# Patient Record
Sex: FEMALE | Race: WHITE | Employment: UNEMPLOYED | URBAN - NONMETROPOLITAN AREA
[De-identification: names, ages, dates, MRNs, and addresses within clinical notes are randomized per-mention and may not be internally consistent; named-entity substitution may affect disease eponyms.]

---

## 2018-12-05 ENCOUNTER — OFFICE VISIT (OUTPATIENT)
Dept: FAMILY MEDICINE CLINIC | Age: 38
End: 2018-12-05
Payer: MEDICAID

## 2018-12-05 VITALS
HEIGHT: 65 IN | RESPIRATION RATE: 14 BRPM | BODY MASS INDEX: 32.59 KG/M2 | HEART RATE: 76 BPM | DIASTOLIC BLOOD PRESSURE: 74 MMHG | SYSTOLIC BLOOD PRESSURE: 106 MMHG | WEIGHT: 195.6 LBS

## 2018-12-05 DIAGNOSIS — Z00.00 WELL ADULT EXAM: Primary | ICD-10-CM

## 2018-12-05 DIAGNOSIS — F32.A DEPRESSION, UNSPECIFIED DEPRESSION TYPE: ICD-10-CM

## 2018-12-05 DIAGNOSIS — M32.9 SYSTEMIC LUPUS ERYTHEMATOSUS, UNSPECIFIED SLE TYPE, UNSPECIFIED ORGAN INVOLVEMENT STATUS (HCC): ICD-10-CM

## 2018-12-05 DIAGNOSIS — M25.512 CHRONIC LEFT SHOULDER PAIN: ICD-10-CM

## 2018-12-05 DIAGNOSIS — J45.30 MILD PERSISTENT ASTHMA WITHOUT COMPLICATION: ICD-10-CM

## 2018-12-05 DIAGNOSIS — G89.29 CHRONIC LEFT SHOULDER PAIN: ICD-10-CM

## 2018-12-05 PROCEDURE — G8484 FLU IMMUNIZE NO ADMIN: HCPCS | Performed by: FAMILY MEDICINE

## 2018-12-05 PROCEDURE — G8427 DOCREV CUR MEDS BY ELIG CLIN: HCPCS | Performed by: FAMILY MEDICINE

## 2018-12-05 PROCEDURE — 99385 PREV VISIT NEW AGE 18-39: CPT | Performed by: FAMILY MEDICINE

## 2018-12-05 PROCEDURE — 4004F PT TOBACCO SCREEN RCVD TLK: CPT | Performed by: FAMILY MEDICINE

## 2018-12-05 PROCEDURE — 99203 OFFICE O/P NEW LOW 30 MIN: CPT | Performed by: FAMILY MEDICINE

## 2018-12-05 PROCEDURE — G8417 CALC BMI ABV UP PARAM F/U: HCPCS | Performed by: FAMILY MEDICINE

## 2018-12-05 RX ORDER — PREDNISONE 20 MG/1
TABLET ORAL
Qty: 15 TABLET | Refills: 0 | Status: SHIPPED | OUTPATIENT
Start: 2018-12-05 | End: 2018-12-14

## 2018-12-05 RX ORDER — IBUPROFEN 600 MG/1
600 TABLET ORAL EVERY 6 HOURS PRN
Qty: 360 TABLET | Refills: 3 | Status: SHIPPED | OUTPATIENT
Start: 2018-12-05 | End: 2019-05-21

## 2018-12-05 RX ORDER — ALPRAZOLAM 0.25 MG/1
0.25 TABLET ORAL NIGHTLY PRN
COMMUNITY
End: 2019-03-01 | Stop reason: ALTCHOICE

## 2018-12-05 RX ORDER — HYDROXYCHLOROQUINE SULFATE 200 MG/1
200 TABLET, FILM COATED ORAL DAILY
Qty: 90 TABLET | Refills: 1 | Status: SHIPPED | OUTPATIENT
Start: 2018-12-05 | End: 2019-06-11 | Stop reason: SDUPTHER

## 2018-12-05 RX ORDER — IBUPROFEN 600 MG/1
600 TABLET ORAL EVERY 6 HOURS PRN
COMMUNITY
End: 2018-12-05 | Stop reason: SDUPTHER

## 2018-12-05 RX ORDER — ALBUTEROL SULFATE 90 UG/1
2 AEROSOL, METERED RESPIRATORY (INHALATION) EVERY 6 HOURS PRN
Qty: 3 INHALER | Refills: 3 | Status: SHIPPED | OUTPATIENT
Start: 2018-12-05 | End: 2019-03-04 | Stop reason: SDUPTHER

## 2018-12-05 RX ORDER — MELATONIN 10 MG
30 CAPSULE ORAL NIGHTLY PRN
COMMUNITY
End: 2018-12-14 | Stop reason: ALTCHOICE

## 2018-12-05 RX ORDER — ALBUTEROL SULFATE 90 UG/1
2 AEROSOL, METERED RESPIRATORY (INHALATION) EVERY 6 HOURS PRN
COMMUNITY
End: 2018-12-05 | Stop reason: SDUPTHER

## 2018-12-05 ASSESSMENT — ENCOUNTER SYMPTOMS
ABDOMINAL PAIN: 0
CONSTIPATION: 0
NAUSEA: 0
VOMITING: 0
BACK PAIN: 0
BLOOD IN STOOL: 0
RHINORRHEA: 0
EYE PAIN: 0
SORE THROAT: 0
WHEEZING: 0
CHEST TIGHTNESS: 0
SHORTNESS OF BREATH: 0
DIARRHEA: 0
COUGH: 0

## 2018-12-05 ASSESSMENT — PATIENT HEALTH QUESTIONNAIRE - PHQ9
2. FEELING DOWN, DEPRESSED OR HOPELESS: 0
SUM OF ALL RESPONSES TO PHQ9 QUESTIONS 1 & 2: 0
SUM OF ALL RESPONSES TO PHQ QUESTIONS 1-9: 0
SUM OF ALL RESPONSES TO PHQ QUESTIONS 1-9: 0
1. LITTLE INTEREST OR PLEASURE IN DOING THINGS: 0

## 2018-12-05 NOTE — PATIENT INSTRUCTIONS
you take. How can you care for yourself at home? · Follow your asthma action plan so you can manage your symptoms at home. An asthma action plan will help you prevent and control airway reactions and will tell you what to do during an asthma attack. If you do not have an asthma action plan, work with your doctor to build one. · Take your asthma medicine exactly as prescribed. Medicine plays an important role in controlling asthma. Talk to your doctor right away if you have any questions about what to take and how to take it. ? Use your quick-relief medicine when you have symptoms of an attack. Quick-relief medicine often is an albuterol inhaler. Some people need to use quick-relief medicine before they exercise. ? Take your controller medicine every day, not just when you have symptoms. Controller medicine is usually an inhaled corticosteroid. The goal is to prevent problems before they occur. Do not use your controller medicine to try to treat an attack that has already started. It does not work fast enough to help. ? If your doctor prescribed corticosteroid pills to use during an attack, take them as directed. They may take hours to work, but they may shorten the attack and help you breathe better. ? Keep your quick-relief medicine with you at all times. · Talk to your doctor before using other medicines. Some medicines, such as aspirin, can cause asthma attacks in some people. · Check yourself for asthma symptoms to know which step to follow in your action plan. Watch for things like being short of breath, having chest tightness, coughing, and wheezing. Also notice if symptoms wake you up at night or if you get tired quickly when you exercise. · If you have a peak flow meter, use it to check how well you are breathing. This can help you predict when an asthma attack is going to occur. Then you can take medicine to prevent the asthma attack or make it less severe. · See your doctor regularly.  These visits will help you learn more about asthma and what you can do to control it. Your doctor will monitor your treatment to make sure the medicine is helping you. · Keep track of your asthma attacks and your treatment. After you have had an attack, write down what triggered it, what helped end it, and any concerns you have about your asthma action plan. Take your diary when you see your doctor. You can then review your asthma action plan and decide if it is working. · Do not smoke or allow others to smoke around you. Avoid smoky places. Smoking makes asthma worse. If you need help quitting, talk to your doctor about stop-smoking programs and medicines. These can increase your chances of quitting for good. · Learn what triggers an asthma attack for you, and avoid the triggers when you can. Common triggers include colds, smoke, air pollution, dust, pollen, mold, pets, cockroaches, stress, and cold air. · Avoid colds and the flu. Get a pneumococcal vaccine shot. If you have had one before, ask your doctor whether you need a second dose. Get a flu vaccine every fall. If you must be around people with colds or the flu, wash your hands often. When should you call for help? Call 911 anytime you think you may need emergency care. For example, call if:    · You have severe trouble breathing.    Call your doctor now or seek immediate medical care if:    · Your symptoms do not get better after you have followed your asthma action plan.     · You cough up yellow, dark brown, or bloody mucus (sputum).    Watch closely for changes in your health, and be sure to contact your doctor if:    · Your coughing and wheezing get worse.     · You need to use quick-relief medicine on more than 2 days a week (unless it is just for exercise).     · You need help figuring out what is triggering your asthma attacks. Where can you learn more? Go to https://chvivienne.Malwarebytes. org and sign in to your SmartRecruiters account.  Enter P597 in the Search Health Information box to learn more about \"Asthma in Adults: Care Instructions. \"     If you do not have an account, please click on the \"Sign Up Now\" link. Current as of: December 6, 2017  Content Version: 11.8  © 5978-7644 [employer]. Care instructions adapted under license by [facility] ([facility]). If you have questions about a medical condition or this instruction, always ask your healthcare professional. [place] [address] any warranty or liability for your use of this information. Patient Education        Depression Treatment: Care Instructions  Your Care Instructions    Depression is a condition that affects the way you feel, think, and act. It causes symptoms such as low energy, loss of interest in daily activities, and sadness or grouchiness that goes on for a long time. Depression is very common and affects men and women of all ages. Depression is a medical illness caused by changes in the natural chemicals in your brain. It is not a character flaw, and it does not mean that you are a bad or weak person. It does not mean that you are going crazy. It is important to know that depression can be treated. Medicines, counseling, and self-care can all help. Many people do not get help because they are embarrassed or think that they will get over the depression on their own. But some people do not get better without treatment. Follow-up care is a key part of your treatment and safety. Be sure to make and go to all appointments, and call your doctor if you are having problems. It's also a good idea to know your test results and keep a list of the medicines you take. How can you care for yourself at home? Learn about antidepressant medicines  Antidepressant medicines can improve or end the symptoms of depression. You may need to take the medicine for at least 6 months, and often longer. Keep taking your medicine even if you feel better.  If you stop taking it too soon, have these tests. Your doctor can help you decide what is right for you. · Pap test and pelvic exam. Begin Pap tests at age 24. A Pap test is the best way to find cervical cancer. The test often is part of a pelvic exam. Ask how often to have this test.  · Tests for sexually transmitted infections (STIs). Ask whether you should have tests for STIs. You may be at risk if you have sex with more than one person, especially if your partners do not wear condoms. For men  · Tests for sexually transmitted infections (STIs). Ask whether you should have tests for STIs. You may be at risk if you have sex with more than one person, especially if you do not wear a condom. · Testicular cancer exam. Ask your doctor whether you should check your testicles regularly. · Prostate exam. Talk to your doctor about whether you should have a blood test (called a PSA test) for prostate cancer. Experts differ on whether and when men should have this test. Some experts suggest it if you are older than 39 and are -American or have a father or brother who got prostate cancer when he was younger than 72. When should you call for help? Watch closely for changes in your health, and be sure to contact your doctor if you have any problems or symptoms that concern you. Where can you learn more? Go to https://Cabara.healthIntio. org and sign in to your GoWorkaBit account. Enter P072 in the [employer] box to learn more about \"Well Visit, Ages 25 to 48: Care Instructions. \"     If you do not have an account, please click on the \"Sign Up Now\" link. Current as of: March 29, 2018  Content Version: 11.8  © 6033-0379 Healthwise, Incorporated. Care instructions adapted under license by [facility] ([facility]). If you have questions about a medical condition or this instruction, always ask your healthcare professional. [place] [address] any warranty or liability for your use of this information.

## 2018-12-05 NOTE — PROGRESS NOTES
MCG/ACT inhaler, Inhale 2 puffs into the lungs every 6 hours as needed for Wheezing, Disp: , Rfl:     Melatonin 10 MG CAPS, Take 30 mg by mouth nightly as needed, Disp: , Rfl:     Hydroxychloroquine Sulfate (PLAQUENIL PO), Take by mouth, Disp: , Rfl:     ibuprofen (ADVIL;MOTRIN) 600 MG tablet, Take 600 mg by mouth every 6 hours as needed for Pain, Disp: , Rfl:     fluticasone-salmeterol (ADVAIR) 250-50 MCG/DOSE AEPB, Inhale 1 puff into the lungs every 12 hours, Disp: , Rfl:     ALPRAZolam (XANAX) 0.25 MG tablet, Take 0.25 mg by mouth nightly as needed for Sleep. ., Disp: , Rfl:     Lactase (DAIRY-RELIEF PO), Take by mouth, Disp: , Rfl:     RaNITidine HCl (ACID REDUCER PO), Take by mouth, Disp: , Rfl:     Fexofenadine HCl (ALLEGRA ALLERGY PO), Take by mouth, Disp: , Rfl:   Social History     Social History    Marital status: Single     Spouse name: N/A    Number of children: N/A    Years of education: N/A     Occupational History    Not on file. Social History Main Topics    Smoking status: Current Every Day Smoker     Packs/day: 0.50     Years: 15.00    Smokeless tobacco: Never Used    Alcohol use No    Drug use: No    Sexual activity: Yes     Other Topics Concern    Not on file     Social History Narrative    No narrative on file     Family History   Problem Relation Age of Onset    Depression Mother     COPD Father     Asthma Sister        Objective:   Physical Exam   Constitutional: She is oriented to person, place, and time. She appears well-developed and well-nourished. HENT:   Head: Normocephalic and atraumatic. Right Ear: External ear normal.   Left Ear: External ear normal.   Nose: Nose normal.   Mouth/Throat: Oropharynx is clear and moist.   Eyes: Pupils are equal, round, and reactive to light. EOM are normal.   Neck: Neck supple. No thyromegaly present. Cardiovascular: Normal rate, regular rhythm and normal heart sounds.     Pulmonary/Chest: Breath sounds normal. She has no

## 2018-12-14 ENCOUNTER — OFFICE VISIT (OUTPATIENT)
Dept: FAMILY MEDICINE CLINIC | Age: 38
End: 2018-12-14
Payer: MEDICAID

## 2018-12-14 ENCOUNTER — HOSPITAL ENCOUNTER (OUTPATIENT)
Age: 38
Discharge: HOME | End: 2018-12-14
Payer: MEDICAID

## 2018-12-14 ENCOUNTER — HOSPITAL ENCOUNTER (OUTPATIENT)
Dept: GENERAL RADIOLOGY | Age: 38
Discharge: HOME | End: 2018-12-14
Payer: MEDICAID

## 2018-12-14 VITALS
SYSTOLIC BLOOD PRESSURE: 102 MMHG | WEIGHT: 193.2 LBS | BODY MASS INDEX: 32.15 KG/M2 | DIASTOLIC BLOOD PRESSURE: 68 MMHG | HEART RATE: 76 BPM | RESPIRATION RATE: 16 BRPM

## 2018-12-14 DIAGNOSIS — M25.512 CHRONIC LEFT SHOULDER PAIN: ICD-10-CM

## 2018-12-14 DIAGNOSIS — M51.36 DDD (DEGENERATIVE DISC DISEASE), LUMBAR: ICD-10-CM

## 2018-12-14 DIAGNOSIS — R07.9 CHEST PAIN, UNSPECIFIED TYPE: ICD-10-CM

## 2018-12-14 DIAGNOSIS — M51.36 DDD (DEGENERATIVE DISC DISEASE), LUMBAR: Primary | ICD-10-CM

## 2018-12-14 DIAGNOSIS — G47.9 SLEEP DISTURBANCE: ICD-10-CM

## 2018-12-14 DIAGNOSIS — G89.29 CHRONIC LEFT SHOULDER PAIN: ICD-10-CM

## 2018-12-14 PROCEDURE — G8484 FLU IMMUNIZE NO ADMIN: HCPCS | Performed by: FAMILY MEDICINE

## 2018-12-14 PROCEDURE — 99214 OFFICE O/P EST MOD 30 MIN: CPT | Performed by: FAMILY MEDICINE

## 2018-12-14 PROCEDURE — 93000 ELECTROCARDIOGRAM COMPLETE: CPT | Performed by: FAMILY MEDICINE

## 2018-12-14 PROCEDURE — G8427 DOCREV CUR MEDS BY ELIG CLIN: HCPCS | Performed by: FAMILY MEDICINE

## 2018-12-14 PROCEDURE — G8417 CALC BMI ABV UP PARAM F/U: HCPCS | Performed by: FAMILY MEDICINE

## 2018-12-14 PROCEDURE — 73030 X-RAY EXAM OF SHOULDER: CPT

## 2018-12-14 PROCEDURE — 4004F PT TOBACCO SCREEN RCVD TLK: CPT | Performed by: FAMILY MEDICINE

## 2018-12-14 PROCEDURE — 72100 X-RAY EXAM L-S SPINE 2/3 VWS: CPT

## 2018-12-14 RX ORDER — DICLOFENAC SODIUM 75 MG/1
75 TABLET, DELAYED RELEASE ORAL 2 TIMES DAILY
Qty: 60 TABLET | Refills: 0 | Status: SHIPPED | OUTPATIENT
Start: 2018-12-14 | End: 2019-03-30 | Stop reason: SDUPTHER

## 2018-12-14 RX ORDER — TRAZODONE HYDROCHLORIDE 50 MG/1
50-100 TABLET ORAL NIGHTLY
Qty: 60 TABLET | Refills: 0 | Status: SHIPPED | OUTPATIENT
Start: 2018-12-14 | End: 2019-03-04 | Stop reason: SDUPTHER

## 2018-12-14 RX ORDER — PREDNISONE 20 MG/1
TABLET ORAL
Qty: 15 TABLET | Refills: 0 | Status: SHIPPED | OUTPATIENT
Start: 2018-12-14 | End: 2019-02-01 | Stop reason: ALTCHOICE

## 2018-12-14 ASSESSMENT — ENCOUNTER SYMPTOMS
ABDOMINAL PAIN: 0
COUGH: 0
BLOOD IN STOOL: 0
SHORTNESS OF BREATH: 0
SORE THROAT: 0
CHEST TIGHTNESS: 1
RHINORRHEA: 0
VOMITING: 0
EYE PAIN: 0
DIARRHEA: 0
BACK PAIN: 1
NAUSEA: 0
CONSTIPATION: 0
WHEEZING: 0

## 2018-12-14 NOTE — PATIENT INSTRUCTIONS
Patient Education        Learning About Degenerative Disc Disease  What is degenerative disc disease? Degenerative disc disease is not really a disease. It's a term used to describe the normal changes in your spinal discs as you age. Spinal discs are small, spongy discs that separate the bones (vertebrae) that make up the spine. The discs act as shock absorbers for the spine, so it can flex, bend, and twist.  Degenerative disc disease can take place in one or more places along the spine. It most often occurs in the discs in the lower back and the neck. What causes it? As we age, our spinal discs break down, or degenerate. This breakdown causes the symptoms of degenerative disc disease in some people. When the discs break down, they can lose fluid and dry out, and their outer layers can have tiny cracks or tears. This leads to less padding and less space between the bones in the spine. The body reacts to this by making bony growths on the spine called bone spurs. These spurs can press on the spinal nerve roots or spinal cord. This can cause pain and can affect how well the nerves work. What are the symptoms? Many people with degenerative disc disease have no pain. But others have severe pain or other symptoms that limit their activities. Some of the most common symptoms are:  · Pain in the back or neck. Where the pain occurs depends on which discs are affected. · Pain that gets worse when you move, such as bending over, reaching up, or twisting. · Pain that may occur in the rear end (buttocks), arm, or leg if a nerve is pinched. · Numbness or tingling in your arm or leg. How is it diagnosed? A doctor can often diagnose degenerative disc disease while doing a physical exam. If your exam shows no signs of a serious condition, imaging tests (such as an X-ray) aren't likely to help your doctor find the cause of your symptoms.   Sometimes degenerative disc disease is found when an X-ray is taken for another reason, such as an injury or other health problem. But even if the doctor finds degenerative disc disease, that doesn't always mean that you will have symptoms. How is it treated? There are several things you can do at home to manage pain from this problem. · To relieve pain, use ice or heat (whichever feels better) on the affected area. ? Put ice or a cold pack on the area for 10 to 20 minutes at a time. Put a thin cloth between the ice and your skin. ? Put a warm water bottle, a heating pad set on low, or a warm cloth on your back. Put a thin cloth between the heating pad and your skin. Do not go to sleep with a heating pad on your skin. · Ask your doctor if you can take acetaminophen (such as Tylenol) or nonsteroidal anti-inflammatory drugs, such as ibuprofen or naproxen. Your doctor can prescribe stronger medicines if needed. Be safe with medicines. Read and follow all instructions on the label. · Get some exercise every day. Exercise is one of the best ways to help your back feel better and stay better. It's best to start each exercise slowly. You may notice a little soreness, and that's okay. But if an exercise makes your pain worse, stop doing it. Here are things you can try:  ? Walking. It's the simplest and maybe the best activity for your back. It gets your blood moving and helps your muscles stay strong. ? Exercises that gently stretch and strengthen your stomach, back, and leg muscles. The stronger those muscles are, the better they're able to protect your back. If you have constant or severe pain in your back or spine, you may need other treatments, such as physical therapy. In some cases, your doctor may suggest surgery. Follow-up care is a key part of your treatment and safety. Be sure to make and go to all appointments, and call your doctor if you are having problems. It's also a good idea to know your test results and keep a list of the medicines you take. Where can you learn more?   Go to But if it doesn't, do your reading elsewhere in the house. Don't watch TV in bed. · Be sure your bed is big enough to stretch out comfortably, especially if you have a sleep partner. · Keep your bedroom quiet, dark, and cool. Use curtains, blinds, or a sleep mask to block out light. To block out noise, use earplugs, soothing music, or a \"white noise\" machine. Your evening and bedtime routine  · Create a relaxing bedtime routine. You might want to take a warm shower or bath, listen to soothing music, or drink a cup of noncaffeinated tea. · Go to bed at the same time every night. And get up at the same time every morning, even if you feel tired. What to avoid  · Limit caffeine (coffee, tea, caffeinated sodas) during the day, and don't have any for at least 4 to 6 hours before bedtime. · Don't drink alcohol before bedtime. Alcohol can cause you to wake up more often during the night. · Don't smoke or use tobacco, especially in the evening. Nicotine can keep you awake. · Don't take naps during the day, especially close to bedtime. · Don't lie in bed awake for too long. If you can't fall asleep, or if you wake up in the middle of the night and can't get back to sleep within 15 minutes or so, get out of bed and go to another room until you feel sleepy. · Don't take medicine right before bed that may keep you awake or make you feel hyper or energized. Your doctor can tell you if your medicine may do this and if you can take it earlier in the day. If you can't sleep  · Imagine yourself in a peaceful, pleasant scene. Focus on the details and feelings of being in a place that is relaxing. · Get up and do a quiet or boring activity until you feel sleepy. · Don't drink any liquids after 6 p.m. if you wake up often because you have to go to the bathroom. Where can you learn more? Go to https://sabine.iconDial. org and sign in to your [employer] account.  Enter I969 in the [facility] box to

## 2018-12-17 ENCOUNTER — TELEPHONE (OUTPATIENT)
Dept: FAMILY MEDICINE CLINIC | Age: 38
End: 2018-12-17

## 2018-12-17 NOTE — TELEPHONE ENCOUNTER
----- Message from Michelle Cain MD sent at 12/14/2018  3:17 PM EST -----  Left shoulder series is normal.

## 2019-01-10 ENCOUNTER — OFFICE VISIT (OUTPATIENT)
Dept: FAMILY MEDICINE CLINIC | Age: 39
End: 2019-01-10
Payer: MEDICAID

## 2019-01-10 VITALS
WEIGHT: 193.8 LBS | DIASTOLIC BLOOD PRESSURE: 72 MMHG | BODY MASS INDEX: 32.25 KG/M2 | HEART RATE: 64 BPM | SYSTOLIC BLOOD PRESSURE: 112 MMHG | RESPIRATION RATE: 12 BRPM

## 2019-01-10 DIAGNOSIS — K52.9 ACUTE GASTROENTERITIS: Primary | ICD-10-CM

## 2019-01-10 LAB
BASOPHILS # BLD: 0.9 %
BASOPHILS ABSOLUTE: 0.1 THOU/MM3 (ref 0–0.1)
EOSINOPHIL # BLD: 3 %
EOSINOPHILS ABSOLUTE: 0.2 THOU/MM3 (ref 0–0.4)
ERYTHROCYTE [DISTWIDTH] IN BLOOD BY AUTOMATED COUNT: 12.4 % (ref 11.5–14.5)
ERYTHROCYTE [DISTWIDTH] IN BLOOD BY AUTOMATED COUNT: 40.1 FL (ref 35–45)
HCT VFR BLD CALC: 41 % (ref 37–47)
HEMOGLOBIN: 14.3 GM/DL (ref 12–16)
IMMATURE GRANS (ABS): 0.01 THOU/MM3 (ref 0–0.07)
IMMATURE GRANULOCYTES: 0.1 %
LYMPHOCYTES # BLD: 24.6 %
LYMPHOCYTES ABSOLUTE: 1.9 THOU/MM3 (ref 1–4.8)
MCH RBC QN AUTO: 31 PG (ref 26–33)
MCHC RBC AUTO-ENTMCNC: 34.9 GM/DL (ref 32.2–35.5)
MCV RBC AUTO: 88.9 FL (ref 81–99)
MONOCYTES # BLD: 5.3 %
MONOCYTES ABSOLUTE: 0.4 THOU/MM3 (ref 0.4–1.3)
NUCLEATED RED BLOOD CELLS: 0 /100 WBC
PLATELET # BLD: 287 THOU/MM3 (ref 130–400)
PMV BLD AUTO: 11.4 FL (ref 9.4–12.4)
RBC # BLD: 4.61 MILL/MM3 (ref 4.2–5.4)
SEG NEUTROPHILS: 66.1 %
SEGMENTED NEUTROPHILS ABSOLUTE COUNT: 5 THOU/MM3 (ref 1.8–7.7)
WBC # BLD: 7.6 THOU/MM3 (ref 4.8–10.8)

## 2019-01-10 PROCEDURE — G8427 DOCREV CUR MEDS BY ELIG CLIN: HCPCS | Performed by: FAMILY MEDICINE

## 2019-01-10 PROCEDURE — G8484 FLU IMMUNIZE NO ADMIN: HCPCS | Performed by: FAMILY MEDICINE

## 2019-01-10 PROCEDURE — 99213 OFFICE O/P EST LOW 20 MIN: CPT | Performed by: FAMILY MEDICINE

## 2019-01-10 PROCEDURE — 96372 THER/PROPH/DIAG INJ SC/IM: CPT | Performed by: FAMILY MEDICINE

## 2019-01-10 PROCEDURE — 36415 COLL VENOUS BLD VENIPUNCTURE: CPT | Performed by: FAMILY MEDICINE

## 2019-01-10 PROCEDURE — 4004F PT TOBACCO SCREEN RCVD TLK: CPT | Performed by: FAMILY MEDICINE

## 2019-01-10 PROCEDURE — G8417 CALC BMI ABV UP PARAM F/U: HCPCS | Performed by: FAMILY MEDICINE

## 2019-01-10 RX ORDER — ONDANSETRON 4 MG/1
4 TABLET, FILM COATED ORAL EVERY 8 HOURS PRN
Qty: 15 TABLET | Refills: 0 | Status: SHIPPED | OUTPATIENT
Start: 2019-01-10 | End: 2019-05-24 | Stop reason: SDUPTHER

## 2019-01-10 RX ORDER — PROMETHAZINE HYDROCHLORIDE 25 MG/ML
50 INJECTION, SOLUTION INTRAMUSCULAR; INTRAVENOUS ONCE
Status: COMPLETED | OUTPATIENT
Start: 2019-01-10 | End: 2019-01-10

## 2019-01-10 RX ADMIN — PROMETHAZINE HYDROCHLORIDE 50 MG: 25 INJECTION, SOLUTION INTRAMUSCULAR; INTRAVENOUS at 15:26

## 2019-01-10 ASSESSMENT — ENCOUNTER SYMPTOMS
BACK PAIN: 0
SORE THROAT: 0
CONSTIPATION: 0
SHORTNESS OF BREATH: 0
RHINORRHEA: 0
NAUSEA: 1
DIARRHEA: 1
EYE PAIN: 0
BLOOD IN STOOL: 0
ABDOMINAL PAIN: 0
VOMITING: 0
COUGH: 0
CHEST TIGHTNESS: 0
WHEEZING: 0

## 2019-01-11 ENCOUNTER — TELEPHONE (OUTPATIENT)
Dept: FAMILY MEDICINE CLINIC | Age: 39
End: 2019-01-11

## 2019-02-01 ENCOUNTER — OFFICE VISIT (OUTPATIENT)
Dept: FAMILY MEDICINE CLINIC | Age: 39
End: 2019-02-01
Payer: MEDICAID

## 2019-02-01 VITALS
SYSTOLIC BLOOD PRESSURE: 122 MMHG | TEMPERATURE: 97.9 F | WEIGHT: 199.4 LBS | RESPIRATION RATE: 16 BRPM | HEART RATE: 100 BPM | HEIGHT: 65 IN | DIASTOLIC BLOOD PRESSURE: 86 MMHG | BODY MASS INDEX: 33.22 KG/M2

## 2019-02-01 DIAGNOSIS — M32.9 SYSTEMIC LUPUS ERYTHEMATOSUS, UNSPECIFIED SLE TYPE, UNSPECIFIED ORGAN INVOLVEMENT STATUS (HCC): ICD-10-CM

## 2019-02-01 DIAGNOSIS — M51.34 DDD (DEGENERATIVE DISC DISEASE), THORACIC: ICD-10-CM

## 2019-02-01 DIAGNOSIS — F32.A DEPRESSION, UNSPECIFIED DEPRESSION TYPE: ICD-10-CM

## 2019-02-01 DIAGNOSIS — J45.40 MODERATE PERSISTENT ASTHMA WITHOUT COMPLICATION: ICD-10-CM

## 2019-02-01 DIAGNOSIS — M51.36 DDD (DEGENERATIVE DISC DISEASE), LUMBAR: Primary | ICD-10-CM

## 2019-02-01 DIAGNOSIS — M50.30 DDD (DEGENERATIVE DISC DISEASE), CERVICAL: ICD-10-CM

## 2019-02-01 PROCEDURE — 4004F PT TOBACCO SCREEN RCVD TLK: CPT | Performed by: FAMILY MEDICINE

## 2019-02-01 PROCEDURE — G8427 DOCREV CUR MEDS BY ELIG CLIN: HCPCS | Performed by: FAMILY MEDICINE

## 2019-02-01 PROCEDURE — G8417 CALC BMI ABV UP PARAM F/U: HCPCS | Performed by: FAMILY MEDICINE

## 2019-02-01 PROCEDURE — 99214 OFFICE O/P EST MOD 30 MIN: CPT | Performed by: FAMILY MEDICINE

## 2019-02-01 PROCEDURE — G8484 FLU IMMUNIZE NO ADMIN: HCPCS | Performed by: FAMILY MEDICINE

## 2019-02-01 ASSESSMENT — ENCOUNTER SYMPTOMS
ABDOMINAL PAIN: 0
SHORTNESS OF BREATH: 1
COUGH: 1
BLOOD IN STOOL: 0
SORE THROAT: 0
RHINORRHEA: 0
EYE PAIN: 0
NAUSEA: 0
VOMITING: 0
CHEST TIGHTNESS: 0
DIARRHEA: 0
WHEEZING: 1
CONSTIPATION: 0
BACK PAIN: 1

## 2019-02-02 ENCOUNTER — APPOINTMENT (OUTPATIENT)
Dept: GENERAL RADIOLOGY | Age: 39
End: 2019-02-02
Payer: MEDICAID

## 2019-02-02 ENCOUNTER — HOSPITAL ENCOUNTER (EMERGENCY)
Age: 39
Discharge: HOME | End: 2019-02-02
Payer: MEDICAID

## 2019-02-02 VITALS
RESPIRATION RATE: 18 BRPM | HEART RATE: 85 BPM | DIASTOLIC BLOOD PRESSURE: 73 MMHG | TEMPERATURE: 97.7 F | OXYGEN SATURATION: 99 % | SYSTOLIC BLOOD PRESSURE: 134 MMHG

## 2019-02-02 DIAGNOSIS — M25.512 ARTHRALGIA OF LEFT SHOULDER REGION: Primary | ICD-10-CM

## 2019-02-02 PROCEDURE — 6360000002 HC RX W HCPCS: Performed by: NURSE PRACTITIONER

## 2019-02-02 PROCEDURE — 96372 THER/PROPH/DIAG INJ SC/IM: CPT

## 2019-02-02 PROCEDURE — 73030 X-RAY EXAM OF SHOULDER: CPT

## 2019-02-02 PROCEDURE — 99283 EMERGENCY DEPT VISIT LOW MDM: CPT

## 2019-02-02 RX ORDER — LIDOCAINE 50 MG/G
OINTMENT TOPICAL
Qty: 50 G | Refills: 0 | Status: SHIPPED | OUTPATIENT
Start: 2019-02-02 | End: 2021-12-22 | Stop reason: ALTCHOICE

## 2019-02-02 RX ORDER — CYCLOBENZAPRINE HCL 10 MG
10 TABLET ORAL 3 TIMES DAILY PRN
Qty: 12 TABLET | Refills: 0 | Status: SHIPPED | OUTPATIENT
Start: 2019-02-02 | End: 2019-02-12

## 2019-02-02 RX ORDER — ORPHENADRINE CITRATE 30 MG/ML
60 INJECTION INTRAMUSCULAR; INTRAVENOUS ONCE
Status: COMPLETED | OUTPATIENT
Start: 2019-02-02 | End: 2019-02-02

## 2019-02-02 RX ORDER — KETOROLAC TROMETHAMINE 30 MG/ML
30 INJECTION, SOLUTION INTRAMUSCULAR; INTRAVENOUS ONCE
Status: COMPLETED | OUTPATIENT
Start: 2019-02-02 | End: 2019-02-02

## 2019-02-02 RX ADMIN — KETOROLAC TROMETHAMINE 30 MG: 30 INJECTION, SOLUTION INTRAMUSCULAR at 02:31

## 2019-02-02 RX ADMIN — ORPHENADRINE CITRATE 60 MG: 30 INJECTION INTRAMUSCULAR; INTRAVENOUS at 02:31

## 2019-02-02 ASSESSMENT — PAIN DESCRIPTION - ORIENTATION: ORIENTATION: LEFT

## 2019-02-02 ASSESSMENT — PAIN DESCRIPTION - LOCATION: LOCATION: SHOULDER

## 2019-02-02 ASSESSMENT — PAIN SCALES - GENERAL: PAINLEVEL_OUTOF10: 5

## 2019-02-04 ENCOUNTER — TELEPHONE (OUTPATIENT)
Dept: FAMILY MEDICINE CLINIC | Age: 39
End: 2019-02-04

## 2019-02-08 ENCOUNTER — HOSPITAL ENCOUNTER (OUTPATIENT)
Dept: PULMONOLOGY | Age: 39
Discharge: HOME | End: 2019-02-08
Payer: MEDICAID

## 2019-02-08 DIAGNOSIS — J45.40 MODERATE PERSISTENT ASTHMA WITHOUT COMPLICATION: ICD-10-CM

## 2019-02-08 PROCEDURE — 2709999900 HC NON-CHARGEABLE SUPPLY

## 2019-02-08 PROCEDURE — 94060 EVALUATION OF WHEEZING: CPT

## 2019-02-08 PROCEDURE — 94726 PLETHYSMOGRAPHY LUNG VOLUMES: CPT

## 2019-02-08 PROCEDURE — 94729 DIFFUSING CAPACITY: CPT

## 2019-02-12 ASSESSMENT — ENCOUNTER SYMPTOMS
BACK PAIN: 0
SHORTNESS OF BREATH: 0

## 2019-02-13 ENCOUNTER — TELEPHONE (OUTPATIENT)
Dept: FAMILY MEDICINE CLINIC | Age: 39
End: 2019-02-13

## 2019-02-13 DIAGNOSIS — R94.2 ABNORMAL PFT: Primary | ICD-10-CM

## 2019-02-15 ENCOUNTER — HOSPITAL ENCOUNTER (OUTPATIENT)
Dept: PHYSICAL THERAPY | Age: 39
Setting detail: THERAPIES SERIES
Discharge: HOME | End: 2019-02-15
Payer: MEDICAID

## 2019-02-15 PROCEDURE — 97750 PHYSICAL PERFORMANCE TEST: CPT

## 2019-02-20 ENCOUNTER — TELEPHONE (OUTPATIENT)
Dept: FAMILY MEDICINE CLINIC | Age: 39
End: 2019-02-20

## 2019-02-22 ENCOUNTER — HOSPITAL ENCOUNTER (OUTPATIENT)
Dept: MRI IMAGING | Age: 39
Discharge: HOME | End: 2019-02-22
Payer: MEDICAID

## 2019-02-22 ENCOUNTER — OFFICE VISIT (OUTPATIENT)
Dept: PSYCHIATRY | Age: 39
End: 2019-02-22
Payer: MEDICAID

## 2019-02-22 VITALS
DIASTOLIC BLOOD PRESSURE: 82 MMHG | HEART RATE: 88 BPM | WEIGHT: 197 LBS | SYSTOLIC BLOOD PRESSURE: 121 MMHG | BODY MASS INDEX: 32.78 KG/M2

## 2019-02-22 DIAGNOSIS — M51.36 DDD (DEGENERATIVE DISC DISEASE), LUMBAR: ICD-10-CM

## 2019-02-22 DIAGNOSIS — M51.34 DDD (DEGENERATIVE DISC DISEASE), THORACIC: ICD-10-CM

## 2019-02-22 DIAGNOSIS — F41.8 OTHER SPECIFIED ANXIETY DISORDERS: ICD-10-CM

## 2019-02-22 DIAGNOSIS — R45.4 IRRITABILITY AND ANGER: ICD-10-CM

## 2019-02-22 DIAGNOSIS — M50.30 DDD (DEGENERATIVE DISC DISEASE), CERVICAL: ICD-10-CM

## 2019-02-22 DIAGNOSIS — F33.1 MDD (MAJOR DEPRESSIVE DISORDER), RECURRENT EPISODE, MODERATE (HCC): Primary | ICD-10-CM

## 2019-02-22 PROCEDURE — 4004F PT TOBACCO SCREEN RCVD TLK: CPT | Performed by: NURSE PRACTITIONER

## 2019-02-22 PROCEDURE — 72146 MRI CHEST SPINE W/O DYE: CPT

## 2019-02-22 PROCEDURE — 90791 PSYCH DIAGNOSTIC EVALUATION: CPT | Performed by: NURSE PRACTITIONER

## 2019-02-22 PROCEDURE — 72141 MRI NECK SPINE W/O DYE: CPT

## 2019-02-22 PROCEDURE — 72148 MRI LUMBAR SPINE W/O DYE: CPT

## 2019-02-26 ENCOUNTER — TELEPHONE (OUTPATIENT)
Dept: FAMILY MEDICINE CLINIC | Age: 39
End: 2019-02-26

## 2019-03-01 ENCOUNTER — OFFICE VISIT (OUTPATIENT)
Dept: PSYCHIATRY | Age: 39
End: 2019-03-01
Payer: MEDICAID

## 2019-03-01 ENCOUNTER — OFFICE VISIT (OUTPATIENT)
Dept: FAMILY MEDICINE CLINIC | Age: 39
End: 2019-03-01
Payer: MEDICAID

## 2019-03-01 VITALS
SYSTOLIC BLOOD PRESSURE: 112 MMHG | DIASTOLIC BLOOD PRESSURE: 80 MMHG | WEIGHT: 201 LBS | BODY MASS INDEX: 33.45 KG/M2 | HEART RATE: 82 BPM

## 2019-03-01 VITALS
WEIGHT: 201.6 LBS | BODY MASS INDEX: 33.55 KG/M2 | RESPIRATION RATE: 16 BRPM | DIASTOLIC BLOOD PRESSURE: 76 MMHG | HEART RATE: 60 BPM | SYSTOLIC BLOOD PRESSURE: 106 MMHG

## 2019-03-01 DIAGNOSIS — R45.4 IRRITABILITY AND ANGER: ICD-10-CM

## 2019-03-01 DIAGNOSIS — F41.8 OTHER SPECIFIED ANXIETY DISORDERS: ICD-10-CM

## 2019-03-01 DIAGNOSIS — G95.0 SYRINX OF SPINAL CORD (HCC): ICD-10-CM

## 2019-03-01 DIAGNOSIS — M32.9 SYSTEMIC LUPUS ERYTHEMATOSUS, UNSPECIFIED SLE TYPE, UNSPECIFIED ORGAN INVOLVEMENT STATUS (HCC): ICD-10-CM

## 2019-03-01 DIAGNOSIS — F43.11 ACUTE POSTTRAUMATIC STRESS DISORDER: ICD-10-CM

## 2019-03-01 DIAGNOSIS — F33.1 MDD (MAJOR DEPRESSIVE DISORDER), RECURRENT EPISODE, MODERATE (HCC): Primary | ICD-10-CM

## 2019-03-01 DIAGNOSIS — M51.36 BULGING LUMBAR DISC: Primary | ICD-10-CM

## 2019-03-01 PROCEDURE — 99214 OFFICE O/P EST MOD 30 MIN: CPT | Performed by: NURSE PRACTITIONER

## 2019-03-01 PROCEDURE — G8427 DOCREV CUR MEDS BY ELIG CLIN: HCPCS | Performed by: FAMILY MEDICINE

## 2019-03-01 PROCEDURE — G8484 FLU IMMUNIZE NO ADMIN: HCPCS | Performed by: FAMILY MEDICINE

## 2019-03-01 PROCEDURE — G8484 FLU IMMUNIZE NO ADMIN: HCPCS | Performed by: NURSE PRACTITIONER

## 2019-03-01 PROCEDURE — 4004F PT TOBACCO SCREEN RCVD TLK: CPT | Performed by: NURSE PRACTITIONER

## 2019-03-01 PROCEDURE — G8417 CALC BMI ABV UP PARAM F/U: HCPCS | Performed by: FAMILY MEDICINE

## 2019-03-01 PROCEDURE — 99214 OFFICE O/P EST MOD 30 MIN: CPT | Performed by: FAMILY MEDICINE

## 2019-03-01 PROCEDURE — G8417 CALC BMI ABV UP PARAM F/U: HCPCS | Performed by: NURSE PRACTITIONER

## 2019-03-01 PROCEDURE — G8427 DOCREV CUR MEDS BY ELIG CLIN: HCPCS | Performed by: NURSE PRACTITIONER

## 2019-03-01 PROCEDURE — 4004F PT TOBACCO SCREEN RCVD TLK: CPT | Performed by: FAMILY MEDICINE

## 2019-03-01 RX ORDER — HYDROXYZINE PAMOATE 25 MG/1
25 CAPSULE ORAL 3 TIMES DAILY PRN
Qty: 90 CAPSULE | Refills: 0 | Status: SHIPPED | OUTPATIENT
Start: 2019-03-01 | End: 2019-03-31

## 2019-03-01 RX ORDER — LEUCOVORIN, FOLIC ACID, LEVOMEFOLATE MAGNESIUM, FERROUS CYSTEINE GLYCINATE, 1,2-DOCOSAHEXANOYL-SN-GLYCERO-3-PHOSPHOSERINE CALCIUM, 1,2-ICOSAPENTOYL-SN-GLYCERO-3-PHOSPHOSERINE CALCIUM, PHOSPHATIDYL SERINE, PYRIDOXAL 5-PHOSPHATE, FLAVIN ADENINE DINUCLEOTIDE, NADH, COBAMAMIDE, COCARBOXYLASE (THIAMINE PYROPHOSPHATE), MAGNESIUM ASCORBATE, ZINC ASCORBATE, MAGNESIUM L-THREONATE AND BETAINE 2.5; 1; 7; 13.6; 6.4; 800; 12; 25; 25; 25; 50; 25; 24; 1; 1; 500; 1.83; 3.67 MG/1; MG/1; MG/1; MG/1; MG/1; UG/1; MG/1; UG/1; UG/1; UG/1; UG/1; UG/1; MG/1; MG/1; MG/1; UG/1; MG/1; MG/1
1 CAPSULE, DELAYED RELEASE PELLETS ORAL DAILY
Qty: 30 CAPSULE | Refills: 1 | Status: SHIPPED | OUTPATIENT
Start: 2019-03-01 | End: 2019-08-15

## 2019-03-01 RX ORDER — DESVENLAFAXINE 50 MG/1
50 TABLET, EXTENDED RELEASE ORAL DAILY
Qty: 30 TABLET | Refills: 1 | Status: SHIPPED | OUTPATIENT
Start: 2019-03-01 | End: 2019-05-16

## 2019-03-01 ASSESSMENT — ENCOUNTER SYMPTOMS
CHEST TIGHTNESS: 0
RHINORRHEA: 0
COUGH: 0
VOMITING: 0
BACK PAIN: 1
NAUSEA: 0
SORE THROAT: 0
EYE PAIN: 0
DIARRHEA: 0
WHEEZING: 0
BLOOD IN STOOL: 0
ABDOMINAL PAIN: 0
SHORTNESS OF BREATH: 0
CONSTIPATION: 0

## 2019-03-04 DIAGNOSIS — G47.9 SLEEP DISTURBANCE: ICD-10-CM

## 2019-03-04 DIAGNOSIS — J45.30 MILD PERSISTENT ASTHMA WITHOUT COMPLICATION: ICD-10-CM

## 2019-03-04 RX ORDER — TRAZODONE HYDROCHLORIDE 50 MG/1
50-100 TABLET ORAL NIGHTLY
Qty: 60 TABLET | Refills: 2 | Status: SHIPPED | OUTPATIENT
Start: 2019-03-04 | End: 2019-08-15 | Stop reason: DRUGHIGH

## 2019-03-04 RX ORDER — ALBUTEROL SULFATE 90 UG/1
2 AEROSOL, METERED RESPIRATORY (INHALATION) EVERY 6 HOURS PRN
Qty: 3 INHALER | Refills: 3 | Status: CANCELLED | OUTPATIENT
Start: 2019-03-04

## 2019-03-04 RX ORDER — ALBUTEROL SULFATE 90 UG/1
2 AEROSOL, METERED RESPIRATORY (INHALATION) EVERY 6 HOURS PRN
Qty: 1 INHALER | Refills: 2 | Status: SHIPPED | OUTPATIENT
Start: 2019-03-04 | End: 2019-03-20 | Stop reason: SDUPTHER

## 2019-03-12 ENCOUNTER — TELEPHONE (OUTPATIENT)
Dept: FAMILY MEDICINE CLINIC | Age: 39
End: 2019-03-12

## 2019-03-20 ENCOUNTER — TELEPHONE (OUTPATIENT)
Dept: PULMONOLOGY | Age: 39
End: 2019-03-20

## 2019-03-20 ENCOUNTER — OFFICE VISIT (OUTPATIENT)
Dept: PULMONOLOGY | Age: 39
End: 2019-03-20
Payer: MEDICAID

## 2019-03-20 VITALS
OXYGEN SATURATION: 97 % | TEMPERATURE: 97.5 F | BODY MASS INDEX: 34.89 KG/M2 | WEIGHT: 204.4 LBS | DIASTOLIC BLOOD PRESSURE: 74 MMHG | SYSTOLIC BLOOD PRESSURE: 124 MMHG | HEART RATE: 90 BPM | HEIGHT: 64 IN

## 2019-03-20 DIAGNOSIS — J45.30 MILD PERSISTENT ASTHMA WITHOUT COMPLICATION: Primary | ICD-10-CM

## 2019-03-20 DIAGNOSIS — F17.200 SMOKER: ICD-10-CM

## 2019-03-20 PROCEDURE — G8484 FLU IMMUNIZE NO ADMIN: HCPCS | Performed by: INTERNAL MEDICINE

## 2019-03-20 PROCEDURE — 4004F PT TOBACCO SCREEN RCVD TLK: CPT | Performed by: INTERNAL MEDICINE

## 2019-03-20 PROCEDURE — G8427 DOCREV CUR MEDS BY ELIG CLIN: HCPCS | Performed by: INTERNAL MEDICINE

## 2019-03-20 PROCEDURE — 99204 OFFICE O/P NEW MOD 45 MIN: CPT | Performed by: INTERNAL MEDICINE

## 2019-03-20 PROCEDURE — G8417 CALC BMI ABV UP PARAM F/U: HCPCS | Performed by: INTERNAL MEDICINE

## 2019-03-20 RX ORDER — VARENICLINE TARTRATE 25 MG
KIT ORAL
Qty: 53 TABLET | Refills: 0 | Status: SHIPPED | OUTPATIENT
Start: 2019-03-20 | End: 2019-06-11

## 2019-03-20 RX ORDER — ALBUTEROL SULFATE 90 UG/1
2 AEROSOL, METERED RESPIRATORY (INHALATION) EVERY 4 HOURS PRN
Qty: 1 INHALER | Refills: 11 | Status: SHIPPED | OUTPATIENT
Start: 2019-03-20 | End: 2019-11-19 | Stop reason: SDUPTHER

## 2019-03-20 RX ORDER — VARENICLINE TARTRATE 1 MG/1
1 TABLET, FILM COATED ORAL 2 TIMES DAILY
Qty: 60 TABLET | Refills: 11 | Status: SHIPPED | OUTPATIENT
Start: 2019-03-20 | End: 2019-06-11

## 2019-03-20 ASSESSMENT — ENCOUNTER SYMPTOMS
EYES NEGATIVE: 1
COUGH: 1
APNEA: 0
VOICE CHANGE: 0
STRIDOR: 0
TROUBLE SWALLOWING: 0
SHORTNESS OF BREATH: 1
CHEST TIGHTNESS: 1
WHEEZING: 1
GASTROINTESTINAL NEGATIVE: 1
CHOKING: 0

## 2019-03-29 ENCOUNTER — OFFICE VISIT (OUTPATIENT)
Dept: PSYCHIATRY | Age: 39
End: 2019-03-29
Payer: MEDICAID

## 2019-03-29 VITALS
WEIGHT: 200 LBS | BODY MASS INDEX: 34.33 KG/M2 | SYSTOLIC BLOOD PRESSURE: 107 MMHG | DIASTOLIC BLOOD PRESSURE: 77 MMHG | HEART RATE: 70 BPM

## 2019-03-29 DIAGNOSIS — F33.0 MDD (MAJOR DEPRESSIVE DISORDER), RECURRENT EPISODE, MILD (HCC): Primary | ICD-10-CM

## 2019-03-29 DIAGNOSIS — R45.4 IRRITABILITY AND ANGER: ICD-10-CM

## 2019-03-29 PROCEDURE — 4004F PT TOBACCO SCREEN RCVD TLK: CPT | Performed by: NURSE PRACTITIONER

## 2019-03-29 PROCEDURE — G8484 FLU IMMUNIZE NO ADMIN: HCPCS | Performed by: NURSE PRACTITIONER

## 2019-03-29 PROCEDURE — G8417 CALC BMI ABV UP PARAM F/U: HCPCS | Performed by: NURSE PRACTITIONER

## 2019-03-29 PROCEDURE — G8427 DOCREV CUR MEDS BY ELIG CLIN: HCPCS | Performed by: NURSE PRACTITIONER

## 2019-03-29 PROCEDURE — 99213 OFFICE O/P EST LOW 20 MIN: CPT | Performed by: NURSE PRACTITIONER

## 2019-03-30 DIAGNOSIS — M25.512 CHRONIC LEFT SHOULDER PAIN: ICD-10-CM

## 2019-03-30 DIAGNOSIS — G89.29 CHRONIC LEFT SHOULDER PAIN: ICD-10-CM

## 2019-03-30 DIAGNOSIS — M51.36 DDD (DEGENERATIVE DISC DISEASE), LUMBAR: ICD-10-CM

## 2019-04-01 RX ORDER — DICLOFENAC SODIUM 75 MG/1
75 TABLET, DELAYED RELEASE ORAL 2 TIMES DAILY
Qty: 60 TABLET | Refills: 0 | Status: SHIPPED | OUTPATIENT
Start: 2019-04-01 | End: 2019-06-10 | Stop reason: SDUPTHER

## 2019-04-01 NOTE — TELEPHONE ENCOUNTER
Chad Koo needs refill of   Requested Prescriptions     Pending Prescriptions Disp Refills    diclofenac (VOLTAREN) 75 MG EC tablet 60 tablet 0     Sig: Take 1 tablet by mouth 2 times daily       Last Filled on:  12/14/2018 #60/NR    Last Visit Date:  3/1/2019    Next Visit Date:    Visit date not found

## 2019-04-03 DIAGNOSIS — M32.9 SYSTEMIC LUPUS ERYTHEMATOSUS, UNSPECIFIED SLE TYPE, UNSPECIFIED ORGAN INVOLVEMENT STATUS (HCC): ICD-10-CM

## 2019-04-03 RX ORDER — HYDROXYCHLOROQUINE SULFATE 200 MG/1
200 TABLET, FILM COATED ORAL DAILY
Qty: 90 TABLET | Refills: 1 | Status: CANCELLED | OUTPATIENT
Start: 2019-04-03

## 2019-04-03 NOTE — TELEPHONE ENCOUNTER
Tabitha Lynn needs refill of   Requested Prescriptions     Pending Prescriptions Disp Refills    hydroxychloroquine (PLAQUENIL) 200 MG tablet 90 tablet 1     Sig: Take 1 tablet by mouth daily       Last Filled on:  12/05/2018 #90/1    Last Visit Date:  3/1/2019    Next Visit Date:    Visit date not found    Pt should have enough medication until her appointment with Dr Jeri Woodruff. I tried to call [facility], phone kept ringing busy. I sent a adsquare message to Pt.

## 2019-04-17 ENCOUNTER — OFFICE VISIT (OUTPATIENT)
Dept: FAMILY MEDICINE CLINIC | Age: 39
End: 2019-04-17
Payer: MEDICAID

## 2019-04-17 VITALS
HEART RATE: 68 BPM | RESPIRATION RATE: 12 BRPM | WEIGHT: 204.6 LBS | SYSTOLIC BLOOD PRESSURE: 114 MMHG | DIASTOLIC BLOOD PRESSURE: 74 MMHG | BODY MASS INDEX: 35.12 KG/M2

## 2019-04-17 DIAGNOSIS — Z23 NEED FOR TDAP VACCINATION: ICD-10-CM

## 2019-04-17 DIAGNOSIS — M25.512 ACUTE PAIN OF LEFT SHOULDER: Primary | ICD-10-CM

## 2019-04-17 DIAGNOSIS — F17.210 CIGARETTE NICOTINE DEPENDENCE WITHOUT COMPLICATION: ICD-10-CM

## 2019-04-17 DIAGNOSIS — Z23 NEED FOR PNEUMOCOCCAL VACCINATION: ICD-10-CM

## 2019-04-17 PROCEDURE — G8417 CALC BMI ABV UP PARAM F/U: HCPCS | Performed by: FAMILY MEDICINE

## 2019-04-17 PROCEDURE — 99214 OFFICE O/P EST MOD 30 MIN: CPT | Performed by: FAMILY MEDICINE

## 2019-04-17 PROCEDURE — G8427 DOCREV CUR MEDS BY ELIG CLIN: HCPCS | Performed by: FAMILY MEDICINE

## 2019-04-17 PROCEDURE — 90472 IMMUNIZATION ADMIN EACH ADD: CPT | Performed by: FAMILY MEDICINE

## 2019-04-17 PROCEDURE — 4004F PT TOBACCO SCREEN RCVD TLK: CPT | Performed by: FAMILY MEDICINE

## 2019-04-17 PROCEDURE — 90471 IMMUNIZATION ADMIN: CPT | Performed by: FAMILY MEDICINE

## 2019-04-17 PROCEDURE — 90732 PPSV23 VACC 2 YRS+ SUBQ/IM: CPT | Performed by: FAMILY MEDICINE

## 2019-04-17 PROCEDURE — 90715 TDAP VACCINE 7 YRS/> IM: CPT | Performed by: FAMILY MEDICINE

## 2019-04-17 RX ORDER — PREDNISONE 20 MG/1
TABLET ORAL
Qty: 15 TABLET | Refills: 2 | Status: SHIPPED | OUTPATIENT
Start: 2019-04-17 | End: 2019-06-11

## 2019-04-17 ASSESSMENT — ENCOUNTER SYMPTOMS
CHEST TIGHTNESS: 0
CONSTIPATION: 0
WHEEZING: 0
EYE PAIN: 0
COUGH: 0
ABDOMINAL PAIN: 0
BLOOD IN STOOL: 0
DIARRHEA: 0
VOMITING: 0
SHORTNESS OF BREATH: 0
NAUSEA: 0
BACK PAIN: 0
RHINORRHEA: 0
SORE THROAT: 0

## 2019-04-17 NOTE — PROGRESS NOTES
Bowel sounds are normal. There is no tenderness. There is no rebound and no guarding. Musculoskeletal: Normal range of motion. She exhibits no edema. Left shoulder: She exhibits tenderness. She exhibits normal range of motion. Lymphadenopathy:     She has no cervical adenopathy. Neurological: She is alert and oriented to person, place, and time. She has normal reflexes. No cranial nerve deficit. Skin: Skin is warm and dry. No rash noted. Psychiatric: She has a normal mood and affect. Nursing note and vitals reviewed. Health Maintenance   Topic Date Due    Pneumococcal 0-64 years Vaccine (1 of 1 - PPSV23) 06/04/1986    Varicella Vaccine (1 of 2 - 13+ 2-dose series) 06/04/1993    HIV screen  06/04/1995    DTaP/Tdap/Td vaccine (1 - Tdap) 06/04/1999    Cervical cancer screen  06/04/2001    Flu vaccine  Completed       Assessment:      Left shoulder pain  Need for Tdap  Need for pneumococcal shot  Nicotine dependence      Plan:      Declines PT/MRI/Specialist  Reviewed health maintenance  Tdap   Pneumovax 23  Encourage smoking cessation    Eufemia received counseling on the following healthy behaviors: nutrition, exercise, medication adherence and tobacco cessation    Patient given educational materials on Smoking Cessation, Nutrition and Exercise    I have instructed Eufemia to complete a self tracking handout on Weights and Smoking and instructed them to bring it with them to her next appointment. Discussed use, benefit, and side effects of prescribed medications. Barriers to medication compliance addressed. All patient questions answered. Pt voiced understanding. Controlled Substances Monitoring:     RX Monitoring 4/17/2019   Attestation The Prescription Monitoring Report for this patient was reviewed today. Chronic Pain Routine Monitoring Possible medication side effects, risk of tolerance/dependence & alternative treatments discussed. ;No signs of potential drug abuse or diversion identified: otherwise, see note documentation               Reji Dodge MD

## 2019-04-17 NOTE — PROGRESS NOTES
After obtaining consent, and per orders of Dr. Onesimo Sandhu, injection of Adacel 0.5 mL IM right deltoid given by Jose Levy. Patient tolerated well. After obtaining consent, and per orders of Dr. Onesimo Sandhu, injection of Pneumovax 23 0.5 mL IM left deltoid given by Jose Levy. Patient tolerated well.     Immunizations     Name Date Dose Route    Pneumococcal Polysaccharide ([id number]) 4/17/2019 0.5 mL Intramuscular    Site: Deltoid- Left    Lot: [id number]    NDC: 7078-6592-29    Tdap (Boostrix, Adacel) 4/17/2019 0.5 mL Intramuscular    Site: Deltoid- Right    Lot: [id number]    ND: 01109-905-95

## 2019-04-17 NOTE — PATIENT INSTRUCTIONS
you're smoke-free, you get sick less often, and you heal faster. You are less likely to get colds, flu, bronchitis, and pneumonia. · As a nonsmoker, you may find that your mood is better and you are less stressed. When and how will you feel healthier? Quitting has real health benefits that start from day 1 of being smoke-free. And the longer you stay smoke-free, the healthier you get and the better you feel. The first hours  · After just 20 minutes, your blood pressure and heart rate go down. That means there's less stress on your heart and blood vessels. · Within 12 hours, the level of carbon monoxide in your blood drops back to normal. That makes room for more oxygen. With more oxygen in your body, you may notice that you have more energy than when you smoked. After 2 weeks  · Your lungs start to work better. · Your risk of heart attack starts to drop. After 1 month  · When your lungs are clear, you cough less and breathe deeper, so it's easier to be active. · Your sense of taste and smell return. That means you can enjoy food more than you have since you started smoking. Over the years  · After 1 year, your risk of heart disease is half what it would be if you kept smoking. · After 5 years, your risk of stroke starts to shrink. Within a few years after that, it's about the same as if you'd never smoked. · After 10 years, your risk of dying from lung cancer is cut by about half. And your risk for many other types of cancer is lower too. How would quitting help others in your life? When you quit smoking, you improve the health of everyone who now breathes in your smoke. · Their heart, lung, and cancer risks drop, much like yours. · They are sick less. For babies and small children, living smoke-free means they're less likely to have ear infections, pneumonia, and bronchitis. · If you're a woman who is or will be pregnant someday, quitting smoking means a healthier .   · Children who are close to you are less likely to become adult smokers. Where can you learn more? Go to https://chpepiceweb.healthArena Solutions. org and sign in to your Cubiezt account. Enter [phone] in the [facility] box to learn more about \"Learning About Benefits From Quitting Smoking. \"     If you do not have an account, please click on the \"Sign Up Now\" link. Current as of: September 26, 2018  Content Version: 11.9  © 3822-8615 FanMiles, Incorporated. Care instructions adapted under license by [facility] ([facility]). If you have questions about a medical condition or this instruction, always ask your healthcare professional. [address] any warranty or liability for your use of this information.

## 2019-05-15 ENCOUNTER — OFFICE VISIT (OUTPATIENT)
Dept: FAMILY MEDICINE CLINIC | Age: 39
End: 2019-05-15
Payer: MEDICAID

## 2019-05-15 VITALS
WEIGHT: 209.2 LBS | SYSTOLIC BLOOD PRESSURE: 110 MMHG | HEART RATE: 64 BPM | BODY MASS INDEX: 35.91 KG/M2 | RESPIRATION RATE: 12 BRPM | DIASTOLIC BLOOD PRESSURE: 68 MMHG

## 2019-05-15 DIAGNOSIS — L30.9 PERIORBITAL DERMATITIS: Primary | ICD-10-CM

## 2019-05-15 PROCEDURE — G8417 CALC BMI ABV UP PARAM F/U: HCPCS | Performed by: FAMILY MEDICINE

## 2019-05-15 PROCEDURE — 4004F PT TOBACCO SCREEN RCVD TLK: CPT | Performed by: FAMILY MEDICINE

## 2019-05-15 PROCEDURE — G8427 DOCREV CUR MEDS BY ELIG CLIN: HCPCS | Performed by: FAMILY MEDICINE

## 2019-05-15 PROCEDURE — 99213 OFFICE O/P EST LOW 20 MIN: CPT | Performed by: FAMILY MEDICINE

## 2019-05-15 RX ORDER — ALCLOMETASONE DIPROPIONATE 0.5 MG/G
CREAM TOPICAL
Qty: 1 TUBE | Refills: 1 | Status: SHIPPED | OUTPATIENT
Start: 2019-05-15 | End: 2019-07-24 | Stop reason: CLARIF

## 2019-05-15 ASSESSMENT — ENCOUNTER SYMPTOMS
CHEST TIGHTNESS: 0
RHINORRHEA: 0
CONSTIPATION: 0
DIARRHEA: 0
ABDOMINAL PAIN: 0
WHEEZING: 0
BACK PAIN: 0
COUGH: 0
EYE PAIN: 0
SORE THROAT: 0
BLOOD IN STOOL: 0
NAUSEA: 0
VOMITING: 0
SHORTNESS OF BREATH: 0

## 2019-05-15 NOTE — PATIENT INSTRUCTIONS
pain, swelling, warmth, or redness. ? Red streaks leading from the area. ? Pus draining from the area. ? A fever.     · You have joint pain along with the rash.    Watch closely for changes in your health, and be sure to contact your doctor if:    · Your rash is changing or getting worse.     · You are not getting better as expected. Where can you learn more? Go to https://lancers Inc.777 Davis. org and sign in to your [employer] account. Enter [phone] in the [facility] box to learn more about \"Dermatitis: Care Instructions. \"     If you do not have an account, please click on the \"Sign Up Now\" link. Current as of: April 17, 2018  Content Version: 12.0  © 1008-6588 Healthwise, Incorporated. Care instructions adapted under license by [facility] ([facility]). If you have questions about a medical condition or this instruction, always ask your healthcare professional. [place] [address] any warranty or liability for your use of this information.

## 2019-05-15 NOTE — PROGRESS NOTES
Subjective:      Patient ID: Deyanira Gross is a 45 y.o. female. HPI  Pt here for a check up. Reviewed BMI of 36. Encouraged diet, exercise and weight loss. Reviewed health maintenance. 1 month of red patch on the left side of face. Occasional itching. Tried bag balm. Single, current smoker, pmh reviewed. Review of Systems   Constitutional: Negative for chills, fatigue, fever and unexpected weight change. HENT: Negative for congestion, ear pain, rhinorrhea and sore throat. Eyes: Negative for pain and visual disturbance. Respiratory: Negative for cough, chest tightness, shortness of breath and wheezing. Cardiovascular: Negative for chest pain and palpitations. Gastrointestinal: Negative for abdominal pain, blood in stool, constipation, diarrhea, nausea and vomiting. Genitourinary: Negative for difficulty urinating, frequency, hematuria and urgency. Musculoskeletal: Negative for back pain, joint swelling, myalgias and neck pain. Skin: Positive for rash. Neurological: Negative for dizziness and headaches. Hematological: Negative for adenopathy. Does not bruise/bleed easily. Psychiatric/Behavioral: Negative for behavioral problems and sleep disturbance. The patient is not nervous/anxious. Objective:   Physical Exam   Constitutional: She is oriented to person, place, and time. She appears well-developed and well-nourished. HENT:   Head: Normocephalic and atraumatic. Right Ear: External ear normal.   Left Ear: External ear normal.   Nose: Nose normal.   Mouth/Throat: Oropharynx is clear and moist.   Eyes: Pupils are equal, round, and reactive to light. EOM are normal.   Neck: Neck supple. No thyromegaly present. Cardiovascular: Normal rate, regular rhythm and normal heart sounds. Pulmonary/Chest: Breath sounds normal. She has no wheezes. She has no rales. Abdominal: Soft. Bowel sounds are normal. There is no tenderness. There is no rebound and no guarding. Musculoskeletal: Normal range of motion. She exhibits no edema. Lymphadenopathy:     She has no cervical adenopathy. Neurological: She is alert and oriented to person, place, and time. She has normal reflexes. No cranial nerve deficit. Skin: Skin is warm and dry. No rash noted. Psychiatric: She has a normal mood and affect. Nursing note and vitals reviewed. Assessment:      Periorbital dermatitis      Plan:       Aclovate cream BID        Charlotte Don MD

## 2019-05-16 RX ORDER — DESVENLAFAXINE 50 MG/1
50 TABLET, EXTENDED RELEASE ORAL DAILY
Qty: 30 TABLET | Refills: 1 | Status: SHIPPED | OUTPATIENT
Start: 2019-05-16 | End: 2019-08-15

## 2019-05-16 NOTE — TELEPHONE ENCOUNTER
Diane Durham called requesting a refill on pristiq, Diane Durham is scheduled to return for a follow up on 6/3

## 2019-05-21 ENCOUNTER — HOSPITAL ENCOUNTER (EMERGENCY)
Age: 39
Discharge: HOME | End: 2019-05-21
Payer: MEDICAID

## 2019-05-21 VITALS
BODY MASS INDEX: 35.68 KG/M2 | SYSTOLIC BLOOD PRESSURE: 108 MMHG | RESPIRATION RATE: 16 BRPM | OXYGEN SATURATION: 94 % | DIASTOLIC BLOOD PRESSURE: 74 MMHG | WEIGHT: 209 LBS | TEMPERATURE: 98.7 F | HEIGHT: 64 IN | HEART RATE: 88 BPM

## 2019-05-21 DIAGNOSIS — K04.7 DENTAL ABSCESS: Primary | ICD-10-CM

## 2019-05-21 LAB
ALBUMIN SERPL-MCNC: 4.4 G/DL (ref 3.5–5.1)
ALP BLD-CCNC: 93 U/L (ref 38–126)
ALT SERPL-CCNC: 21 U/L (ref 11–66)
ANION GAP SERPL CALCULATED.3IONS-SCNC: 13 MEQ/L (ref 8–16)
AST SERPL-CCNC: 11 U/L (ref 5–40)
BASOPHILS # BLD: 0.2 %
BASOPHILS ABSOLUTE: 0 THOU/MM3 (ref 0–0.1)
BILIRUB SERPL-MCNC: 0.8 MG/DL (ref 0.3–1.2)
BUN BLDV-MCNC: 6 MG/DL (ref 7–22)
CALCIUM SERPL-MCNC: 9.5 MG/DL (ref 8.5–10.5)
CHLORIDE BLD-SCNC: 99 MEQ/L (ref 98–111)
CO2: 23 MEQ/L (ref 23–33)
CREAT SERPL-MCNC: 0.9 MG/DL (ref 0.4–1.2)
EOSINOPHIL # BLD: 0.5 %
EOSINOPHILS ABSOLUTE: 0.1 THOU/MM3 (ref 0–0.4)
ERYTHROCYTE [DISTWIDTH] IN BLOOD BY AUTOMATED COUNT: 12.7 % (ref 11.5–14.5)
ERYTHROCYTE [DISTWIDTH] IN BLOOD BY AUTOMATED COUNT: 41.2 FL (ref 35–45)
GFR SERPL CREATININE-BSD FRML MDRD: 70 ML/MIN/1.73M2
GLUCOSE BLD-MCNC: 119 MG/DL (ref 70–108)
HCT VFR BLD CALC: 41.3 % (ref 37–47)
HEMOGLOBIN: 14.8 GM/DL (ref 12–16)
IMMATURE GRANS (ABS): 0.06 THOU/MM3 (ref 0–0.07)
IMMATURE GRANULOCYTES: 0.4 %
LYMPHOCYTES # BLD: 8.2 %
LYMPHOCYTES ABSOLUTE: 1.1 THOU/MM3 (ref 1–4.8)
MCH RBC QN AUTO: 31.8 PG (ref 26–33)
MCHC RBC AUTO-ENTMCNC: 35.8 GM/DL (ref 32.2–35.5)
MCV RBC AUTO: 88.8 FL (ref 81–99)
MONOCYTES # BLD: 5.6 %
MONOCYTES ABSOLUTE: 0.8 THOU/MM3 (ref 0.4–1.3)
NUCLEATED RED BLOOD CELLS: 0 /100 WBC
OSMOLALITY CALCULATION: 268.9 MOSMOL/KG (ref 275–300)
PLATELET # BLD: 284 THOU/MM3 (ref 130–400)
PMV BLD AUTO: 10.2 FL (ref 9.4–12.4)
POTASSIUM SERPL-SCNC: 3.9 MEQ/L (ref 3.5–5.2)
RBC # BLD: 4.65 MILL/MM3 (ref 4.2–5.4)
SEG NEUTROPHILS: 85.1 %
SEGMENTED NEUTROPHILS ABSOLUTE COUNT: 11.8 THOU/MM3 (ref 1.8–7.7)
SODIUM BLD-SCNC: 135 MEQ/L (ref 135–145)
TOTAL PROTEIN: 7.4 G/DL (ref 6.1–8)
WBC # BLD: 13.9 THOU/MM3 (ref 4.8–10.8)

## 2019-05-21 PROCEDURE — 87040 BLOOD CULTURE FOR BACTERIA: CPT

## 2019-05-21 PROCEDURE — 80053 COMPREHEN METABOLIC PANEL: CPT

## 2019-05-21 PROCEDURE — 6360000002 HC RX W HCPCS: Performed by: PHYSICIAN ASSISTANT

## 2019-05-21 PROCEDURE — 96375 TX/PRO/DX INJ NEW DRUG ADDON: CPT

## 2019-05-21 PROCEDURE — 2500000003 HC RX 250 WO HCPCS: Performed by: PHYSICIAN ASSISTANT

## 2019-05-21 PROCEDURE — 85025 COMPLETE CBC W/AUTO DIFF WBC: CPT

## 2019-05-21 PROCEDURE — 99283 EMERGENCY DEPT VISIT LOW MDM: CPT

## 2019-05-21 PROCEDURE — 96365 THER/PROPH/DIAG IV INF INIT: CPT

## 2019-05-21 PROCEDURE — 36415 COLL VENOUS BLD VENIPUNCTURE: CPT

## 2019-05-21 RX ORDER — OXYCODONE HYDROCHLORIDE AND ACETAMINOPHEN 5; 325 MG/1; MG/1
1 TABLET ORAL ONCE
Status: DISCONTINUED | OUTPATIENT
Start: 2019-05-21 | End: 2019-05-21

## 2019-05-21 RX ORDER — METOCLOPRAMIDE HYDROCHLORIDE 5 MG/ML
10 INJECTION INTRAMUSCULAR; INTRAVENOUS ONCE
Status: COMPLETED | OUTPATIENT
Start: 2019-05-21 | End: 2019-05-21

## 2019-05-21 RX ORDER — KETOROLAC TROMETHAMINE 10 MG/1
10 TABLET, FILM COATED ORAL EVERY 6 HOURS PRN
Qty: 15 TABLET | Refills: 0 | Status: SHIPPED | OUTPATIENT
Start: 2019-05-21 | End: 2019-08-15

## 2019-05-21 RX ORDER — ACETAMINOPHEN AND CODEINE PHOSPHATE 300; 30 MG/1; MG/1
1 TABLET ORAL EVERY 6 HOURS PRN
Qty: 10 TABLET | Refills: 0 | Status: SHIPPED | OUTPATIENT
Start: 2019-05-21 | End: 2019-05-24 | Stop reason: SDUPTHER

## 2019-05-21 RX ORDER — CLINDAMYCIN HYDROCHLORIDE 150 MG/1
300 CAPSULE ORAL 4 TIMES DAILY
Qty: 80 CAPSULE | Refills: 0 | Status: SHIPPED | OUTPATIENT
Start: 2019-05-21 | End: 2019-05-31

## 2019-05-21 RX ORDER — KETOROLAC TROMETHAMINE 30 MG/ML
30 INJECTION, SOLUTION INTRAMUSCULAR; INTRAVENOUS ONCE
Status: COMPLETED | OUTPATIENT
Start: 2019-05-21 | End: 2019-05-21

## 2019-05-21 RX ORDER — CLINDAMYCIN PHOSPHATE 600 MG/50ML
600 INJECTION INTRAVENOUS ONCE
Status: COMPLETED | OUTPATIENT
Start: 2019-05-21 | End: 2019-05-21

## 2019-05-21 RX ADMIN — CLINDAMYCIN PHOSPHATE 600 MG: 600 INJECTION, SOLUTION INTRAVENOUS at 16:39

## 2019-05-21 RX ADMIN — METOCLOPRAMIDE 10 MG: 5 INJECTION, SOLUTION INTRAMUSCULAR; INTRAVENOUS at 16:39

## 2019-05-21 RX ADMIN — KETOROLAC TROMETHAMINE 30 MG: 30 INJECTION, SOLUTION INTRAMUSCULAR at 16:39

## 2019-05-21 ASSESSMENT — ENCOUNTER SYMPTOMS
NAUSEA: 0
DIARRHEA: 0
WHEEZING: 0
BACK PAIN: 0
EYE DISCHARGE: 0
EYE PAIN: 0
SHORTNESS OF BREATH: 0
COUGH: 0
RHINORRHEA: 0
VOMITING: 1
ABDOMINAL PAIN: 0
SORE THROAT: 0

## 2019-05-21 ASSESSMENT — PAIN SCALES - GENERAL: PAINLEVEL_OUTOF10: 10

## 2019-05-21 ASSESSMENT — PAIN DESCRIPTION - PAIN TYPE: TYPE: ACUTE PAIN

## 2019-05-21 ASSESSMENT — PAIN DESCRIPTION - LOCATION: LOCATION: TEETH

## 2019-05-21 NOTE — ED PROVIDER NOTES
[facility] [address] [address] EMERGENCY DEPT      CHIEF COMPLAINT       Chief Complaint   Patient presents with    Dental Pain    Facial Swelling       Nurses Notes reviewed and I agree except as noted in the HPI. HISTORY OF PRESENT ILLNESS    Clemente Crowder is a 45 y.o. female who presents to the ED for the evaluation of left upper dental pain with left facial swelling that onset 30 hours ago. The patient states she stopped at an ER on the way home from vacation last night, and was prescribed Tramadol and penicillin. She states the tramadol has not relieved the pain, and now her face is more swollen. She admits to having significant need for visiting a dentist, but states she has not had insurance until recently, claiming she has been waiting for medicaid which she has just recently been able to officially receive. She states she is trying to find a dentist, but is currently just trying to find a way to manage the pain. She also admits to fever, chills, and vomiting once last night after taking tramadol. She also admits to smoking. The patient did not state any other complaints or symptoms during my initial encounter. REVIEW OF SYSTEMS     Review of Systems   Constitutional: Positive for chills and fever. Negative for appetite change and fatigue. HENT: Positive for dental problem (dental pain for last 30hours with known dental decay). Negative for congestion, ear pain, rhinorrhea and sore throat. Eyes: Negative for pain, discharge and visual disturbance. Respiratory: Negative for cough, shortness of breath and wheezing. Cardiovascular: Negative for chest pain, palpitations and leg swelling. Gastrointestinal: Positive for vomiting (1x last night). Negative for abdominal pain, diarrhea and nausea. Genitourinary: Negative for difficulty urinating, dysuria, hematuria and vaginal discharge. Musculoskeletal: Negative for arthralgias, back pain, joint swelling and neck pain. Skin: Negative for pallor and rash. Neurological: Negative for dizziness, syncope, weakness, light-headedness, numbness and headaches. Hematological: Negative for adenopathy. Psychiatric/Behavioral: Negative for confusion and suicidal ideas. The patient is not nervous/anxious. PAST MEDICAL HISTORY    has a past medical history of Anxiety, Asthma, DDD (degenerative disc disease), cervical, DDD (degenerative disc disease), thoracolumbar, Depression, GERD (gastroesophageal reflux disease), and SLE (systemic lupus erythematosus related syndrome) ([facility] [address] SURGICAL HISTORY      has no past surgical history on file. CURRENT MEDICATIONS       Discharge Medication List as of 5/21/2019  5:54 PM      CONTINUE these medications which have NOT CHANGED    Details   desvenlafaxine succinate (PRISTIQ) 50 MG TB24 extended release tablet Take 1 tablet by mouth daily, Disp-30 tablet, R-1Normal      alclomethasone (ACLOVATE) 0.05 % cream Apply topically 2 times daily. , Disp-1 Tube, R-1, Normal      sertraline (ZOLOFT) 50 MG tablet Take 50 mg by mouth dailyHistorical Med      predniSONE (DELTASONE) 20 MG tablet Take 1 tab BID x 5 days, then 1 tab daily x 5 days. Take with food. , Disp-15 tablet, R-2Normal      diclofenac (VOLTAREN) 75 MG EC tablet Take 1 tablet by mouth 2 times daily, Disp-60 tablet, R-0Normal      albuterol sulfate  (90 Base) MCG/ACT inhaler Inhale 2 puffs into the lungs every 4 hours as needed for Wheezing, Disp-1 Inhaler, R-11Normal      varenicline (CHANTIX STARTING MONTH PAK) 0.5 MG X 11 & 1 MG X 42 tablet Take by mouth., Disp-53 tablet, R-0Normal      varenicline (CHANTIX CONTINUING MONTH PAK) 1 MG tablet Take 1 tablet by mouth 2 times daily, Disp-60 tablet, R-11Normal      traZODone (DESYREL) 50 MG tablet Take 1-2 tablets by mouth nightly, Disp-60 tablet, R-2Normal      Dietary Management Product (ENLYTE) CAPS Take 1 capsule by mouth daily, Disp-30 capsule, R-1Normal      lidocaine (XYLOCAINE) 5 % ointment Apply topically as needed. , Disp-50 g, R-0, Print      ondansetron (ZOFRAN) 4 MG tablet Take 1 tablet by mouth every 8 hours as needed for Nausea, Disp-15 tablet, R-0Normal      fluticasone-salmeterol (ADVAIR) 250-50 MCG/DOSE AEPB Inhale 1 puff into the lungs every 12 hoursHistorical Med      Lactase (DAIRY-RELIEF PO) Take by mouth as needed Historical Med      RaNITidine HCl (ACID REDUCER PO) Take by mouth as needed Historical Med      Fexofenadine HCl (ALLEGRA ALLERGY PO) Take by mouth daily Historical Med      hydroxychloroquine (PLAQUENIL) 200 MG tablet Take 1 tablet by mouth daily, Disp-90 tablet, R-1Normal             ALLERGIES     is allergic to prozac [fluoxetine hcl]. FAMILY HISTORY     indicated that her mother is alive. She indicated that her father is . She indicated that her sister is alive. family history includes Asthma in her sister; COPD in her father; Depression in her mother. SOCIAL HISTORY    reports that she has been smoking. She has a 7.50 pack-year smoking history. She has never used smokeless tobacco. She reports that she has current or past drug history. She reports that she does not drink alcohol. PHYSICAL EXAM     INITIAL VITALS:  height is 5' 4\" (1.626 m) and weight is 209 lb (94.8 kg). Her oral temperature is 98.7 °F (37.1 °C). Her blood pressure is 108/74 and her pulse is 88. Her respiration is 16 and oxygen saturation is 94%. Physical Exam   Constitutional: She is oriented to person, place, and time. Vital signs are normal. She appears well-developed and well-nourished. She is cooperative. Non-toxic appearance. No distress. Tearful upon exam due to pain. HENT:   Head: Normocephalic and atraumatic. Right Ear: Tympanic membrane, external ear and ear canal normal.   Left Ear: Tympanic membrane, external ear and ear canal normal.   Nose: Nose normal. No rhinorrhea.    Mouth/Throat: Uvula is midline, oropharynx is clear and moist and mucous membranes are normal. No trismus in Plain radiographic images are visualized and preliminarily interpreted by the emergency physician unless otherwise stated below. No orders to display       LABS:   Labs Reviewed   CBC WITH AUTO DIFFERENTIAL - Abnormal; Notable for the following components:       Result Value    WBC 13.9 (*)     MCHC 35.8 (*)     Segs Absolute 11.8 (*)     All other components within normal limits   COMPREHENSIVE METABOLIC PANEL - Abnormal; Notable for the following components:    Glucose 119 (*)     BUN 6 (*)     All other components within normal limits   OSMOLALITY - Abnormal; Notable for the following components:    Osmolality Calc 268.9 (*)     All other components within normal limits   GLOMERULAR FILTRATION RATE, ESTIMATED - Abnormal; Notable for the following components:    Est, Glom Filt Rate 70 (*)     All other components within normal limits   CULTURE BLOOD #2    Narrative:     Source: blood-Adult-suboptimal <5.5oz./set volume       Site: Peripheral Vein(single bottle)            Current Antibiotics: not stated   CULTURE BLOOD #1    Narrative:     Source: blood-Adult-suboptimal <5.5oz./set volume       Site: IV start draw:            Current Antibiotics: not stated   ANION GAP       EMERGENCY DEPARTMENT COURSE:   Vitals:    Vitals:    05/21/19 1545 05/21/19 1700 05/21/19 1802   BP: 137/86 125/71 108/74   Pulse: 100 96 88   Resp: 18 18 16   Temp: 98.7 °F (37.1 °C)     TempSrc: Oral     SpO2: 96% 96% 94%   Weight: 209 lb (94.8 kg)     Height: 5' 4\" (1.626 m)       The patient was seen within the ED today for the evaluation of left upper dental pain with left facial swelling that onset 30 hours ago. The patient arrived in no acute distress and in stable condition. Within the department, I observed the patient's vital signs to be within acceptable range.  On exam, I appreciated left facial swelling, significant dental caries of which I cannot tell specifically which tooth is causing the reported pain, and no fluctuance or abscess found with palpation. The patient was tearful upon exam due to pain. Within the department, the patient was treated with IV cleocin, Toradol, and Reglan. I observed the patient's condition to improve with reduced swelling and pain during the duration of her stay. On re-exam the patient has remained non-toxic appearing, managing own secretions, with no respiratory distress, and clear speech noted. The patient has tolerated oral fluids without difficulty. The patient has been stable in the ER with good vital signs. I explained my proposed course of treatment to the patient, who was amenable to my decision, and I answered all questions that were asked. She was discharged home in stable condition with prescriptions for Cleocin, Toradol, and Tylenol/Codeine #3, and the patient will return to the ED if her symptoms become more severe in nature or otherwise change. I advised the patient to follow-up with [facility]. I also discussed return to ED precautions with the patient who verbalized understanding. CRITICAL CARE:   None    CONSULTS:  None    PROCEDURES:  None    FINAL IMPRESSION      1. Dental abscess          DISPOSITION/PLAN     1. Dental abscess        PATIENT REFERRED TO:  [facility]  [address] [facility] [address]  [phone]  Schedule an appointment as soon as possible for a visit         DISCHARGE MEDICATIONS:  Discharge Medication List as of 5/21/2019  5:54 PM      START taking these medications    Details   clindamycin (CLEOCIN) 150 MG capsule Take 2 capsules by mouth 4 times daily for 10 days, Disp-80 capsule, R-0Print      ketorolac (TORADOL) 10 MG tablet Take 1 tablet by mouth every 6 hours as needed for Pain, Disp-15 tablet, R-0Print      acetaminophen-codeine (TYLENOL/CODEINE #3) 300-30 MG per tablet Take 1 tablet by mouth every 6 hours as needed for Pain (Not relieved by ibuprofen) for up to 3 days. , Disp-10 tablet, R-0Print             (Please note that portions of this note were completed with a voice recognition program.  Efforts were made to edit the dictations but occasionally words are mis-transcribed.)    Scribe:  Sarmad Garcia 5/21/19 3:56 PM Scribing for and in the presence of SUDEEP Ramirez. Signed by: Tamia Castellon 05/22/19 11:41 AM    Provider:  I personally performed the services described in the documentation, reviewed and edited the documentation which was dictated to the scribe in my presence, and it accurately records my words and actions.     SUDEEP Ramirez 05/22/19 11:41 AM    SUDEEP Ramirez PA-C  05/22/19 1141

## 2019-05-22 ENCOUNTER — HOSPITAL ENCOUNTER (EMERGENCY)
Age: 39
Discharge: HOME | End: 2019-05-22
Attending: EMERGENCY MEDICINE
Payer: MEDICAID

## 2019-05-22 VITALS
BODY MASS INDEX: 35.68 KG/M2 | RESPIRATION RATE: 18 BRPM | WEIGHT: 209 LBS | TEMPERATURE: 99.5 F | DIASTOLIC BLOOD PRESSURE: 69 MMHG | SYSTOLIC BLOOD PRESSURE: 111 MMHG | HEIGHT: 64 IN | OXYGEN SATURATION: 95 % | HEART RATE: 100 BPM

## 2019-05-22 DIAGNOSIS — K04.7 DENTAL ABSCESS: Primary | ICD-10-CM

## 2019-05-22 PROCEDURE — 6370000000 HC RX 637 (ALT 250 FOR IP): Performed by: EMERGENCY MEDICINE

## 2019-05-22 PROCEDURE — 6360000002 HC RX W HCPCS: Performed by: EMERGENCY MEDICINE

## 2019-05-22 PROCEDURE — 99282 EMERGENCY DEPT VISIT SF MDM: CPT

## 2019-05-22 PROCEDURE — 96372 THER/PROPH/DIAG INJ SC/IM: CPT

## 2019-05-22 RX ORDER — KETOROLAC TROMETHAMINE 30 MG/ML
30 INJECTION, SOLUTION INTRAMUSCULAR; INTRAVENOUS ONCE
Status: COMPLETED | OUTPATIENT
Start: 2019-05-22 | End: 2019-05-22

## 2019-05-22 RX ORDER — HYDROCODONE BITARTRATE AND ACETAMINOPHEN 5; 325 MG/1; MG/1
1 TABLET ORAL ONCE
Status: COMPLETED | OUTPATIENT
Start: 2019-05-22 | End: 2019-05-22

## 2019-05-22 RX ORDER — CLINDAMYCIN HYDROCHLORIDE 150 MG/1
300 CAPSULE ORAL ONCE
Status: COMPLETED | OUTPATIENT
Start: 2019-05-22 | End: 2019-05-22

## 2019-05-22 RX ADMIN — KETOROLAC TROMETHAMINE 30 MG: 30 INJECTION, SOLUTION INTRAMUSCULAR at 05:07

## 2019-05-22 RX ADMIN — CLINDAMYCIN HYDROCHLORIDE 300 MG: 150 CAPSULE ORAL at 05:06

## 2019-05-22 RX ADMIN — HYDROCODONE BITARTRATE AND ACETAMINOPHEN 1 TABLET: 5; 325 TABLET ORAL at 05:06

## 2019-05-22 RX ADMIN — Medication 5 ML: at 05:54

## 2019-05-22 ASSESSMENT — ENCOUNTER SYMPTOMS
WHEEZING: 0
ABDOMINAL DISTENTION: 0
VOMITING: 0
SINUS PRESSURE: 0
SHORTNESS OF BREATH: 0
DIARRHEA: 0
CHOKING: 0
EYE PAIN: 0
FACIAL SWELLING: 1
SORE THROAT: 0
PHOTOPHOBIA: 0
EYE REDNESS: 0
BLOOD IN STOOL: 0
NAUSEA: 0
CONSTIPATION: 0
CHEST TIGHTNESS: 0
EYE ITCHING: 0
TROUBLE SWALLOWING: 0
ABDOMINAL PAIN: 0
COUGH: 0
BACK PAIN: 0
RHINORRHEA: 0
VOICE CHANGE: 0
EYE DISCHARGE: 0

## 2019-05-22 ASSESSMENT — PAIN DESCRIPTION - LOCATION: LOCATION: JAW

## 2019-05-22 ASSESSMENT — PAIN SCALES - GENERAL
PAINLEVEL_OUTOF10: 9
PAINLEVEL_OUTOF10: 4
PAINLEVEL_OUTOF10: 9
PAINLEVEL_OUTOF10: 9

## 2019-05-22 ASSESSMENT — PAIN DESCRIPTION - PAIN TYPE: TYPE: ACUTE PAIN

## 2019-05-22 NOTE — ED NOTES
Discharge instructions and new medications discussed and explained with Pt. Pt. Verbalized understanding and has no further questions or needs at this time.         Venessa Robles RN  05/22/19 7315

## 2019-05-22 NOTE — ED PROVIDER NOTES
[facility]  eMERGENCY dEPARTMENT eNCOUnter          CHIEF COMPLAINT       Chief Complaint   Patient presents with    Facial Swelling       Nurses Notes reviewed and I agreeexcept as noted in the HPI. HISTORY OF PRESENT ILLNESS    Jocelyn Navas is a 45 y.o. female who presents to the Emergency Department for the evaluation of left sided facial swelling. The patient the patient states that she believes she has an abscess tooth which she states 3 days ago. The patient has not been evaluated by her dentist. The patient states that her teeth are \"always fracturing\". The patient states that she was evaluated here in the department at 1800 last night and could not get her medications last night. The patient states that her swelling has worsened since her discharge. The patient rates her pain as a 9/10 in severity. The patient denies any other signs or symptoms at this time. The HPI was provided by the patient. REVIEW OF SYSTEMS      Review of Systems   Constitutional: Negative for activity change, appetite change, diaphoresis, fatigue and unexpected weight change. HENT: Positive for dental problem (dental pain) and facial swelling. Negative for congestion, ear discharge, ear pain, hearing loss, rhinorrhea, sinus pressure, sore throat, trouble swallowing and voice change. Eyes: Negative for photophobia, pain, discharge, redness and itching. Respiratory: Negative for cough, choking, chest tightness, shortness of breath and wheezing. Cardiovascular: Negative for chest pain, palpitations and leg swelling. Gastrointestinal: Negative for abdominal distention, abdominal pain, blood in stool, constipation, diarrhea, nausea and vomiting. Endocrine: Negative for polydipsia, polyphagia and polyuria. Genitourinary: Negative for decreased urine volume, difficulty urinating, dysuria, enuresis, frequency, hematuria and urgency.    Musculoskeletal: Negative for arthralgias, back pain, gait tablet, R-0Normal      albuterol sulfate  (90 Base) MCG/ACT inhaler Inhale 2 puffs into the lungs every 4 hours as needed for Wheezing, Disp-1 Inhaler, R-11Normal      varenicline (CHANTIX STARTING MONTH ) 0.5 MG X 11 & 1 MG X 42 tablet Take by mouth., Disp-53 tablet, R-0Normal      varenicline (CHANTIX CONTINUING MONTH ) 1 MG tablet Take 1 tablet by mouth 2 times daily, Disp-60 tablet, R-11Normal      traZODone (DESYREL) 50 MG tablet Take 1-2 tablets by mouth nightly, Disp-60 tablet, R-2Normal      Dietary Management Product (ENLYTE) CAPS Take 1 capsule by mouth daily, Disp-30 capsule, R-1Normal      lidocaine (XYLOCAINE) 5 % ointment Apply topically as needed. , Disp-50 g, R-0, Print      ondansetron (ZOFRAN) 4 MG tablet Take 1 tablet by mouth every 8 hours as needed for Nausea, Disp-15 tablet, R-0Normal      fluticasone-salmeterol (ADVAIR) 250-50 MCG/DOSE AEPB Inhale 1 puff into the lungs every 12 hoursHistorical Med      Lactase (DAIRY-RELIEF PO) Take by mouth as needed Historical Med      RaNITidine HCl (ACID REDUCER PO) Take by mouth as needed Historical Med      Fexofenadine HCl (ALLEGRA ALLERGY PO) Take by mouth daily Historical Med      hydroxychloroquine (PLAQUENIL) 200 MG tablet Take 1 tablet by mouth daily, Disp-90 tablet, R-1Normal             ALLERGIES     is allergic to prozac [fluoxetine hcl]. FAMILY HISTORY     indicated that her mother is alive. She indicated that her father is . She indicated that her sister is alive. family history includes Asthma in her sister; COPD in her father; Depression in her mother. SOCIAL HISTORY    reports that she has been smoking. She has a 7.50 pack-year smoking history. She has never used smokeless tobacco. She reports that she has current or past drug history. She reports that she does not drink alcohol. PHYSICAL EXAM     INITIAL VITALS:  height is 5' 4\" (1.626 m) and weight is 209 lb (94.8 kg).  Her oral temperature is 99.5 °F (37.5 °C). Her blood pressure is 111/69 and her pulse is 100. Her respiration is 18 and oxygen saturation is 95%. Physical Exam   Constitutional: She is oriented to person, place, and time. She appears well-developed and well-nourished. No distress. HENT:   Head: Normocephalic and atraumatic. Right Ear: External ear normal.   Left Ear: External ear normal.   Nose: Nose normal.   Mouth/Throat: Oropharynx is clear and moist. Dental caries present. No oropharyngeal exudate. Left sided facial swelling, dental caries noted to the upper jaw. Eyes: Pupils are equal, round, and reactive to light. Conjunctivae and EOM are normal. Right eye exhibits no discharge. Left eye exhibits no discharge. No scleral icterus. Neck: Normal range of motion. Neck supple. No JVD present. No tracheal deviation present. Cardiovascular: Normal rate, regular rhythm, normal heart sounds and intact distal pulses. Exam reveals no gallop and no friction rub. No murmur heard. Pulmonary/Chest: Effort normal and breath sounds normal. She has no wheezes. She has no rales. Abdominal: Soft. Bowel sounds are normal. She exhibits no mass. There is no tenderness. There is no rebound and no guarding. Musculoskeletal: Normal range of motion. She exhibits no edema or tenderness. Lymphadenopathy:     She has no cervical adenopathy. Neurological: She is alert and oriented to person, place, and time. She has normal reflexes. She displays normal reflexes. No cranial nerve deficit. She exhibits normal muscle tone. Coordination normal.   Skin: Skin is warm and dry. No rash noted. She is not diaphoretic. Psychiatric: She has a normal mood and affect. Her behavior is normal. Judgment and thought content normal.   Nursing note and vitals reviewed.     DIFFERENTIAL DIAGNOSIS:   Differential diagnoses were discussed extensively with the patient and family including but no limited to dental abscess, dental decay, dental caries, dental fracture, the dentist as provided on the list.  Patient is instructed take all medications as prescribed follow-up as directed and return to the emergency room immediately for any new or worsening complaints. CRITICALCARE:   None     CONSULTS:  None    PROCEDURES:  None    FINAL IMPRESSION      1. Dental abscess          DISPOSITION/PLAN   Discharge home in stable condition. PATIENT REFERRED TO:  [facility]  [address] [place] [address]  [phone]  Call in 1 day      Dentist  From list provided here  Call in 1 day        DISCHARGE MEDICATIONS:  Discharge Medication List as of 5/22/2019  5:24 AM          (Please note that portions of this note were completed with a voice recognition program.  Efforts weremade to edit the dictations but occasionally words are mis-transcribed.)    Scribe:  Betzaida Rm 5/22/19 4:52 AM Scribing for and in the presence ofRich Rosario DO. Scribe: Betzaida Rm 5/22/19 4:52 AM    Provider:  I personally performed the services described in the documentation, reviewed and edited the documentation which was dictated to the scribe inmy presence, and it accurately records my words and actions.     Meenakshi Peralta DO 5/22/19 6:25 AM      Meenakshi Peralta DO  05/22/19 1416

## 2019-05-23 ENCOUNTER — TELEPHONE (OUTPATIENT)
Dept: FAMILY MEDICINE CLINIC | Age: 39
End: 2019-05-23

## 2019-05-23 NOTE — TELEPHONE ENCOUNTER
[facility] ([facility]) ED Follow up Call    [facility] for [facility] visit:         [address] [facility]

## 2019-05-24 ENCOUNTER — OFFICE VISIT (OUTPATIENT)
Dept: FAMILY MEDICINE CLINIC | Age: 39
End: 2019-05-24
Payer: MEDICAID

## 2019-05-24 VITALS
TEMPERATURE: 98.6 F | WEIGHT: 207.4 LBS | RESPIRATION RATE: 14 BRPM | DIASTOLIC BLOOD PRESSURE: 74 MMHG | SYSTOLIC BLOOD PRESSURE: 110 MMHG | BODY MASS INDEX: 35.6 KG/M2 | HEART RATE: 52 BPM

## 2019-05-24 DIAGNOSIS — K04.7 DENTAL ABSCESS: ICD-10-CM

## 2019-05-24 DIAGNOSIS — K52.9 ACUTE GASTROENTERITIS: ICD-10-CM

## 2019-05-24 DIAGNOSIS — K04.7 TOOTH ABSCESS: Primary | ICD-10-CM

## 2019-05-24 PROCEDURE — 4004F PT TOBACCO SCREEN RCVD TLK: CPT | Performed by: FAMILY MEDICINE

## 2019-05-24 PROCEDURE — G8427 DOCREV CUR MEDS BY ELIG CLIN: HCPCS | Performed by: FAMILY MEDICINE

## 2019-05-24 PROCEDURE — G8417 CALC BMI ABV UP PARAM F/U: HCPCS | Performed by: FAMILY MEDICINE

## 2019-05-24 PROCEDURE — 99213 OFFICE O/P EST LOW 20 MIN: CPT | Performed by: FAMILY MEDICINE

## 2019-05-24 RX ORDER — ONDANSETRON 4 MG/1
4 TABLET, FILM COATED ORAL EVERY 8 HOURS PRN
Qty: 15 TABLET | Refills: 0 | Status: SHIPPED | OUTPATIENT
Start: 2019-05-24 | End: 2020-02-26

## 2019-05-24 RX ORDER — ACETAMINOPHEN AND CODEINE PHOSPHATE 300; 30 MG/1; MG/1
1 TABLET ORAL EVERY 6 HOURS PRN
Qty: 28 TABLET | Refills: 0 | Status: SHIPPED | OUTPATIENT
Start: 2019-05-24 | End: 2019-05-31

## 2019-05-24 ASSESSMENT — ENCOUNTER SYMPTOMS
EYE PAIN: 0
COUGH: 0
ABDOMINAL PAIN: 0
CHEST TIGHTNESS: 0
VOMITING: 0
RHINORRHEA: 0
SORE THROAT: 0
CONSTIPATION: 0
SHORTNESS OF BREATH: 0
BACK PAIN: 0
DIARRHEA: 0
BLOOD IN STOOL: 0
WHEEZING: 0
NAUSEA: 0

## 2019-05-24 NOTE — PATIENT INSTRUCTIONS
Patient Education        Abscessed Tooth: Care Instructions  Your Care Instructions    An abscessed tooth is a tooth that has a pocket of pus in the tissues around it. Pus forms when the body tries to fight an infection caused by bacteria. If the pus cannot drain, it forms an abscess. An abscessed tooth can cause red, swollen gums and throbbing pain, especially when you chew. You may have a bad taste in your mouth and a fever, and your jaw may swell. Damage to the tooth, untreated tooth decay, or gum disease can cause an abscessed tooth. An abscessed tooth needs to be treated by a dental professional right away. If it is not treated, the infection could spread to other parts of your body. Your dentist will give you antibiotics to stop the infection. He or she may make a hole in the tooth or cut open (bartolo) the abscess inside your mouth so that the infection can drain, which should relieve your pain. You may need to have a root canal treatment, which tries to save your tooth by taking out the infected pulp and replacing it with a healing medicine and/or a filling. If these treatments do not work, your tooth may have to be removed. Follow-up care is a key part of your treatment and safety. Be sure to make and go to all appointments, and call your doctor if you are having problems. It's also a good idea to know your test results and keep a list of the medicines you take. How can you care for yourself at home? · Reduce pain and swelling in your face and jaw by putting ice or a cold pack on the outside of your cheek for 10 to 20 minutes at a time. Put a thin cloth between the ice and your skin. · Take pain medicines exactly as directed. ? If the doctor gave you a prescription medicine for pain, take it as prescribed. ? If you are not taking a prescription pain medicine, ask your doctor if you can take an over-the-counter medicine. · Take your antibiotics as directed.  Do not stop taking them just because you feel better. You need to take the full course of antibiotics. To prevent tooth abscess  · Brush and floss every day, and have regular dental checkups. · Eat a healthy diet, and avoid sugary foods and drinks. · Do not smoke, use e-cigarettes with nicotine, or use spit tobacco. Tobacco and nicotine slow your ability to heal. Tobacco also increases your risk for gum disease and cancer of the mouth and throat. If you need help quitting, talk to your doctor about stop-smoking programs and medicines. These can increase your chances of quitting for good. When should you call for help? Call 911 anytime you think you may need emergency care. For example, call if:    · You have trouble breathing.    Call your doctor now or seek immediate medical care if:    · You have new or worse symptoms of infection, such as:  ? Increased pain, swelling, warmth, or redness. ? Red streaks leading from the area. ? Pus draining from the area. ? A fever.    Watch closely for changes in your health, and be sure to contact your doctor if:    · You do not get better as expected. Where can you learn more? Go to [email or web address] org and sign in to your [employer] account. Enter F018 in the [facility] box to learn more about \"Abscessed Tooth: Care Instructions. \"     If you do not have an account, please click on the \"Sign Up Now\" link. Current as of: October 3, 2018  Content Version: 12.0  © 4247-4156 Healthwise, Incorporated. Care instructions adapted under license by [facility] ([facility]). If you have questions about a medical condition or this instruction, always ask your healthcare professional. [place] [address] any warranty or liability for your use of this information.

## 2019-05-27 LAB
BLOOD CULTURE, ROUTINE: NORMAL
BLOOD CULTURE, ROUTINE: NORMAL

## 2019-05-31 ENCOUNTER — TELEPHONE (OUTPATIENT)
Dept: FAMILY MEDICINE CLINIC | Age: 39
End: 2019-05-31

## 2019-05-31 NOTE — TELEPHONE ENCOUNTER
Patient states that she is having \"massive heartburn\" on the Clindamycin. She already takes omeprazole OTC and is drinking lot of milk. Wants to know what else you recommend.   Please call her back at [phone]

## 2019-06-03 ENCOUNTER — TELEPHONE (OUTPATIENT)
Dept: FAMILY MEDICINE CLINIC | Age: 39
End: 2019-06-03

## 2019-06-03 NOTE — TELEPHONE ENCOUNTER
Patient states that she finished the Clindamycin today and she wants to know when she can start taking her other medications again.   Please call her back at [phone]

## 2019-06-07 ENCOUNTER — OFFICE VISIT (OUTPATIENT)
Dept: PSYCHIATRY | Age: 39
End: 2019-06-07
Payer: MEDICAID

## 2019-06-07 VITALS
DIASTOLIC BLOOD PRESSURE: 70 MMHG | WEIGHT: 206 LBS | HEART RATE: 74 BPM | SYSTOLIC BLOOD PRESSURE: 105 MMHG | BODY MASS INDEX: 35.36 KG/M2

## 2019-06-07 DIAGNOSIS — F33.0 MDD (MAJOR DEPRESSIVE DISORDER), RECURRENT EPISODE, MILD (HCC): Primary | ICD-10-CM

## 2019-06-07 DIAGNOSIS — F41.8 OTHER SPECIFIED ANXIETY DISORDERS: ICD-10-CM

## 2019-06-07 DIAGNOSIS — R45.4 IRRITABILITY AND ANGER: ICD-10-CM

## 2019-06-07 PROCEDURE — 99214 OFFICE O/P EST MOD 30 MIN: CPT | Performed by: NURSE PRACTITIONER

## 2019-06-07 PROCEDURE — G8417 CALC BMI ABV UP PARAM F/U: HCPCS | Performed by: NURSE PRACTITIONER

## 2019-06-07 PROCEDURE — 4004F PT TOBACCO SCREEN RCVD TLK: CPT | Performed by: NURSE PRACTITIONER

## 2019-06-07 PROCEDURE — G8427 DOCREV CUR MEDS BY ELIG CLIN: HCPCS | Performed by: NURSE PRACTITIONER

## 2019-06-07 NOTE — PROGRESS NOTES
Onset    Depression Mother     COPD Father     Asthma Sister      Social History     Tobacco Use    Smoking status: Current Every Day Smoker     Packs/day: 0.50     Years: 15.00     Pack years: 7.50    Smokeless tobacco: Never Used   Substance Use Topics    Alcohol use: No     Current Outpatient Medications   Medication Sig Dispense Refill    ondansetron (ZOFRAN) 4 MG tablet Take 1 tablet by mouth every 8 hours as needed for Nausea 15 tablet 0    desvenlafaxine succinate (PRISTIQ) 50 MG TB24 extended release tablet Take 1 tablet by mouth daily 30 tablet 1    alclomethasone (ACLOVATE) 0.05 % cream Apply topically 2 times daily. 1 Tube 1    diclofenac (VOLTAREN) 75 MG EC tablet Take 1 tablet by mouth 2 times daily 60 tablet 0    albuterol sulfate  (90 Base) MCG/ACT inhaler Inhale 2 puffs into the lungs every 4 hours as needed for Wheezing 1 Inhaler 11    traZODone (DESYREL) 50 MG tablet Take 1-2 tablets by mouth nightly 60 tablet 2    Dietary Management Product (ENLYTE) CAPS Take 1 capsule by mouth daily 30 capsule 1    lidocaine (XYLOCAINE) 5 % ointment Apply topically as needed. 50 g 0    fluticasone-salmeterol (ADVAIR) 250-50 MCG/DOSE AEPB Inhale 1 puff into the lungs every 12 hours      Lactase (DAIRY-RELIEF PO) Take by mouth as needed       RaNITidine HCl (ACID REDUCER PO) Take by mouth as needed       Fexofenadine HCl (ALLEGRA ALLERGY PO) Take by mouth daily       hydroxychloroquine (PLAQUENIL) 200 MG tablet Take 1 tablet by mouth daily 90 tablet 1    ketorolac (TORADOL) 10 MG tablet Take 1 tablet by mouth every 6 hours as needed for Pain 15 tablet 0    predniSONE (DELTASONE) 20 MG tablet Take 1 tab BID x 5 days, then 1 tab daily x 5 days. Take with food. 15 tablet 2    varenicline (CHANTIX STARTING MONTH LARRY) 0.5 MG X 11 & 1 MG X 42 tablet Take by mouth.  53 tablet 0    varenicline (CHANTIX CONTINUING MONTH LARRY) 1 MG tablet Take 1 tablet by mouth 2 times daily 60 tablet 11     No spent on counseling and coordination of care regarding topics discussed above.     Electronically signed by BINH Church CNP on 6/7/2019 at 11:43 AM

## 2019-06-10 DIAGNOSIS — G89.29 CHRONIC LEFT SHOULDER PAIN: ICD-10-CM

## 2019-06-10 DIAGNOSIS — M25.512 CHRONIC LEFT SHOULDER PAIN: ICD-10-CM

## 2019-06-10 DIAGNOSIS — M51.36 DDD (DEGENERATIVE DISC DISEASE), LUMBAR: ICD-10-CM

## 2019-06-11 ENCOUNTER — NURSE ONLY (OUTPATIENT)
Dept: LAB | Age: 39
End: 2019-06-11

## 2019-06-11 ENCOUNTER — TELEPHONE (OUTPATIENT)
Dept: RHEUMATOLOGY | Age: 39
End: 2019-06-11

## 2019-06-11 ENCOUNTER — OFFICE VISIT (OUTPATIENT)
Dept: RHEUMATOLOGY | Age: 39
End: 2019-06-11
Payer: MEDICAID

## 2019-06-11 VITALS
OXYGEN SATURATION: 97 % | SYSTOLIC BLOOD PRESSURE: 120 MMHG | BODY MASS INDEX: 35.92 KG/M2 | HEART RATE: 78 BPM | HEIGHT: 64 IN | WEIGHT: 210.4 LBS | DIASTOLIC BLOOD PRESSURE: 78 MMHG

## 2019-06-11 DIAGNOSIS — M79.601 PARESTHESIA AND PAIN OF BOTH UPPER EXTREMITIES: ICD-10-CM

## 2019-06-11 DIAGNOSIS — M32.9 H/O SYSTEMIC LUPUS ERYTHEMATOSUS (SLE) (HCC): ICD-10-CM

## 2019-06-11 DIAGNOSIS — M79.602 PARESTHESIA AND PAIN OF BOTH UPPER EXTREMITIES: ICD-10-CM

## 2019-06-11 DIAGNOSIS — G89.29 OTHER CHRONIC BACK PAIN: ICD-10-CM

## 2019-06-11 DIAGNOSIS — M25.50 POLYARTHRALGIA: Primary | ICD-10-CM

## 2019-06-11 DIAGNOSIS — R20.2 PARESTHESIA AND PAIN OF BOTH UPPER EXTREMITIES: ICD-10-CM

## 2019-06-11 DIAGNOSIS — M25.50 POLYARTHRALGIA: ICD-10-CM

## 2019-06-11 DIAGNOSIS — M54.9 OTHER CHRONIC BACK PAIN: ICD-10-CM

## 2019-06-11 DIAGNOSIS — M32.9 SYSTEMIC LUPUS ERYTHEMATOSUS, UNSPECIFIED SLE TYPE, UNSPECIFIED ORGAN INVOLVEMENT STATUS (HCC): ICD-10-CM

## 2019-06-11 LAB
ALBUMIN SERPL-MCNC: 4.6 G/DL (ref 3.5–5.1)
ALP BLD-CCNC: 89 U/L (ref 38–126)
ALT SERPL-CCNC: 19 U/L (ref 11–66)
ANION GAP SERPL CALCULATED.3IONS-SCNC: 14 MEQ/L (ref 8–16)
AST SERPL-CCNC: 15 U/L (ref 5–40)
BASOPHILS # BLD: 0.7 %
BASOPHILS ABSOLUTE: 0 THOU/MM3 (ref 0–0.1)
BILIRUB SERPL-MCNC: 0.5 MG/DL (ref 0.3–1.2)
BUN BLDV-MCNC: 8 MG/DL (ref 7–22)
C-REACTIVE PROTEIN: 0.13 MG/DL (ref 0–1)
CALCIUM SERPL-MCNC: 9.5 MG/DL (ref 8.5–10.5)
CHLORIDE BLD-SCNC: 102 MEQ/L (ref 98–111)
CO2: 22 MEQ/L (ref 23–33)
CREAT SERPL-MCNC: 0.8 MG/DL (ref 0.4–1.2)
CREATININE URINE: 48.6 MG/DL
EOSINOPHIL # BLD: 3.9 %
EOSINOPHILS ABSOLUTE: 0.3 THOU/MM3 (ref 0–0.4)
ERYTHROCYTE [DISTWIDTH] IN BLOOD BY AUTOMATED COUNT: 12.5 % (ref 11.5–14.5)
ERYTHROCYTE [DISTWIDTH] IN BLOOD BY AUTOMATED COUNT: 40.5 FL (ref 35–45)
FOLATE: 18.9 NG/ML (ref 4.8–24.2)
GFR SERPL CREATININE-BSD FRML MDRD: 80 ML/MIN/1.73M2
GLUCOSE BLD-MCNC: 94 MG/DL (ref 70–108)
HAV IGM SER IA-ACNC: NEGATIVE
HCT VFR BLD CALC: 42.1 % (ref 37–47)
HEMOGLOBIN: 14.8 GM/DL (ref 12–16)
HEPATITIS B CORE IGM ANTIBODY: NEGATIVE
HEPATITIS B SURFACE ANTIGEN: NEGATIVE
HEPATITIS C ANTIBODY: NEGATIVE
IMMATURE GRANS (ABS): 0.02 THOU/MM3 (ref 0–0.07)
IMMATURE GRANULOCYTES: 0.3 %
IRON: 72 UG/DL (ref 50–170)
LYMPHOCYTES # BLD: 30.4 %
LYMPHOCYTES ABSOLUTE: 2 THOU/MM3 (ref 1–4.8)
MAGNESIUM: 2.3 MG/DL (ref 1.6–2.4)
MCH RBC QN AUTO: 31.6 PG (ref 26–33)
MCHC RBC AUTO-ENTMCNC: 35.2 GM/DL (ref 32.2–35.5)
MCV RBC AUTO: 89.8 FL (ref 81–99)
MONOCYTES # BLD: 4.9 %
MONOCYTES ABSOLUTE: 0.3 THOU/MM3 (ref 0.4–1.3)
NUCLEATED RED BLOOD CELLS: 0 /100 WBC
PLATELET # BLD: 331 THOU/MM3 (ref 130–400)
PMV BLD AUTO: 10.8 FL (ref 9.4–12.4)
POTASSIUM SERPL-SCNC: 4 MEQ/L (ref 3.5–5.2)
PROT/CREAT RATIO, UR: 0.11
PROTEIN, URINE: 5.3 MG/DL
RBC # BLD: 4.69 MILL/MM3 (ref 4.2–5.4)
SEDIMENTATION RATE, ERYTHROCYTE: 14 MM/HR (ref 0–20)
SEG NEUTROPHILS: 59.8 %
SEGMENTED NEUTROPHILS ABSOLUTE COUNT: 4 THOU/MM3 (ref 1.8–7.7)
SODIUM BLD-SCNC: 138 MEQ/L (ref 135–145)
TOTAL PROTEIN: 8.1 G/DL (ref 6.1–8)
TSH SERPL DL<=0.05 MIU/L-ACNC: 1.57 UIU/ML (ref 0.4–4.2)
VITAMIN B-12: 417 PG/ML (ref 211–911)
WBC # BLD: 6.7 THOU/MM3 (ref 4.8–10.8)

## 2019-06-11 PROCEDURE — G8417 CALC BMI ABV UP PARAM F/U: HCPCS | Performed by: INTERNAL MEDICINE

## 2019-06-11 PROCEDURE — G8427 DOCREV CUR MEDS BY ELIG CLIN: HCPCS | Performed by: INTERNAL MEDICINE

## 2019-06-11 PROCEDURE — 99244 OFF/OP CNSLTJ NEW/EST MOD 40: CPT | Performed by: INTERNAL MEDICINE

## 2019-06-11 RX ORDER — HYDROXYCHLOROQUINE SULFATE 200 MG/1
200 TABLET, FILM COATED ORAL DAILY
Qty: 90 TABLET | Refills: 1 | Status: SHIPPED | OUTPATIENT
Start: 2019-06-11 | End: 2020-02-26

## 2019-06-11 ASSESSMENT — ENCOUNTER SYMPTOMS
WHEEZING: 0
BLOOD IN STOOL: 0
PHOTOPHOBIA: 1
NAUSEA: 1
CONSTIPATION: 1
EYE ITCHING: 1
ANAL BLEEDING: 0
SHORTNESS OF BREATH: 0
BACK PAIN: 1
VOMITING: 0
EYE REDNESS: 0
COUGH: 0
EYE PAIN: 0
DIARRHEA: 0

## 2019-06-11 NOTE — PROGRESS NOTES
Lilly       Date Of Service: 6/11/2019  Provider: Mikel Whitmore DO    Name: Aren Bar   MRN: [id number]    Chief Complaint(s)      Chief Complaint   Patient presents with    New Patient     Systemic Lupus Erythematosus       - left ankle fx 2016, left shoulder   History of Present illness (HPI)    Aren Bar   is a(n)39 y.o. female with a hx of anxiety, depression, bipolar, Headaches, asthma, DDD Cervical spine, Thoracic and lumbar spine,  systemic lupus referred by Dr. Cate Lyles MD  for evaluation of systemic lupus. Diagnosed with systemic lupus in 1998. Aamir Ramos Pt was having persistent fatigue, tiredness, arthralgia of knees, left shoulder. Evaluated by Rheumatology -- started on plaquenil. Last evaluation 2012-13. Dx'ed with DDD in 2008. The patient reports pain affecting her left shoulder, bilateral knees, left ankle, and entire neck and back (most severe in the lower back) . Most severe pain in th lower back. Pain on a scale 0-10: 3.5 to 8/10. Type of pain: constant - aching pain with occasional throbbing and Needle sensation in the back. The other joints are intermittent. Timing: n/a. Aggravating factors: increased activity, weather changes. Back: bending, lifting, standing. Alleviating factors: Rt shoulder-- laying on shoulder, and icing. Back: heating. Current therapy:  Plaquenil , diclofenac . Previous therapy: ibuprofen 600mg prn, tramadol prn pain. Physical therapy of back -- worsened pain. Decompression therapy, mid back epidural/injections -- worsened pain     Associated symptoms:  - swelling/ - redness/ - warmth. Intermittent  AM stiffness. myalgia throughout the entire back.      -denies Photosenstivity, Rash, dry mouth/dry eyes, oral/nasal sores, Raynaud's, digital ulcerations, skin tightening, renal disease,foamy urination, hematuria, sz's, blood clots, pre-term labor loss, AIHA,leukpenia/lymphopenia, thrombocytopenia, hair loss, serositis, arthritis. Cancer screening: up to date     Review of Systems    Review of Systems   Constitutional: Positive for fatigue. Negative for diaphoresis and fever. HENT: Positive for congestion and tinnitus. Negative for hearing loss and nosebleeds. Eyes: Positive for photophobia (w/ migraines) and itching. Negative for pain and redness. Respiratory: Negative for cough, shortness of breath and wheezing. Cardiovascular: Negative. Negative for chest pain. Gastrointestinal: Positive for constipation and nausea (\"always\"). Negative for anal bleeding, blood in stool, diarrhea and vomiting. Endocrine: Positive for cold intolerance, heat intolerance, polydipsia and polyphagia. Negative for polyuria. Genitourinary: Negative for difficulty urinating, dyspareunia, frequency, hematuria, vaginal bleeding and vaginal discharge. Musculoskeletal: Positive for arthralgias, back pain, myalgias and neck pain. Negative for joint swelling. Skin: Negative for rash. Neurological: Positive for numbness (oocasionally in Rt arm and leg wtih laying on the right side. ) and headaches (h/o migraines). Negative for dizziness and weakness. Hematological: Negative for adenopathy. Does not bruise/bleed easily. Psychiatric/Behavioral: Positive for agitation and sleep disturbance (improved w/ trazadone). Negative for decreased concentration, dysphoric mood and hallucinations. The patient is nervous/anxious. PAST MEDICAL HISTORY      Past Medical History:   Diagnosis Date    Anxiety     Asthma     DDD (degenerative disc disease), cervical     DDD (degenerative disc disease), thoracolumbar     Depression     GERD (gastroesophageal reflux disease)     SLE (systemic lupus erythematosus related syndrome) ([facility] [address]        PAST SURGICAL HISTORY    History reviewed. No pertinent surgical history.     FAMILY HISTORY      Family History   Problem Relation Age of Onset    Depression Mother     COPD Father     Asthma Sister        SOCIAL HISTORY      Social History     Tobacco History     Smoking Status  Current Every Day Smoker Smoking Frequency  0.5 packs/day for 15 years (7.5 pk yrs)    Smokeless Tobacco Use  Never Used          Alcohol History     Alcohol Use Status  No          Drug Use     Drug Use Status  Yes Comment  Not currently          Sexual Activity     Sexually Active  Yes Partners  Male                ALLERGIES     Allergies   Allergen Reactions    Prozac [Fluoxetine Hcl] Other (See Comments)     \"I want to kill myself\"       CURRENT MEDICATIONS      Current Outpatient Medications   Medication Sig Dispense Refill    ondansetron (ZOFRAN) 4 MG tablet Take 1 tablet by mouth every 8 hours as needed for Nausea 15 tablet 0    ketorolac (TORADOL) 10 MG tablet Take 1 tablet by mouth every 6 hours as needed for Pain 15 tablet 0    desvenlafaxine succinate (PRISTIQ) 50 MG TB24 extended release tablet Take 1 tablet by mouth daily 30 tablet 1    alclomethasone (ACLOVATE) 0.05 % cream Apply topically 2 times daily. 1 Tube 1    predniSONE (DELTASONE) 20 MG tablet Take 1 tab BID x 5 days, then 1 tab daily x 5 days. Take with food. 15 tablet 2    diclofenac (VOLTAREN) 75 MG EC tablet Take 1 tablet by mouth 2 times daily 60 tablet 0    albuterol sulfate  (90 Base) MCG/ACT inhaler Inhale 2 puffs into the lungs every 4 hours as needed for Wheezing 1 Inhaler 11    varenicline (CHANTIX STARTING MONTH LARRY) 0.5 MG X 11 & 1 MG X 42 tablet Take by mouth. 53 tablet 0    varenicline (CHANTIX CONTINUING MONTH PAK) 1 MG tablet Take 1 tablet by mouth 2 times daily 60 tablet 11    traZODone (DESYREL) 50 MG tablet Take 1-2 tablets by mouth nightly 60 tablet 2    Dietary Management Product (ENLYTE) CAPS Take 1 capsule by mouth daily 30 capsule 1    lidocaine (XYLOCAINE) 5 % ointment Apply topically as needed.  50 g 0    fluticasone-salmeterol (ADVAIR) 250-50 MCG/DOSE AEPB Inhale 1 puff into the lungs every 12 hours      Lactase (DAIRY-RELIEF PO) Take by mouth as needed       RaNITidine HCl (ACID REDUCER PO) Take by mouth as needed       Fexofenadine HCl (ALLEGRA ALLERGY PO) Take by mouth daily       hydroxychloroquine (PLAQUENIL) 200 MG tablet Take 1 tablet by mouth daily 90 tablet 1     No current facility-administered medications for this visit. PHYSICAL EXAMINATION / OBJECTIVE     Objective:  /78 (Site: Right Upper Arm, Position: Sitting, Cuff Size: Large Adult)   Pulse 78   Ht 5' 4.02\" (1.626 m)   Wt 210 lb 6.4 oz (95.4 kg)   LMP 06/05/2019   SpO2 97%   BMI 36.10 kg/m²     Physical Exam        Musculoskeletal:     Normal gait. Strength 5/5 in biceps, triceps, hips, knees,      Upper extremities:   ROM --- intact bilateral upper extremities. Msk Strength 5/5 in bilateral shoulders, elbow flex/ext. No synovitis, or deformities. no Synovitis. Tender --- left shoulder -- AC, Lateral shoulder.  + Left hawking, jayne, speed. + tinel testing bilateral wrist and cubital tunnel. Lower extremities:- ROM , strength intact bilateral- intact bilateral lower extremities. +  Tender outer hips, bilateral knees. NO Synovitis. Knee: --- + patellar grind, Neg draw, Claudia, compression testing. + FADIR with external hip pain bilat. Spine:     C-spine: intact ROM, Non-tender, no swelling.   good flexion and extension, reflexes are 2+ and symmetric, motor and sensory appear to be normal, negative SLR test, spurling compression testing, intact ROM of C-spine. positive findings: + TENDER throughout the entire C, T-, L-spine and perispinal regions.         RAPID3 Composite Score MDHAQ (0-10) + Patient pain VAS (0-10): + Patient global assessment VAS (0-10):     6/11/2019 --- RAPID 3: 2.3 + 3.5 + 10 = 15.8     Remission: <3  Low Disease Activity: <6  Moderate Disease Activity: >=6 and <=12  High Disease Activity: >12    LABS        CBC  Lab Results   Component Value Date    WBC 13.9 05/21/2019    RBC 4.65 05/21/2019    HGB 14.8 05/21/2019    HCT 41.3 05/21/2019    MCV 88.8 05/21/2019    MCH 31.8 05/21/2019    MCHC 35.8 05/21/2019     05/21/2019       CMP  Lab Results   Component Value Date    CALCIUM 9.5 05/21/2019    LABALBU 4.4 05/21/2019    PROT 7.4 05/21/2019     05/21/2019    K 3.9 05/21/2019    CO2 23 05/21/2019    CL 99 05/21/2019    BUN 6 05/21/2019    CREATININE 0.9 05/21/2019    ALKPHOS 93 05/21/2019    ALT 21 05/21/2019    AST 11 05/21/2019       HgBA1c: No components found for: HGBA1C    No results found for: TSHFT4, TSH  No results found for: VITD25      No results found for: ANASCRN  No results found for: SSA  No results found for: SSB  No results found for: ANTI-SMITH  No results found for: DSDNAAB   No results found for: ANTIRNP  No results found for: C3, C4  No results found for: CCPAB  No results found for: RF    No components found for: CANCASCRN, APANCASCRN  No results found for: SEDRATE  No results found for: CRP    RADIOLOGY:     MRI cervical spine 2/22/19  Essentially unremarkable MRI of the cervical spine. No significant spinal canal or neural foraminal narrowing is identified. MRI Thoracic spine: 2/22/19  1. There is prominence and dilation of the central canal which measures up to 0.2 cm and extends from T5-T6 to T9-T10. This may represent the patient's baseline versus a small syrinx. Follow-up exam in 3 months for further evaluation and to document    stability would be beneficial.       2. Multilevel degenerative changes are present with small disc protrusions at multiple levels. No significant spinal canal narrowing is identified. MRI lumbar spine 2/22/19  Multilevel degenerative changes are present throughout the lumbar spine and are further discussed by level in the findings. These are most significant at L4-L5 where there is a posterior disc protrusion indenting the ventral thecal sac and causing mild    spinal canal narrowing.  Mild degenerative facet

## 2019-06-12 ENCOUNTER — TELEPHONE (OUTPATIENT)
Dept: RHEUMATOLOGY | Age: 39
End: 2019-06-12

## 2019-06-12 RX ORDER — DICLOFENAC SODIUM 75 MG/1
75 TABLET, DELAYED RELEASE ORAL 2 TIMES DAILY
Qty: 60 TABLET | Refills: 0 | Status: SHIPPED | OUTPATIENT
Start: 2019-06-12 | End: 2019-09-28 | Stop reason: SDUPTHER

## 2019-06-12 NOTE — TELEPHONE ENCOUNTER
----- Message from Saeid Avalos DO sent at 6/11/2019  6:22 PM EDT -----  Lab testing revealed no evidence of inflammation. The liver and kidney function tests and liver function tests were within normal limits. The remainder of the blood tests have returned you'll be notified of the results.

## 2019-06-14 LAB
C3 COMPLEMENT: 147 MG/DL (ref 88–201)
C4 COMPLEMENT: 26 MG/DL (ref 10–40)

## 2019-07-05 ENCOUNTER — HOSPITAL ENCOUNTER (OUTPATIENT)
Dept: MRI IMAGING | Age: 39
Discharge: HOME | End: 2019-07-05
Payer: MEDICAID

## 2019-07-05 DIAGNOSIS — G89.29 OTHER CHRONIC BACK PAIN: ICD-10-CM

## 2019-07-05 DIAGNOSIS — M54.9 OTHER CHRONIC BACK PAIN: ICD-10-CM

## 2019-07-05 PROCEDURE — A9579 GAD-BASE MR CONTRAST NOS,1ML: HCPCS | Performed by: INTERNAL MEDICINE

## 2019-07-05 PROCEDURE — 6360000004 HC RX CONTRAST MEDICATION: Performed by: INTERNAL MEDICINE

## 2019-07-05 PROCEDURE — 72157 MRI CHEST SPINE W/O & W/DYE: CPT

## 2019-07-05 RX ADMIN — GADOTERIDOL 20 ML: 279.3 INJECTION, SOLUTION INTRAVENOUS at 19:12

## 2019-07-24 DIAGNOSIS — L30.9 PERIORBITAL DERMATITIS: Primary | ICD-10-CM

## 2019-07-24 RX ORDER — ALCLOMETASONE DIPROPIONATE 0.5 MG/G
OINTMENT TOPICAL
Qty: 1 TUBE | Refills: 1 | Status: SHIPPED | OUTPATIENT
Start: 2019-07-24 | End: 2021-09-21 | Stop reason: SDUPTHER

## 2019-08-01 ENCOUNTER — TELEPHONE (OUTPATIENT)
Dept: PULMONOLOGY | Age: 39
End: 2019-08-01

## 2019-08-15 ENCOUNTER — OFFICE VISIT (OUTPATIENT)
Dept: PSYCHIATRY | Age: 39
End: 2019-08-15
Payer: MEDICAID

## 2019-08-15 ENCOUNTER — TELEPHONE (OUTPATIENT)
Dept: RHEUMATOLOGY | Age: 39
End: 2019-08-15

## 2019-08-15 VITALS — WEIGHT: 209 LBS | BODY MASS INDEX: 35.68 KG/M2 | HEIGHT: 64 IN

## 2019-08-15 DIAGNOSIS — F33.2 SEVERE EPISODE OF RECURRENT MAJOR DEPRESSIVE DISORDER, WITHOUT PSYCHOTIC FEATURES (HCC): Primary | ICD-10-CM

## 2019-08-15 DIAGNOSIS — F43.10 PTSD (POST-TRAUMATIC STRESS DISORDER): ICD-10-CM

## 2019-08-15 DIAGNOSIS — F41.1 GENERALIZED ANXIETY DISORDER: ICD-10-CM

## 2019-08-15 DIAGNOSIS — G47.00 INSOMNIA, UNSPECIFIED TYPE: ICD-10-CM

## 2019-08-15 PROCEDURE — 90792 PSYCH DIAG EVAL W/MED SRVCS: CPT | Performed by: NURSE PRACTITIONER

## 2019-08-15 PROCEDURE — 4004F PT TOBACCO SCREEN RCVD TLK: CPT | Performed by: NURSE PRACTITIONER

## 2019-08-15 RX ORDER — BUSPIRONE HYDROCHLORIDE 7.5 MG/1
7.5 TABLET ORAL 3 TIMES DAILY
Qty: 90 TABLET | Refills: 1 | Status: SHIPPED | OUTPATIENT
Start: 2019-08-15 | End: 2019-09-14

## 2019-08-15 RX ORDER — TRAZODONE HYDROCHLORIDE 150 MG/1
150 TABLET ORAL NIGHTLY
Qty: 30 TABLET | Refills: 1 | Status: SHIPPED | OUTPATIENT
Start: 2019-08-15 | End: 2019-11-26 | Stop reason: SDUPTHER

## 2019-08-15 SDOH — ECONOMIC STABILITY: INCOME INSECURITY: HOW HARD IS IT FOR YOU TO PAY FOR THE VERY BASICS LIKE FOOD, HOUSING, MEDICAL CARE, AND HEATING?: NOT HARD AT ALL

## 2019-08-15 SDOH — ECONOMIC STABILITY: TRANSPORTATION INSECURITY
IN THE PAST 12 MONTHS, HAS LACK OF TRANSPORTATION KEPT YOU FROM MEETINGS, WORK, OR FROM GETTING THINGS NEEDED FOR DAILY LIVING?: NO

## 2019-08-15 SDOH — ECONOMIC STABILITY: FOOD INSECURITY: WITHIN THE PAST 12 MONTHS, YOU WORRIED THAT YOUR FOOD WOULD RUN OUT BEFORE YOU GOT MONEY TO BUY MORE.: NEVER TRUE

## 2019-08-15 SDOH — ECONOMIC STABILITY: TRANSPORTATION INSECURITY
IN THE PAST 12 MONTHS, HAS THE LACK OF TRANSPORTATION KEPT YOU FROM MEDICAL APPOINTMENTS OR FROM GETTING MEDICATIONS?: NO

## 2019-08-15 SDOH — ECONOMIC STABILITY: FOOD INSECURITY: WITHIN THE PAST 12 MONTHS, THE FOOD YOU BOUGHT JUST DIDN'T LAST AND YOU DIDN'T HAVE MONEY TO GET MORE.: NEVER TRUE

## 2019-08-15 ASSESSMENT — PATIENT HEALTH QUESTIONNAIRE - PHQ9
1. LITTLE INTEREST OR PLEASURE IN DOING THINGS: 2
2. FEELING DOWN, DEPRESSED OR HOPELESS: 3
SUM OF ALL RESPONSES TO PHQ QUESTIONS 1-9: 20
10. IF YOU CHECKED OFF ANY PROBLEMS, HOW DIFFICULT HAVE THESE PROBLEMS MADE IT FOR YOU TO DO YOUR WORK, TAKE CARE OF THINGS AT HOME, OR GET ALONG WITH OTHER PEOPLE: 2
5. POOR APPETITE OR OVEREATING: 3
7. TROUBLE CONCENTRATING ON THINGS, SUCH AS READING THE NEWSPAPER OR WATCHING TELEVISION: 1
6. FEELING BAD ABOUT YOURSELF - OR THAT YOU ARE A FAILURE OR HAVE LET YOURSELF OR YOUR FAMILY DOWN: 2
8. MOVING OR SPEAKING SO SLOWLY THAT OTHER PEOPLE COULD HAVE NOTICED. OR THE OPPOSITE, BEING SO FIGETY OR RESTLESS THAT YOU HAVE BEEN MOVING AROUND A LOT MORE THAN USUAL: 3
SUM OF ALL RESPONSES TO PHQ QUESTIONS 1-9: 20
9. THOUGHTS THAT YOU WOULD BE BETTER OFF DEAD, OR OF HURTING YOURSELF: 0
SUM OF ALL RESPONSES TO PHQ9 QUESTIONS 1 & 2: 5
3. TROUBLE FALLING OR STAYING ASLEEP: 3
4. FEELING TIRED OR HAVING LITTLE ENERGY: 3

## 2019-08-15 ASSESSMENT — ANXIETY QUESTIONNAIRES
4. TROUBLE RELAXING: 3-NEARLY EVERY DAY
5. BEING SO RESTLESS THAT IT IS HARD TO SIT STILL: 3-NEARLY EVERY DAY
6. BECOMING EASILY ANNOYED OR IRRITABLE: 3-NEARLY EVERY DAY
GAD7 TOTAL SCORE: 19
7. FEELING AFRAID AS IF SOMETHING AWFUL MIGHT HAPPEN: 1-SEVERAL DAYS
2. NOT BEING ABLE TO STOP OR CONTROL WORRYING: 3-NEARLY EVERY DAY
3. WORRYING TOO MUCH ABOUT DIFFERENT THINGS: 3-NEARLY EVERY DAY
1. FEELING NERVOUS, ANXIOUS, OR ON EDGE: 3-NEARLY EVERY DAY

## 2019-08-15 NOTE — PROGRESS NOTES
(degenerative disc disease), thoracolumbar     Depression     GERD (gastroesophageal reflux disease)        PAST SURGICAL HISTORY:    History reviewed. No pertinent surgical history. PREVIOUSMEDICATIONS:  Outpatient Medications Prior to Visit   Medication Sig Dispense Refill    beclomethasone (QVAR REDIHALER) 80 MCG/ACT AERB inhaler Inhale 2 puffs into the lungs 2 times daily 10.6 g 5    alclomethasone (ACLOVATE) 0.05 % ointment Apply topically 2 times daily. 1 Tube 1    diclofenac (VOLTAREN) 75 MG EC tablet Take 1 tablet by mouth 2 times daily 60 tablet 0    hydroxychloroquine (PLAQUENIL) 200 MG tablet Take 1 tablet by mouth daily 90 tablet 1    ondansetron (ZOFRAN) 4 MG tablet Take 1 tablet by mouth every 8 hours as needed for Nausea 15 tablet 0    albuterol sulfate  (90 Base) MCG/ACT inhaler Inhale 2 puffs into the lungs every 4 hours as needed for Wheezing 1 Inhaler 11    lidocaine (XYLOCAINE) 5 % ointment Apply topically as needed. 50 g 0    Lactase (DAIRY-RELIEF PO) Take by mouth as needed       RaNITidine HCl (ACID REDUCER PO) Take by mouth as needed       fluticasone-salmeterol (ADVAIR) 250-50 MCG/DOSE AEPB Inhale 1 puff into the lungs every 12 hours 60 each 5    ketorolac (TORADOL) 10 MG tablet Take 1 tablet by mouth every 6 hours as needed for Pain 15 tablet 0    desvenlafaxine succinate (PRISTIQ) 50 MG TB24 extended release tablet Take 1 tablet by mouth daily 30 tablet 1    traZODone (DESYREL) 50 MG tablet Take 1-2 tablets by mouth nightly 60 tablet 2    Dietary Management Product (ENLYTE) CAPS Take 1 capsule by mouth daily 30 capsule 1     No facility-administered medications prior to visit. ALLERGIES:    Prozac [fluoxetine hcl]    REVIEW OF SYSTEMS:    Review of Systems    The patient sees Zeynep Canales MD as her primary care provider.     SPECIALISTS: Dr. Palmira Chavis (rheumatology); Dr. Anu Weber (pulmonology)    OBJECTIVE DATA     Ht 5' 4\" (1.626 m)   Wt 209 lb (94.8 kg)

## 2019-08-15 NOTE — TELEPHONE ENCOUNTER
Discussed with Dr. Abimbola Ramirez. It is determined that patient is having migraines. The headaches are not related to a rheumatological problem. Patient is to follow up with PCP for possible treatment. Patient called and notified. She voiced understanding and will follow up with PCP. No further questions were asked at this time.

## 2019-08-30 ENCOUNTER — OFFICE VISIT (OUTPATIENT)
Dept: PSYCHIATRY | Age: 39
End: 2019-08-30
Payer: MEDICAID

## 2019-08-30 VITALS — WEIGHT: 210 LBS | HEIGHT: 64 IN | BODY MASS INDEX: 35.85 KG/M2

## 2019-08-30 DIAGNOSIS — G43.909 MIGRAINE WITHOUT STATUS MIGRAINOSUS, NOT INTRACTABLE, UNSPECIFIED MIGRAINE TYPE: ICD-10-CM

## 2019-08-30 DIAGNOSIS — F43.10 PTSD (POST-TRAUMATIC STRESS DISORDER): ICD-10-CM

## 2019-08-30 DIAGNOSIS — G47.00 INSOMNIA, UNSPECIFIED TYPE: ICD-10-CM

## 2019-08-30 DIAGNOSIS — F33.2 SEVERE EPISODE OF RECURRENT MAJOR DEPRESSIVE DISORDER, WITHOUT PSYCHOTIC FEATURES (HCC): Primary | ICD-10-CM

## 2019-08-30 DIAGNOSIS — F41.1 GENERALIZED ANXIETY DISORDER: ICD-10-CM

## 2019-08-30 PROCEDURE — 99213 OFFICE O/P EST LOW 20 MIN: CPT | Performed by: NURSE PRACTITIONER

## 2019-08-30 PROCEDURE — G8417 CALC BMI ABV UP PARAM F/U: HCPCS | Performed by: NURSE PRACTITIONER

## 2019-08-30 PROCEDURE — G8427 DOCREV CUR MEDS BY ELIG CLIN: HCPCS | Performed by: NURSE PRACTITIONER

## 2019-08-30 PROCEDURE — 4004F PT TOBACCO SCREEN RCVD TLK: CPT | Performed by: NURSE PRACTITIONER

## 2019-08-30 RX ORDER — VILAZODONE HYDROCHLORIDE 20 MG/1
20 TABLET ORAL DAILY
Qty: 30 TABLET | Refills: 1 | Status: SHIPPED | OUTPATIENT
Start: 2019-08-30 | End: 2019-11-26
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
## 2019-09-03 ENCOUNTER — OFFICE VISIT (OUTPATIENT)
Dept: FAMILY MEDICINE CLINIC | Age: 39
End: 2019-09-03
Payer: MEDICAID

## 2019-09-03 VITALS
HEART RATE: 80 BPM | TEMPERATURE: 98.5 F | WEIGHT: 205 LBS | DIASTOLIC BLOOD PRESSURE: 68 MMHG | RESPIRATION RATE: 14 BRPM | SYSTOLIC BLOOD PRESSURE: 110 MMHG | BODY MASS INDEX: 35.19 KG/M2

## 2019-09-03 DIAGNOSIS — R50.9 FEVER, UNSPECIFIED FEVER CAUSE: Primary | ICD-10-CM

## 2019-09-03 LAB
BASOPHILS # BLD: 0.7 %
BASOPHILS ABSOLUTE: 0.1 THOU/MM3 (ref 0–0.1)
C-REACTIVE PROTEIN: 0.12 MG/DL (ref 0–1)
EOSINOPHIL # BLD: 2.4 %
EOSINOPHILS ABSOLUTE: 0.2 THOU/MM3 (ref 0–0.4)
ERYTHROCYTE [DISTWIDTH] IN BLOOD BY AUTOMATED COUNT: 12.4 % (ref 11.5–14.5)
ERYTHROCYTE [DISTWIDTH] IN BLOOD BY AUTOMATED COUNT: 40.1 FL (ref 35–45)
HCT VFR BLD CALC: 44.4 % (ref 37–47)
HEMOGLOBIN: 15.6 GM/DL (ref 12–16)
IMMATURE GRANS (ABS): 0.02 THOU/MM3 (ref 0–0.07)
IMMATURE GRANULOCYTES: 0 %
LYMPHOCYTES # BLD: 29.2 %
LYMPHOCYTES ABSOLUTE: 2.2 THOU/MM3 (ref 1–4.8)
MCH RBC QN AUTO: 31.3 PG (ref 26–33)
MCHC RBC AUTO-ENTMCNC: 35.1 GM/DL (ref 32.2–35.5)
MCV RBC AUTO: 89 FL (ref 81–99)
MONOCYTES # BLD: 6.6 %
MONOCYTES ABSOLUTE: 0.5 THOU/MM3 (ref 0.4–1.3)
NUCLEATED RED BLOOD CELLS: 0 /100 WBC
PLATELET # BLD: 306 THOU/MM3 (ref 130–400)
PMV BLD AUTO: 11 FL (ref 9.4–12.4)
RBC # BLD: 4.99 MILL/MM3 (ref 4.2–5.4)
SEG NEUTROPHILS: 60.8 %
SEGMENTED NEUTROPHILS ABSOLUTE COUNT: 4.6 THOU/MM3 (ref 1.8–7.7)
WBC # BLD: 7.5 THOU/MM3 (ref 4.8–10.8)

## 2019-09-03 PROCEDURE — G8417 CALC BMI ABV UP PARAM F/U: HCPCS | Performed by: FAMILY MEDICINE

## 2019-09-03 PROCEDURE — 36415 COLL VENOUS BLD VENIPUNCTURE: CPT | Performed by: FAMILY MEDICINE

## 2019-09-03 PROCEDURE — G8427 DOCREV CUR MEDS BY ELIG CLIN: HCPCS | Performed by: FAMILY MEDICINE

## 2019-09-03 PROCEDURE — 99213 OFFICE O/P EST LOW 20 MIN: CPT | Performed by: FAMILY MEDICINE

## 2019-09-03 PROCEDURE — 4004F PT TOBACCO SCREEN RCVD TLK: CPT | Performed by: FAMILY MEDICINE

## 2019-09-03 ASSESSMENT — ENCOUNTER SYMPTOMS
BACK PAIN: 0
SORE THROAT: 0
RHINORRHEA: 0
CONSTIPATION: 0
WHEEZING: 0
ABDOMINAL PAIN: 0
COUGH: 0
CHEST TIGHTNESS: 0
NAUSEA: 0
BLOOD IN STOOL: 0
VOMITING: 0
EYE PAIN: 0
SHORTNESS OF BREATH: 0
DIARRHEA: 0

## 2019-09-03 NOTE — PROGRESS NOTES
Subjective:      Patient ID: Vik Morris is a 44 y.o. female. Fever    The current episode started in the past 7 days (x 5 days). The problem has been unchanged. Her temperature was unmeasured prior to arrival. Associated symptoms include headaches and sleepiness. Pertinent negatives include no abdominal pain, chest pain, congestion, coughing, diarrhea, ear pain, muscle aches, nausea, rash, sore throat, urinary pain, vomiting or wheezing. She has tried NSAIDs for the symptoms. The treatment provided mild relief. Is on clinda 150 mg QID from dentist since 2  weeks ago. Review of Systems   Constitutional: Positive for fever. Negative for chills, fatigue and unexpected weight change. HENT: Negative for congestion, ear pain, rhinorrhea and sore throat. Eyes: Negative for pain and visual disturbance. Respiratory: Negative for cough, chest tightness, shortness of breath and wheezing. Cardiovascular: Negative for chest pain and palpitations. Gastrointestinal: Negative for abdominal pain, blood in stool, constipation, diarrhea, nausea and vomiting. Genitourinary: Negative for difficulty urinating, dysuria, frequency, hematuria and urgency. Musculoskeletal: Negative for back pain, joint swelling, myalgias and neck pain. Skin: Negative for rash. Neurological: Positive for headaches. Negative for dizziness. Hematological: Negative for adenopathy. Does not bruise/bleed easily. Psychiatric/Behavioral: Negative for behavioral problems and sleep disturbance. The patient is not nervous/anxious. Objective:   Physical Exam   Constitutional: She is oriented to person, place, and time. She appears well-developed and well-nourished. HENT:   Head: Normocephalic and atraumatic. Right Ear: External ear normal.   Left Ear: External ear normal.   Nose: Right sinus exhibits maxillary sinus tenderness.    Mouth/Throat: Oropharynx is clear and moist.   Eyes: Pupils are equal, round, and reactive to light. EOM are normal.   Neck: Neck supple. No thyromegaly present. Cardiovascular: Normal rate, regular rhythm and normal heart sounds. Pulmonary/Chest: Breath sounds normal. She has no wheezes. She has no rales. Abdominal: Soft. Bowel sounds are normal. There is no tenderness. There is no rebound and no guarding. Musculoskeletal: Normal range of motion. She exhibits no edema. Lymphadenopathy:     She has cervical adenopathy. Right cervical: Superficial cervical adenopathy present. Left cervical: Superficial cervical adenopathy present. Neurological: She is alert and oriented to person, place, and time. She has normal reflexes. No cranial nerve deficit. Skin: Skin is warm and dry. No rash noted. Psychiatric: She has a normal mood and affect. Nursing note and vitals reviewed. Assessment:      Fever ? ? etiology      Plan:      CBC and crp  Treat accordingly          Neo Brothers MD

## 2019-09-04 ENCOUNTER — TELEPHONE (OUTPATIENT)
Dept: FAMILY MEDICINE CLINIC | Age: 39
End: 2019-09-04

## 2019-09-04 NOTE — TELEPHONE ENCOUNTER
----- Message from Zaria Birmingham MD sent at 9/3/2019  5:14 PM EDT -----  crp level is normal.  CBC is normal.  No shift seen. Still running fevers? ?

## 2019-09-06 ENCOUNTER — TELEPHONE (OUTPATIENT)
Dept: PSYCHIATRY | Age: 39
End: 2019-09-06

## 2019-09-06 NOTE — TELEPHONE ENCOUNTER
Patient submitted this message today via [employer]:    From  Tyree Raúl To  [facility] Clinical Support Pool Sent  9/6/2019 10:50 AM   It's not fevers.  I got blood drawn and they found nothing. My head just gets super hot. I have not taken any meds for a week trying to figure out what's wrong.  But I will be taking them again because my attitude six.  Do I still take the v meds or not? This writer reached out to patient to obtain clarification surrounding this message. Patient states that it was discussed during her last appointment about the possibility of Viibryd 20mg causing her head to feel hot. She followed up with PCP to rule out any underlying illness and all results were benign. Patient states that she took herself off of Viibryd 20mg seven days ago to see if her sxs would improve but it did not. She states that her mood if very poor and would like to resume the medication if permissible. She attended an appointment in the office 8/30 and is scheduled to return 10/9.

## 2019-09-12 ENCOUNTER — OFFICE VISIT (OUTPATIENT)
Dept: RHEUMATOLOGY | Age: 39
End: 2019-09-12
Payer: MEDICAID

## 2019-09-12 VITALS
SYSTOLIC BLOOD PRESSURE: 130 MMHG | DIASTOLIC BLOOD PRESSURE: 72 MMHG | WEIGHT: 205.03 LBS | HEIGHT: 64 IN | BODY MASS INDEX: 35 KG/M2 | HEART RATE: 76 BPM | OXYGEN SATURATION: 97 %

## 2019-09-12 DIAGNOSIS — G89.29 OTHER CHRONIC BACK PAIN: Primary | ICD-10-CM

## 2019-09-12 DIAGNOSIS — R20.2 PARESTHESIA AND PAIN OF BOTH UPPER EXTREMITIES: ICD-10-CM

## 2019-09-12 DIAGNOSIS — M54.9 OTHER CHRONIC BACK PAIN: Primary | ICD-10-CM

## 2019-09-12 DIAGNOSIS — M32.9 H/O SYSTEMIC LUPUS ERYTHEMATOSUS (SLE) (HCC): ICD-10-CM

## 2019-09-12 DIAGNOSIS — M79.601 PARESTHESIA AND PAIN OF BOTH UPPER EXTREMITIES: ICD-10-CM

## 2019-09-12 DIAGNOSIS — M79.602 PARESTHESIA AND PAIN OF BOTH UPPER EXTREMITIES: ICD-10-CM

## 2019-09-12 PROCEDURE — 99213 OFFICE O/P EST LOW 20 MIN: CPT | Performed by: INTERNAL MEDICINE

## 2019-09-12 PROCEDURE — G8417 CALC BMI ABV UP PARAM F/U: HCPCS | Performed by: INTERNAL MEDICINE

## 2019-09-12 PROCEDURE — 4004F PT TOBACCO SCREEN RCVD TLK: CPT | Performed by: INTERNAL MEDICINE

## 2019-09-12 PROCEDURE — G8427 DOCREV CUR MEDS BY ELIG CLIN: HCPCS | Performed by: INTERNAL MEDICINE

## 2019-09-12 ASSESSMENT — ENCOUNTER SYMPTOMS
EYE ITCHING: 1
VOMITING: 0
CONSTIPATION: 1
EYE REDNESS: 0
WHEEZING: 0
SHORTNESS OF BREATH: 0
BACK PAIN: 1
BLOOD IN STOOL: 0
NAUSEA: 1
DIARRHEA: 0
ANAL BLEEDING: 0
EYE PAIN: 0
PHOTOPHOBIA: 1
COUGH: 0

## 2019-09-12 NOTE — PROGRESS NOTES
knees,      Upper extremities:   ROM --- intact bilateral upper extremities. Msk Strength 5/5 in bilateral shoulders, elbow flex/ext. No synovitis, or deformities. no Synovitis. Tender --- left shoulder -- AC, Lateral shoulder.  + Left hawking, jayne, speed. + tinel testing bilateral wrist and cubital tunnel. Lower extremities:- ROM , strength intact bilateral- intact bilateral lower extremities. +  Tender outer hips, bilateral knees. NO Synovitis. Knee: --- + patellar grind, Neg draw, [facility], compression testing. + FADIR with external hip pain bilat. Spine:     C-spine: intact ROM, Non-tender, no swelling.   good flexion and extension, reflexes are 2+ and symmetric, motor and sensory appear to be normal, negative SLR test, spurling compression testing, intact ROM of C-spine. positive findings: + TENDER throughout the entire C, T-, L-spine and perispinal regions.         RAPID3 Composite Score MDHAQ (0-10) + Patient pain VAS (0-10): + Patient global assessment VAS (0-10):     9/12/2019 --- RAPID 3: 2.3 + 3.5 + 10 = 15.8     Remission: <3  Low Disease Activity: <6  Moderate Disease Activity: >=6 and <=12  High Disease Activity: >12    LABS        CBC  Lab Results   Component Value Date    WBC 7.5 09/03/2019    RBC 4.99 09/03/2019    HGB 15.6 09/03/2019    HCT 44.4 09/03/2019    MCV 89.0 09/03/2019    MCH 31.3 09/03/2019    MCHC 35.1 09/03/2019     09/03/2019       CMP  Lab Results   Component Value Date    CALCIUM 9.5 06/11/2019    LABALBU 4.6 06/11/2019    PROT 8.1 06/11/2019     06/11/2019    K 4.0 06/11/2019    CO2 22 06/11/2019     06/11/2019    BUN 8 06/11/2019    CREATININE 0.8 06/11/2019    ALKPHOS 89 06/11/2019    ALT 19 06/11/2019    AST 15 06/11/2019       HgBA1c: No components found for: HGBA1C    Lab Results   Component Value Date    TSH 1.570 06/11/2019     No results found for: VITD25      No results found for: ANASCRN  No results found for: SSA  No results found

## 2019-09-23 ENCOUNTER — OFFICE VISIT (OUTPATIENT)
Dept: PULMONOLOGY | Age: 39
End: 2019-09-23
Payer: MEDICAID

## 2019-09-23 VITALS
HEIGHT: 64 IN | HEART RATE: 70 BPM | DIASTOLIC BLOOD PRESSURE: 64 MMHG | OXYGEN SATURATION: 97 % | SYSTOLIC BLOOD PRESSURE: 118 MMHG | BODY MASS INDEX: 35.41 KG/M2 | TEMPERATURE: 98.3 F | WEIGHT: 207.4 LBS

## 2019-09-23 DIAGNOSIS — J45.40 MODERATE PERSISTENT ASTHMA WITHOUT COMPLICATION: Primary | ICD-10-CM

## 2019-09-23 DIAGNOSIS — F17.210 CIGARETTE NICOTINE DEPENDENCE WITHOUT COMPLICATION: ICD-10-CM

## 2019-09-23 PROCEDURE — 99213 OFFICE O/P EST LOW 20 MIN: CPT | Performed by: NURSE PRACTITIONER

## 2019-09-23 PROCEDURE — G8427 DOCREV CUR MEDS BY ELIG CLIN: HCPCS | Performed by: NURSE PRACTITIONER

## 2019-09-23 PROCEDURE — 4004F PT TOBACCO SCREEN RCVD TLK: CPT | Performed by: NURSE PRACTITIONER

## 2019-09-23 PROCEDURE — G8417 CALC BMI ABV UP PARAM F/U: HCPCS | Performed by: NURSE PRACTITIONER

## 2019-09-23 ASSESSMENT — ENCOUNTER SYMPTOMS
SORE THROAT: 0
VOICE CHANGE: 0
CHEST TIGHTNESS: 0
SHORTNESS OF BREATH: 1
BACK PAIN: 0
COUGH: 1
NAUSEA: 0
ALLERGIC/IMMUNOLOGIC NEGATIVE: 1
STRIDOR: 0
DIARRHEA: 0
TROUBLE SWALLOWING: 0
EYES NEGATIVE: 1
WHEEZING: 1

## 2019-09-23 NOTE — PROGRESS NOTES
for arthralgias and back pain. Skin: Negative. Allergic/Immunologic: Negative. Neurological: Negative. Negative for dizziness and light-headedness. Psychiatric/Behavioral: Negative. Negative for behavioral problems, sleep disturbance and suicidal ideas. All other systems reviewed and are negative. Physical exam   /64 (Site: Right Upper Arm, Position: Sitting, Cuff Size: Medium Adult)   Pulse 70   Temp 98.3 °F (36.8 °C) (Oral)   Ht 5' 4\" (1.626 m)   Wt 207 lb 6.4 oz (94.1 kg)   SpO2 97% Comment: on room air  BMI 35.60 kg/m²        Physical Exam   Constitutional: She is oriented to person, place, and time. She appears well-developed and well-nourished. No distress. HENT:   Mouth/Throat: No oropharyngeal exudate. Eyes: Conjunctivae are normal. Right eye exhibits no discharge. No scleral icterus. Neck: Neck supple. No JVD present. Cardiovascular: Normal rate and regular rhythm. Exam reveals no friction rub. No murmur heard. Pulmonary/Chest: Effort normal. No respiratory distress. She has wheezes (inspiratory ) in the right upper field, the right middle field, the left upper field, the left middle field and the left lower field. She has no rales. She exhibits no tenderness. Abdominal: Soft. Musculoskeletal: She exhibits no edema or tenderness. Lymphadenopathy:     She has no cervical adenopathy. Neurological: She is alert and oriented to person, place, and time. Skin: Skin is warm and dry. Capillary refill takes less than 2 seconds. Psychiatric: She has a normal mood and affect. Her behavior is normal. Judgment and thought content normal.   Nursing note and vitals reviewed. Test results   Lung Nodule Screening     [] Qualifies    [x]Does not qualify   [] Declined    [] Completed               Assessment      Diagnosis Orders   1. Moderate persistent asthma without complication     2.  Cigarette nicotine dependence without complication        Plan   -Recommend she stop smoking, neg effects of smoking on health discussed. Unfortunately she has no desire to quit and does not wish to pursue any NRT. -she has done very well with Advair in past, currently symptoms are uncontrolled with use of her MADHAVI several times per day on ICS only.     -recommend she stop Qvar, re-start Advair 250/50 diskus BID- orders placed, may need to do PA  -script for new spacer device to use with her albuterol HFA    Will see Antoinette Bundy in: 6 months    Electronically signed by BINH Araujo CNP on 9/23/2019 at 1:34 PM

## 2019-09-28 DIAGNOSIS — G89.29 CHRONIC LEFT SHOULDER PAIN: ICD-10-CM

## 2019-09-28 DIAGNOSIS — M51.36 DDD (DEGENERATIVE DISC DISEASE), LUMBAR: ICD-10-CM

## 2019-09-28 DIAGNOSIS — M25.512 CHRONIC LEFT SHOULDER PAIN: ICD-10-CM

## 2019-09-30 RX ORDER — DICLOFENAC SODIUM 75 MG/1
75 TABLET, DELAYED RELEASE ORAL 2 TIMES DAILY
Qty: 60 TABLET | Refills: 2 | Status: SHIPPED | OUTPATIENT
Start: 2019-09-30 | End: 2020-02-26

## 2019-09-30 RX ORDER — INHALER, ASSIST DEVICES
1 SPACER (EA) MISCELLANEOUS DAILY
Qty: 1 DEVICE | Refills: 0 | Status: SHIPPED | OUTPATIENT
Start: 2019-09-30

## 2019-10-02 ENCOUNTER — PATIENT MESSAGE (OUTPATIENT)
Dept: FAMILY MEDICINE CLINIC | Age: 39
End: 2019-10-02

## 2019-10-09 ENCOUNTER — TELEPHONE (OUTPATIENT)
Dept: PSYCHIATRY | Age: 39
End: 2019-10-09

## 2019-10-17 ENCOUNTER — TELEPHONE (OUTPATIENT)
Dept: PULMONOLOGY | Age: 39
End: 2019-10-17

## 2019-10-17 RX ORDER — FLUTICASONE PROPIONATE AND SALMETEROL 232; 14 UG/1; UG/1
1 POWDER, METERED RESPIRATORY (INHALATION) 2 TIMES DAILY
Qty: 1 EACH | Refills: 11 | Status: SHIPPED | OUTPATIENT
Start: 2019-10-17 | End: 2021-08-26 | Stop reason: ALTCHOICE

## 2019-10-23 ENCOUNTER — OFFICE VISIT (OUTPATIENT)
Dept: FAMILY MEDICINE CLINIC | Age: 39
End: 2019-10-23
Payer: MEDICAID

## 2019-10-23 VITALS
HEART RATE: 74 BPM | HEIGHT: 65 IN | DIASTOLIC BLOOD PRESSURE: 78 MMHG | SYSTOLIC BLOOD PRESSURE: 120 MMHG | BODY MASS INDEX: 34.66 KG/M2 | WEIGHT: 208 LBS | RESPIRATION RATE: 14 BRPM

## 2019-10-23 DIAGNOSIS — Z01.419 WELL FEMALE EXAM WITH ROUTINE GYNECOLOGICAL EXAM: Primary | ICD-10-CM

## 2019-10-23 PROCEDURE — G8484 FLU IMMUNIZE NO ADMIN: HCPCS | Performed by: FAMILY MEDICINE

## 2019-10-23 PROCEDURE — 99395 PREV VISIT EST AGE 18-39: CPT | Performed by: FAMILY MEDICINE

## 2019-10-23 ASSESSMENT — ENCOUNTER SYMPTOMS
RHINORRHEA: 0
CHEST TIGHTNESS: 0
SHORTNESS OF BREATH: 0
CONSTIPATION: 0
SORE THROAT: 0
BLOOD IN STOOL: 0
COUGH: 0
BACK PAIN: 0
NAUSEA: 0
DIARRHEA: 0
VOMITING: 0
WHEEZING: 0
EYE PAIN: 0
ABDOMINAL PAIN: 0

## 2019-10-28 ENCOUNTER — TELEPHONE (OUTPATIENT)
Dept: FAMILY MEDICINE CLINIC | Age: 39
End: 2019-10-28

## 2019-10-28 LAB — CYTOLOGY THIN PREP PAP: NORMAL

## 2019-11-19 DIAGNOSIS — J45.30 MILD PERSISTENT ASTHMA WITHOUT COMPLICATION: ICD-10-CM

## 2019-11-19 RX ORDER — ALBUTEROL SULFATE 90 UG/1
2 AEROSOL, METERED RESPIRATORY (INHALATION) EVERY 4 HOURS PRN
Qty: 1 INHALER | Refills: 11 | Status: SHIPPED | OUTPATIENT
Start: 2019-11-19 | End: 2020-12-30

## 2019-11-25 ENCOUNTER — PATIENT MESSAGE (OUTPATIENT)
Dept: FAMILY MEDICINE CLINIC | Age: 39
End: 2019-11-25

## 2019-11-26 ENCOUNTER — OFFICE VISIT (OUTPATIENT)
Dept: PSYCHIATRY | Age: 39
End: 2019-11-26
Payer: MEDICAID

## 2019-11-26 ENCOUNTER — TELEPHONE (OUTPATIENT)
Dept: PULMONOLOGY | Age: 39
End: 2019-11-26

## 2019-11-26 DIAGNOSIS — F33.42 RECURRENT MAJOR DEPRESSIVE DISORDER, IN FULL REMISSION (HCC): Primary | ICD-10-CM

## 2019-11-26 DIAGNOSIS — F43.10 PTSD (POST-TRAUMATIC STRESS DISORDER): ICD-10-CM

## 2019-11-26 DIAGNOSIS — F41.1 GENERALIZED ANXIETY DISORDER: ICD-10-CM

## 2019-11-26 PROCEDURE — 90833 PSYTX W PT W E/M 30 MIN: CPT | Performed by: NURSE PRACTITIONER

## 2019-11-26 PROCEDURE — G8417 CALC BMI ABV UP PARAM F/U: HCPCS | Performed by: NURSE PRACTITIONER

## 2019-11-26 PROCEDURE — 99213 OFFICE O/P EST LOW 20 MIN: CPT | Performed by: NURSE PRACTITIONER

## 2019-11-26 PROCEDURE — G8484 FLU IMMUNIZE NO ADMIN: HCPCS | Performed by: NURSE PRACTITIONER

## 2019-11-26 PROCEDURE — G8427 DOCREV CUR MEDS BY ELIG CLIN: HCPCS | Performed by: NURSE PRACTITIONER

## 2019-11-26 PROCEDURE — 4004F PT TOBACCO SCREEN RCVD TLK: CPT | Performed by: NURSE PRACTITIONER

## 2019-11-26 RX ORDER — TRAZODONE HYDROCHLORIDE 150 MG/1
150 TABLET ORAL NIGHTLY
Qty: 30 TABLET | Refills: 2 | Status: SHIPPED
Start: 2019-11-26 | End: 2020-02-26

## 2019-12-02 DIAGNOSIS — J45.30 MILD PERSISTENT ASTHMA WITHOUT COMPLICATION: Primary | ICD-10-CM

## 2019-12-02 RX ORDER — NEBULIZER ACCESSORIES
1 EACH MISCELLANEOUS EVERY 6 HOURS PRN
Qty: 1 EACH | Refills: 0 | Status: SHIPPED | OUTPATIENT
Start: 2019-12-02 | End: 2020-03-09

## 2020-02-03 ENCOUNTER — TELEPHONE (OUTPATIENT)
Dept: PSYCHIATRY | Age: 40
End: 2020-02-03

## 2020-02-03 NOTE — TELEPHONE ENCOUNTER
Message received today via [employer]:    From  [id number] [facility]  2/1/2020  4:35 PM   I have a new dog and was wondering what it would take to make him my service dog.        Karin attended an appointment in the office 11/26 and is scheduled to return 2/26

## 2020-02-15 ENCOUNTER — HOSPITAL ENCOUNTER (EMERGENCY)
Age: 40
Discharge: HOME | End: 2020-02-15
Payer: MEDICAID

## 2020-02-15 VITALS
TEMPERATURE: 98.5 F | DIASTOLIC BLOOD PRESSURE: 85 MMHG | HEIGHT: 64 IN | OXYGEN SATURATION: 98 % | BODY MASS INDEX: 35.85 KG/M2 | WEIGHT: 210 LBS | RESPIRATION RATE: 18 BRPM | SYSTOLIC BLOOD PRESSURE: 127 MMHG | HEART RATE: 77 BPM

## 2020-02-15 PROCEDURE — 99281 EMR DPT VST MAYX REQ PHY/QHP: CPT

## 2020-02-15 PROCEDURE — 96372 THER/PROPH/DIAG INJ SC/IM: CPT

## 2020-02-15 PROCEDURE — 6360000002 HC RX W HCPCS: Performed by: NURSE PRACTITIONER

## 2020-02-15 RX ORDER — KETOROLAC TROMETHAMINE 30 MG/ML
30 INJECTION, SOLUTION INTRAMUSCULAR; INTRAVENOUS ONCE
Status: COMPLETED | OUTPATIENT
Start: 2020-02-15 | End: 2020-02-15

## 2020-02-15 RX ADMIN — PENICILLIN G BENZATHINE 1.2 MILLION UNITS: 1200000 INJECTION, SUSPENSION INTRAMUSCULAR at 22:49

## 2020-02-15 RX ADMIN — KETOROLAC TROMETHAMINE 30 MG: 30 INJECTION, SOLUTION INTRAMUSCULAR at 22:49

## 2020-02-15 ASSESSMENT — PAIN DESCRIPTION - PAIN TYPE: TYPE: ACUTE PAIN

## 2020-02-15 ASSESSMENT — PAIN SCALES - GENERAL
PAINLEVEL_OUTOF10: 6
PAINLEVEL_OUTOF10: 6

## 2020-02-15 ASSESSMENT — PAIN DESCRIPTION - LOCATION: LOCATION: TEETH

## 2020-02-15 ASSESSMENT — PAIN DESCRIPTION - ORIENTATION: ORIENTATION: RIGHT;LOWER

## 2020-02-16 NOTE — ED NOTES
Pt arrives Ed for right lower dental pain that started yesterday.  Pt states she has not had time to see her dentist. Pain is a [address] [facility], RN  02/15/20 [id number]

## 2020-02-16 NOTE — ED PROVIDER NOTES
[facility]  eMERGENCY dEPARTMENT eNCOUnter          CHIEF COMPLAINT       Chief Complaint   Patient presents with    Dental Pain       Nurses Notes reviewed and I agree except as noted in the HPI. HISTORY OF PRESENT ILLNESS    Beryl Singh is a 44 y.o. female who presents to the Emergency Department for the evaluation of dental pain. The patient stated her dental pain is located to her left side and rated her pain a 6 out of 10 in severity. The patient stated her symptom onset began yesterday. The patient reported history of tooth decay and meth use. The [facility]. The patient stated \"I am immune to toradol\". The patient has no further acute complaints at this time. The HPI was provided by the patient. REVIEW OF SYSTEMS     Review of Systems   Constitutional: Negative for fever. HENT: Positive for dental problem. Negative for congestion, rhinorrhea, sore throat and trouble swallowing. Respiratory: Negative for cough, chest tightness, shortness of breath and wheezing. Cardiovascular: Negative for chest pain and palpitations. Musculoskeletal: Negative for arthralgias and myalgias. Neurological: Negative for headaches. Hematological: Does not bruise/bleed easily. PAST MEDICAL HISTORY    has a past medical history of Anxiety, Asthma, DDD (degenerative disc disease), cervical, DDD (degenerative disc disease), thoracolumbar, Depression, and GERD (gastroesophageal reflux disease). SURGICAL HISTORY      has no past surgical history on file. CURRENT MEDICATIONS       Previous Medications    ALBUTEROL SULFATE  (90 BASE) MCG/ACT INHALER    Inhale 2 puffs into the lungs every 4 hours as needed for Wheezing    ALCLOMETHASONE (ACLOVATE) 0.05 % OINTMENT    Apply topically 2 times daily.     BECLOMETHASONE (QVAR REDIHALER) 80 MCG/ACT AERB INHALER    Inhale 2 puffs into the lungs 2 times daily    DICLOFENAC (VOLTAREN) 75 MG EC Physical Exam  Vitals signs and nursing note reviewed. Constitutional:       Appearance: She is well-developed. She is not diaphoretic. HENT:      Head: Normocephalic and atraumatic. Right Ear: External ear normal.      Left Ear: External ear normal.      Mouth/Throat:      Dentition: Abnormal dentition. Does not have dentures. Dental tenderness, gingival swelling and dental caries present. No dental abscesses or gum lesions. Eyes:      General: No scleral icterus. Right eye: No discharge. Left eye: No discharge. Conjunctiva/sclera: Conjunctivae normal.   Neck:      Musculoskeletal: Normal range of motion and neck supple. Vascular: No JVD. Pulmonary:      Effort: Pulmonary effort is normal. No respiratory distress. Breath sounds: No stridor. Abdominal:      General: There is no distension. Palpations: Abdomen is soft. Musculoskeletal: Normal range of motion. Skin:     General: Skin is warm and dry. Findings: No erythema. Neurological:      Mental Status: She is alert and oriented to person, place, and time. Motor: No abnormal muscle tone. Psychiatric:         Behavior: Behavior normal.          DIFFERENTIAL DIAGNOSIS:   Including but not limited to: dental decay, dental abscess    DIAGNOSTIC RESULTS     EKG: All EKG's are interpreted by the Emergency Department Physician who either signs or Co-signs this chart in the absence of a cardiologist.    None    RADIOLOGY: non-plainfilm images(s) such as CT, Ultrasound and MRI are read by the radiologist.    No orders to display       LABS:     Labs Reviewed - No data to display    EMERGENCY DEPARTMENT COURSE:   Vitals:    Vitals:    02/15/20 2200   BP: 127/85   Pulse: 77   Resp: 18   Temp: 98.5 °F (36.9 °C)   TempSrc: Oral   SpO2: 98%   Weight: 210 lb (95.3 kg)   Height: 5' 4\" (1.626 m)       10:11 PM: The patient was seen and evaluated. MDM:  Pertinent Labs & Imaging studies reviewed.  (See chart for details)    The patient was seen and evaluated within the ED today with dental pain. Within the department, I observed the patient's vital signs to be within acceptable range. On exam, I appreciated findings as documented in the physical exam.  Radiological studies and laboratory work were not indicated. Within the department, the patient was treated with Toradol, Bicillin. I observed the patient's condition to remain stable during the duration of the stay and I explained my proposed course of treatment to the patient, who was amenable to my decision. They were discharged home in stable condition with instructions to follow-up with her dentist, and the patient will return to the ED if the symptoms become more severe in nature or otherwise change    I have given the patient strict written and verbal instructions about care at home, follow-up, and signs and symptoms of worsening of condition and they did verbalize understanding. CRITICAL CARE:   none     CONSULTS:  none    PROCEDURES:  none    FINAL IMPRESSION      1. Pain due to dental caries          DISPOSITION/PLAN   Discharge      PATIENT REFERRED TO:  [address]  [address] [facility] [address]  [phone]  Schedule an appointment as soon as possible for a visit   or the dentist of your choosing from the list provided      DISCHARGE MEDICATIONS:  New Prescriptions    No medications on file       (Please note that portions of this note were completed with a voice recognition program.  Efforts were made to edit the dictations but occasionally words are mis-transcribed.)    The patient was given an opportunity to see the Emergency Attending. The patient voiced understanding that I was a Mid-LevelProvider and was in agreement with being seen independently by myself. Scribe:  Amena Lyons 2/15/20 10:11 PM Scribing for and in the presence of Varsha Bella CNP.     Signed by: Cornell Ray, 02/15/20 10:11 PM    Provider:  I personally performed the services described in the documentation, reviewed and edited the documentation which was dictated to the scribe in my presence, and it accurately records my words and actions.     Julio C Jaimes CNP 2/15/20 10:11 PM      BINH Masters - BRYANT  02/17/20 [id number]

## 2020-02-17 ENCOUNTER — TELEPHONE (OUTPATIENT)
Dept: FAMILY MEDICINE CLINIC | Age: 40
End: 2020-02-17

## 2020-02-17 ENCOUNTER — HOSPITAL ENCOUNTER (EMERGENCY)
Age: 40
Discharge: HOME | End: 2020-02-18
Payer: MEDICAID

## 2020-02-17 LAB
EKG ATRIAL RATE: 56 BPM
EKG P AXIS: 50 DEGREES
EKG P-R INTERVAL: 130 MS
EKG Q-T INTERVAL: 404 MS
EKG QRS DURATION: 86 MS
EKG QTC CALCULATION (BAZETT): 389 MS
EKG R AXIS: 21 DEGREES
EKG T AXIS: 35 DEGREES
EKG VENTRICULAR RATE: 56 BPM

## 2020-02-17 PROCEDURE — 99282 EMERGENCY DEPT VISIT SF MDM: CPT

## 2020-02-17 PROCEDURE — 93005 ELECTROCARDIOGRAM TRACING: CPT | Performed by: NURSE PRACTITIONER

## 2020-02-17 RX ORDER — 0.9 % SODIUM CHLORIDE 0.9 %
1000 INTRAVENOUS SOLUTION INTRAVENOUS ONCE
Status: COMPLETED | OUTPATIENT
Start: 2020-02-18 | End: 2020-02-18

## 2020-02-17 ASSESSMENT — ENCOUNTER SYMPTOMS
VOMITING: 0
NAUSEA: 1
WHEEZING: 0
CHEST TIGHTNESS: 0
SORE THROAT: 0
COUGH: 0
CHEST TIGHTNESS: 0
TROUBLE SWALLOWING: 0
SHORTNESS OF BREATH: 0
SHORTNESS OF BREATH: 0
SORE THROAT: 0
TROUBLE SWALLOWING: 0
WHEEZING: 0
COUGH: 0
RHINORRHEA: 0
RHINORRHEA: 0

## 2020-02-17 NOTE — TELEPHONE ENCOUNTER
[facility] ED Follow up Call    Reason for ED visit:  Dental pain     Mychart message sent                FU appts/Provider:    Future Appointments   Date Time Provider Srinath Mccarthy   2/26/2020 10:00 AM BINH See CNP PS[facility]CHIA [facility] [place]   3/26/2020 11:30 AM BINH Cook CNP Pul Med [address]   9/9/2020 10:30 AM DO CAITLIN Zavala Rheum [place] [address]

## 2020-02-18 ENCOUNTER — APPOINTMENT (OUTPATIENT)
Dept: GENERAL RADIOLOGY | Age: 40
End: 2020-02-18
Payer: MEDICAID

## 2020-02-18 VITALS
BODY MASS INDEX: 35.85 KG/M2 | HEART RATE: 73 BPM | RESPIRATION RATE: 20 BRPM | OXYGEN SATURATION: 100 % | DIASTOLIC BLOOD PRESSURE: 79 MMHG | WEIGHT: 210 LBS | TEMPERATURE: 98.1 F | HEIGHT: 64 IN | SYSTOLIC BLOOD PRESSURE: 129 MMHG

## 2020-02-18 LAB
ALBUMIN SERPL-MCNC: 3.8 G/DL (ref 3.5–5.1)
ALP BLD-CCNC: 73 U/L (ref 38–126)
ALT SERPL-CCNC: 21 U/L (ref 11–66)
AMPHETAMINE+METHAMPHETAMINE URINE SCREEN: NEGATIVE
ANION GAP SERPL CALCULATED.3IONS-SCNC: 11 MEQ/L (ref 8–16)
AST SERPL-CCNC: 15 U/L (ref 5–40)
BARBITURATE QUANTITATIVE URINE: NEGATIVE
BASOPHILS # BLD: 0.6 %
BASOPHILS ABSOLUTE: 0 THOU/MM3 (ref 0–0.1)
BENZODIAZEPINE QUANTITATIVE URINE: NEGATIVE
BILIRUB SERPL-MCNC: 0.4 MG/DL (ref 0.3–1.2)
BILIRUBIN DIRECT: < 0.2 MG/DL (ref 0–0.3)
BILIRUBIN URINE: NEGATIVE
BLOOD, URINE: NEGATIVE
BUN BLDV-MCNC: 8 MG/DL (ref 7–22)
CALCIUM SERPL-MCNC: 8.9 MG/DL (ref 8.5–10.5)
CANNABINOID QUANTITATIVE URINE: NEGATIVE
CHARACTER, URINE: CLEAR
CHLORIDE BLD-SCNC: 106 MEQ/L (ref 98–111)
CO2: 23 MEQ/L (ref 23–33)
COCAINE METABOLITE QUANTITATIVE URINE: NEGATIVE
COLOR: YELLOW
CREAT SERPL-MCNC: 0.8 MG/DL (ref 0.4–1.2)
EOSINOPHIL # BLD: 3.2 %
EOSINOPHILS ABSOLUTE: 0.2 THOU/MM3 (ref 0–0.4)
ERYTHROCYTE [DISTWIDTH] IN BLOOD BY AUTOMATED COUNT: 11.9 % (ref 11.5–14.5)
ERYTHROCYTE [DISTWIDTH] IN BLOOD BY AUTOMATED COUNT: 39.1 FL (ref 35–45)
GFR SERPL CREATININE-BSD FRML MDRD: 80 ML/MIN/1.73M2
GLUCOSE BLD-MCNC: 112 MG/DL (ref 70–108)
GLUCOSE URINE: NEGATIVE MG/DL
HCT VFR BLD CALC: 39.5 % (ref 37–47)
HEMOGLOBIN: 13.6 GM/DL (ref 12–16)
IMMATURE GRANS (ABS): 0.01 THOU/MM3 (ref 0–0.07)
IMMATURE GRANULOCYTES: 0.1 %
KETONES, URINE: NEGATIVE
LEUKOCYTE ESTERASE, URINE: NEGATIVE
LIPASE: 19 U/L (ref 5.6–51.3)
LYMPHOCYTES # BLD: 35.2 %
LYMPHOCYTES ABSOLUTE: 2.7 THOU/MM3 (ref 1–4.8)
MAGNESIUM: 2.1 MG/DL (ref 1.6–2.4)
MCH RBC QN AUTO: 31.1 PG (ref 26–33)
MCHC RBC AUTO-ENTMCNC: 34.4 GM/DL (ref 32.2–35.5)
MCV RBC AUTO: 90.2 FL (ref 81–99)
MONOCYTES # BLD: 5.8 %
MONOCYTES ABSOLUTE: 0.5 THOU/MM3 (ref 0.4–1.3)
NITRITE, URINE: NEGATIVE
NUCLEATED RED BLOOD CELLS: 0 /100 WBC
OPIATES, URINE: POSITIVE
OSMOLALITY CALCULATION: 278.5 MOSMOL/KG (ref 275–300)
OXYCODONE: NEGATIVE
PH UA: 6.5 (ref 5–9)
PHENCYCLIDINE QUANTITATIVE URINE: NEGATIVE
PLATELET # BLD: 288 THOU/MM3 (ref 130–400)
PMV BLD AUTO: 10.9 FL (ref 9.4–12.4)
POTASSIUM SERPL-SCNC: 4 MEQ/L (ref 3.5–5.2)
PROTEIN UA: NEGATIVE
RBC # BLD: 4.38 MILL/MM3 (ref 4.2–5.4)
SEG NEUTROPHILS: 55.1 %
SEGMENTED NEUTROPHILS ABSOLUTE COUNT: 4.3 THOU/MM3 (ref 1.8–7.7)
SODIUM BLD-SCNC: 140 MEQ/L (ref 135–145)
SPECIFIC GRAVITY, URINE: 1.01 (ref 1–1.03)
TOTAL PROTEIN: 6.6 G/DL (ref 6.1–8)
TROPONIN T: < 0.01 NG/ML
UROBILINOGEN, URINE: 1 EU/DL (ref 0–1)
WBC # BLD: 7.8 THOU/MM3 (ref 4.8–10.8)

## 2020-02-18 PROCEDURE — 81003 URINALYSIS AUTO W/O SCOPE: CPT

## 2020-02-18 PROCEDURE — 2580000003 HC RX 258: Performed by: NURSE PRACTITIONER

## 2020-02-18 PROCEDURE — 36415 COLL VENOUS BLD VENIPUNCTURE: CPT

## 2020-02-18 PROCEDURE — 80053 COMPREHEN METABOLIC PANEL: CPT

## 2020-02-18 PROCEDURE — 80307 DRUG TEST PRSMV CHEM ANLYZR: CPT

## 2020-02-18 PROCEDURE — 93010 ELECTROCARDIOGRAM REPORT: CPT | Performed by: INTERNAL MEDICINE

## 2020-02-18 PROCEDURE — 71046 X-RAY EXAM CHEST 2 VIEWS: CPT

## 2020-02-18 PROCEDURE — 82248 BILIRUBIN DIRECT: CPT

## 2020-02-18 PROCEDURE — 83690 ASSAY OF LIPASE: CPT

## 2020-02-18 PROCEDURE — 85025 COMPLETE CBC W/AUTO DIFF WBC: CPT

## 2020-02-18 PROCEDURE — 84484 ASSAY OF TROPONIN QUANT: CPT

## 2020-02-18 PROCEDURE — 83735 ASSAY OF MAGNESIUM: CPT

## 2020-02-18 RX ADMIN — SODIUM CHLORIDE 1000 ML: 9 INJECTION, SOLUTION INTRAVENOUS at 00:13

## 2020-02-18 NOTE — ED NOTES
Pt up to restroom at this time and tolerates well. Pt vital signs stable and respirations unlabored. Pt urine sent to the lab at this time. Pt states she is feeling much better at this time than when she came in.       Ashley Nicole RN  02/18/20 [id number]

## 2020-02-18 NOTE — ED PROVIDER NOTES
nursing note reviewed. Constitutional:       General: She is not in acute distress. Appearance: She is well-developed. HENT:      Head: Normocephalic and atraumatic. Right Ear: External ear normal.      Left Ear: External ear normal.      Nose: Nose normal.      Mouth/Throat:      Mouth: Mucous membranes are moist.   Eyes:      Extraocular Movements: Extraocular movements intact. Conjunctiva/sclera: Conjunctivae normal.   Neck:      Musculoskeletal: Normal range of motion and neck supple. Cardiovascular:      Rate and Rhythm: Normal rate and regular rhythm. Heart sounds: Normal heart sounds. Pulmonary:      Effort: Pulmonary effort is normal.      Breath sounds: Normal breath sounds. Musculoskeletal: Normal range of motion. Skin:     General: Skin is warm and dry. Neurological:      Mental Status: She is alert and oriented to person, place, and time. Psychiatric:         Behavior: Behavior normal.         Thought Content: Thought content normal.         Judgment: Judgment normal.           DIFFERENTIAL DIAGNOSIS:   Including but not limited to medication reaction, anxiety, drug intoxication, ACS    DIAGNOSTIC RESULTS     EKG: All EKG's are interpreted [facility] who either signs or Co-signs this chart in the absence of a cardiologist.  EKG completed at 2358 shows sinus bradycardia ventricular rate 56, LA interval 130, QRS duration 86, , QTc 389  This was compared to an EKG completed 12/14/2018 with no acute changes    RADIOLOGY: non-plain film images(s) such as CT, Ultrasound and MRI are read by the radiologist.  Plain radiographic images are visualized and preliminarily interpreted by the emergency physician unless otherwisestated below. XR CHEST STANDARD (2 VW)   Final Result      No acute cardiopulmonary disease. **This report has been created using voice recognition software.  It may contain minor errors which are inherent in voice

## 2020-02-19 ENCOUNTER — TELEPHONE (OUTPATIENT)
Dept: FAMILY MEDICINE CLINIC | Age: 40
End: 2020-02-19

## 2020-02-20 ENCOUNTER — TELEPHONE (OUTPATIENT)
Dept: PULMONOLOGY | Age: 40
End: 2020-02-20

## 2020-02-20 NOTE — TELEPHONE ENCOUNTER
I received a letter from [employer] [facility] showing that patient is frequently filling her albuterol and has not filled her Advair since October. I would like to see her sooner to discuss her meds and symptoms related to her asthma. Can you please reach out to patient and move her appt up if willing. Thanks.

## 2020-02-26 ENCOUNTER — OFFICE VISIT (OUTPATIENT)
Dept: PSYCHIATRY | Age: 40
End: 2020-02-26
Payer: MEDICAID

## 2020-02-26 PROCEDURE — 90833 PSYTX W PT W E/M 30 MIN: CPT | Performed by: NURSE PRACTITIONER

## 2020-02-26 PROCEDURE — G8427 DOCREV CUR MEDS BY ELIG CLIN: HCPCS | Performed by: NURSE PRACTITIONER

## 2020-02-26 PROCEDURE — G8417 CALC BMI ABV UP PARAM F/U: HCPCS | Performed by: NURSE PRACTITIONER

## 2020-02-26 PROCEDURE — 4004F PT TOBACCO SCREEN RCVD TLK: CPT | Performed by: NURSE PRACTITIONER

## 2020-02-26 PROCEDURE — G8484 FLU IMMUNIZE NO ADMIN: HCPCS | Performed by: NURSE PRACTITIONER

## 2020-02-26 PROCEDURE — 99214 OFFICE O/P EST MOD 30 MIN: CPT | Performed by: NURSE PRACTITIONER

## 2020-02-26 RX ORDER — BUSPIRONE HYDROCHLORIDE 7.5 MG/1
7.5 TABLET ORAL 3 TIMES DAILY
Qty: 90 TABLET | Refills: 0 | Status: SHIPPED | OUTPATIENT
Start: 2020-02-26 | End: 2020-03-27

## 2020-02-26 RX ORDER — TRAZODONE HYDROCHLORIDE 50 MG/1
TABLET ORAL
Qty: 60 TABLET | Refills: 0 | Status: SHIPPED
Start: 2020-02-26 | End: 2020-03-31

## 2020-02-26 RX ORDER — HYDROXYZINE PAMOATE 25 MG/1
25 CAPSULE ORAL 3 TIMES DAILY PRN
Qty: 90 CAPSULE | Refills: 0 | Status: SHIPPED
Start: 2020-02-26 | End: 2020-03-31

## 2020-02-26 NOTE — PROGRESS NOTES
[address] [address]  Dept: [phone]  Dept Fax: [phone]  Loc: [phone]    Visit Date: 2/26/2020    SUBJECTIVE DATA     CHIEF COMPLAINT:    Chief Complaint   Patient presents with    Insomnia    Anxiety    Follow-up       History obtained from: patient    HISTORY OF PRESENT ILLNESS:    Aurea Alvarez is a 44 y.o. female who presents to the office for follow-up on complaints of depression and anxiety. Her last appointment was 11/26/2019. Patient states she \"could be better\"    She quick taking her meds prior to her last appointment    Around [place] she restarted all of her prior medications  -states she restarted the medications because \"I knew there were going to be a lot of people in my house. \"  -states she \"knew there was going to be nothing but drama. A lot drama. \"  -states she didn't think she would be able to work through any stressors  -states she recalls she felt very nervous and anxious  -states \"I knew I would have to blow up on someone if they started drama. \"    Has a puppy that \"keeps me sane all the time\"    She is not in counseling  -states \"I'm ready now. I wasn't ready before, but I'm ready now. \"    Still not sleeping well. She was trying to change her sleep schedule to match her 's sleep schedule, but was unable to make the change. States he works 3rd shift and she didn't like sleeping through the day  -she is not taking the Trazodone  -has been off the trazodone for a couple of months  -has difficulty initiating and maintaining sleep  -has not been evaluated for sleep disorders  -states when she tries to go to bed her mind races constantly     States \"I have never liked taking pills. \"    Eats a lot during the night  -states she munches throughout the night  -it helps her calm down    Denies suicidal ideations, intent, plan. No homicidal ideations, intent, plan.  No audiovisual oriented, thought content appropriate   Mood:. Anxious      Affect:  Normal      Appearance:  Age Appropriate, Casually Dressed, Overweight, Clean, Well Groomed, Clothing Appropriate for Age and Clothing Appropriate for Weather   Activity:  Cooperative, Good Eye Contact and Seated Calmly   Gait/Posture: Normal   Speech:  Clear, Fluent, Normal Pitch and Volume, Age and Situation Appropriate   Thought Process: Within Normal Limits   Thought Content: Within Normal Limits   Cognition:  Grossly Intact   Memory: Intact   Insight:  Good   Judgment: Good   Suicidal Ideations: Denies Suicidal Ideation   Homicidal Ideations: Negative for homicidal ideation   Medication Side Effects: Absent       Attention Span Attention span and concentration were age appropriate       Screenings Completed in This Encounter:     Anxiety and Depression:                    DIAGNOSIS AND ASSESSMENT DATA     DIAGNOSIS:   1. Recurrent major depressive disorder, in full remission ([facility]    2. Generalized anxiety disorder    3. PTSD (post-traumatic stress disorder)    4. Insomnia, unspecified type      R/O personality disorder    PLAN   Follow-up:  Return in about 4 weeks (around 3/25/2020), or if symptoms worsen or fail to improve, for follow-up and medication management.     Prescriptions for this encounter:  New Prescriptions    BUSPIRONE (BUSPAR) 7.5 MG TABLET    Take 1 tablet by mouth 3 times daily    HYDROXYZINE (VISTARIL) 25 MG CAPSULE    Take 1 capsule by mouth 3 times daily as needed for Anxiety    TRAZODONE (DESYREL) 50 MG TABLET    Take 1-2 tabs by mouth at bedtime as needed for insomnia       Orders Placed This Encounter   Medications    busPIRone (BUSPAR) 7.5 MG tablet     Sig: Take 1 tablet by mouth 3 times daily     Dispense:  90 tablet     Refill:  0    traZODone (DESYREL) 50 MG tablet     Sig: Take 1-2 tabs by mouth at bedtime as needed for insomnia     Dispense:  60 tablet     Refill:  0    hydrOXYzine (VISTARIL) 25 MG capsule Sig: Take 1 capsule by mouth 3 times daily as needed for Anxiety     Dispense:  90 capsule     Refill:  0       Medications Discontinued During This Encounter   Medication Reason    traZODone (DESYREL) 150 MG tablet Patient Choice    ondansetron (ZOFRAN) 4 MG tablet LIST CLEANUP    Lactase (DAIRY-RELIEF PO) LIST CLEANUP    hydroxychloroquine (PLAQUENIL) 200 MG tablet LIST CLEANUP    beclomethasone (QVAR REDIHALER) 80 MCG/ACT AERB inhaler LIST CLEANUP    diclofenac (VOLTAREN) 75 MG EC tablet LIST CLEANUP       Additional orders:  Orders Placed This Encounter   Procedures    Ambulatory referral to Sleep Medicine     Discussed with patient the importance of obtaining and maintaining gainful employment in the overall management of her mood disorder symptoms. Discussed with patient that she would benefit from employment as it would offer structure, routine, financial independence, and increased socialization. Discussed with patient that full-time employment is essential to management of her mood disorder symptoms and that failure to maintain employment is associated with worsened long-term mental health prognosis. Patient has no difficulty performing ADLs or IADLs due to her diagnosis. Discussed with patient that she diagnosis does not in any way precluded her from obtaining and maintaining competitive employment. Patient is again reporting disruption in mood control. She is reporting complaints of anxiety. Various treatment options reviewed in detail. She will start BuSpar for the anxiety and will also be given Vistaril to use as needed for anxiety. She will be referred to sleep medicine for evaluation of the insomnia and to rule out sleep disorders including but not limited to sleep apnea. Patient is encouraged to actively participate in individual psychotherapy.  Patient is encouraged to utilize nonpharmacologic coping skills such as deep breathing, guided imagery, guided meditation, muscle relaxation, calming music, and/or journaling. Risks, potential side effects, possibledrug-drug interactions, benefits and alternate treatments discussed in detail. All questions answered. Patient stated understanding and is agreeable to treatment plan. Patient has been instructed to seek emergency help via the emergency and/or calling 911 should symptoms become severe, worsen, or with other concerning symptoms. Patient instructed to goimmediately to the emergency room and/or call 911 with any suicidal or homicidal ideations or if audio/visual hallucinations develop  Patient stated understanding and agrees. Patient given crisis center information. I spent a total of 20 minutes with the patient in counseling and coordination of care regarding topics discussed above. Utilized reflective listening and CBT to assist patient with topics as above. The remainder of the visit was spent on symptom evaluation and medication management. Provider Signature:  Electronically signed by BINH Snowden CNP on 2/26/2020 at 10:59 AM    **This report has been created using voice recognition software. It may contain minor errors which are inherent in voice recognition technology. **

## 2020-02-27 ENCOUNTER — OFFICE VISIT (OUTPATIENT)
Dept: PULMONOLOGY | Age: 40
End: 2020-02-27
Payer: MEDICAID

## 2020-02-27 VITALS
SYSTOLIC BLOOD PRESSURE: 114 MMHG | HEIGHT: 64 IN | BODY MASS INDEX: 36.57 KG/M2 | WEIGHT: 214.2 LBS | HEART RATE: 70 BPM | DIASTOLIC BLOOD PRESSURE: 70 MMHG | OXYGEN SATURATION: 99 %

## 2020-02-27 PROCEDURE — 99214 OFFICE O/P EST MOD 30 MIN: CPT | Performed by: NURSE PRACTITIONER

## 2020-02-27 PROCEDURE — 3023F SPIROM DOC REV: CPT | Performed by: NURSE PRACTITIONER

## 2020-02-27 PROCEDURE — G8484 FLU IMMUNIZE NO ADMIN: HCPCS | Performed by: NURSE PRACTITIONER

## 2020-02-27 PROCEDURE — G8926 SPIRO NO PERF OR DOC: HCPCS | Performed by: NURSE PRACTITIONER

## 2020-02-27 PROCEDURE — 4004F PT TOBACCO SCREEN RCVD TLK: CPT | Performed by: NURSE PRACTITIONER

## 2020-02-27 PROCEDURE — G8427 DOCREV CUR MEDS BY ELIG CLIN: HCPCS | Performed by: NURSE PRACTITIONER

## 2020-02-27 PROCEDURE — G8417 CALC BMI ABV UP PARAM F/U: HCPCS | Performed by: NURSE PRACTITIONER

## 2020-02-27 ASSESSMENT — ENCOUNTER SYMPTOMS
SHORTNESS OF BREATH: 1
VOMITING: 0
COUGH: 1
EYES NEGATIVE: 1
ABDOMINAL PAIN: 0
WHEEZING: 0
DIARRHEA: 0
NAUSEA: 0

## 2020-02-27 NOTE — PROGRESS NOTES
[facility]     Patient: Soraida Hyde, 44 y.o.   : 1980    Pt of Dr. Ioana Mckeon   Patient presents with    Follow-up     asthma 6 mo f/u, needs to go over meds        HPI  Christa Jaeger is here at my request to discuss meds, I received letter from her insurance that she is frequently filling her albuterol and had not filled [address] since October. She admits that she stopped taking all of her medications. She was feeling overwhelmed by how much she was on and felt drowsy on her meds so stopped everything. Since she has been very SOB , all the time, even at rest.  Just saw her physiatrist and was told a lot of her SOB is anxiety related and she needs to get back on her meds. meds pre-prescribed and she has to  her Buspar today . Has appt to start counseling. She is ready to take better care of herself. No ER visits for breathing issues, was on antibiotics this month for \"bad tooth\"   Smoking 1 PPD - no desire to quit at this time, too much anxiety right now   She had Air Duo at home. Past Medical hx   PMH:  Past Medical History:   Diagnosis Date    Anxiety     Asthma     DDD (degenerative disc disease), cervical     DDD (degenerative disc disease), thoracolumbar     Depression     GERD (gastroesophageal reflux disease)      SURGICAL HISTORY:No past surgical history on file. SOCIAL HISTORY:  Social History     Tobacco Use    Smoking status: Current Every Day Smoker     Packs/day: 1.00     Types: Cigarettes    Smokeless tobacco: Never Used   Substance Use Topics    Alcohol use: No    Drug use: Not Currently     Comment: Not currently -- meth last use  and last pot use 2016  years ago.       ALLERGIES:  Allergies   Allergen Reactions    Hydrocodone      \"makes me feel completely out of whack\"    Prozac [Fluoxetine Hcl] Other (See Comments)     \"I want to kill myself\"     FAMILY HISTORY:  Family History Problem Relation Age of Onset    Depression Mother    Vinod Car COPD Father     Asthma Sister     Depression Sister     Lupus Paternal Aunt     No Known Problems Daughter     No Known Problems Daughter     Asthma Daughter     No Known Problems Brother      CURRENT MEDICATIONS:  Current Outpatient Medications   Medication Sig Dispense Refill    busPIRone (BUSPAR) 7.5 MG tablet Take 1 tablet by mouth 3 times daily 90 tablet 0    traZODone (DESYREL) 50 MG tablet Take 1-2 tabs by mouth at bedtime as needed for insomnia 60 tablet 0    hydrOXYzine (VISTARIL) 25 MG capsule Take 1 capsule by mouth 3 times daily as needed for Anxiety 90 capsule 0    Respiratory Therapy Supplies (NEBULIZER AIR TUBE/PLUGS) MISC 1 Package by Does not apply route every 6 hours as needed (Wheezing/shortness of breath) Please provide nebulizer kit and supplies. 1 each 0    albuterol sulfate  (90 Base) MCG/ACT inhaler Inhale 2 puffs into the lungs every 4 hours as needed for Wheezing 1 Inhaler 11    Spacer/Aero-Holding Chambers (VORTEX VALVED HOLDING CHAMBER) SUJATHA 1 Device by Does not apply route daily Use with inhalers 1 Device 0    alclomethasone (ACLOVATE) 0.05 % ointment Apply topically 2 times daily. 1 Tube 1    lidocaine (XYLOCAINE) 5 % ointment Apply topically as needed. 50 g 0    RaNITidine HCl (ACID REDUCER PO) Take by mouth as needed       Fluticasone-Salmeterol (AIRDUO RESPICLICK 525/72) 173-30 MCG/ACT AEPB Inhale 1 puff into the lungs 2 times daily (Patient not taking: Reported on 2/26/2020) 1 each 11     No current facility-administered medications for this visit. Pastora SHARP   Review of Systems   Constitutional: Negative for activity change, chills, fatigue, fever and unexpected weight change. HENT: Negative. Eyes: Negative. Respiratory: Positive for cough and shortness of breath. Negative for wheezing. Cardiovascular: Negative for chest pain, palpitations and leg swelling.    Gastrointestinal: Negative Diagnosis Orders   1. Moderate persistent asthma without complication  Full PFT Study With Bronchodilator   2. Cigarette nicotine dependence without complication  Full PFT Study With Bronchodilator   3. Other specified anxiety disorders     4. Simple chronic bronchitis ([address]        Plan   Discussed results of last PFT from 2019- -mixed COPD/ asthma with reversible obstruction. Highly recommend she get back on AIr Duo twice a day, she is agreeable.   - denies need for refill  May continue PRN albuterol, discussed side effects frequent use can be exacerbating her anxiety and can cause increased HR  Recommend she stop smoking, she should discuss smoking cessation with her physiatrist and phycologist     Will see Fatoumata Mossclarissa in: 6 months with Full PFT     Electronically signed by BINH Ross CNP on 2/27/2020 at 2:12 PM

## 2020-03-06 ENCOUNTER — PATIENT MESSAGE (OUTPATIENT)
Dept: FAMILY MEDICINE CLINIC | Age: 40
End: 2020-03-06

## 2020-03-06 NOTE — TELEPHONE ENCOUNTER
Spoke with pt and states she hasn't worked since Sept 2018. She had a work injury back in 2008 and hurt her back. She does have an  working on getting her disability right now. She is needing the letter to collect her food stamps. Last appt was 10/2019 - physical.  She is agreeable to making an appt if needed.

## 2020-03-09 ENCOUNTER — OFFICE VISIT (OUTPATIENT)
Dept: FAMILY MEDICINE CLINIC | Age: 40
End: 2020-03-09
Payer: MEDICAID

## 2020-03-09 VITALS
WEIGHT: 213 LBS | HEART RATE: 88 BPM | BODY MASS INDEX: 36.56 KG/M2 | RESPIRATION RATE: 14 BRPM | DIASTOLIC BLOOD PRESSURE: 70 MMHG | SYSTOLIC BLOOD PRESSURE: 126 MMHG

## 2020-03-09 PROBLEM — M32.9 H/O SYSTEMIC LUPUS ERYTHEMATOSUS (SLE) (HCC): Status: ACTIVE | Noted: 2020-03-09

## 2020-03-09 PROBLEM — G95.0 SYRINX OF SPINAL CORD (HCC): Status: ACTIVE | Noted: 2020-03-09

## 2020-03-09 PROCEDURE — G8427 DOCREV CUR MEDS BY ELIG CLIN: HCPCS | Performed by: FAMILY MEDICINE

## 2020-03-09 PROCEDURE — 99213 OFFICE O/P EST LOW 20 MIN: CPT | Performed by: FAMILY MEDICINE

## 2020-03-09 PROCEDURE — G8484 FLU IMMUNIZE NO ADMIN: HCPCS | Performed by: FAMILY MEDICINE

## 2020-03-09 PROCEDURE — 4004F PT TOBACCO SCREEN RCVD TLK: CPT | Performed by: FAMILY MEDICINE

## 2020-03-09 PROCEDURE — G8417 CALC BMI ABV UP PARAM F/U: HCPCS | Performed by: FAMILY MEDICINE

## 2020-03-09 ASSESSMENT — ENCOUNTER SYMPTOMS
VOMITING: 0
CONSTIPATION: 0
SHORTNESS OF BREATH: 0
CHEST TIGHTNESS: 0
ABDOMINAL PAIN: 0
WHEEZING: 0
BACK PAIN: 1
DIARRHEA: 0
COUGH: 0
SORE THROAT: 0
NAUSEA: 0
RHINORRHEA: 0
BLOOD IN STOOL: 0
EYE PAIN: 0

## 2020-03-09 NOTE — PATIENT INSTRUCTIONS
reason, such as an injury or other health problem. But even if the doctor finds degenerative disc disease, that doesn't always mean that you will have symptoms. How is it treated? There are several things you can do at home to manage pain from this problem. · To relieve pain, use ice or heat (whichever feels better) on the affected area. ? Put ice or a cold pack on the area for 10 to 20 minutes at a time. Put a thin cloth between the ice and your skin. ? Put a warm water bottle, a heating pad set on low, or a warm cloth on your back. Put a thin cloth between the heating pad and your skin. Do not go to sleep with a heating pad on your skin. · Ask your doctor if you can take acetaminophen (such as Tylenol) or nonsteroidal anti-inflammatory drugs, such as ibuprofen or naproxen. Your doctor can prescribe stronger medicines if needed. Be safe with medicines. Read and follow all instructions on the label. · Get some exercise every day. Exercise is one of the best ways to help your back feel better and stay better. It's best to start each exercise slowly. You may notice a little soreness, and that's okay. But if an exercise makes your pain worse, stop doing it. Here are things you can try:  ? Walking. It's the simplest and maybe the best activity for your back. It gets your blood moving and helps your muscles stay strong. ? Exercises that gently stretch and strengthen your stomach, back, and leg muscles. The stronger those muscles are, the better they're able to protect your back. If you have constant or severe pain in your back or spine, you may need other treatments, such as physical therapy. In some cases, your doctor may suggest surgery. Follow-up care is a key part of your treatment and safety. Be sure to make and go to all appointments, and call your doctor if you are having problems. It's also a good idea to know your test results and keep a list of the medicines you take. Where can you learn more?   Go to https://chpepiceweb.[email or web address]. org and sign in to your [employer] account. Enter T287 in the [facility] box to learn more about \"Learning About Degenerative Disc Disease. \"     If you do not have an account, please click on the \"Sign Up Now\" link. Current as of: 2019  Content Version: 12.3  © 7655-4617 [employer]. Care instructions adapted under license by [facility] ([facility]). If you have questions about a medical condition or this instruction, always ask your healthcare professional. [place] [address] any warranty or liability for your use of this information. Patient Education        Lupus: Care Instructions  Your Care Instructions  Lupus is a long-term disease that can cause inflammation, pain, and tissue damage in your body. It is an autoimmune disease. This means the immune system attacks its own tissues. Lupus may cause problems with your skin, kidneys, heart, lungs, nerves, or blood cells. This information is about systemic lupus erythematosus (SLE). SLE is the most common and most serious type of lupus. But there are other types of lupus, such as discoid or cutaneous lupus, drug-induced systemic lupus, and  lupus. When you have lupus symptoms, you are having flares or relapses. When your symptoms get better, you are in remission. Lupus may get worse very quickly. There is no way to tell when a flare will happen or how bad it will be. When you have a lupus flare, you may have new symptoms as well as symptoms you have had in the past.  Learn your body's signs of a flare, such as joint pain, a rash, a fever, or being more tired. When you see any of these signs, take steps to control your symptoms. Follow-up care is a key part of your treatment and safety. Be sure to make and go to all appointments, and call your doctor if you are having problems. It's also a good idea to know your test results and keep a list of the medicines you take.   How can you care for yourself at home? Reduce stress and tiredness  · Keep your daily schedule as simple as possible. · Keep your list of things to do as short as you can. · Exercise regularly. A daily walk or swim, for example, can lower stress, clear your head, improve your mood, and help fight tiredness. · Use meditation, yoga, or guided imagery to relax. · Get plenty of rest. Some people with lupus need up to 12 hours of sleep every night. · Pace yourself. Do not do too many activities. · Ask others for help. Do not try to do everything yourself. · Take short breaks from your usual activities. Think about cutting down on work hours when your symptoms are severe. · If you think that depression or anxiety is making you feel more tired, talk to your doctor, a mental health professional, or both. Take care of your skin  · Ask your doctor about the use of corticosteroid creams for skin symptoms. · If you are bothered by the way a lupus rash looks on your face or if you have scars from lupus, you can try makeup, such as Covermark, to cover the rash or scars. · Stay out of the sun, especially when the sun's rays are the strongest, usually between 10 a.m. and 4 p.m. If you must be in the sun, cover your arms and legs, and wear a hat. Make sure to use a broad-spectrum sunscreen that has a sun protection factor (SPF) of 50 or higher. Put more sunscreen on after swimming, sweating, or toweling off. Practice good self-care  · Learn more about lupus and how to take care of yourself. · Take your medicines exactly as prescribed. Call your doctor if you have any problems with your medicine. · Do not smoke. If you need help quitting, talk to your doctor about stop-smoking programs and medicines. These can increase your chances of quitting for good. · Eat a healthy, balanced diet. A balanced diet includes whole grains, dairy, fruits and vegetables, and protein.  Eat a variety of foods from each of those groups so you get all the nutrients you need. · Avoid other people who are sick with colds or the flu. These illnesses can cause lupus flares. Talk to your doctor about flu shots and pneumococcal vaccinations. If you do get sick or think you are getting an infection, talk with your doctor so you can treat your symptoms right away. · Brush and floss your teeth each day. See your dentist two times a year. · Get regular eye exams. · Build a support system of family, friends, and health professionals. When should you call for help? Call 911 anytime you think you may need emergency care. For example, call if:    · You have symptoms of a heart attack. These may include:  ? Chest pain or pressure, or a strange feeling in the chest.  ? Sweating. ? Shortness of breath. ? Nausea or vomiting. ? Pain, pressure, or a strange feeling in the back, neck, jaw, or upper belly or in one or both shoulders or arms. ? Lightheadedness or sudden weakness. ? A fast or irregular heartbeat.    After you call  911, the  may tell you to chew 1 adult-strength or 2 to 4 low-dose aspirin. Wait for an ambulance. Do not try to drive yourself.   Call your doctor now or seek immediate medical care if:    · You are short of breath.     · You have blood in your urine or are urinating less often and in smaller amounts than usual.     · You have a fever.     · You feel depressed or notice any changes in your behavior or thinking.     · You are dizzy or have muscle weakness.     · You have swelling of the lower legs or feet.    Watch closely for changes in your health, and be sure to contact your doctor if:    · Your symptoms get worse or you develop any new symptoms. These may include aching or swollen joints, increased fatigue, loss of appetite, hair loss, skin rashes, or new sores in your mouth or nose. Where can you learn more? Go to https://sabine.[employer]. org and sign in to your [employer] account.  Enter R607 in the [address] box to learn more about \"Lupus: Care Instructions. \"     If you do not have an account, please click on the \"Sign Up Now\" link. Current as of: April 1, 2019  Content Version: 12.3  © 5865-4973 [employer]. Care instructions adapted under license by [facility] ([facility]). If you have questions about a medical condition or this instruction, always ask your healthcare professional. [address] any warranty or liability for your use of this information.

## 2020-03-09 NOTE — LETTER
[address] [place] [address]  Phone: [phone]  Fax: [phone]    Jesús Ferrell MD        March 9, 2020     Patient: Michelle Goodson   YOB: 1980   Date of Visit: 3/9/2020       To Whom It May Concern: It is my medical opinion that Demaris Habermann is unemployable at the present time due to DDD of spine. Follows with Rhematology. If you have any questions or concerns, please don't hesitate to call.     Sincerely,        Jesús Ferrell MD

## 2020-03-09 NOTE — PROGRESS NOTES
Subjective:      Patient ID: Evelia Burks is a 44 y.o. female. HPI  Pt here for a check up. Reviewed BMI of 37. Encouraged diet, exercise and weight loss. Has known DDD of entire spine. Has not worked since 2018. Back injury at work 2008. Is trying to get disability. Working with a . Needs note for JFS to keep medicaid. Unemployable at this time. , current smoker, pmh reviewed. Review of Systems   Constitutional: Negative for chills, fatigue, fever and unexpected weight change. HENT: Negative for congestion, ear pain, rhinorrhea and sore throat. Eyes: Negative for pain and visual disturbance. Respiratory: Negative for cough, chest tightness, shortness of breath and wheezing. Cardiovascular: Negative for chest pain and palpitations. Gastrointestinal: Negative for abdominal pain, blood in stool, constipation, diarrhea, nausea and vomiting. Genitourinary: Negative for difficulty urinating, frequency, hematuria and urgency. Musculoskeletal: Positive for back pain and neck pain. Negative for joint swelling and myalgias. Skin: Negative for rash. Neurological: Negative for dizziness and headaches. Hematological: Negative for adenopathy. Does not bruise/bleed easily. Psychiatric/Behavioral: Negative for behavioral problems and sleep disturbance. The patient is not nervous/anxious. Objective:   Physical Exam  Vitals signs and nursing note reviewed. Constitutional:       Appearance: She is well-developed. HENT:      Head: Normocephalic and atraumatic. Right Ear: External ear normal.      Left Ear: External ear normal.      Nose: Nose normal.      Mouth/Throat:      Mouth: Mucous membranes are moist.   Eyes:      Pupils: Pupils are equal, round, and reactive to light. Neck:      Musculoskeletal: Neck supple. Thyroid: No thyromegaly. Cardiovascular:      Rate and Rhythm: Normal rate and regular rhythm. Heart sounds: Normal heart sounds. Pulmonary:      Breath sounds: Normal breath sounds. No wheezing or rales. Abdominal:      General: Bowel sounds are normal.      Palpations: Abdomen is soft. Tenderness: There is no abdominal tenderness. There is no guarding or rebound. Musculoskeletal: Normal range of motion. Cervical back: She exhibits tenderness. Thoracic back: She exhibits tenderness. Lumbar back: She exhibits tenderness. Lymphadenopathy:      Cervical: No cervical adenopathy. Skin:     General: Skin is warm and dry. Findings: No rash. Neurological:      Mental Status: She is alert and oriented to person, place, and time. Cranial Nerves: No cranial nerve deficit. Deep Tendon Reflexes: Reflexes are normal and symmetric. Assessment:      DDD cervical spine  DDD LS spine      Plan:      Letter for JFS        1. Syrinx of spinal cord ([address]  Stable, per MRI 2019    2.  H/O systemic lupus erythematosus (SLE) (HCC)  Stable, no meds needed currently        Kami Burt MD

## 2020-03-16 ENCOUNTER — OFFICE VISIT (OUTPATIENT)
Dept: FAMILY MEDICINE CLINIC | Age: 40
End: 2020-03-16
Payer: MEDICAID

## 2020-03-16 VITALS
SYSTOLIC BLOOD PRESSURE: 126 MMHG | RESPIRATION RATE: 14 BRPM | BODY MASS INDEX: 36.73 KG/M2 | HEART RATE: 88 BPM | DIASTOLIC BLOOD PRESSURE: 80 MMHG | TEMPERATURE: 98.5 F | WEIGHT: 214 LBS

## 2020-03-16 PROCEDURE — G8417 CALC BMI ABV UP PARAM F/U: HCPCS | Performed by: FAMILY MEDICINE

## 2020-03-16 PROCEDURE — 99213 OFFICE O/P EST LOW 20 MIN: CPT | Performed by: FAMILY MEDICINE

## 2020-03-16 PROCEDURE — G8427 DOCREV CUR MEDS BY ELIG CLIN: HCPCS | Performed by: FAMILY MEDICINE

## 2020-03-16 PROCEDURE — G8484 FLU IMMUNIZE NO ADMIN: HCPCS | Performed by: FAMILY MEDICINE

## 2020-03-16 PROCEDURE — 4004F PT TOBACCO SCREEN RCVD TLK: CPT | Performed by: FAMILY MEDICINE

## 2020-03-16 RX ORDER — SULFAMETHOXAZOLE AND TRIMETHOPRIM 800; 160 MG/1; MG/1
1 TABLET ORAL 2 TIMES DAILY
Qty: 20 TABLET | Refills: 0 | Status: SHIPPED | OUTPATIENT
Start: 2020-03-16 | End: 2020-03-26

## 2020-03-16 ASSESSMENT — ENCOUNTER SYMPTOMS
SORE THROAT: 0
HEMOPTYSIS: 0
VOMITING: 0
RHINORRHEA: 1
CHEST TIGHTNESS: 1
EYE PAIN: 0
ABDOMINAL PAIN: 0
BACK PAIN: 0
BLOOD IN STOOL: 0
SHORTNESS OF BREATH: 1
DIARRHEA: 0
CONSTIPATION: 0
COUGH: 1
HEARTBURN: 0
WHEEZING: 1
NAUSEA: 0

## 2020-03-16 NOTE — PROGRESS NOTES
Maxillary sinus tenderness and frontal sinus tenderness present. Mouth/Throat:      Mouth: Mucous membranes are moist.   Eyes:      Pupils: Pupils are equal, round, and reactive to light. Neck:      Musculoskeletal: Neck supple. Thyroid: No thyromegaly. Cardiovascular:      Rate and Rhythm: Normal rate and regular rhythm. Heart sounds: Normal heart sounds. Pulmonary:      Breath sounds: Normal breath sounds. No wheezing or rales. Abdominal:      General: Bowel sounds are normal.      Palpations: Abdomen is soft. Tenderness: There is no abdominal tenderness. There is no guarding or rebound. Musculoskeletal: Normal range of motion. Lymphadenopathy:      Cervical: No cervical adenopathy. Skin:     General: Skin is warm and dry. Findings: No rash. Neurological:      Mental Status: She is alert and oriented to person, place, and time. Cranial Nerves: No cranial nerve deficit. Deep Tendon Reflexes: Reflexes are normal and symmetric.          Assessment:      Acute pansinusitis      Plan:      Bactrim DS BID x 10 days          Joby Foy MD

## 2020-03-16 NOTE — PATIENT INSTRUCTIONS
hot, wet towel or a warm gel pack on your face 3 or 4 times a day for 5 to 10 minutes each time. · Try a decongestant nasal spray like oxymetazoline (Afrin). Do not use it for more than 3 days in a row. Using it for more than 3 days can make your congestion worse. When should you call for help? Call your doctor now or seek immediate medical care if:    · You have new or worse swelling or redness in your face or around your eyes.     · You have a new or higher fever.    Watch closely for changes in your health, and be sure to contact your doctor if:    · You have new or worse facial pain.     · The mucus from your nose becomes thicker (like pus) or has new blood in it.     · You are not getting better as expected. Where can you learn more? Go to https://SEDEMAC MechatronicspePECO Palleteb.Avuxi. org and sign in to your [employer]Solar account. Enter H627 in the [employer]arch box to learn more about \"Sinusitis: Care Instructions. \"     If you do not have an account, please click on the \"Sign Up Now\" link. Current as of: July 28, 2019Content Version: 12.4  © 9470-8228 Healthwise, Incorporated. Care instructions adapted under license by [facility] ([facility]). If you have questions about a medical condition or this instruction, always ask your healthcare professional. [address]  any warranty or liability for your use of this information.

## 2020-03-25 RX ORDER — ALBUTEROL SULFATE 2.5 MG/3ML
2.5 SOLUTION RESPIRATORY (INHALATION) EVERY 6 HOURS PRN
Qty: 120 VIAL | Refills: 11 | Status: SHIPPED | OUTPATIENT
Start: 2020-03-25 | End: 2022-07-19

## 2020-03-31 ENCOUNTER — VIRTUAL VISIT (OUTPATIENT)
Dept: PSYCHIATRY | Age: 40
End: 2020-03-31
Payer: MEDICAID

## 2020-03-31 PROCEDURE — 90833 PSYTX W PT W E/M 30 MIN: CPT | Performed by: NURSE PRACTITIONER

## 2020-03-31 PROCEDURE — 99213 OFFICE O/P EST LOW 20 MIN: CPT | Performed by: NURSE PRACTITIONER

## 2020-03-31 NOTE — PROGRESS NOTES
Sammi or Hypomania:  no     Panic Attacks:  no     Phobias:  no     Obsessions and Compulsions:  no     Body or Vocal Tics:  no     Hallucinations:  no     Delusions:  no    SOCIAL HISTORY:  Patient was born in [place] and raised by her father      Social History     Socioeconomic History    Marital status:      Spouse name: Not on file    Number of children: 3    Years of education: Not on file    Highest education level: 11th grade   Occupational History    Not on file   Social Needs    Financial resource strain: Not hard at all   Rose-Giovana insecurity     Worry: Never true     Inability: Never true   [employer] needs     Medical: No     Non-medical: No   Tobacco Use    Smoking status: Current Every Day Smoker     Packs/day: 1.00     Types: Cigarettes    Smokeless tobacco: Never Used   Substance and Sexual Activity    Alcohol use: No    Drug use: Not Currently     Comment: Not currently -- meth last use 2003 and last pot use 2016  years ago.      Sexual activity: Yes     Partners: Male   Lifestyle    Physical activity     Days per week: Not on file     Minutes per session: Not on file    Stress: Not on file   Relationships    Social connections     Talks on phone: Not on file     Gets together: Not on file     Attends [employer] service: Not on file     Active member of club or organization: Not on file     Attends meetings of clubs or organizations: Not on file     Relationship status: Not on file    Intimate partner violence     Fear of current or ex partner: Not on file     Emotionally abused: Not on file     Physically abused: Not on file     Forced sexual activity: Not on file   Other Topics Concern    Not on file   Social History Narrative    2/26/2020    LEVEL OF EDUCATION: completed 11th grade    SPECIAL EDUCATION NEEDS: had special classes in middle school for reading and math    RESIDENCE: Currently lives with her shruti     LEGAL HISTORY: was in custodial for 6 months for non-payment MG/3ML) 0.083% nebulizer solution Take 3 mLs by nebulization every 6 hours as needed for Wheezing or Shortness of Breath 120 vial 11    albuterol sulfate  (90 Base) MCG/ACT inhaler Inhale 2 puffs into the lungs every 4 hours as needed for Wheezing 1 Inhaler 11    Fluticasone-Salmeterol (AIRDUO RESPICLICK 071/40) 048-22 MCG/ACT AEPB Inhale 1 puff into the lungs 2 times daily 1 each 11    alclomethasone (ACLOVATE) 0.05 % ointment Apply topically 2 times daily. 1 Tube 1    lidocaine (XYLOCAINE) 5 % ointment Apply topically as needed. 50 g 0    RaNITidine HCl (ACID REDUCER PO) Take by mouth as needed       traZODone (DESYREL) 50 MG tablet Take 1-2 tabs by mouth at bedtime as needed for insomnia 60 tablet 0    hydrOXYzine (VISTARIL) 25 MG capsule Take 1 capsule by mouth 3 times daily as needed for Anxiety 90 capsule 0    Spacer/Aero-Holding Chambers (VORTEX VALVED HOLDING CHAMBER) SUJATHA 1 Device by Does not apply route daily Use with inhalers 1 Device 0     No facility-administered medications prior to visit. ALLERGIES:    Hydrocodone and Prozac [fluoxetine hcl]    REVIEW OF SYSTEMS:    Review of Systems    The patient sees Cecil Lee MD as her primary care provider. SPECIALISTS: Dr. Sarah Saldaña (rheumatology); Dr. Deborah Cantu (pulmonology)    OBJECTIVE DATA     There were no vitals taken for this visit. Physical Exam    Mental Status Evaluation:   Orientation: Alert, oriented, thought content appropriate   Mood:. Euthymic      Affect:  Normal      Appearance:  Age Appropriate, Casually Dressed, Overweight, Clean, Well Groomed, Clothing Appropriate for Age and Clothing Appropriate for Weather   Activity:  Cooperative, Good Eye Contact and Seated Calmly   Gait/Posture: Normal   Speech:  Clear, Fluent, Normal Pitch and Volume, Age and Situation Appropriate   Thought Process: Within Normal Limits   Thought Content:   Within Normal Limits   Cognition:  Grossly Intact   Memory: Intact   Insight:  Good Judgment: Good   Suicidal Ideations: Denies Suicidal Ideation   Homicidal Ideations: Negative for homicidal ideation   Medication Side Effects: Absent       Attention Span Attention span and concentration were age appropriate       Screenings Completed in This Encounter:     Anxiety and Depression:                    DIAGNOSIS AND ASSESSMENT DATA     DIAGNOSIS:   1. Recurrent major depressive disorder, in full remission ([facility] [address]    2. Generalized anxiety disorder    3. PTSD (post-traumatic stress disorder)      R/O personality disorder    PLAN   Follow-up:  Return in about 3 months (around 6/30/2020). Prescriptions for this encounter:  New Prescriptions    No medications on file       No orders of the defined types were placed in this encounter. Medications Discontinued During This Encounter   Medication Reason    hydrOXYzine (VISTARIL) 25 MG capsule Patient Choice    traZODone (DESYREL) 50 MG tablet Patient Choice       Additional orders:  No orders of the defined types were placed in this encounter. Patient is reporting stable mood at this time. She has discontinued all of her medications on her own prior to her visit today. She denies any problems with her mood or any mood dysregulation since discontinuing the medications. She continues with some situational stressors but is reporting improved ability to manage the stressors. Discussed strategies to manage her stressors. Supportive therapy provided. Patient is encouraged to utilize nonpharmacologic coping skills such as deep breathing, guided imagery, guided meditation, muscle relaxation, calming music, and/or journaling. Discussed with patient the importance of obtaining and maintaining gainful employment in the overall management of her mood disorder symptoms. Discussed with patient that she would benefit from employment as it would offer structure, routine, financial independence, and increased socialization.   Discussed with patient that

## 2020-04-17 ENCOUNTER — HOSPITAL ENCOUNTER (EMERGENCY)
Age: 40
Discharge: HOME | End: 2020-04-17
Attending: EMERGENCY MEDICINE
Payer: MEDICAID

## 2020-04-17 VITALS
RESPIRATION RATE: 16 BRPM | BODY MASS INDEX: 36.7 KG/M2 | OXYGEN SATURATION: 98 % | HEART RATE: 74 BPM | DIASTOLIC BLOOD PRESSURE: 90 MMHG | TEMPERATURE: 97.5 F | HEIGHT: 64 IN | SYSTOLIC BLOOD PRESSURE: 124 MMHG | WEIGHT: 215 LBS

## 2020-04-17 LAB
ANION GAP SERPL CALCULATED.3IONS-SCNC: 11 MEQ/L (ref 8–16)
BASOPHILS # BLD: 0.7 %
BASOPHILS ABSOLUTE: 0.1 THOU/MM3 (ref 0–0.1)
BUN BLDV-MCNC: 8 MG/DL (ref 7–22)
C-REACTIVE PROTEIN: 0.41 MG/DL (ref 0–1)
CALCIUM SERPL-MCNC: 9 MG/DL (ref 8.5–10.5)
CHLORIDE BLD-SCNC: 102 MEQ/L (ref 98–111)
CO2: 23 MEQ/L (ref 23–33)
CREAT SERPL-MCNC: 0.7 MG/DL (ref 0.4–1.2)
EOSINOPHIL # BLD: 2.8 %
EOSINOPHILS ABSOLUTE: 0.2 THOU/MM3 (ref 0–0.4)
ERYTHROCYTE [DISTWIDTH] IN BLOOD BY AUTOMATED COUNT: 12.2 % (ref 11.5–14.5)
ERYTHROCYTE [DISTWIDTH] IN BLOOD BY AUTOMATED COUNT: 39.8 FL (ref 35–45)
GFR SERPL CREATININE-BSD FRML MDRD: > 90 ML/MIN/1.73M2
GLUCOSE BLD-MCNC: 108 MG/DL (ref 70–108)
HCT VFR BLD CALC: 42.4 % (ref 37–47)
HEMOGLOBIN: 14.7 GM/DL (ref 12–16)
IMMATURE GRANS (ABS): 0.02 THOU/MM3 (ref 0–0.07)
IMMATURE GRANULOCYTES: 0.2 %
LYMPHOCYTES # BLD: 25.6 %
LYMPHOCYTES ABSOLUTE: 2.1 THOU/MM3 (ref 1–4.8)
MCH RBC QN AUTO: 31.2 PG (ref 26–33)
MCHC RBC AUTO-ENTMCNC: 34.7 GM/DL (ref 32.2–35.5)
MCV RBC AUTO: 90 FL (ref 81–99)
MONOCYTES # BLD: 5.6 %
MONOCYTES ABSOLUTE: 0.5 THOU/MM3 (ref 0.4–1.3)
NUCLEATED RED BLOOD CELLS: 0 /100 WBC
OSMOLALITY CALCULATION: 270.8 MOSMOL/KG (ref 275–300)
PLATELET # BLD: 277 THOU/MM3 (ref 130–400)
PMV BLD AUTO: 10.9 FL (ref 9.4–12.4)
POTASSIUM SERPL-SCNC: 3.9 MEQ/L (ref 3.5–5.2)
RBC # BLD: 4.71 MILL/MM3 (ref 4.2–5.4)
SEDIMENTATION RATE, ERYTHROCYTE: 9 MM/HR (ref 0–20)
SEG NEUTROPHILS: 65.1 %
SEGMENTED NEUTROPHILS ABSOLUTE COUNT: 5.3 THOU/MM3 (ref 1.8–7.7)
SODIUM BLD-SCNC: 136 MEQ/L (ref 135–145)
WBC # BLD: 8.2 THOU/MM3 (ref 4.8–10.8)

## 2020-04-17 PROCEDURE — 36415 COLL VENOUS BLD VENIPUNCTURE: CPT

## 2020-04-17 PROCEDURE — 96374 THER/PROPH/DIAG INJ IV PUSH: CPT

## 2020-04-17 PROCEDURE — 85025 COMPLETE CBC W/AUTO DIFF WBC: CPT

## 2020-04-17 PROCEDURE — 6360000002 HC RX W HCPCS: Performed by: EMERGENCY MEDICINE

## 2020-04-17 PROCEDURE — 86140 C-REACTIVE PROTEIN: CPT

## 2020-04-17 PROCEDURE — 96360 HYDRATION IV INFUSION INIT: CPT

## 2020-04-17 PROCEDURE — 99283 EMERGENCY DEPT VISIT LOW MDM: CPT

## 2020-04-17 PROCEDURE — 85651 RBC SED RATE NONAUTOMATED: CPT

## 2020-04-17 PROCEDURE — 80048 BASIC METABOLIC PNL TOTAL CA: CPT

## 2020-04-17 PROCEDURE — 96375 TX/PRO/DX INJ NEW DRUG ADDON: CPT

## 2020-04-17 PROCEDURE — 2580000003 HC RX 258: Performed by: EMERGENCY MEDICINE

## 2020-04-17 RX ORDER — METOCLOPRAMIDE HYDROCHLORIDE 5 MG/ML
10 INJECTION INTRAMUSCULAR; INTRAVENOUS ONCE
Status: COMPLETED | OUTPATIENT
Start: 2020-04-17 | End: 2020-04-17

## 2020-04-17 RX ORDER — DIPHENHYDRAMINE HYDROCHLORIDE 50 MG/ML
25 INJECTION INTRAMUSCULAR; INTRAVENOUS ONCE
Status: COMPLETED | OUTPATIENT
Start: 2020-04-17 | End: 2020-04-17

## 2020-04-17 RX ORDER — KETOROLAC TROMETHAMINE 30 MG/ML
30 INJECTION, SOLUTION INTRAMUSCULAR; INTRAVENOUS ONCE
Status: COMPLETED | OUTPATIENT
Start: 2020-04-17 | End: 2020-04-17

## 2020-04-17 RX ORDER — SODIUM CHLORIDE 9 MG/ML
INJECTION, SOLUTION INTRAVENOUS CONTINUOUS
Status: DISCONTINUED | OUTPATIENT
Start: 2020-04-17 | End: 2020-04-17 | Stop reason: HOSPADM

## 2020-04-17 RX ORDER — SUMATRIPTAN 50 MG/1
50 TABLET, FILM COATED ORAL
Qty: 6 TABLET | Refills: 1 | Status: SHIPPED | OUTPATIENT
Start: 2020-04-17 | End: 2021-08-26 | Stop reason: ALTCHOICE

## 2020-04-17 RX ORDER — 0.9 % SODIUM CHLORIDE 0.9 %
500 INTRAVENOUS SOLUTION INTRAVENOUS ONCE
Status: COMPLETED | OUTPATIENT
Start: 2020-04-17 | End: 2020-04-17

## 2020-04-17 RX ADMIN — DIPHENHYDRAMINE HYDROCHLORIDE 25 MG: 50 INJECTION, SOLUTION INTRAMUSCULAR; INTRAVENOUS at 18:33

## 2020-04-17 RX ADMIN — SODIUM CHLORIDE 500 ML: 9 INJECTION, SOLUTION INTRAVENOUS at 18:33

## 2020-04-17 RX ADMIN — METOCLOPRAMIDE 10 MG: 5 INJECTION, SOLUTION INTRAMUSCULAR; INTRAVENOUS at 18:33

## 2020-04-17 RX ADMIN — KETOROLAC TROMETHAMINE 30 MG: 30 INJECTION, SOLUTION INTRAMUSCULAR at 18:33

## 2020-04-17 ASSESSMENT — ENCOUNTER SYMPTOMS
VOMITING: 0
SORE THROAT: 0
ABDOMINAL PAIN: 0
NAUSEA: 1
COUGH: 1
SHORTNESS OF BREATH: 0
PHOTOPHOBIA: 1

## 2020-04-17 ASSESSMENT — PAIN SCALES - GENERAL
PAINLEVEL_OUTOF10: 7
PAINLEVEL_OUTOF10: 7

## 2020-04-17 ASSESSMENT — PAIN DESCRIPTION - DESCRIPTORS: DESCRIPTORS: THROBBING

## 2020-04-17 ASSESSMENT — PAIN DESCRIPTION - LOCATION: LOCATION: HEAD

## 2020-04-17 NOTE — ED PROVIDER NOTES
RATE, ESTIMATED   SEDIMENTATION RATE     All other unresulted laboratory test above are normal:    Vitals:    Vitals:    04/17/20 1724 04/17/20 1841   BP: (!) 140/83 113/74   Pulse: 80 65   Resp: 18 16   Temp: 97.5 °F (36.4 °C)    TempSrc: Oral    SpO2: 97% 99%   Weight: 215 lb (97.5 kg)    Height: 5' 4\" (1.626 m)        EMERGENCY DEPARTMENT COURSE:    Medications   0.9 % sodium chloride bolus (500 mLs Intravenous New Bag 4/17/20 1833)   0.9 % sodium chloride infusion (has no administration in time range)   diphenhydrAMINE (BENADRYL) injection 25 mg (25 mg Intravenous Given 4/17/20 1833)   ketorolac (TORADOL) injection 30 mg (30 mg Intravenous Given 4/17/20 1833)   metoclopramide (REGLAN) injection 10 mg (10 mg Intravenous Given 4/17/20 1833)     1755: The patient was seen and evaluated. Appropriate labs, Reglan, Toradol, Benadryl, and IV fluids ordered. The pt was seen and evaluated by me. Within the department, I observed the pt's vitalsigns to be within acceptable range. Laboratory and Radiological studies were performed, results were reviewed with the patient. Within the department, the pt was treated with IV fluid hydration, Benadryl, Reglan and Toradol. Patient when reevaluated, patient's headache resolved. I observed the pt's condition to be hemodynamically stable during the duration of their stay. I explained my proposed course of treatment to the pt, and they were amenable to my decision. They were discharged home, and they will return to the ED if their symptoms become more severein nature, or otherwise change. CRITICAL CARE:   None. CONSULTS:  None    PROCEDURES:  None. FINAL IMPRESSION       1.  Migraine without aura and without status migrainosus, not intractable          DISPOSITION/PLAN  PATIENT REFERRED TO:  Félix Camargo MD  [address] [place] [address]  [phone]    Schedule an appointment as soon as possible for a visit in 1 week      DISCHARGE MEDICATIONS:  New Prescriptions    SUMATRIPTAN (IMITREX) 50 MG TABLET    Take 1 tablet by mouth once as needed for Migraine (Take medications at the onset of migraine)       (Please note that portions of this note were completed with a voice recognition program.  Efforts were made to edit the dictations but occasionallywords are mis-transcribed.)    Scribe:  Faye Horvath 04/17/20 5:57 PM EDT Scribing for and in the presence of Dallas Peña M.D. Signed by: Cornell Lou, 04/17/20 7:27 PM    Provider:  I personally performed the services described in the documentation, reviewed and edited the documentation which was dictated to the scribe in my presence, and it accurately records my words andactions.      04/17/20 7:27 PM      Santi Hernandez MD      Emergency room physician              Santi Hernandez MD  04/17/20 8236

## 2020-04-18 ENCOUNTER — CARE COORDINATION (OUTPATIENT)
Dept: CARE COORDINATION | Age: 40
End: 2020-04-18

## 2020-05-02 ENCOUNTER — CARE COORDINATION (OUTPATIENT)
Dept: CARE COORDINATION | Age: 40
End: 2020-05-02

## 2020-06-09 ENCOUNTER — INITIAL CONSULT (OUTPATIENT)
Dept: PULMONOLOGY | Age: 40
End: 2020-06-09
Payer: MEDICAID

## 2020-06-09 VITALS
HEART RATE: 90 BPM | WEIGHT: 215 LBS | DIASTOLIC BLOOD PRESSURE: 80 MMHG | TEMPERATURE: 97.9 F | BODY MASS INDEX: 36.7 KG/M2 | HEIGHT: 64 IN | OXYGEN SATURATION: 99 % | SYSTOLIC BLOOD PRESSURE: 112 MMHG

## 2020-06-09 PROCEDURE — G8417 CALC BMI ABV UP PARAM F/U: HCPCS | Performed by: INTERNAL MEDICINE

## 2020-06-09 PROCEDURE — G8427 DOCREV CUR MEDS BY ELIG CLIN: HCPCS | Performed by: INTERNAL MEDICINE

## 2020-06-09 PROCEDURE — 99213 OFFICE O/P EST LOW 20 MIN: CPT | Performed by: INTERNAL MEDICINE

## 2020-06-09 PROCEDURE — 4004F PT TOBACCO SCREEN RCVD TLK: CPT | Performed by: INTERNAL MEDICINE
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
## 2020-06-09 NOTE — PATIENT INSTRUCTIONS
you have more normal sleep, so you feel less sleepy and more alert during the daytime. · CPAP may help keep heart failure or other heart problems from getting worse. · CPAP may help lower your blood pressure. · If you use CPAP, your bed partner may also sleep better because you are not snoring or restless. What are the side effects? Some people who use CPAP have:  · A dry or stuffy nose and a sore throat. · Irritated skin on the face. · Sore eyes. · Bloating. If you have any of these problems, work with your doctor to fix them. Here are some things you can try:  · Be sure the mask or nasal prongs fit well. · See if your doctor can adjust the pressure of your CPAP. · If your nose is dry, try a humidifier. · If your nose is runny or stuffy, try decongestant medicine or a steroid nasal spray. Be safe with medicines. Read and follow all instructions on the label. Do not use the medicine longer than the label says. If these things do not help, you might try a different type of machine. Some machines have air pressure that adjusts on its own. Others have air pressures that are different when you breathe in than when you breathe out. This may reduce discomfort caused by too much pressure in your nose. Where can you learn more? Go to https://MyoKardia.MetaLINCS. org and sign in to your MymCart account. Enter T696 in the [facility] box to learn more about \"Learning About CPAP for Sleep Apnea. \"     If you do not have an account, please click on the \"Sign Up Now\" link. Current as of: February 24, 2020               Content Version: 12.5  © 4806-6037 Healthwise, Incorporated. Care instructions adapted under license by [facility] ([facility]). If you have questions about a medical condition or this instruction, always ask your healthcare professional. [place] [address] any warranty or liability for your use of this information.          Patient Education        Sleep Apnea: Care Instructions  Your Care Instructions     Sleep apnea means that you frequently stop breathing for 10 seconds or longer during sleep. It can be mild to severe, based on the number of times an hour that you stop breathing or have slowed breathing. Blocked or narrowed airways in your nose, mouth, or throat can cause sleep apnea. Your airway can become blocked when your throat muscles and tongue relax during sleep. You can treat sleep apnea at home by making lifestyle changes. You also can use a CPAP breathing machine that keeps tissues in the throat from blocking your airway. Or your doctor may suggest that you use a breathing device while you sleep. It helps keep your airway open. This could be a device that you put in your mouth. In some cases, surgery may be needed to remove enlarged tissues in the throat. Follow-up care is a key part of your treatment and safety. Be sure to make and go to all appointments, and call your doctor if you are having problems. It's also a good idea to know your test results and keep a list of the medicines you take. How can you care for yourself at home? · Lose weight, if needed. It may reduce the number of times you stop breathing or have slowed breathing. · Sleep on your side. It may stop mild apnea. If you tend to roll onto your back, sew a pocket in the back of your pajama top. Put a tennis ball into the pocket, and stitch the pocket shut. This will help keep you from sleeping on your back. · Avoid alcohol and medicines such as sleeping pills and sedatives before bed. · Do not smoke. Smoking can make sleep apnea worse. If you need help quitting, talk to your doctor about stop-smoking programs and medicines. These can increase your chances of quitting for good. · Prop up the head of your bed 4 to 6 inches by putting bricks under the legs of the bed. · Treat breathing problems, such as a stuffy nose, caused by a cold or allergies.   · Try a continuous positive airway pressure (CPAP) breathing machine if your doctor recommends it. The machine keeps your airway open when you sleep. · If CPAP does not work for you, ask your doctor if you can try other breathing machines. A bilevel positive airway pressure machine uses one type of air pressure for breathing in and another type for breathing out. Another device raises or lowers air pressure as needed while you breathe. · Talk to your doctor if:  ? Your nose feels dry or bleeds when you use one of these machines. You may need to increase moisture in the air. A humidifier may help. ? Your nose is runny or stuffy from using a breathing machine. Decongestants or a corticosteroid nasal spray may help. ? You are sleepy during the day and it gets in the way of the normal things you do. Do not drive when you are drowsy. When should you call for help? Watch closely for changes in your health, and be sure to contact your doctor if:  · You still have sleep apnea even though you have made lifestyle changes. · You are thinking of trying a device such as CPAP. · You are having problems using a CPAP or similar machine. Where can you learn more? Go to https://Renewable Funding.Drug123.com. org and sign in to your Baytex account. Enter Y291 in the [facility] box to learn more about \"Sleep Apnea: Care Instructions. \"     If you do not have an account, please click on the \"Sign Up Now\" link. Current as of: February 24, 2020               Content Version: 12.5  © 8221-2750 Healthwise, Incorporated. Care instructions adapted under license by [facility] ([facility]). If you have questions about a medical condition or this instruction, always ask your healthcare professional. Katherine Ash any warranty or liability for your use of this information.

## 2020-06-16 ENCOUNTER — OFFICE VISIT (OUTPATIENT)
Dept: PSYCHIATRY | Age: 40
End: 2020-06-16
Payer: MEDICAID

## 2020-06-16 VITALS — WEIGHT: 213.8 LBS | HEIGHT: 64 IN | BODY MASS INDEX: 36.5 KG/M2

## 2020-06-16 PROCEDURE — 99212 OFFICE O/P EST SF 10 MIN: CPT | Performed by: NURSE PRACTITIONER

## 2020-06-16 PROCEDURE — G8417 CALC BMI ABV UP PARAM F/U: HCPCS | Performed by: NURSE PRACTITIONER

## 2020-06-16 PROCEDURE — G8427 DOCREV CUR MEDS BY ELIG CLIN: HCPCS | Performed by: NURSE PRACTITIONER

## 2020-06-16 PROCEDURE — 4004F PT TOBACCO SCREEN RCVD TLK: CPT | Performed by: NURSE PRACTITIONER

## 2020-06-16 PROCEDURE — 90833 PSYTX W PT W E/M 30 MIN: CPT | Performed by: NURSE PRACTITIONER
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
## 2020-07-16 ENCOUNTER — HOSPITAL ENCOUNTER (OUTPATIENT)
Dept: SLEEP CENTER | Age: 40
Discharge: HOME | End: 2020-07-18
Payer: MEDICAID

## 2020-07-16 PROCEDURE — 95806 SLEEP STUDY UNATT&RESP EFFT: CPT

## 2020-07-16 NOTE — PROGRESS NOTES
Fredy Isra presents today for a HST instruction and demonstration on unit # [id number]. Questions were asked and answers given. She was able to return demonstration and verbalized understanding. The sleep center control room phone number was provided incase questions arise during her study. Informed patient to call 911 in case of an emergency. She states she will return the unit tomorrow.

## 2020-07-19 ENCOUNTER — HOSPITAL ENCOUNTER (EMERGENCY)
Age: 40
Discharge: HOME | End: 2020-07-19
Attending: EMERGENCY MEDICINE
Payer: MEDICAID

## 2020-07-19 VITALS
DIASTOLIC BLOOD PRESSURE: 92 MMHG | TEMPERATURE: 97.5 F | SYSTOLIC BLOOD PRESSURE: 131 MMHG | RESPIRATION RATE: 18 BRPM | OXYGEN SATURATION: 100 % | HEART RATE: 88 BPM

## 2020-07-19 PROCEDURE — 99283 EMERGENCY DEPT VISIT LOW MDM: CPT

## 2020-07-19 PROCEDURE — 6360000002 HC RX W HCPCS: Performed by: EMERGENCY MEDICINE

## 2020-07-19 PROCEDURE — 96372 THER/PROPH/DIAG INJ SC/IM: CPT

## 2020-07-19 PROCEDURE — 6370000000 HC RX 637 (ALT 250 FOR IP): Performed by: EMERGENCY MEDICINE

## 2020-07-19 RX ORDER — AMOXICILLIN 250 MG/1
500 CAPSULE ORAL ONCE
Status: COMPLETED | OUTPATIENT
Start: 2020-07-19 | End: 2020-07-19

## 2020-07-19 RX ORDER — NAPROXEN 500 MG/1
500 TABLET ORAL 2 TIMES DAILY PRN
Qty: 20 TABLET | Refills: 0 | Status: SHIPPED | OUTPATIENT
Start: 2020-07-19 | End: 2020-08-12 | Stop reason: ALTCHOICE

## 2020-07-19 RX ORDER — AMOXICILLIN 500 MG/1
500 CAPSULE ORAL 3 TIMES DAILY
Qty: 30 CAPSULE | Refills: 0 | Status: SHIPPED | OUTPATIENT
Start: 2020-07-19 | End: 2020-07-29

## 2020-07-19 RX ORDER — KETOROLAC TROMETHAMINE 30 MG/ML
30 INJECTION, SOLUTION INTRAMUSCULAR; INTRAVENOUS ONCE
Status: COMPLETED | OUTPATIENT
Start: 2020-07-19 | End: 2020-07-19

## 2020-07-19 RX ADMIN — KETOROLAC TROMETHAMINE 30 MG: 30 INJECTION, SOLUTION INTRAMUSCULAR at 01:02

## 2020-07-19 RX ADMIN — AMOXICILLIN 500 MG: 250 CAPSULE ORAL at 01:01

## 2020-07-19 ASSESSMENT — ENCOUNTER SYMPTOMS
ABDOMINAL DISTENTION: 0
NAUSEA: 0
SORE THROAT: 0
CONSTIPATION: 0
BACK PAIN: 0
EYE PAIN: 0
COUGH: 0
ABDOMINAL PAIN: 0
STRIDOR: 0
WHEEZING: 0
VOMITING: 0
RHINORRHEA: 0
SHORTNESS OF BREATH: 0
CHEST TIGHTNESS: 0
EYE DISCHARGE: 0
PHOTOPHOBIA: 0
DIARRHEA: 0
EYE ITCHING: 0
EYE REDNESS: 0

## 2020-07-19 ASSESSMENT — PAIN SCALES - GENERAL
PAINLEVEL_OUTOF10: 10
PAINLEVEL_OUTOF10: 10

## 2020-07-19 ASSESSMENT — PAIN DESCRIPTION - PAIN TYPE: TYPE: ACUTE PAIN

## 2020-07-19 ASSESSMENT — PAIN DESCRIPTION - ORIENTATION: ORIENTATION: LEFT;RIGHT

## 2020-07-19 ASSESSMENT — PAIN DESCRIPTION - DESCRIPTORS: DESCRIPTORS: THROBBING

## 2020-07-19 ASSESSMENT — PAIN DESCRIPTION - LOCATION: LOCATION: TEETH

## 2020-07-19 NOTE — ED TRIAGE NOTES
Pt presents to the Ed with complaints of dental pain that she had had for the past week. Pt states she has 3 teeth that are bothering her and the pain is 11/10 that is radiating down her neck.

## 2020-07-19 NOTE — ED PROVIDER NOTES
[facility]  EMERGENCY DEPARTMENT ENCOUNTER      PATIENT NAME: Jasmyne Lott  MRN: [id number]  : 1980  العراقي: 2020  PROVIDER: Greer Pete MD      CHIEF COMPLAINT       Chief Complaint   Patient presents with    Dental Pain       Nurses Notes reviewed and I agreeexcept as noted in the HPI. HISTORY OF PRESENT ILLNESS    Jasmyne Lott is a 36 y.o. female who presents to Emergency Department with Dental Pain      Onset: Gradula  Location: Multiple tooth greater to right lower side and right upper incisor  Severity: 8/10  Timing: For one week  Modifying factors: Worse on cold and hot food or air  Quality: Pain  Duration: Persistent  Context: Hx of poor dental health and previous Meth abuser. She cannot see her dentist quick. Prior episodes: frequent  Therapy today: OTC Tylenol  Associated Symptoms: No fever or chills. No facial swelling. This HPI was provided by the patient. REVIEW OF SYSTEMS     Review of Systems   Constitutional: Negative for activity change, appetite change, chills, fatigue, fever and unexpected weight change. HENT: Positive for dental problem. Negative for congestion, ear discharge, ear pain, hearing loss, nosebleeds, rhinorrhea and sore throat. Eyes: Negative for photophobia, pain, discharge, redness and itching. Respiratory: Negative for cough, chest tightness, shortness of breath, wheezing and stridor. Cardiovascular: Negative for chest pain, palpitations and leg swelling. Gastrointestinal: Negative for abdominal distention, abdominal pain, constipation, diarrhea, nausea and vomiting. Endocrine: Negative for cold intolerance, heat intolerance, polydipsia and polyphagia. Genitourinary: Negative for dysuria, flank pain, frequency and hematuria. Musculoskeletal: Negative for arthralgias, back pain, gait problem, myalgias, neck pain and neck stiffness. Skin: Negative for pallor, rash and wound.    Allergic/Immunologic: Negative for environmental allergies and food allergies. Neurological: Negative for dizziness, tremors, syncope, weakness and headaches. Psychiatric/Behavioral: Negative for agitation, behavioral problems, confusion, self-injury, sleep disturbance and suicidal ideas. PAST MEDICAL HISTORY    has a past medical history of Anxiety, Asthma, DDD (degenerative disc disease), cervical, DDD (degenerative disc disease), thoracolumbar, Depression, and GERD (gastroesophageal reflux disease). SURGICAL HISTORY      has no past surgical history on file. CURRENT MEDICATIONS       Previous Medications    ALBUTEROL (PROVENTIL) (2.5 MG/3ML) 0.083% NEBULIZER SOLUTION    Take 3 mLs by nebulization every 6 hours as needed for Wheezing or Shortness of Breath    ALBUTEROL SULFATE  (90 BASE) MCG/ACT INHALER    Inhale 2 puffs into the lungs every 4 hours as needed for Wheezing    ALCLOMETHASONE (ACLOVATE) 0.05 % OINTMENT    Apply topically 2 times daily. FLUTICASONE-SALMETEROL (AIRDUO RESPICLICK 828/11) 261-91 MCG/ACT AEPB    Inhale 1 puff into the lungs 2 times daily    LIDOCAINE (XYLOCAINE) 5 % OINTMENT    Apply topically as needed. RANITIDINE HCL (ACID REDUCER PO)    Take by mouth as needed     SPACER/AERO-HOLDING CHAMBERS (VORTEX VALVED HOLDING CHAMBER) SUJATHA    1 Device by Does not apply route daily Use with inhalers    SUMATRIPTAN (IMITREX) 50 MG TABLET    Take 1 tablet by mouth once as needed for Migraine (Take medications at the onset of migraine)       ALLERGIES     is allergic to hydrocodone and prozac [fluoxetine hcl]. FAMILY HISTORY     She indicated that her mother is alive. She indicated that her father is . She indicated that her sister is alive. She indicated that her brother is alive. She indicated that all of her three daughters are alive. She indicated that her paternal aunt is alive.    family history includes Asthma in her daughter and sister; COPD in her father; Depression in her mother and sister; Lupus in her paternal aunt; No Known Problems in her brother, daughter, and daughter. SOCIAL HISTORY      reports that she has been smoking cigarettes. She has been smoking about 0.50 packs per day. She has never used smokeless tobacco. She reports previous drug use. She reports that she does not drink alcohol. PHYSICAL EXAM     INITIAL VITALS:  oral temperature is 97.5 °F (36.4 °C). Her blood pressure is 131/92 (abnormal) and her pulse is 88. Her respiration is 18 and oxygen saturation is 100%. Physical Exam  Vitals signs and nursing note reviewed. Constitutional:       Appearance: She is well-developed. She is not diaphoretic. HENT:      Head: Normocephalic and atraumatic. Nose: Nose normal.      Mouth/Throat:      Comments: Multiple dental caries with multiple tooth erosions, significant tenderness to right lower second molar and a right upper incisor with pulp exposed, no periodontal abscess. Eyes:      General: No scleral icterus. Right eye: No discharge. Left eye: No discharge. Conjunctiva/sclera: Conjunctivae normal.      Pupils: Pupils are equal, round, and reactive to light. Neck:      Musculoskeletal: Normal range of motion and neck supple. Vascular: No JVD. Trachea: No tracheal deviation. Cardiovascular:      Rate and Rhythm: Normal rate and regular rhythm. Heart sounds: Normal heart sounds. No murmur. No friction rub. No gallop. Pulmonary:      Effort: Pulmonary effort is normal. No respiratory distress. Breath sounds: Normal breath sounds. No stridor. No wheezing or rales. Chest:      Chest wall: No tenderness. Abdominal:      General: Bowel sounds are normal. There is no distension. Palpations: Abdomen is soft. There is no mass. Tenderness: There is no abdominal tenderness. There is no guarding or rebound. Hernia: No hernia is present. Musculoskeletal:         General: No tenderness or deformity. Lymphadenopathy:      Cervical: No cervical adenopathy. Skin:     General: Skin is warm and dry. Capillary Refill: Capillary refill takes less than 2 seconds. Coloration: Skin is not pale. Findings: No erythema or rash. Neurological:      Mental Status: She is alert and oriented to person, place, and time. Cranial Nerves: No cranial nerve deficit. Sensory: No sensory deficit. Motor: No abnormal muscle tone. Coordination: Coordination normal.      Deep Tendon Reflexes: Reflexes normal.   Psychiatric:         Behavior: Behavior normal.         Thought Content: Thought content normal.         Judgment: Judgment normal.           DIFFERENTIAL DIAGNOSIS:   Apical abscess, infected dental caries, pulpitis    DIAGNOSTIC RESULTS     EKG: All EKG's are interpreted by the Emergency Department Physician who either signs or Co-signsthis chart in the absence of a cardiologist.  Interpreted by me  Not indicated    RADIOLOGY: non-plain film images(s) such as CT, Ultrasound and MRI are read by the radiologist.    No orders to display       []Visualized and interpreted by me   [] Radiologist's Wet Read Report Reviewed   [] Discussed with Radiologist.    LABS:   No results found for this visit on 07/19/20. EMERGENCY DEPARTMENT COURSE:   Vitals:    Vitals:    07/19/20 0035   BP: (!) 131/92   Pulse: 88   Resp: 18   Temp: 97.5 °F (36.4 °C)   TempSrc: Oral   SpO2: 100%       12:58 AM: Patient is seen and evaluated in a timely fashion. ACTIONS:  IM Toradol and oral amoxicillin      MEDICAL DECISION MAKINGS:    Patient had infected dental caries. Certainly she may also have dental abscess. However there was no drainable peritonsillar abscess. Patient had no facial swelling. No clinical findings suggesting patient had necrotic ulcerative gingivitis. First dose amoxicillin was given in the ED. Discharged with amoxicillin and Naprosyn. Followed by dentist soon as possible.   She was provided available dentist list.      CRITICAL CARE:   None    CONSULTS:  None    PROCEDURES:  None    FINAL IMPRESSION      1.  Infected dental caries          DISPOSITION/PLAN   Home    PATIENT REFERRED TO:  [facility]  [facility] [address], [address]  Hours: Ranulfo [place]  Phone: [phone]:  New Prescriptions    AMOXICILLIN (AMOXIL) 500 MG CAPSULE    Take 1 capsule by mouth 3 times daily for 10 days    NAPROXEN (NAPROSYN) 500 MG TABLET    Take 1 tablet by mouth 2 times daily as needed for Pain       (Please note that portions of this note were completed with a voice recognition program.  Efforts were made to edit the dictations but occasionally words aremis-transcribed.)    Livia Noble MD  '     Livia Noble MD  07/19/20 0002

## 2020-07-20 ENCOUNTER — TELEPHONE (OUTPATIENT)
Dept: FAMILY MEDICINE CLINIC | Age: 40
End: 2020-07-20

## 2020-07-20 LAB — STATUS: NORMAL

## 2020-07-24 NOTE — PROGRESS NOTES
[address] [place] [address]                               SLEEP STUDY REPORT    PATIENT NAME: Dior Broussard                      :        1980  MED REC NO:   [id number]                           ROOM:  ACCOUNT NO:   [de-identified]                           ADMIT DATE: 2020  PROVIDER:     JAYLIN Lira Cap STUDY:  2020    HOME SLEEP STUDY    REFERRING PROVIDER:  Herber Anne CNP    HISTORY OF PRESENT ILLNESS:  The patient is a 51-year-old female with  complaints of chronic fatigue and insomnia. Weight 215 pounds, height  64 inches, BMI 36.9. METHODS:  The patient underwent Type III Portable Monitoring Sleep Study  including the simultaneous recording of oral-nasal airflow, rib and  abdominal respiratory effort, pulse rate, oxygen saturation, left and  right leg movement, and body position. Scoring criteria is consistent  with the current published AASM standards for scoring of apneas and  hypopneas 4A. DETAILS OF THE STUDY:  The patient came by the sleep lab and picked up  her unit #[id number]. She was given instructions on how to set it up. On  reviewing the sleep log, the patient got up to the restroom once through  the night. Total recording time 435 minutes, lights off time 01:32  a.m., lights on time 08:47 p.m. RESPIRATORY SUMMARY:  There were no respiratory events. PULSE OXIMETRY SUMMARY:  Mean oxygen saturation 96.2%, lowest oxygen  saturation 93%. BODY POSITION SUMMARY:  49 minutes of supine time, 386 minutes of  non-supine time, 37 minutes of upright time. ECG SUMMARY:  The patient remained in normal sinus rhythm through the  night. ASSESSMENT:  Negative type III polysomnogram for a sleep-disordered  breathing. RECOMMENDATIONS:  The patient will be followed up in the sleep clinic to  review results.         Jenny Loo M.D.    D: 2020 8:08:07       T: 2020 9:18:45 [email or web address]  Job#: [id number]     Doc#: [id number]    CC:

## 2020-07-31 ENCOUNTER — OFFICE VISIT (OUTPATIENT)
Dept: PULMONOLOGY | Age: 40
End: 2020-07-31
Payer: MEDICAID

## 2020-07-31 VITALS
HEIGHT: 64 IN | TEMPERATURE: 98.7 F | BODY MASS INDEX: 36.16 KG/M2 | HEART RATE: 82 BPM | SYSTOLIC BLOOD PRESSURE: 132 MMHG | WEIGHT: 211.8 LBS | DIASTOLIC BLOOD PRESSURE: 86 MMHG | OXYGEN SATURATION: 98 %

## 2020-07-31 PROCEDURE — 4004F PT TOBACCO SCREEN RCVD TLK: CPT | Performed by: NURSE PRACTITIONER

## 2020-07-31 PROCEDURE — 99213 OFFICE O/P EST LOW 20 MIN: CPT | Performed by: NURSE PRACTITIONER

## 2020-07-31 PROCEDURE — G8417 CALC BMI ABV UP PARAM F/U: HCPCS | Performed by: NURSE PRACTITIONER

## 2020-07-31 PROCEDURE — G8427 DOCREV CUR MEDS BY ELIG CLIN: HCPCS | Performed by: NURSE PRACTITIONER

## 2020-07-31 ASSESSMENT — ENCOUNTER SYMPTOMS
ABDOMINAL PAIN: 0
COUGH: 1
VOMITING: 0
WHEEZING: 0
DIARRHEA: 0
EYES NEGATIVE: 1
NAUSEA: 0
SHORTNESS OF BREATH: 0

## 2020-07-31 NOTE — PATIENT INSTRUCTIONS
Patient Education        Stopping Smoking: Care Instructions  Your Care Instructions     Cigarette smokers crave the nicotine in cigarettes. Giving it up is much harder than simply changing a habit. Your body has to stop craving the nicotine. It is hard to quit, but you can do it. There are many tools that people use to quit smoking. You may find that combining tools works best for you. There are several steps to quitting. First you get ready to quit. Then you get support to help you. After that, you learn new skills and behaviors to become a nonsmoker. For many people, a necessary step is getting and using medicine. Your doctor will help you set up the plan that best meets your needs. You may want to attend a smoking cessation program to help you quit smoking. When you choose a program, look for one that has proven success. Ask your doctor for ideas. You will greatly increase your chances of success if you take medicine as well as get counseling or join a cessation program.  Some of the changes you feel when you first quit tobacco are uncomfortable. Your body will miss the nicotine at first, and you may feel short-tempered and grumpy. You may have trouble sleeping or concentrating. Medicine can help you deal with these symptoms. You may struggle with changing your smoking habits and rituals. The last step is the tricky one: Be prepared for the smoking urge to continue for a time. This is a lot to deal with, but keep at it. You will feel better. Follow-up care is a key part of your treatment and safety. Be sure to make and go to all appointments, and call your doctor if you are having problems. It's also a good idea to know your test results and keep a list of the medicines you take. How can you care for yourself at home? · Ask your family, friends, and coworkers for support. You have a better chance of quitting if you have help and support.   · Join a support group, such as Nicotine Anonymous, for people who are trying to quit smoking. · Consider signing up for a smoking cessation program, such as the American Lung Association's Freedom from Smoking program.  · Get text messaging support. Go to the website at www.smokefree. gov to sign up for the [facility] program.  · Set a quit date. Pick your date carefully so that it is not right in the middle of a big deadline or stressful time. Once you quit, do not even take a puff. Get rid of all ashtrays and lighters after your last cigarette. Clean your house and your clothes so that they do not smell of smoke. · Learn how to be a nonsmoker. Think about ways you can avoid those things that make you reach for a cigarette. ? Avoid situations that put you at greatest risk for smoking. For some people, it is hard to have a drink with friends without smoking. For others, they might skip a coffee break with coworkers who smoke. ? Change your daily routine. Take a different route to work or eat a meal in a different place. · Cut down on stress. Calm yourself or release tension by doing an activity you enjoy, such as reading a book, taking a hot bath, or gardening. · Talk to your doctor or pharmacist about nicotine replacement therapy, which replaces the nicotine in your body. You still get nicotine but you do not use tobacco. Nicotine replacement products help you slowly reduce the amount of nicotine you need. These products come in several forms, many of them available over-the-counter:  ? Nicotine patches  ? Nicotine gum and lozenges  ? Nicotine inhaler  · Ask your doctor about bupropion (Wellbutrin) or varenicline (Chantix), which are prescription medicines. They do not contain nicotine. They help you by reducing withdrawal symptoms, such as stress and anxiety. · Some people find hypnosis, acupuncture, and massage helpful for ending the smoking habit. · Eat a healthy diet and get regular exercise.  Having healthy habits will help your body move past its craving for

## 2020-07-31 NOTE — PROGRESS NOTES
[facility], [address], 36 y.o.  [id number]    Nurses Notes   Cristela Maher is here for a HST follow up   Study Results  Initial Study Date -  7/16/2020  AHI -  0    Total Events - 0  (Apneas  0  Hypopneas 0  Central  0)  LM w/Arousals -   Sleep Efficiency -  % (Total Sleep Time - 435.3 min)  Time with Sats below 88% - 0 min  Weight 215 lb  Interval History       Jasmyne Lott is a 36 y.o. old femalewho comes in to review the results of her recent sleep study, to answer questions and to explore options for treatment. She continues to have insomnia: trouble falling asleep and staying asleep . \" I can not shut my brain down\" other symptoms include gasping/ choking at night and snoring. Underlying anxiety is following with psychiatry and is gong to be starting counseling   Underlying asthma. On Air Duo   PMHx  Past Medical History:   Diagnosis Date    Anxiety     Asthma     DDD (degenerative disc disease), cervical     DDD (degenerative disc disease), thoracolumbar     Depression     GERD (gastroesophageal reflux disease)      No past surgical history on file. Social History     Tobacco Use    Smoking status: Current Every Day Smoker     Packs/day: 0.50     Types: Cigarettes    Smokeless tobacco: Never Used   Substance Use Topics    Alcohol use: No    Drug use: Not Currently     Comment: Not currently -- meth last use 2003 and last pot use 2016  years ago.       Family History   Problem Relation Age of Onset    Depression Mother     COPD Father     Asthma Sister     Depression Sister     Lupus Paternal Aunt     No Known Problems Daughter     No Known Problems Daughter     Asthma Daughter     No Known Problems Brother      Allergies  Allergies   Allergen Reactions    Hydrocodone      \"makes me feel completely out of whack\"    Prozac [Fluoxetine Hcl] Other (See Comments)     \"I want to kill myself\"     Meds  Current Outpatient Medications   Medication Sig Dispense Refill    albuterol (PROVENTIL) (2.5 MG/3ML) 0.083% nebulizer solution Take 3 mLs by nebulization every 6 hours as needed for Wheezing or Shortness of Breath 120 vial 11    albuterol sulfate  (90 Base) MCG/ACT inhaler Inhale 2 puffs into the lungs every 4 hours as needed for Wheezing 1 Inhaler 11    Fluticasone-Salmeterol (AIRDUO RESPICLICK 830/90) 774-30 MCG/ACT AEPB Inhale 1 puff into the lungs 2 times daily 1 each 11    Spacer/Aero-Holding Chambers (VORTEX VALVED HOLDING CHAMBER) SUJATHA 1 Device by Does not apply route daily Use with inhalers 1 Device 0    alclomethasone (ACLOVATE) 0.05 % ointment Apply topically 2 times daily. 1 Tube 1    lidocaine (XYLOCAINE) 5 % ointment Apply topically as needed. 50 g 0    RaNITidine HCl (ACID REDUCER PO) Take by mouth as needed       naproxen (NAPROSYN) 500 MG tablet Take 1 tablet by mouth 2 times daily as needed for Pain 20 tablet 0    SUMAtriptan (IMITREX) 50 MG tablet Take 1 tablet by mouth once as needed for Migraine (Take medications at the onset of migraine) 6 tablet 1     No current facility-administered medications for this visit. ROS  Review of Systems   Constitutional: Positive for fatigue. Negative for chills and fever. HENT: Negative. Eyes: Negative. Respiratory: Positive for cough. Negative for shortness of breath and wheezing. Cardiovascular: Negative for chest pain, palpitations and leg swelling. Gastrointestinal: Negative for abdominal pain, diarrhea, nausea and vomiting. Genitourinary: Negative. Musculoskeletal: Negative. Skin: Negative. Neurological: Negative. Hematological: Does not bruise/bleed easily. Psychiatric/Behavioral: Positive for sleep disturbance. Negative for suicidal ideas. The patient is nervous/anxious.        Exam  /86 (Site: Left Upper Arm, Position: Sitting, Cuff Size: Medium Adult)   Pulse 82   Temp 98.7 °F (37.1 °C)   Ht 5' 4\" (1.626 m)   Wt 211 lb 12.8 oz (96.1 kg) SpO2 98% Comment: on RA  BMI 36.36 kg/m²    Body mass index is 36.36 kg/m². Oxygen level - Room Air  Physical Exam  Vitals signs and nursing note reviewed. Constitutional:       General: She is not in acute distress. Appearance: She is well-developed. HENT:      Mouth/Throat:      Lips: Pink. Eyes:      Conjunctiva/sclera: Conjunctivae normal.   Neck:      Vascular: No JVD. Cardiovascular:      Rate and Rhythm: Normal rate and regular rhythm. Pulmonary:      Effort: Pulmonary effort is normal. No accessory muscle usage. Breath sounds: No rales. Musculoskeletal:      Right lower leg: No edema. Left lower leg: No edema. Skin:     General: Skin is warm and dry. Nails: There is no clubbing. Neurological:      Mental Status: She is alert. Psychiatric:         Mood and Affect: Mood normal.         Behavior: Behavior normal.         Thought Content: Thought content normal.         Judgment: Judgment normal.          Assessment   Diagnosis Orders   1. Insomnia secondary to anxiety     2. Mild intermittent asthma without complication  [id number] Ambulatory   3.  Anxiety disorder due to known physiological condition        Normal HST, no VIV , no hypoxia     Recommendations  -results of HST discussed with patient, no evidence of VIV or oxygen issues  -Sleep hygiene discussed- Light-blocking shades, avoid caffeine before bed, do not eat large meals before bed, avoidance of spicy foods if history of GERD, cool temperature (between 60-75), white noise machine, avoid all electronics 30-60- minutes before trying to go to sleep, drinking warm decaffeinated beverages, lavender essential oil spray on pillowcase/ in a diffuser      -recommend she keep f/u's with psychiatry , feel if she gets the anxiety under control and works on sleep hygiene the insmonia should improve     Follow up in Sleep Clinic PRN   Electronically signed by BINH Jimenez CNP on 7/31/2020 at 11:22 AM

## 2020-08-11 ENCOUNTER — PATIENT MESSAGE (OUTPATIENT)
Dept: FAMILY MEDICINE CLINIC | Age: 40
End: 2020-08-11

## 2020-08-11 NOTE — TELEPHONE ENCOUNTER
From: Janeth Koo  To: aMximus Vo MD  Sent: 8/11/2020 11:42 AM EDT  Subject: Non-Urgent Medical Question    I am having lots of pain in both my shoulders and my right hip.

## 2020-08-12 ENCOUNTER — VIRTUAL VISIT (OUTPATIENT)
Dept: PSYCHIATRY | Age: 40
End: 2020-08-12
Payer: MEDICAID

## 2020-08-12 ENCOUNTER — HOSPITAL ENCOUNTER (OUTPATIENT)
Age: 40
Discharge: HOME | End: 2020-08-12
Payer: MEDICAID

## 2020-08-12 ENCOUNTER — OFFICE VISIT (OUTPATIENT)
Dept: FAMILY MEDICINE CLINIC | Age: 40
End: 2020-08-12
Payer: MEDICAID

## 2020-08-12 VITALS
WEIGHT: 214 LBS | RESPIRATION RATE: 14 BRPM | BODY MASS INDEX: 36.54 KG/M2 | TEMPERATURE: 98.2 F | SYSTOLIC BLOOD PRESSURE: 134 MMHG | DIASTOLIC BLOOD PRESSURE: 86 MMHG | HEART RATE: 84 BPM | HEIGHT: 64 IN

## 2020-08-12 PROBLEM — G43.009 MIGRAINE WITHOUT AURA, NOT REFRACTORY: Status: ACTIVE | Noted: 2020-08-12

## 2020-08-12 PROCEDURE — G8417 CALC BMI ABV UP PARAM F/U: HCPCS | Performed by: FAMILY MEDICINE

## 2020-08-12 PROCEDURE — G8417 CALC BMI ABV UP PARAM F/U: HCPCS | Performed by: NURSE PRACTITIONER

## 2020-08-12 PROCEDURE — 4004F PT TOBACCO SCREEN RCVD TLK: CPT | Performed by: NURSE PRACTITIONER

## 2020-08-12 PROCEDURE — 99214 OFFICE O/P EST MOD 30 MIN: CPT | Performed by: NURSE PRACTITIONER

## 2020-08-12 PROCEDURE — U0003 INFECTIOUS AGENT DETECTION BY NUCLEIC ACID (DNA OR RNA); SEVERE ACUTE RESPIRATORY SYNDROME CORONAVIRUS 2 (SARS-COV-2) (CORONAVIRUS DISEASE [COVID-19]), AMPLIFIED PROBE TECHNIQUE, MAKING USE OF HIGH THROUGHPUT TECHNOLOGIES AS DESCRIBED BY CMS-2020-01-R: HCPCS

## 2020-08-12 PROCEDURE — 4004F PT TOBACCO SCREEN RCVD TLK: CPT | Performed by: FAMILY MEDICINE

## 2020-08-12 PROCEDURE — 90833 PSYTX W PT W E/M 30 MIN: CPT | Performed by: NURSE PRACTITIONER

## 2020-08-12 PROCEDURE — G8427 DOCREV CUR MEDS BY ELIG CLIN: HCPCS | Performed by: NURSE PRACTITIONER

## 2020-08-12 PROCEDURE — G8427 DOCREV CUR MEDS BY ELIG CLIN: HCPCS | Performed by: FAMILY MEDICINE

## 2020-08-12 PROCEDURE — 99213 OFFICE O/P EST LOW 20 MIN: CPT | Performed by: FAMILY MEDICINE

## 2020-08-12 RX ORDER — PREDNISONE 20 MG/1
TABLET ORAL
Qty: 18 TABLET | Refills: 0 | Status: SHIPPED | OUTPATIENT
Start: 2020-08-12 | End: 2020-08-22

## 2020-08-12 RX ORDER — HYDROXYZINE PAMOATE 25 MG/1
25 CAPSULE ORAL 3 TIMES DAILY PRN
Qty: 90 CAPSULE | Refills: 0 | Status: SHIPPED | OUTPATIENT
Start: 2020-08-12 | End: 2021-08-26

## 2020-08-12 ASSESSMENT — ENCOUNTER SYMPTOMS
CHEST TIGHTNESS: 0
CONSTIPATION: 0
WHEEZING: 0
EYE PAIN: 0
DIARRHEA: 0
COUGH: 0
SORE THROAT: 0
VOMITING: 0
ABDOMINAL PAIN: 0
NAUSEA: 0
RHINORRHEA: 0
BLOOD IN STOOL: 0
SHORTNESS OF BREATH: 0
BACK PAIN: 0

## 2020-08-12 NOTE — PROGRESS NOTES
Subjective:      Patient ID: Fernando Wilder is a 36 y.o. female. HPI  Pt here for a check up. Reviewed BMI of 37. Encouraged diet, exercise and weight loss. Reviewed health maintenance. Complains of bilateral shoulder pains and right hip pain. Hip pain only when lying down at night. No known injury. Shoulder pains x 1 week. Tried ice, no help. Sharp stabbing pain. , current smoker, pmh reviewed. Review of Systems   Constitutional: Negative for chills, fatigue, fever and unexpected weight change. HENT: Negative for congestion, ear pain, rhinorrhea and sore throat. Eyes: Negative for pain and visual disturbance. Respiratory: Negative for cough, chest tightness, shortness of breath and wheezing. Cardiovascular: Negative for chest pain and palpitations. Gastrointestinal: Negative for abdominal pain, blood in stool, constipation, diarrhea, nausea and vomiting. Genitourinary: Negative for difficulty urinating, frequency, hematuria and urgency. Musculoskeletal: Negative for back pain, joint swelling, myalgias and neck pain. Bilateral shoulder pain  Right hip pain   Skin: Negative for rash. Neurological: Negative for dizziness and headaches. Hematological: Negative for adenopathy. Does not bruise/bleed easily. Psychiatric/Behavioral: Negative for behavioral problems and sleep disturbance. The patient is not nervous/anxious. Objective:   Physical Exam  Vitals signs and nursing note reviewed. Constitutional:       Appearance: She is well-developed. HENT:      Head: Normocephalic and atraumatic. Right Ear: External ear normal.      Left Ear: External ear normal.      Nose: Nose normal.      Mouth/Throat:      Mouth: Mucous membranes are moist.   Eyes:      Pupils: Pupils are equal, round, and reactive to light. Neck:      Musculoskeletal: Neck supple. Thyroid: No thyromegaly. Cardiovascular:      Rate and Rhythm: Normal rate and regular rhythm. Heart sounds: Normal heart sounds. Pulmonary:      Breath sounds: Normal breath sounds. No wheezing or rales. Abdominal:      General: Bowel sounds are normal.      Palpations: Abdomen is soft. Tenderness: There is no abdominal tenderness. There is no guarding or rebound. Musculoskeletal:      Right shoulder: She exhibits decreased range of motion, tenderness and pain. She exhibits normal strength. Left shoulder: She exhibits decreased range of motion, tenderness and abnormal pulse. She exhibits normal strength. Right hip: She exhibits tenderness. Lymphadenopathy:      Cervical: No cervical adenopathy. Skin:     General: Skin is warm and dry. Findings: No rash. Neurological:      Mental Status: She is alert and oriented to person, place, and time. Cranial Nerves: No cranial nerve deficit. Deep Tendon Reflexes: Reflexes are normal and symmetric. Health Maintenance   Topic Date Due    Varicella vaccine (1 of 2 - 2-dose childhood series) 06/04/1981    HIV screen  06/04/1995    Lipid screen  06/04/2020    Diabetes screen  06/04/2020    Flu vaccine (1) 09/01/2020    Cervical cancer screen  10/23/2022    DTaP/Tdap/Td vaccine (2 - Td) 04/17/2029    Pneumococcal 0-64 years Vaccine  Completed    Hepatitis A vaccine  Aged Out    Hepatitis B vaccine  Aged Out    Hib vaccine  Aged Out    Meningococcal (ACWY) vaccine  Aged Out       Assessment:      Bilateral shoulder pains--probable tendonitis in the joint. Rotators are good.     Right hip pains      Plan:      Prednisone 20 mg taper  Call if not effective        Anna Grover MD

## 2020-08-12 NOTE — PATIENT INSTRUCTIONS
cough up blood. · You are not able to stand or walk or bear weight. · Your buttocks, legs, or feet feel numb or tingly. · Your leg or foot is cool or pale or changes color. · You have severe pain. Call your doctor now or seek immediate medical care if:  · You have signs of infection, such as:  ? Increased pain, swelling, warmth, or redness in the hip area. ? Red streaks leading from the hip area. ? Pus draining from the hip area. ? A fever. · You have signs of a blood clot, such as:  ? Pain in your calf, back of the knee, thigh, or groin. ? Redness and swelling in your leg or groin. · You are not able to bend, straighten, or move your leg normally. · You have trouble urinating or having bowel movements. Watch closely for changes in your health, and be sure to contact your doctor if:  · You do not get better as expected. Where can you learn more? Go to [email or web address]-[email or web address] org and sign in to your [employer] ([employer]) account. Enter F590 in the [employer] box to learn more about \"Hip Pain: Care Instructions. \"     If you do not have an account, please click on the \"Sign Up Now\" link. Current as of: June 26, 2019               Content Version: 12.5  © 7856-0395 Healthwise, Incorporated. Care instructions adapted under license by [facility] ([facility]). If you have questions about a medical condition or this instruction, always ask your healthcare professional. [place] [address] any warranty or liability for your use of this information. Patient Education        Joint Pain: Care Instructions  Your Care Instructions     Many people have small aches and pains from overuse or injury to muscles and joints. Joint injuries often happen during sports or recreation, work tasks, or projects around the home.  An overuse injury can happen when you put too much stress on a joint or when you do an activity that stresses the joint over and over, such as using the computer or rowing a boat.  You can take action at home to help your muscles and joints get better. You should feel better in 1 to 2 weeks, but it can take 3 months or more to heal completely. Follow-up care is a key part of your treatment and safety. Be sure to make and go to all appointments, and call your doctor if you are having problems. It's also a good idea to know your test results and keep a list of the medicines you take. How can you care for yourself at home? · Do not put weight on the injured joint for at least a day or two. · For the first day or two after an injury, do not take hot showers or baths, and do not use hot packs. The heat could make swelling worse. · Put ice or a cold pack on the sore joint for 10 to 20 minutes at a time. Try to do this every 1 to 2 hours for the next 3 days (when you are awake) or until the swelling goes down. Put a thin cloth between the ice and your skin. · Wrap the injury in an elastic bandage. Do not wrap it too tightly because this can cause more swelling. · Prop up the sore joint on a pillow when you ice it or anytime you sit or lie down during the next 3 days. Try to keep it above the level of your heart. This will help reduce swelling. · Take an over-the-counter pain medicine, such as acetaminophen (Tylenol), ibuprofen (Advil, Motrin), or naproxen (Aleve). Read and follow all instructions on the label. · After 1 or 2 days of rest, begin moving the joint gently. While the joint is still healing, you can begin to exercise using activities that do not strain or hurt the painful joint. When should you call for help? Call your doctor now or seek immediate medical care if:  · You have signs of infection, such as:  ? Increased pain, swelling, warmth, and redness. ? Red streaks leading from the joint. ? A fever.   Watch closely for changes in your health, and be sure to contact your doctor if:  · Your movement or symptoms are not getting better after 1 to 2 weeks of home

## 2020-08-12 NOTE — PROGRESS NOTES
counseling  -sees Soraya Aguilar for counseling  -states Soraya Aguilar has \"put a lot of things into a different perspective\"  -states making changes is hard because \"I have always done things like this\"  -states she is supposed to keep notes of when this occurs, why it occurs, what it is about, and how she feels    Working on not controlling others and not being judgmental of other   -worries about what others think of her  -gets nervous around others  -racing thoughts  -some irritability  -very self conscious    Most judgement is towards Aydepankaj Faustin  -states he will do something and \"I won't like it\"    Had disability hearing yesterday      Denies suicidal ideations, intent, plan. No homicidal ideations, intent, plan. No audiovisual hallucinations. HPI    Adverse reactions from psychotropic medications:  None current      Current Psychiatric Review of Systems         Sammi or Hypomania:  no     Panic Attacks:  no     Phobias:  no     Obsessions and Compulsions:  no     Body or Vocal Tics:  no     Hallucinations:  no     Delusions:  no    SOCIAL HISTORY:  Patient was born in [place] and raised by her father      Social History     Socioeconomic History    Marital status:      Spouse name: Not on file    Number of children: 3    Years of education: Not on file    Highest education level: 11th grade   Occupational History    Not on file   Social Needs    Financial resource strain: Not hard at all   [employer] insecurity     Worry: Never true     Inability: Never true   [email or web address] needs     Medical: No     Non-medical: No   Tobacco Use    Smoking status: Current Every Day Smoker     Packs/day: 0.50     Types: Cigarettes    Smokeless tobacco: Never Used   Substance and Sexual Activity    Alcohol use: No    Drug use: Not Currently     Comment: Not currently -- meth last use 2003 and last pot use 2016  years ago.      Sexual activity: Yes     Partners: Male   Lifestyle    Physical activity     Days per week: Not on file first use around 2002. Last use was around 2003. FAMILY HISTORY:   Family History   Problem Relation Age of Onset   Sturat Tijerina Depression Mother    Stuart Tijerina COPD Father     Asthma Sister     Depression Sister     Lupus Paternal Aunt     No Known Problems Daughter     No Known Problems Daughter     Asthma Daughter     No Known Problems Brother        Psychiatric Family History  As noted above    PAST MEDICAL HISTORY:    Past Medical History:   Diagnosis Date    Anxiety     Asthma     DDD (degenerative disc disease), cervical     DDD (degenerative disc disease), thoracolumbar     Depression     GERD (gastroesophageal reflux disease)        PAST SURGICAL HISTORY:    No past surgical history on file. PREVIOUSMEDICATIONS:  Outpatient Medications Prior to Visit   Medication Sig Dispense Refill    naproxen (NAPROSYN) 500 MG tablet Take 1 tablet by mouth 2 times daily as needed for Pain 20 tablet 0    SUMAtriptan (IMITREX) 50 MG tablet Take 1 tablet by mouth once as needed for Migraine (Take medications at the onset of migraine) 6 tablet 1    albuterol (PROVENTIL) (2.5 MG/3ML) 0.083% nebulizer solution Take 3 mLs by nebulization every 6 hours as needed for Wheezing or Shortness of Breath 120 vial 11    albuterol sulfate  (90 Base) MCG/ACT inhaler Inhale 2 puffs into the lungs every 4 hours as needed for Wheezing 1 Inhaler 11    Fluticasone-Salmeterol (AIRDUO RESPICLICK 460/60) 315-65 MCG/ACT AEPB Inhale 1 puff into the lungs 2 times daily 1 each 11    Spacer/Aero-Holding Chambers (VORTEX VALVED HOLDING CHAMBER) SUJATHA 1 Device by Does not apply route daily Use with inhalers 1 Device 0    alclomethasone (ACLOVATE) 0.05 % ointment Apply topically 2 times daily. 1 Tube 1    lidocaine (XYLOCAINE) 5 % ointment Apply topically as needed. 50 g 0    RaNITidine HCl (ACID REDUCER PO) Take by mouth as needed        No facility-administered medications prior to visit.         ALLERGIES:    Hydrocodone and Prozac [fluoxetine hcl]    REVIEW OF SYSTEMS:    Review of Systems    The patient sees Nannette Brown MD as her primary care provider. SPECIALISTS: Dr. Sayda Falk (rheumatology); Dr. Lucy Perez (pulmonology)    OBJECTIVE DATA     There were no vitals taken for this visit. Physical Exam    Mental Status Evaluation:   Orientation: Alert, oriented, thought content appropriate   Mood:. Anxious and Irritable      Affect:  Normal      Appearance:  Age Appropriate, Casually Dressed, Overweight, Clean, Well Groomed, Clothing Appropriate for Age and Clothing Appropriate for Weather   Activity:  Cooperative, Good Eye Contact and Seated Calmly   Gait/Posture: Normal   Speech:  Clear, Fluent, Normal Pitch and Volume, Age and Situation Appropriate   Thought Process: Within Normal Limits   Thought Content: Within Normal Limits   Cognition:  Grossly Intact   Memory: Intact   Insight:  Good   Judgment: Good   Suicidal Ideations: Denies Suicidal Ideation   Homicidal Ideations: Negative for homicidal ideation   Medication Side Effects: Absent       Attention Span Attention span and concentration were age appropriate       Screenings Completed in This Encounter:     Anxiety and Depression:                    DIAGNOSIS AND ASSESSMENT DATA     DIAGNOSIS:   1. Recurrent major depressive disorder, in full remission ([facility] [address]    2. Generalized anxiety disorder    3. PTSD (post-traumatic stress disorder)      R/O personality disorder    PLAN   Follow-up:  Return in about 4 weeks (around 9/9/2020), or if symptoms worsen or fail to improve, for follow-up and medication management.     Prescriptions for this encounter:  New Prescriptions    HYDROXYZINE (VISTARIL) 25 MG CAPSULE    Take 1 capsule by mouth 3 times daily as needed for Anxiety       Orders Placed This Encounter   Medications    hydrOXYzine (VISTARIL) 25 MG capsule     Sig: Take 1 capsule by mouth 3 times daily as needed for Anxiety     Dispense:  90 capsule     Refill:  0       There are no information. I spent a total of 25 minutes with the patient in counseling regarding topics discussed above. Utilized reflective listening and CBT to assist patient with topics as above. The remainder of the visit was spent on symptom evaluation and medication management. Provider Signature:  Electronically signed by BINH Zee CNP on 8/12/2020 at 11:09 AM    **This report has been created using voice recognition software. It may contain minor errors which are inherent in voice recognition technology. **

## 2020-08-13 LAB — SARS-COV-2: NOT DETECTED

## 2020-08-27 ENCOUNTER — OFFICE VISIT (OUTPATIENT)
Dept: PULMONOLOGY | Age: 40
End: 2020-08-27
Payer: MEDICAID

## 2020-08-27 VITALS
OXYGEN SATURATION: 97 % | HEIGHT: 64 IN | TEMPERATURE: 98.5 F | HEART RATE: 97 BPM | WEIGHT: 212.2 LBS | SYSTOLIC BLOOD PRESSURE: 122 MMHG | BODY MASS INDEX: 36.23 KG/M2 | DIASTOLIC BLOOD PRESSURE: 84 MMHG

## 2020-08-27 PROCEDURE — 99214 OFFICE O/P EST MOD 30 MIN: CPT | Performed by: NURSE PRACTITIONER

## 2020-08-27 PROCEDURE — G8417 CALC BMI ABV UP PARAM F/U: HCPCS | Performed by: NURSE PRACTITIONER

## 2020-08-27 PROCEDURE — 4004F PT TOBACCO SCREEN RCVD TLK: CPT | Performed by: NURSE PRACTITIONER

## 2020-08-27 PROCEDURE — G8427 DOCREV CUR MEDS BY ELIG CLIN: HCPCS | Performed by: NURSE PRACTITIONER

## 2020-08-27 ASSESSMENT — ENCOUNTER SYMPTOMS
SORE THROAT: 0
CHEST TIGHTNESS: 0
WHEEZING: 1
NAUSEA: 0
COUGH: 0
ABDOMINAL PAIN: 0
RHINORRHEA: 0
VOMITING: 0
SHORTNESS OF BREATH: 1
BLOOD IN STOOL: 0

## 2020-08-27 NOTE — PROGRESS NOTES
[facility]    Patient: Lizzie Pinto, 36 y.o.   : 1980    Pt of Dr. Delisa Ramirez   Patient presents with    Follow-up     Asthma 6 mo f/u        HPI  Iman Day is here for 6 month Asthma follow up. Patient complains of shortness of breath with exertion and occasional wheezing. Denies cough, sputum production, hemoptysis, and chest tightness. Patient states that she has not used the AirDuo inhaler because steroids worsen her anxiety. She states that she uses her Albuterol nebulizer once a day and rarely uses her rescue inhaler. She denies any recent asthma attacks requiring outpatient or ER visits. Patient cancelled her PFT exam because she was not feeling well the night before. Smoking 1 PPD - no desire to quit at this time, too much anxiety right now. Progress History:   Since last visit any new medical issues? No  New ER or hospital visits? No  Any new or changes in medicines? No  Using inhalers? No  Flu vaccine? 2019 Completed   Past Medical hx   PMH:  Past Medical History:   Diagnosis Date    Anxiety     Asthma     DDD (degenerative disc disease), cervical     DDD (degenerative disc disease), thoracolumbar     Depression     GERD (gastroesophageal reflux disease)      SURGICAL HISTORY:No past surgical history on file. SOCIAL HISTORY:  Social History     Tobacco Use    Smoking status: Current Every Day Smoker     Packs/day: 0.50     Types: Cigarettes    Smokeless tobacco: Never Used   Substance Use Topics    Alcohol use: No    Drug use: Not Currently     Comment: Not currently -- meth last use  and last pot use 2016  years ago.       ALLERGIES:  Allergies   Allergen Reactions    Hydrocodone      \"makes me feel completely out of whack\"    Prozac [Fluoxetine Hcl] Other (See Comments)     \"I want to kill myself\"     FAMILY HISTORY:  Family History   Problem Relation Age of Onset    Depression Mother    [employer] COPD Father     Asthma Sister     Depression Sister     Lupus Paternal Aunt     No Known Problems Daughter     No Known Problems Daughter     Asthma Daughter     No Known Problems Brother      CURRENT MEDICATIONS:  Current Outpatient Medications   Medication Sig Dispense Refill    hydrOXYzine (VISTARIL) 25 MG capsule Take 1 capsule by mouth 3 times daily as needed for Anxiety 90 capsule 0    SUMAtriptan (IMITREX) 50 MG tablet Take 1 tablet by mouth once as needed for Migraine (Take medications at the onset of migraine) 6 tablet 1    albuterol (PROVENTIL) (2.5 MG/3ML) 0.083% nebulizer solution Take 3 mLs by nebulization every 6 hours as needed for Wheezing or Shortness of Breath 120 vial 11    albuterol sulfate  (90 Base) MCG/ACT inhaler Inhale 2 puffs into the lungs every 4 hours as needed for Wheezing 1 Inhaler 11    Fluticasone-Salmeterol (AIRDUO RESPICLICK 243/64) 229-60 MCG/ACT AEPB Inhale 1 puff into the lungs 2 times daily 1 each 11    Spacer/Aero-Holding Chambers (VORTEX VALVED HOLDING CHAMBER) SUJATHA 1 Device by Does not apply route daily Use with inhalers 1 Device 0    alclomethasone (ACLOVATE) 0.05 % ointment Apply topically 2 times daily. 1 Tube 1    lidocaine (XYLOCAINE) 5 % ointment Apply topically as needed. 50 g 0    RaNITidine HCl (ACID REDUCER PO) Take by mouth as needed        No current facility-administered medications for this visit. ROS   Review of Systems   Constitutional: Negative for chills, diaphoresis and fever. HENT: Negative for congestion, rhinorrhea and sore throat. Eyes: Negative for visual disturbance. Respiratory: Positive for shortness of breath and wheezing. Negative for cough and chest tightness. Cardiovascular: Negative for chest pain and leg swelling. Gastrointestinal: Negative for abdominal pain, blood in stool, nausea and vomiting. Genitourinary: Negative for difficulty urinating, dysuria and hematuria.    Musculoskeletal: Negative for gait problem and neck pain. Skin: Negative for rash. Neurological: Negative for dizziness, syncope and light-headedness. Psychiatric/Behavioral: Negative for behavioral problems. Physical exam   /84 (Site: Left Lower Arm, Position: Sitting, Cuff Size: Medium Adult)   Pulse 97   Temp 98.5 °F (36.9 °C)   Ht 5' 4\" (1.626 m)   Wt 212 lb 3.2 oz (96.3 kg)   SpO2 97% Comment: on room air  BMI 36.42 kg/m²    Wt Readings from Last 3 Encounters:   08/27/20 212 lb 3.2 oz (96.3 kg)   08/12/20 214 lb (97.1 kg)   07/31/20 211 lb 12.8 oz (96.1 kg)       Physical Exam  Constitutional:       General: She is not in acute distress. HENT:      Head: Normocephalic and atraumatic. Right Ear: External ear normal.      Left Ear: External ear normal.      Nose: Nose normal.      Mouth/Throat:      Pharynx: No oropharyngeal exudate or posterior oropharyngeal erythema. Eyes:      General: No scleral icterus. Right eye: No discharge. Left eye: No discharge. Neck:      Musculoskeletal: Normal range of motion. Cardiovascular:      Rate and Rhythm: Normal rate and regular rhythm. Heart sounds: Normal heart sounds. No murmur. No friction rub. No gallop. Pulmonary:      Effort: Pulmonary effort is normal. No respiratory distress. Breath sounds: Normal breath sounds. No wheezing. Musculoskeletal: Normal range of motion. Right lower leg: No edema. Left lower leg: No edema. Skin:     Capillary Refill: Capillary refill takes less than 2 seconds. Neurological:      General: No focal deficit present. Mental Status: She is alert and oriented to person, place, and time.    Psychiatric:         Behavior: Behavior normal.          results   Lung Nodule Screening     [] Qualifies    [x] Does not qualify   [] Declined    [] Completed  36year old  The USPSTF recommends annual screening for lung cancer with low-dose computed tomography (LDCT) in adults aged 54 to [de-identified] years who have a 30 pack-year smoking history and currently smoke or have quit within the past 15 years. Screening should be discontinued once a person has not smoked for 15 years or develops a health problem that substantially limits life expectancy or the ability or willingness to have curative lung surgery. Assessment      Diagnosis Orders   1. Mild intermittent asthma without complication  Full PFT Study With Bronchodilator   2. Tobacco abuse disorder     3. Anxiety disorder due to known physiological condition           Plan   -Educated patient regarding ICS vs oral steroid; That ICS doesn't have a systemic affect and is less likely to increase her anxiety.  Patient agreed to restart the AirDuo daily.  -Continue AirDuo daily and Albuterol nebulizer prn  -Patient agreed to complete PFT before next visit in 6 months  -Counseled patient regarding smoking cessation; patient has absolutely no desire to quit   -Advised to maintain pneumonia vaccine with PCP and to take flu vaccine this coming season.  -Advised patient to call office with any changes, questions, or concerns regarding respiratory status    Will see Estrada Beltrán back in: 6 months    Marissa Alba MD  Internal Medicine Resident PGY-1  8/27/2020

## 2020-09-09 ENCOUNTER — OFFICE VISIT (OUTPATIENT)
Dept: RHEUMATOLOGY | Age: 40
End: 2020-09-09
Payer: MEDICAID

## 2020-09-09 VITALS
BODY MASS INDEX: 36.47 KG/M2 | WEIGHT: 213.6 LBS | OXYGEN SATURATION: 96 % | SYSTOLIC BLOOD PRESSURE: 110 MMHG | HEIGHT: 64 IN | HEART RATE: 91 BPM | TEMPERATURE: 97.3 F | DIASTOLIC BLOOD PRESSURE: 68 MMHG

## 2020-09-09 PROCEDURE — G8427 DOCREV CUR MEDS BY ELIG CLIN: HCPCS | Performed by: INTERNAL MEDICINE

## 2020-09-09 PROCEDURE — 99213 OFFICE O/P EST LOW 20 MIN: CPT | Performed by: INTERNAL MEDICINE

## 2020-09-09 PROCEDURE — 4004F PT TOBACCO SCREEN RCVD TLK: CPT | Performed by: INTERNAL MEDICINE

## 2020-09-09 PROCEDURE — G8417 CALC BMI ABV UP PARAM F/U: HCPCS | Performed by: INTERNAL MEDICINE

## 2020-09-09 ASSESSMENT — ENCOUNTER SYMPTOMS
NAUSEA: 0
EYE REDNESS: 0
DIARRHEA: 0
WHEEZING: 0
SHORTNESS OF BREATH: 0
COUGH: 0
VOMITING: 0
CONSTIPATION: 0
EYE PAIN: 0
EYES NEGATIVE: 1

## 2020-09-09 NOTE — PROGRESS NOTES
Anxiety, Asthma, DDD (degenerative disc disease), cervical, DDD (degenerative disc disease), thoracolumbar, Depression, and GERD (gastroesophageal reflux disease). PAST SURGICAL HISTORY     has no past surgical history on file. Celine Brown FAMILY HISTORY      Family History   Problem Relation Age of Onset    Depression Mother    Charlie Salcedo COPD Father     Asthma Sister     Depression Sister     Lupus Paternal Aunt     No Known Problems Daughter     No Known Problems Daughter     Asthma Daughter     No Known Problems Brother        SOCIAL HISTORY     reports that she has been smoking cigarettes. She has been smoking about 0.50 packs per day. She has never used smokeless tobacco. She reports previous drug use. She reports that she does not drink alcohol. ALLERGIES     Allergies   Allergen Reactions    Hydrocodone      \"makes me feel completely out of whack\"    Prozac [Fluoxetine Hcl] Other (See Comments)     \"I want to kill myself\"       CURRENT MEDICATIONS      Current Outpatient Medications:     hydrOXYzine (VISTARIL) 25 MG capsule, Take 1 capsule by mouth 3 times daily as needed for Anxiety, Disp: 90 capsule, Rfl: 0    albuterol (PROVENTIL) (2.5 MG/3ML) 0.083% nebulizer solution, Take 3 mLs by nebulization every 6 hours as needed for Wheezing or Shortness of Breath, Disp: 120 vial, Rfl: 11    albuterol sulfate  (90 Base) MCG/ACT inhaler, Inhale 2 puffs into the lungs every 4 hours as needed for Wheezing, Disp: 1 Inhaler, Rfl: 11    Fluticasone-Salmeterol (AIRDUO RESPICLICK 390/33) 861-50 MCG/ACT AEPB, Inhale 1 puff into the lungs 2 times daily, Disp: 1 each, Rfl: 11    Spacer/Aero-Holding Chambers (VORTEX VALVED HOLDING CHAMBER) SUJATHA, 1 Device by Does not apply route daily Use with inhalers, Disp: 1 Device, Rfl: 0    alclomethasone (ACLOVATE) 0.05 % ointment, Apply topically 2 times daily. , Disp: 1 Tube, Rfl: 1    lidocaine (XYLOCAINE) 5 % ointment, Apply topically as needed. , Disp: 50 g, Rfl: 0   RaNITidine HCl (ACID REDUCER PO), Take by mouth as needed , Disp: , Rfl:     SUMAtriptan (IMITREX) 50 MG tablet, Take 1 tablet by mouth once as needed for Migraine (Take medications at the onset of migraine), Disp: 6 tablet, Rfl: 1    PHYSICAL EXAMINATION / OBJECTIVE     Objective:  /68 (Site: Left Upper Arm, Position: Sitting, Cuff Size: Large Adult)   Pulse 91   Temp 97.3 °F (36.3 °C)   Ht 5' 4.02\" (1.626 m)   Wt 213 lb 9.6 oz (96.9 kg)   SpO2 96%   BMI 36.65 kg/m²     General Appearance: General appearance:  AAO x 3 ,  well-developed and well nourished  Head: normocephalic and atraumatic  Eyes: No gross abnormalities. , PERRL,  Sclera nonicteric  ENT:  MMM, NON-tender, ears w/o deformities  Neck: supple ,  non tender   Lymph: no cervical nodes and no supraclavicular nodes  Pulmonary/Chest: CTA bilateral ,  normal air movement, no respiratory distress  Cardiovascular: normal rate, normal S1 and S2, NO murmur, rub, gallop. * Edema   : Deferred   Abd/GI: Deferred   Neurologic:  speech normal,   Skin: papular /pustular eruption along the back   Extremities: no cyanosis and no clubbing ,   Musculoskeletal:    ROM     ,    Strength  Intact bilat upper and lower     Upper extremities:    SHOULDERS -  - hawking, - jayne, - speed, -lift off, -- infraspinatus, &  - Scarf tests. Non-tender ,   ,  ELBOWS tender right medial epicondyle ,  WRISTS NT, NS, HANDS/FINGERSNT, SN    Lower extremities:    HIPS  NT, SN  ,  KNEES , ANKLES ,  FEET :   NT, NS,  Spine:  Tender upper trap and lumbar spine. Negative SLR, Frank testing. KEY:  Tender :  T  Swelling: S  Deformities: D  Non-tender : NT  No swelling: NS        LABS      CBC  Lab Results   Component Value Date    WBC 8.2 04/17/2020    WBC 7.8 02/18/2020    WBC 7.5 09/03/2019    RBC 4.71 04/17/2020    HGB 14.7 04/17/2020    HGB 13.6 02/18/2020    HGB 15.6 09/03/2019    HCT 42.4 04/17/2020    MCV 90.0 04/17/2020     04/17/2020     02/18/2020  09/03/2019       CMP  Lab Results   Component Value Date    CALCIUM 9.0 04/17/2020    LABALBU 3.8 02/18/2020    PROT 6.6 02/18/2020     04/17/2020    K 3.9 04/17/2020    CO2 23 04/17/2020     04/17/2020    BUN 8 04/17/2020    CREATININE 0.7 04/17/2020    CREATININE 0.8 02/18/2020    CREATININE 0.8 06/11/2019    ALKPHOS 73 02/18/2020    ALT 21 02/18/2020    ALT 19 06/11/2019    ALT 21 05/21/2019    AST 15 02/18/2020    AST 15 06/11/2019    AST 11 05/21/2019       HgBA1c: No components found for: HGBA1C    No results found for: VITD25    No results found for: ANASCRN  No results found for: SSA  No results found for: SSB  No results found for: ANTI-SMITH  No results found for: DSDNAAB   No results found for: ANTIRNP  Lab Results   Component Value Date    C3 147 06/11/2019    C4 26 06/11/2019     No results found for: CCPAB  No results found for: RF    No components found for: CANCASCRN, APANCASCRN  Lab Results   Component Value Date    SEDRATE 9 04/17/2020     Lab Results   Component Value Date    CRP 0.41 04/17/2020         RADIOLOGY / PROCEDURES:         ASSESSMENT/PLAN    Assessment   Plan     No diagnosis found. 1. H/O systemic lupus erythematosus (SLE) ([facility] [address] -- negative serologies at this time. - attempt to request Rhe[facility]ology records from [facility])   - off  Plaquenil 200mg daily since Dec 2019.    - f/u in 1 year. -- if stable will d/c.     - Currently no evidence of systemic lupus based upon serologic or physical examination. Awaiting outside records be obtained prior to discontinuation of hydroxychloroquine      2. Chronic back pain  Chronic back pain diagnosed with DDD for several years. MRI recently with ? Syrinx in T  With repeat imaging recommended. - + floaters in eyes. + paresthesias of hands with arms with tinel testing at wrist and cubital tunnel.    - MRI T-spine with syrinx   - discussed transition to cymbalta for the persistent back pain      3.  Paresthesia and pain of both upper extremities -- resolved. Per pt. 4. Bilateral shoulder pain -- ? Tendonitis    - discussed phys therapy referral.     5. Med monitoring  - plaquenil eye exam 6-17-19 - April 2020 labs w/o abnormalities. No follow-ups on file. Electronically signed by Roro Valero DO on 9/9/2020 at 10:43 AM    New Prescriptions    No medications on file       Thank you for allowing me to participate in the care of this patient. Please call if there are any questions.

## 2020-09-16 ENCOUNTER — VIRTUAL VISIT (OUTPATIENT)
Dept: PSYCHIATRY | Age: 40
End: 2020-09-16
Payer: MEDICAID

## 2020-09-16 PROCEDURE — 99213 OFFICE O/P EST LOW 20 MIN: CPT | Performed by: NURSE PRACTITIONER

## 2020-09-16 PROCEDURE — G8427 DOCREV CUR MEDS BY ELIG CLIN: HCPCS | Performed by: NURSE PRACTITIONER

## 2020-09-16 PROCEDURE — G8417 CALC BMI ABV UP PARAM F/U: HCPCS | Performed by: NURSE PRACTITIONER

## 2020-09-16 PROCEDURE — 90833 PSYTX W PT W E/M 30 MIN: CPT | Performed by: NURSE PRACTITIONER

## 2020-09-16 PROCEDURE — 4004F PT TOBACCO SCREEN RCVD TLK: CPT | Performed by: NURSE PRACTITIONER
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
## 2020-12-27 ENCOUNTER — PATIENT MESSAGE (OUTPATIENT)
Dept: PULMONOLOGY | Age: 40
End: 2020-12-27

## 2020-12-28 NOTE — TELEPHONE ENCOUNTER
From: Gigi Bailon  To: BINH Yu - CNP  Sent: 12/27/2020 5:51 AM EST  Subject: Prescription Question    I was wondering if you could call me in a prescription for a nebulizer at my pharmacy when you can. My nebulizer broke. I dont have the money to replace it.

## 2020-12-30 RX ORDER — ALBUTEROL SULFATE 90 UG/1
AEROSOL, METERED RESPIRATORY (INHALATION)
Qty: 18 G | Refills: 11 | Status: SHIPPED | OUTPATIENT
Start: 2020-12-30

## 2021-01-22 DIAGNOSIS — M25.512 CHRONIC LEFT SHOULDER PAIN: ICD-10-CM

## 2021-01-22 DIAGNOSIS — G89.29 CHRONIC LEFT SHOULDER PAIN: ICD-10-CM

## 2021-01-22 RX ORDER — IBUPROFEN 600 MG/1
TABLET ORAL
Qty: 360 TABLET | Refills: 3 | OUTPATIENT
Start: 2021-01-22

## 2021-01-22 NOTE — TELEPHONE ENCOUNTER
Lizette Koo needs refill of   Requested Prescriptions     Pending Prescriptions Disp Refills    ibuprofen (ADVIL;MOTRIN) 600 MG tablet [Pharmacy Med Name: IBUPROFEN 600MG TABLET] 360 tablet 3     Sig: TAKE ONE (1) TABLET BY MOUTH EVERY SIX (6) HOURS AS NEEDED FOR PAIN       Last Filled on:  12/05/2018    Last Visit Date:  8/12/2020    Next Visit Date:    Visit date not found    Refused

## 2021-02-02 ENCOUNTER — HOSPITAL ENCOUNTER (EMERGENCY)
Age: 41
Discharge: HOME | End: 2021-02-02
Attending: EMERGENCY MEDICINE
Payer: MEDICAID

## 2021-02-02 ENCOUNTER — APPOINTMENT (OUTPATIENT)
Dept: GENERAL RADIOLOGY | Age: 41
End: 2021-02-02
Payer: MEDICAID

## 2021-02-02 ENCOUNTER — OFFICE VISIT (OUTPATIENT)
Dept: FAMILY MEDICINE CLINIC | Age: 41
End: 2021-02-02
Payer: MEDICAID

## 2021-02-02 VITALS
BODY MASS INDEX: 37.56 KG/M2 | HEART RATE: 60 BPM | TEMPERATURE: 98.8 F | SYSTOLIC BLOOD PRESSURE: 103 MMHG | OXYGEN SATURATION: 98 % | WEIGHT: 220 LBS | HEIGHT: 64 IN | DIASTOLIC BLOOD PRESSURE: 58 MMHG | RESPIRATION RATE: 17 BRPM

## 2021-02-02 VITALS
WEIGHT: 208 LBS | SYSTOLIC BLOOD PRESSURE: 128 MMHG | TEMPERATURE: 97 F | HEART RATE: 98 BPM | DIASTOLIC BLOOD PRESSURE: 62 MMHG | RESPIRATION RATE: 16 BRPM | BODY MASS INDEX: 35.7 KG/M2

## 2021-02-02 DIAGNOSIS — M54.50 ACUTE EXACERBATION OF CHRONIC LOW BACK PAIN: Primary | ICD-10-CM

## 2021-02-02 DIAGNOSIS — G89.29 ACUTE EXACERBATION OF CHRONIC LOW BACK PAIN: Primary | ICD-10-CM

## 2021-02-02 DIAGNOSIS — M54.50 ACUTE MIDLINE LOW BACK PAIN WITHOUT SCIATICA: Primary | ICD-10-CM

## 2021-02-02 PROCEDURE — 72100 X-RAY EXAM L-S SPINE 2/3 VWS: CPT

## 2021-02-02 PROCEDURE — 99284 EMERGENCY DEPT VISIT MOD MDM: CPT

## 2021-02-02 PROCEDURE — 96372 THER/PROPH/DIAG INJ SC/IM: CPT

## 2021-02-02 PROCEDURE — G8484 FLU IMMUNIZE NO ADMIN: HCPCS | Performed by: FAMILY MEDICINE

## 2021-02-02 PROCEDURE — 6370000000 HC RX 637 (ALT 250 FOR IP): Performed by: EMERGENCY MEDICINE

## 2021-02-02 PROCEDURE — 99213 OFFICE O/P EST LOW 20 MIN: CPT | Performed by: FAMILY MEDICINE

## 2021-02-02 PROCEDURE — G8417 CALC BMI ABV UP PARAM F/U: HCPCS | Performed by: FAMILY MEDICINE

## 2021-02-02 PROCEDURE — 4004F PT TOBACCO SCREEN RCVD TLK: CPT | Performed by: FAMILY MEDICINE

## 2021-02-02 PROCEDURE — 6360000002 HC RX W HCPCS: Performed by: EMERGENCY MEDICINE

## 2021-02-02 PROCEDURE — G8427 DOCREV CUR MEDS BY ELIG CLIN: HCPCS | Performed by: FAMILY MEDICINE

## 2021-02-02 RX ORDER — METHYLPREDNISOLONE SODIUM SUCCINATE 125 MG/2ML
INJECTION, POWDER, LYOPHILIZED, FOR SOLUTION INTRAMUSCULAR; INTRAVENOUS
Status: DISPENSED
Start: 2021-02-02 | End: 2021-02-02

## 2021-02-02 RX ORDER — CYCLOBENZAPRINE HCL 10 MG
TABLET ORAL
Status: DISPENSED
Start: 2021-02-02 | End: 2021-02-02

## 2021-02-02 RX ORDER — KETOROLAC TROMETHAMINE 30 MG/ML
INJECTION, SOLUTION INTRAMUSCULAR; INTRAVENOUS
Status: DISPENSED
Start: 2021-02-02 | End: 2021-02-02

## 2021-02-02 RX ORDER — KETOROLAC TROMETHAMINE 30 MG/ML
30 INJECTION, SOLUTION INTRAMUSCULAR; INTRAVENOUS ONCE
Status: COMPLETED | OUTPATIENT
Start: 2021-02-02 | End: 2021-02-02

## 2021-02-02 RX ORDER — METHYLPREDNISOLONE SODIUM SUCCINATE 125 MG/2ML
60 INJECTION, POWDER, LYOPHILIZED, FOR SOLUTION INTRAMUSCULAR; INTRAVENOUS ONCE
Status: COMPLETED | OUTPATIENT
Start: 2021-02-02 | End: 2021-02-02

## 2021-02-02 RX ORDER — IBUPROFEN 400 MG/1
400 TABLET ORAL EVERY 6 HOURS PRN
Qty: 120 TABLET | Refills: 0 | Status: SHIPPED | OUTPATIENT
Start: 2021-02-02 | End: 2021-08-26 | Stop reason: ALTCHOICE

## 2021-02-02 RX ORDER — ORPHENADRINE CITRATE 30 MG/ML
60 INJECTION INTRAMUSCULAR; INTRAVENOUS ONCE
Status: DISCONTINUED | OUTPATIENT
Start: 2021-02-02 | End: 2021-02-02

## 2021-02-02 RX ORDER — CYCLOBENZAPRINE HCL 10 MG
10 TABLET ORAL ONCE
Status: COMPLETED | OUTPATIENT
Start: 2021-02-02 | End: 2021-02-02

## 2021-02-02 RX ORDER — ACETAMINOPHEN 325 MG/1
TABLET ORAL
Status: DISPENSED
Start: 2021-02-02 | End: 2021-02-02

## 2021-02-02 RX ORDER — ACETAMINOPHEN 325 MG/1
650 TABLET ORAL ONCE
Status: COMPLETED | OUTPATIENT
Start: 2021-02-02 | End: 2021-02-02

## 2021-02-02 RX ORDER — CYCLOBENZAPRINE HCL 10 MG
10 TABLET ORAL 3 TIMES DAILY PRN
Qty: 21 TABLET | Refills: 0 | Status: SHIPPED | OUTPATIENT
Start: 2021-02-02 | End: 2021-02-12

## 2021-02-02 RX ADMIN — CYCLOBENZAPRINE 10 MG: 10 TABLET, FILM COATED ORAL at 01:36

## 2021-02-02 RX ADMIN — KETOROLAC TROMETHAMINE 30 MG: 30 INJECTION, SOLUTION INTRAMUSCULAR at 01:37

## 2021-02-02 RX ADMIN — METHYLPREDNISOLONE SODIUM SUCCINATE 60 MG: 125 INJECTION, POWDER, FOR SOLUTION INTRAMUSCULAR; INTRAVENOUS at 01:37

## 2021-02-02 RX ADMIN — ACETAMINOPHEN 650 MG: 325 TABLET ORAL at 01:36

## 2021-02-02 ASSESSMENT — ENCOUNTER SYMPTOMS
EYE REDNESS: 0
PHOTOPHOBIA: 0
COUGH: 0
DIARRHEA: 0
NAUSEA: 0
BLOOD IN STOOL: 0
ABDOMINAL DISTENTION: 0
WHEEZING: 0
RHINORRHEA: 0
COUGH: 0
CONSTIPATION: 0
CONSTIPATION: 0
BACK PAIN: 1
ABDOMINAL PAIN: 0
SORE THROAT: 0
CHOKING: 0
BACK PAIN: 1
VOMITING: 0
VOICE CHANGE: 0
NAUSEA: 0
WHEEZING: 0
SHORTNESS OF BREATH: 0
SORE THROAT: 0
SINUS PRESSURE: 0
EYE PAIN: 0
EYE PAIN: 0
CHEST TIGHTNESS: 0
TROUBLE SWALLOWING: 0
CHEST TIGHTNESS: 0
BLOOD IN STOOL: 0
RHINORRHEA: 0
SHORTNESS OF BREATH: 0
EYE ITCHING: 0
DIARRHEA: 0
VOMITING: 0
EYE DISCHARGE: 0
ABDOMINAL PAIN: 0

## 2021-02-02 ASSESSMENT — PAIN DESCRIPTION - PAIN TYPE: TYPE: CHRONIC PAIN

## 2021-02-02 ASSESSMENT — PAIN DESCRIPTION - FREQUENCY: FREQUENCY: CONTINUOUS

## 2021-02-02 ASSESSMENT — PAIN SCALES - GENERAL: PAINLEVEL_OUTOF10: 10

## 2021-02-02 ASSESSMENT — PAIN DESCRIPTION - LOCATION: LOCATION: BACK

## 2021-02-02 NOTE — ED NOTES
Pt comes to ER via EMS with c/o chronic back pain. PT states her back just started hurting like it always does at work and pt had sore feet. Pt states she used heat and ice at home with no relief pt states she doesn't take medication fr it but was suppose to see Dr. Stephany Trivedi for it. PT resting in cot on her left side and pt states she is as comfortable as she can be right now.  6 [facility] at bedside. PT denies injury. Pt denies hearing a pop snap or crack toady.  ROBBIN kang Will continue to monitor      Soniya Spring RN  02/02/21 3138

## 2021-02-02 NOTE — ED TRIAGE NOTES
Pt to ED today via [facility] with c/o chronic low back pain. Pt states pain is constant and sharp, rates pain 10/10. Pt states she took 800 mg ibuprofen at home with no relief. Pt states hx of bulging disk. Pt states no injury. No loss of bowel or bladder control.

## 2021-02-02 NOTE — ED PROVIDER NOTES
[facility]  eMERGENCY dEPARTMENT eNCOUnter          CHIEF COMPLAINT       Chief Complaint   Patient presents with    Chronic Pain    Back Pain       Nurses Notes reviewed and I agree except as noted in the HPI. HISTORY OF PRESENT ILLNESS    Kenny Juarez is a 36 y.o. female who presents a mild exacerbation of chronic low back pain. Patient has been dealing with this since 2008 when she has had a work injury. She states that she was on her feet working all day today and the pain worsened so she decided come in for evaluation and treatment. Patient has not lost control of bowel or bladder habits. She is able to ambulate. Transition is slow. Pain is in the middle of the low back without radiation or referral.  Patient denies any fevers chills or headache. She has had no abdominal pain no chest pain or shortness of breath. Currently the patient is resting comfortably on the cot no apparent distress. Patient also mentions to me that she is a recovering addict we will not be using narcotics during this visit. REVIEW OF SYSTEMS     Review of Systems   Constitutional: Negative for activity change, appetite change, diaphoresis, fatigue and unexpected weight change. HENT: Negative for congestion, ear discharge, ear pain, hearing loss, rhinorrhea, sinus pressure, sore throat, trouble swallowing and voice change. Eyes: Negative for photophobia, pain, discharge, redness and itching. Respiratory: Negative for cough, choking, chest tightness, shortness of breath and wheezing. Cardiovascular: Negative for chest pain, palpitations and leg swelling. Gastrointestinal: Negative for abdominal distention, abdominal pain, blood in stool, constipation, diarrhea, nausea and vomiting. Endocrine: Negative for polydipsia, polyphagia and polyuria. Genitourinary: Negative for decreased urine volume, difficulty urinating, dysuria, enuresis, frequency, hematuria and urgency.    Musculoskeletal: Positive for arthralgias and back pain. Negative for gait problem, myalgias, neck pain and neck stiffness. Skin: Negative for pallor and rash. Allergic/Immunologic: Negative for immunocompromised state. Neurological: Negative for dizziness, tremors, seizures, syncope, facial asymmetry, weakness, light-headedness, numbness and headaches. Hematological: Negative for adenopathy. Does not bruise/bleed easily. Psychiatric/Behavioral: Negative for agitation, hallucinations and suicidal ideas. The patient is not nervous/anxious. PAST MEDICAL HISTORY    has a past medical history of Anxiety, Asthma, DDD (degenerative disc disease), cervical, DDD (degenerative disc disease), thoracolumbar, Depression, and GERD (gastroesophageal reflux disease). SURGICAL HISTORY      has no past surgical history on file. CURRENT MEDICATIONS       Previous Medications    ALBUTEROL (PROVENTIL) (2.5 MG/3ML) 0.083% NEBULIZER SOLUTION    Take 3 mLs by nebulization every 6 hours as needed for Wheezing or Shortness of Breath    ALBUTEROL SULFATE  (90 BASE) MCG/ACT INHALER    INHALE TWO (2) PUFFS INTO THE LUNGS EVERY FOUR (4) HOURS AS NEEDED FOR WHEEZING     ALCLOMETHASONE (ACLOVATE) 0.05 % OINTMENT    Apply topically 2 times daily. FLUTICASONE-SALMETEROL (AIRDUO RESPICLICK 103/16) 474-62 MCG/ACT AEPB    Inhale 1 puff into the lungs 2 times daily    HYDROXYZINE (VISTARIL) 25 MG CAPSULE    Take 1 capsule by mouth 3 times daily as needed for Anxiety    LIDOCAINE (XYLOCAINE) 5 % OINTMENT    Apply topically as needed.     RANITIDINE HCL (ACID REDUCER PO)    Take by mouth as needed     SPACER/AERO-HOLDING CHAMBERS (VORTEX VALVED HOLDING CHAMBER) SUJATHA    1 Device by Does not apply route daily Use with inhalers    SUMATRIPTAN (IMITREX) 50 MG TABLET    Take 1 tablet by mouth once as needed for Migraine (Take medications at the onset of migraine)       ALLERGIES     is allergic to hydrocodone and prozac [fluoxetine Pulmonary effort is normal.      Breath sounds: Normal breath sounds. No stridor. No wheezing, rhonchi or rales. Chest:      Chest wall: No tenderness. Abdominal:      General: Bowel sounds are normal. There is no distension. Palpations: Abdomen is soft. There is no mass. Tenderness: There is no abdominal tenderness. There is no guarding or rebound. Hernia: No hernia is present. Musculoskeletal: Normal range of motion. Lumbar back: She exhibits tenderness. She exhibits normal range of motion, no bony tenderness, no swelling, no edema, no deformity, no laceration, no pain, no spasm and normal pulse. Lymphadenopathy:      Cervical: No cervical adenopathy. Skin:     General: Skin is warm and dry. Findings: No bruising, ecchymosis, lesion or rash. Neurological:      General: No focal deficit present. Mental Status: She is alert and oriented to person, place, and time. Mental status is at baseline. GCS: GCS eye subscore is 4. GCS verbal subscore is 5. GCS motor subscore is 6. Cranial Nerves: Cranial nerves are intact. No cranial nerve deficit. Sensory: Sensation is intact. No sensory deficit. Motor: Motor function is intact. No weakness. Coordination: Coordination is intact. Coordination normal.      Gait: Gait is intact. Gait normal.      Deep Tendon Reflexes: Reflexes are normal and symmetric. Reflexes normal.      Reflex Scores:       Tricep reflexes are 2+ on the right side and 2+ on the left side. Bicep reflexes are 2+ on the right side and 2+ on the left side. Brachioradialis reflexes are 2+ on the right side and 2+ on the left side. Patellar reflexes are 2+ on the right side and 2+ on the left side. Achilles reflexes are 2+ on the right side and 2+ on the left side. Psychiatric:         Speech: Speech normal.         Behavior: Behavior normal.         Thought Content:  Thought content normal.         Judgment: Judgment relaxers along with Tylenol for this. She is instructed to follow-up with a chronic pain management specialist as provided above. She is also instructed to follow-up with her primary care physician. She is instructed to return to the nearest emergency room immediately for any new or worsening complaints. CRITICAL CARE:   None    CONSULTS:  None    PROCEDURES:  None    FINAL IMPRESSION      1.  Acute exacerbation of chronic low back pain          DISPOSITION/PLAN   Discharge    PATIENT REFERRED TO:  Bard Kylah MD  [address]  [phone]    Call in 1 day      [place] [address]  [phone]    Call in 1 day        DISCHARGE MEDICATIONS:  New Prescriptions    CYCLOBENZAPRINE (FLEXERIL) 10 MG TABLET    Take 1 tablet by mouth 3 times daily as needed for Muscle spasms    IBUPROFEN (IBU) 400 MG TABLET    Take 1 tablet by mouth every 6 hours as needed for Pain       (Please note that portions of this note were completed with a voice recognition program.  Efforts were made to edit the dictations but occasionally words are mis-transcribed.)    Gorge France, [address] [place] [address],   02/02/21 1469

## 2021-02-02 NOTE — ED NOTES
Pt medicated per MAR. Pt back from radiology. RR reg. No distress noted.  Will continue to monitor      Sunita Hills RN  02/02/21 0899

## 2021-02-02 NOTE — PROGRESS NOTES
Venita Montemayor (:  1980) is a 36 y.o. female,Established patient, here for evaluation of the following chief complaint(s):  Back Pain      ASSESSMENT/PLAN:  1. Acute midline low back pain without sciatica  -to fill flexeril and ibuprofen  -reviewed lumbar films  -needs work slip  -ED referred to pain management. No follow-ups on file. SUBJECTIVE/OBJECTIVE:  HPI   Pt here for ED follow up of low back pain without radiation. Reviewed BMI of 36. Encouraged diet, exercise and weight loss. Reviewed health maintenance. Took a part time job and led to the exacerbation. She realizes she cannot do this. Given flexeril and ibuprofen. It does help. , current smoker, pmh reviewed. Tried heat/ice. Review of Systems   Constitutional: Negative for chills, fatigue, fever and unexpected weight change. HENT: Negative for congestion, ear pain, rhinorrhea and sore throat. Eyes: Negative for pain and visual disturbance. Respiratory: Negative for cough, chest tightness, shortness of breath and wheezing. Cardiovascular: Negative for chest pain and palpitations. Gastrointestinal: Negative for abdominal pain, blood in stool, constipation, diarrhea, nausea and vomiting. Genitourinary: Negative for difficulty urinating, frequency, hematuria and urgency. Musculoskeletal: Positive for back pain. Negative for joint swelling, myalgias and neck pain. Skin: Negative for rash. Neurological: Negative for dizziness and headaches. Hematological: Negative for adenopathy. Does not bruise/bleed easily. Psychiatric/Behavioral: Negative for behavioral problems and sleep disturbance. The patient is not nervous/anxious. Physical Exam  Vitals signs and nursing note reviewed. Constitutional:       Appearance: She is well-developed. HENT:      Head: Normocephalic and atraumatic.       Right Ear: External ear normal.      Left Ear: External ear normal.      Nose: Nose normal.      Mouth/Throat: Mouth: Mucous membranes are moist.   Eyes:      Pupils: Pupils are equal, round, and reactive to light. Neck:      Musculoskeletal: Neck supple. Thyroid: No thyromegaly. Cardiovascular:      Rate and Rhythm: Normal rate and regular rhythm. Heart sounds: Normal heart sounds. Pulmonary:      Breath sounds: Normal breath sounds. No wheezing or rales. Abdominal:      General: Bowel sounds are normal.      Palpations: Abdomen is soft. Tenderness: There is no abdominal tenderness. There is no guarding or rebound. Musculoskeletal: Normal range of motion. Lumbar back: She exhibits pain. Back:    Lymphadenopathy:      Cervical: No cervical adenopathy. Skin:     General: Skin is warm and dry. Findings: No rash. Neurological:      Mental Status: She is alert and oriented to person, place, and time. Cranial Nerves: No cranial nerve deficit. Deep Tendon Reflexes: Reflexes are normal and symmetric. An electronic signature was used to authenticate this note.     --Macie Nielson MD

## 2021-02-02 NOTE — ED NOTES
Bed: 019A  Expected date: 2/2/21  Expected time: 12:50 AM  Means of arrival: [facility]  Comments:     Luciana Reyes RN  02/02/21 2943

## 2021-02-02 NOTE — PATIENT INSTRUCTIONS
Patient Education        Learning About Relief for Back Pain  What is back strain? Back strain is an injury that happens when you overstretch, or pull, a muscle in your back. You may hurt your back in an accident or when you exercise or lift something. Most back pain gets better with rest and time. You can take care of yourself at home to help your back heal.  What can you do first to relieve back pain? When you first feel back pain, try these steps:  · Walk. Take a short walk (10 to 20 minutes) on a level surface (no slopes, hills, or stairs) every 2 to 3 hours. Walk only distances you can manage without pain, especially leg pain. · Relax. Find a comfortable position for rest. Some people are comfortable on the floor or a medium-firm bed with a small pillow under their head and another under their knees. Some people prefer to lie on their side with a pillow between their knees. Don't stay in one position for too long. · Try heat or ice. Try using a heating pad on a low or medium setting, or take a warm shower, for 15 to 20 minutes every 2 to 3 hours. Or you can buy single-use heat wraps that last up to 8 hours. You can also try an ice pack for 10 to 15 minutes every 2 to 3 hours. You can use an ice pack or a bag of frozen vegetables wrapped in a thin towel. There is not strong evidence that either heat or ice will help, but you can try them to see if they help. You may also want to try switching between heat and cold. · Take pain medicine exactly as directed. ? If the doctor gave you a prescription medicine for pain, take it as prescribed. ? If you are not taking a prescription pain medicine, ask your doctor if you can take an over-the-counter medicine. What else can you do? · Stretch and exercise. Exercises that increase flexibility may relieve your pain and make it easier for your muscles to keep your spine in a good, neutral position. And don't forget to keep walking. · Do self-massage. You can use self-massage to unwind after work or school or to energize yourself in the morning. You can easily massage your feet, hands, or neck. Self-massage works best if you are in comfortable clothes and are sitting or lying in a comfortable position. Use oil or lotion to massage bare skin. · Reduce stress. Back pain can lead to a vicious [place]: Distress about the pain tenses the muscles in your back, which in turn causes more pain. Learn how to relax your mind and your muscles to lower your stress. Where can you learn more? Go to https://Blue Marble Materialspepiceweb.Clarity Payment Solutions. org and sign in to your Givespark account. Enter S992 in the ProductBio box to learn more about \"Learning About Relief for Back Pain. \"     If you do not have an account, please click on the \"Sign Up Now\" link. Current as of: March 2, 2020               Content Version: 12.6  © 2006-2020 [employer]. Care instructions adapted under license by [facility] ([facility]). If you have questions about a medical condition or this instruction, always ask your healthcare professional. [place] [address] any warranty or liability for your use of this information.

## 2021-02-02 NOTE — LETTER
[address] [place] [id number]  Phone: [phone]  Fax: [phone]    Mackey Duane, MD        February 2, 2021     Patient: Michelle Goodson   YOB: 1980   Date of Visit: 2/2/2021       To Whom It May Concern: It is my medical opinion that Suze Valadez may return to work on 02/06/2021. Off work 02/3/2021 through 02/05/2021 due to low back pain. If you have any questions or concerns, please don't hesitate to call.     Sincerely,        Mackey Duane, MD

## 2021-08-25 DIAGNOSIS — J45.30 MILD PERSISTENT ASTHMA WITHOUT COMPLICATION: ICD-10-CM

## 2021-08-25 DIAGNOSIS — L30.9 PERIORBITAL DERMATITIS: ICD-10-CM

## 2021-08-25 RX ORDER — ALCLOMETASONE DIPROPIONATE 0.5 MG/G
OINTMENT TOPICAL
Qty: 1 TUBE | Refills: 1 | OUTPATIENT
Start: 2021-08-25

## 2021-08-25 RX ORDER — ALBUTEROL SULFATE 90 UG/1
AEROSOL, METERED RESPIRATORY (INHALATION)
Qty: 18 G | Refills: 11 | OUTPATIENT
Start: 2021-08-25

## 2021-08-25 NOTE — TELEPHONE ENCOUNTER
Jun Koo needs refill of   Requested Prescriptions     Pending Prescriptions Disp Refills    alclomethasone (ACLOVATE) 0.05 % ointment 1 Tube 1     Sig: Apply topically 2 times daily. Last Filled on:  7/24/2019    Last Visit Date:  2/2/2021    Next Visit Date:    8/25/2021    Medication has not been filled since 2019, patient will need an appointment.  Has an appointment today at 2:15 pm.

## 2021-08-25 NOTE — TELEPHONE ENCOUNTER
Taylor Koo needs refill of   Requested Prescriptions     Pending Prescriptions Disp Refills    albuterol sulfate  (90 Base) MCG/ACT inhaler 18 g 11       Last Filled on:  12/30/2020    Last Visit Date:  8/27/2020    Next Visit Date:    8/26/2021    Last filled by an Kassandra Mccoy CNP.

## 2021-08-26 ENCOUNTER — OFFICE VISIT (OUTPATIENT)
Dept: FAMILY MEDICINE CLINIC | Age: 41
End: 2021-08-26
Payer: MEDICAID

## 2021-08-26 VITALS
WEIGHT: 200.8 LBS | DIASTOLIC BLOOD PRESSURE: 74 MMHG | HEART RATE: 82 BPM | BODY MASS INDEX: 34.47 KG/M2 | SYSTOLIC BLOOD PRESSURE: 126 MMHG | TEMPERATURE: 97.9 F | OXYGEN SATURATION: 98 % | RESPIRATION RATE: 16 BRPM

## 2021-08-26 DIAGNOSIS — M25.512 ACUTE PAIN OF LEFT SHOULDER: ICD-10-CM

## 2021-08-26 DIAGNOSIS — G89.29 CHRONIC MIDLINE LOW BACK PAIN WITHOUT SCIATICA: Primary | ICD-10-CM

## 2021-08-26 DIAGNOSIS — J41.0 SIMPLE CHRONIC BRONCHITIS (HCC): ICD-10-CM

## 2021-08-26 DIAGNOSIS — G95.0 SYRINX OF SPINAL CORD (HCC): ICD-10-CM

## 2021-08-26 DIAGNOSIS — M25.562 ACUTE PAIN OF LEFT KNEE: ICD-10-CM

## 2021-08-26 DIAGNOSIS — M32.9 H/O SYSTEMIC LUPUS ERYTHEMATOSUS (SLE) (HCC): ICD-10-CM

## 2021-08-26 DIAGNOSIS — M54.50 CHRONIC MIDLINE LOW BACK PAIN WITHOUT SCIATICA: Primary | ICD-10-CM

## 2021-08-26 DIAGNOSIS — F33.2 SEVERE EPISODE OF RECURRENT MAJOR DEPRESSIVE DISORDER, WITHOUT PSYCHOTIC FEATURES (HCC): ICD-10-CM

## 2021-08-26 PROCEDURE — G8926 SPIRO NO PERF OR DOC: HCPCS | Performed by: FAMILY MEDICINE

## 2021-08-26 PROCEDURE — G8417 CALC BMI ABV UP PARAM F/U: HCPCS | Performed by: FAMILY MEDICINE

## 2021-08-26 PROCEDURE — G8427 DOCREV CUR MEDS BY ELIG CLIN: HCPCS | Performed by: FAMILY MEDICINE

## 2021-08-26 PROCEDURE — 3023F SPIROM DOC REV: CPT | Performed by: FAMILY MEDICINE

## 2021-08-26 PROCEDURE — 99214 OFFICE O/P EST MOD 30 MIN: CPT | Performed by: FAMILY MEDICINE

## 2021-08-26 PROCEDURE — 4004F PT TOBACCO SCREEN RCVD TLK: CPT | Performed by: FAMILY MEDICINE

## 2021-08-26 SDOH — ECONOMIC STABILITY: FOOD INSECURITY: WITHIN THE PAST 12 MONTHS, YOU WORRIED THAT YOUR FOOD WOULD RUN OUT BEFORE YOU GOT MONEY TO BUY MORE.: NEVER TRUE

## 2021-08-26 SDOH — ECONOMIC STABILITY: FOOD INSECURITY: WITHIN THE PAST 12 MONTHS, THE FOOD YOU BOUGHT JUST DIDN'T LAST AND YOU DIDN'T HAVE MONEY TO GET MORE.: NEVER TRUE

## 2021-08-26 ASSESSMENT — ENCOUNTER SYMPTOMS
CONSTIPATION: 0
BLOOD IN STOOL: 0
CHEST TIGHTNESS: 0
WHEEZING: 0
DIARRHEA: 0
NAUSEA: 0
EYE PAIN: 0
SHORTNESS OF BREATH: 0
SORE THROAT: 0
ABDOMINAL PAIN: 0
BACK PAIN: 1
VOMITING: 0
COUGH: 0
RHINORRHEA: 0

## 2021-08-26 ASSESSMENT — SOCIAL DETERMINANTS OF HEALTH (SDOH): HOW HARD IS IT FOR YOU TO PAY FOR THE VERY BASICS LIKE FOOD, HOUSING, MEDICAL CARE, AND HEATING?: NOT HARD AT ALL

## 2021-08-26 NOTE — PATIENT INSTRUCTIONS
use self-massage to unwind after work or school or to energize yourself in the morning. You can easily massage your feet, hands, or neck. Self-massage works best if you are in comfortable clothes and are sitting or lying in a comfortable position. Use oil or lotion to massage bare skin. · Reduce stress. Back pain can lead to a vicious [place]: Distress about the pain tenses the muscles in your back, which in turn causes more pain. Learn how to relax your mind and your muscles to lower your stress. Where can you learn more? Go to https://Niiki PharmapeImagekindeb.Openbay. org and sign in to your [employer] Energy Solutions account. Enter Q703 in the Proxible box to learn more about \"Learning About Relief for Back Pain. \"     If you do not have an account, please click on the \"Sign Up Now\" link. Current as of: November 16, 2020               Content Version: 12.9  © 2006-2021 Locally. Care instructions adapted under license by [facility] ([facility]). If you have questions about a medical condition or this instruction, always ask your healthcare professional. [place] [address] any warranty or liability for your use of this information. Patient Education        Knee Pain or Injury: Care Instructions  Your Care Instructions     Injuries are a common cause of knee problems. Sudden (acute) injuries may be caused by a direct blow to the knee. They can also be caused by abnormal twisting, bending, or falling on the knee. Pain, bruising, or swelling may be severe, and may start within minutes of the injury. Overuse is another cause of knee pain. Other causes are climbing stairs, kneeling, and other activities that use the knee. Everyday wear and tear, especially as you get older, also can cause knee pain. Rest, along with home treatment, often relieves pain and allows your knee to heal. If you have a serious knee injury, you may need tests and treatment.   Follow-up care is a key part of your treatment and safety. Be sure to make and go to all appointments, and call your doctor if you are having problems. It's also a good idea to know your test results and keep a list of the medicines you take. How can you care for yourself at home? · Be safe with medicines. Read and follow all instructions on the label. ? If the doctor gave you a prescription medicine for pain, take it as prescribed. ? If you are not taking a prescription pain medicine, ask your doctor if you can take an over-the-counter medicine. · Rest and protect your knee. Take a break from any activity that may cause pain. · Put ice or a cold pack on your knee for 10 to 20 minutes at a time. Put a thin cloth between the ice and your skin. · Prop up a sore knee on a pillow when you ice it or anytime you sit or lie down for the next 3 days. Try to keep it above the level of your heart. This will help reduce swelling. · If your knee is not swollen, you can put moist heat, a heating pad, or a warm cloth on your knee. · If your doctor recommends an elastic bandage, sleeve, or other type of support for your knee, wear it as directed. · Follow your doctor's instructions about how much weight you can put on your leg. Use a cane, crutches, or a walker as instructed. · Follow your doctor's instructions about activity during your healing process. If you can do mild exercise, slowly increase your activity. · Reach and stay at a healthy weight. Extra weight can strain the joints, especially the knees and hips, and make the pain worse. Losing even a few pounds may help. When should you call for help? Call 911 anytime you think you may need emergency care. For example, call if:    · You have symptoms of a blood clot in your lung (called a pulmonary embolism). These may include:  ? Sudden chest pain. ? Trouble breathing. ? Coughing up blood.    Call your doctor now or seek immediate medical care if:    · You have severe or increasing pain.     · Your leg or foot turns cold or changes color.     · You cannot stand or put weight on your knee.     · Your knee looks twisted or bent out of shape.     · You cannot move your knee.     · You have signs of infection, such as:  ? Increased pain, swelling, warmth, or redness. ? Red streaks leading from the knee. ? Pus draining from a place on your knee. ? A fever.     · You have signs of a blood clot in your leg (called a deep vein thrombosis), such as:  ? Pain in your calf, back of the knee, thigh, or groin. ? Redness and swelling in your leg or groin. Watch closely for changes in your health, and be sure to contact your doctor if:    · You have tingling, weakness, or numbness in your knee.     · You have any new symptoms, such as swelling.     · You have bruises from a knee injury that last longer than 2 weeks.     · You do not get better as expected. Where can you learn more? Go to https://Harry and David.StarGreetz. org and sign in to your BrandProject account. Enter K195 in the EasyRun box to learn more about \"Knee Pain or Injury: Care Instructions. \"     If you do not have an account, please click on the \"Sign Up Now\" link. Current as of: October 19, 2020               Content Version: 12.9  © 7787-4588 [employer]. Care instructions adapted under license by [facility] ([facility]). If you have questions about a medical condition or this instruction, always ask your healthcare professional. [place] [address] any warranty or liability for your use of this information. Patient Education        Shoulder Pain: Care Instructions  Your Care Instructions     You can hurt your shoulder by using it too much during an activity, such as fishing or baseball. It can also happen as part of the everyday wear and tear of getting older. Shoulder injuries can be slow to heal, but your shoulder should get better with time.   Your doctor may recommend a sling to rest your shoulder. If you have injured your shoulder, you may need testing and treatment. Follow-up care is a key part of your treatment and safety. Be sure to make and go to all appointments, and call your doctor if you are having problems. It's also a good idea to know your test results and keep a list of the medicines you take. How can you care for yourself at home? · Take pain medicines exactly as directed. ? If the doctor gave you a prescription medicine for pain, take it as prescribed. ? If you are not taking a prescription pain medicine, ask your doctor if you can take an over-the-counter medicine. ? Do not take two or more pain medicines at the same time unless the doctor told you to. Many pain medicines contain acetaminophen, which is Tylenol. Too much acetaminophen (Tylenol) can be harmful. · If your doctor recommends that you wear a sling, use it as directed. Do not take it off before your doctor tells you to. · Put ice or a cold pack on the sore area for 10 to 20 minutes at a time. Put a thin cloth between the ice and your skin. · If there is no swelling, you can put moist heat, a heating pad, or a warm cloth on your shoulder. Some doctors suggest alternating between hot and cold. · Rest your shoulder for a few days. If your doctor recommends it, you can then begin gentle exercise of the shoulder, but do not lift anything heavy. When should you call for help? Call 911 anytime you think you may need emergency care. For example, call if:    · You have chest pain or pressure. This may occur with:  ? Sweating. ? Shortness of breath. ? Nausea or vomiting. ? Pain that spreads from the chest to the neck, jaw, or one or both shoulders or arms. ? Dizziness or lightheadedness. ? A fast or uneven pulse. After calling 911, chew 1 adult-strength aspirin. Wait for an ambulance. Do not try to drive yourself.     · Your arm or hand is cool or pale or changes color.    Call your doctor now or seek immediate medical care if:    · You have signs of infection, such as:  ? Increased pain, swelling, warmth, or redness in your shoulder. ? Red streaks leading from a place on your shoulder. ? Pus draining from an area of your shoulder. ? Swollen lymph nodes in your neck, armpits, or groin. ? A fever. Watch closely for changes in your health, and be sure to contact your doctor if:    · You cannot use your shoulder.     · Your shoulder does not get better as expected. Where can you learn more? Go to https://Cirrus WorkspeVenaxis.APerfectShirt.com. org and sign in to your Poached Jobs account. Enter B180 in the LibraryThing box to learn more about \"Shoulder Pain: Care Instructions. \"     If you do not have an account, please click on the \"Sign Up Now\" link. Current as of: November 16, 2020               Content Version: 12.9  © 8001-6597 Healthwise, Incorporated. Care instructions adapted under license by [facility] ([facility]). If you have questions about a medical condition or this instruction, always ask your healthcare professional. [address] any warranty or liability for your use of this information.

## 2021-08-26 NOTE — PROGRESS NOTES
Shiv Dunham (:  1980) is a 39 y.o. female,Established patient, here for evaluation of the following chief complaint(s):  Knee Pain (left knee, left shoulde, pain management)         ASSESSMENT/PLAN:  1. Chronic midline low back pain without sciatica  -     [address], [place], DO, Pain Medicine, [facility] [place]  -chronic condition, exacerbated, refer to pain management for further evaluation and treatment. 2. Severe episode of recurrent major depressive disorder, without psychotic features ([facility] [address]  -stable  3. Simple chronic bronchitis (HCC)  -stable on inhaler and aerosols  4. Syrinx of spinal cord (HCC)  -stable  5. H/O systemic lupus erythematosus (SLE) (HCC)  stable  6. Acute pain of left shoulder  -     ROBERT Childs MD, Orthopedic Surgery, [facility] [place]  -unknown diagnosis/prognosis, refer to ortho for further evaluation and treatment    7. Acute pain of left knee  -     ROBERT Childs MD, Orthopedic Surgery, [facility] [place]  -unknown diagnosis/prognosis, refer to ortho for further evaluation and treatment    No follow-ups on file. Subjective   SUBJECTIVE/OBJECTIVE:  HPI  Patient here today for a check up. Reviewed BMI of  34. Encouraged diet, exercise and weight loss. Left shoulder and left knee pain. 2 weeks ago. No new injury. Given from ED, prednisone and toradol, gabapentin. Using sparingly. Had plain films. Wants to know the cause. , current smoker, pmh reviewed. Review of Systems   Constitutional: Negative for chills, fatigue, fever and unexpected weight change. HENT: Negative for congestion, ear pain, rhinorrhea and sore throat. Eyes: Negative for pain and visual disturbance. Respiratory: Negative for cough, chest tightness, shortness of breath and wheezing. Cardiovascular: Negative for chest pain and palpitations. Gastrointestinal: Negative for abdominal pain, blood in stool, constipation, diarrhea, nausea and vomiting.    Genitourinary: Negative for difficulty urinating, frequency, hematuria and urgency. Musculoskeletal: Positive for back pain. Negative for joint swelling, myalgias and neck pain. Left knee pain, left shoulder pain   Skin: Negative for rash. Neurological: Negative for dizziness and headaches. Hematological: Negative for adenopathy. Does not bruise/bleed easily. Psychiatric/Behavioral: Negative for behavioral problems and sleep disturbance. The patient is not nervous/anxious. Objective   Physical Exam  Vitals and nursing note reviewed. Constitutional:       Appearance: She is well-developed. HENT:      Head: Normocephalic and atraumatic. Right Ear: External ear normal.      Left Ear: External ear normal.      Nose: Nose normal.      Mouth/Throat:      Mouth: Mucous membranes are moist.   Eyes:      Pupils: Pupils are equal, round, and reactive to light. Neck:      Thyroid: No thyromegaly. Cardiovascular:      Rate and Rhythm: Normal rate and regular rhythm. Heart sounds: Normal heart sounds. Pulmonary:      Breath sounds: Normal breath sounds. No wheezing or rales. Abdominal:      General: Bowel sounds are normal.      Palpations: Abdomen is soft. Tenderness: There is no abdominal tenderness. There is no guarding or rebound. Musculoskeletal:      Left shoulder: Tenderness present. Decreased range of motion. Cervical back: Neck supple. Thoracic back: Tenderness present. Lumbar back: Tenderness present. Left knee: Decreased range of motion. Tenderness present. Lymphadenopathy:      Cervical: No cervical adenopathy. Skin:     General: Skin is warm and dry. Findings: No rash. Neurological:      Mental Status: She is alert and oriented to person, place, and time. Cranial Nerves: No cranial nerve deficit. Deep Tendon Reflexes: Reflexes are normal and symmetric. An electronic signature was used to authenticate this note.     --Devin Gomez Tj Winchester MD

## 2021-09-15 ENCOUNTER — HOSPITAL ENCOUNTER (OUTPATIENT)
Dept: GENERAL RADIOLOGY | Age: 41
Discharge: HOME | End: 2021-09-15
Payer: MEDICAID

## 2021-09-15 ENCOUNTER — OFFICE VISIT (OUTPATIENT)
Dept: RHEUMATOLOGY | Age: 41
End: 2021-09-15
Payer: MEDICAID

## 2021-09-15 ENCOUNTER — HOSPITAL ENCOUNTER (OUTPATIENT)
Age: 41
Discharge: HOME | End: 2021-09-15
Payer: MEDICAID

## 2021-09-15 VITALS
DIASTOLIC BLOOD PRESSURE: 80 MMHG | OXYGEN SATURATION: 98 % | SYSTOLIC BLOOD PRESSURE: 118 MMHG | BODY MASS INDEX: 34.49 KG/M2 | WEIGHT: 202 LBS | HEIGHT: 64 IN | HEART RATE: 87 BPM

## 2021-09-15 DIAGNOSIS — M54.50 CHRONIC MIDLINE LOW BACK PAIN, UNSPECIFIED WHETHER SCIATICA PRESENT: ICD-10-CM

## 2021-09-15 DIAGNOSIS — G89.29 CHRONIC MIDLINE LOW BACK PAIN, UNSPECIFIED WHETHER SCIATICA PRESENT: Primary | ICD-10-CM

## 2021-09-15 DIAGNOSIS — M32.9 H/O SYSTEMIC LUPUS ERYTHEMATOSUS (SLE) (HCC): ICD-10-CM

## 2021-09-15 DIAGNOSIS — M54.50 CHRONIC MIDLINE LOW BACK PAIN, UNSPECIFIED WHETHER SCIATICA PRESENT: Primary | ICD-10-CM

## 2021-09-15 DIAGNOSIS — G89.29 CHRONIC MIDLINE LOW BACK PAIN, UNSPECIFIED WHETHER SCIATICA PRESENT: ICD-10-CM

## 2021-09-15 LAB
ALBUMIN SERPL-MCNC: 4.2 G/DL (ref 3.5–5.1)
ALP BLD-CCNC: 86 U/L (ref 38–126)
ALT SERPL-CCNC: 13 U/L (ref 11–66)
ANION GAP SERPL CALCULATED.3IONS-SCNC: 11 MEQ/L (ref 8–16)
AST SERPL-CCNC: 11 U/L (ref 5–40)
BASOPHILS # BLD: 0.7 %
BASOPHILS ABSOLUTE: 0.1 THOU/MM3 (ref 0–0.1)
BILIRUB SERPL-MCNC: 0.5 MG/DL (ref 0.3–1.2)
BILIRUBIN URINE: NEGATIVE
BLOOD, URINE: NEGATIVE
BUN BLDV-MCNC: 9 MG/DL (ref 7–22)
C-REACTIVE PROTEIN: < 0.3 MG/DL (ref 0–1)
C3 COMPLEMENT: 141 MG/DL (ref 90–180)
CALCIUM SERPL-MCNC: 9.4 MG/DL (ref 8.5–10.5)
CHARACTER, URINE: CLEAR
CHLORIDE BLD-SCNC: 104 MEQ/L (ref 98–111)
CO2: 23 MEQ/L (ref 23–33)
COLOR: YELLOW
COMPLEMENT C4: 21 MG/DL (ref 10–40)
CREAT SERPL-MCNC: 0.8 MG/DL (ref 0.4–1.2)
CREATININE URINE: 44.4 MG/DL
EOSINOPHIL # BLD: 2.3 %
EOSINOPHILS ABSOLUTE: 0.2 THOU/MM3 (ref 0–0.4)
ERYTHROCYTE [DISTWIDTH] IN BLOOD BY AUTOMATED COUNT: 12.4 % (ref 11.5–14.5)
ERYTHROCYTE [DISTWIDTH] IN BLOOD BY AUTOMATED COUNT: 40.2 FL (ref 35–45)
GFR SERPL CREATININE-BSD FRML MDRD: 79 ML/MIN/1.73M2
GLUCOSE BLD-MCNC: 94 MG/DL (ref 70–108)
GLUCOSE, URINE: NEGATIVE MG/DL
HCT VFR BLD CALC: 43.6 % (ref 37–47)
HEMOGLOBIN: 15.2 GM/DL (ref 12–16)
IMMATURE GRANS (ABS): 0.03 THOU/MM3 (ref 0–0.07)
IMMATURE GRANULOCYTES: 0.4 %
KETONES, URINE: NEGATIVE
LEUKOCYTE EST, POC: NEGATIVE
LYMPHOCYTES # BLD: 29.8 %
LYMPHOCYTES ABSOLUTE: 2.4 THOU/MM3 (ref 1–4.8)
MCH RBC QN AUTO: 31.3 PG (ref 26–33)
MCHC RBC AUTO-ENTMCNC: 34.9 GM/DL (ref 32.2–35.5)
MCV RBC AUTO: 89.9 FL (ref 81–99)
MONOCYTES # BLD: 6.6 %
MONOCYTES ABSOLUTE: 0.5 THOU/MM3 (ref 0.4–1.3)
NITRITE, URINE: NEGATIVE
NUCLEATED RED BLOOD CELLS: 0 /100 WBC
PH UA: 7 (ref 5–9)
PLATELET # BLD: 265 THOU/MM3 (ref 130–400)
PMV BLD AUTO: 10.8 FL (ref 9.4–12.4)
POTASSIUM SERPL-SCNC: 4 MEQ/L (ref 3.5–5.2)
PROT/CREAT RATIO, UR: 0.09
PROTEIN UA: NEGATIVE MG/DL
PROTEIN, URINE: < 4 MG/DL
RBC # BLD: 4.85 MILL/MM3 (ref 4.2–5.4)
SEDIMENTATION RATE, ERYTHROCYTE: 5 MM/HR (ref 0–20)
SEG NEUTROPHILS: 60.2 %
SEGMENTED NEUTROPHILS ABSOLUTE COUNT: 4.9 THOU/MM3 (ref 1.8–7.7)
SODIUM BLD-SCNC: 138 MEQ/L (ref 135–145)
SPECIFIC GRAVITY UA: 1.01 (ref 1–1.03)
TOTAL PROTEIN: 7.1 G/DL (ref 6.1–8)
UROBILINOGEN, URINE: 0.2 EU/DL (ref 0–1)
WBC # BLD: 8.2 THOU/MM3 (ref 4.8–10.8)

## 2021-09-15 PROCEDURE — G8417 CALC BMI ABV UP PARAM F/U: HCPCS | Performed by: INTERNAL MEDICINE

## 2021-09-15 PROCEDURE — G8427 DOCREV CUR MEDS BY ELIG CLIN: HCPCS | Performed by: INTERNAL MEDICINE

## 2021-09-15 PROCEDURE — 81003 URINALYSIS AUTO W/O SCOPE: CPT

## 2021-09-15 PROCEDURE — 86140 C-REACTIVE PROTEIN: CPT

## 2021-09-15 PROCEDURE — 4004F PT TOBACCO SCREEN RCVD TLK: CPT | Performed by: INTERNAL MEDICINE

## 2021-09-15 PROCEDURE — 86160 COMPLEMENT ANTIGEN: CPT

## 2021-09-15 PROCEDURE — 99214 OFFICE O/P EST MOD 30 MIN: CPT | Performed by: INTERNAL MEDICINE

## 2021-09-15 PROCEDURE — 72202 X-RAY EXAM SI JOINTS 3/> VWS: CPT

## 2021-09-15 PROCEDURE — 80053 COMPREHEN METABOLIC PANEL: CPT

## 2021-09-15 PROCEDURE — 85025 COMPLETE CBC W/AUTO DIFF WBC: CPT

## 2021-09-15 PROCEDURE — 85651 RBC SED RATE NONAUTOMATED: CPT

## 2021-09-15 PROCEDURE — 82570 ASSAY OF URINE CREATININE: CPT

## 2021-09-15 PROCEDURE — 36415 COLL VENOUS BLD VENIPUNCTURE: CPT

## 2021-09-15 PROCEDURE — 84156 ASSAY OF PROTEIN URINE: CPT

## 2021-09-15 ASSESSMENT — ENCOUNTER SYMPTOMS
EYES NEGATIVE: 1
VOMITING: 0
DIARRHEA: 0
NAUSEA: 0
BACK PAIN: 1
CONSTIPATION: 0
SINUS PAIN: 0
EYE REDNESS: 0
COUGH: 0
EYE PAIN: 0
SHORTNESS OF BREATH: 1
SINUS PRESSURE: 1
WHEEZING: 1

## 2021-09-15 NOTE — PROGRESS NOTES
[facility] RHEUMATOLOGY FOLLOW UP NOTE       Date Of Service: 9/15/2021  Provider: Flavia Arceo DO ,   PCP: Viktoria Nelson MD   Name: Janelle Avila   MRN: [id number]        History of Present Illness (HPI)     Chief Complaint   Patient presents with    Follow-up     1 year f/u H/O SLE         Janelle Avila  is a(n)41 y.o. female with a hx of anxiety, asthma (active interrmittent) depression, bipolar, Headaches, asthma, DDD Cervical spine, Thoracic and lumbar spine,  systemic lupus  Here for the f/u evaluation     Last evaluation in sept 2019 - move back form [place] ~ 1 months ago. episide of left knee swelling w/ ER evaluation 5-6 weeks ago. Reports contined arthralgia, --- headaches intermittently occurring, --- fatigue -worsened w/ exertion. --- left shoulder pain increased - referred to phys therapy - started 9/14/2021)     currentlyw with pain int he left wrist, left > right shoulder, left hip, left knee, left ankle, neck and lower back. Pain up to 4/10. Constant in the mid to lower back - dull w/ ocasional stabbing/sharp pain. Timing: n/a.  --- ongoing mid to lower back pain - aggravated with activity. rare shooting pain from the left shoulder into the shoulder. Back stiffness csing inomnia. AM stiffness ~ 30 minutes. Deneis paresthesia, weakness. Rash, orasl sores, dry mouth/dry eyes, oral/nasal sores  Raynaud's,  digital ulcerations, skin tightening , renal disease:   /  foamy urination,  Hematuria , Seizures , blood clots, AIHA,  leukopenia/lymphopenia,  thrombocytopenia , chest pain, sob, lower ext swelling. astham - active using nebulisher 3-4 times per day. REVIEW OF SYSTEMS: (ROS)    Review of Systems   Constitutional: Positive for fatigue. Negative for diaphoresis and fever. HENT: Positive for sinus pressure (sometimes). Negative for congestion, hearing loss, nosebleeds, postnasal drip and sinus pain. Hair loss   Eyes: Negative. Negative for pain and redness. Respiratory: Positive for shortness of breath and wheezing. Negative for cough. Cardiovascular: Positive for chest pain. Gastrointestinal: Negative for constipation, diarrhea, nausea and vomiting. Genitourinary: Negative for difficulty urinating, frequency and hematuria. Musculoskeletal: Positive for arthralgias, back pain, myalgias and neck pain. Negative for joint swelling. Skin: Negative for rash. Neurological: Positive for headaches (frontal). Negative for dizziness and weakness. Hematological: Does not bruise/bleed easily. Psychiatric/Behavioral: Negative for sleep disturbance. The patient is not nervous/anxious. PAST MEDICAL HISTORY     has a past medical history of Anxiety, Asthma, DDD (degenerative disc disease), cervical, DDD (degenerative disc disease), thoracolumbar, Depression, and GERD (gastroesophageal reflux disease). SOCIAL HISTORY     reports that she has been smoking cigarettes. She has been smoking about 1.00 pack per day. She has never used smokeless tobacco. She reports previous drug use. She reports that she does not drink alcohol. ALLERGIES     Allergies   Allergen Reactions    Hydrocodone      \"makes me feel completely out of whack\"    Prozac [Fluoxetine Hcl] Other (See Comments)     \"I want to kill myself\"       CURRENT MEDICATIONS      Current Outpatient Medications:     albuterol sulfate  (90 Base) MCG/ACT inhaler, INHALE TWO (2) PUFFS INTO THE LUNGS EVERY FOUR (4) HOURS AS NEEDED FOR WHEEZING , Disp: 18 g, Rfl: 11    Spacer/Aero-Holding Chambers (VORTEX VALVED HOLDING CHAMBER) SUJATHA, 1 Device by Does not apply route daily Use with inhalers, Disp: 1 Device, Rfl: 0    albuterol (PROVENTIL) (2.5 MG/3ML) 0.083% nebulizer solution, Take 3 mLs by nebulization every 6 hours as needed for Wheezing or Shortness of Breath, Disp: 120 vial, Rfl: 11    alclomethasone (ACLOVATE) 0.05 % ointment, Apply topically 2 times daily.  (Patient not taking: Reported on 9/15/2021), Disp: 1 Tube, Rfl: 1    lidocaine (XYLOCAINE) 5 % ointment, Apply topically as needed. (Patient not taking: Reported on 9/15/2021), Disp: 50 g, Rfl: 0    RaNITidine HCl (ACID REDUCER PO), Take by mouth as needed  (Patient not taking: Reported on 9/15/2021), Disp: , Rfl:     PHYSICAL EXAMINATION / OBJECTIVE     Objective:  /80 (Site: Left Lower Arm, Position: Sitting, Cuff Size: Medium Adult)   Pulse 87   Ht 5' 4.02\" (1.626 m)   Wt 202 lb (91.6 kg)   SpO2 98%   BMI 34.66 kg/m²     Physical Exam      General Appearance:  AAO x 3 ,  well-developed and well nourished  Head: NCAT  Eyes: No abnormalities. ,  Sclera non-icteric,   Ears / Nose:  normal  appearance  ears and nose. No active drainage   Mouth:  MMM, ears w/o deformities  Neck: No jugular venous distention, appears symmetric, good ROM  Lymph:  no cervical adenopathy    Pulmonary/Chest: CTA bilateral ,  symmetric chest expansion. Cardiovascular: Normal S1 and S2, NO murmur, rub, gallop  : Deferred   Abd/GI: Deferred   Neurologic: Speech normal, no facial droop,  Intact, symmetric bilateral  DTR's 0/4 Patellar, 0/4 Achilles, 0/4 biceps, 0-4 triceps, 2/4  brachioradialis   Skin: + red patch in the right eye brow    Musculoskeletal:  Upper extremities:  Non-tender, No swelling     Lower extremities: tender bilat outer hips. Knees, ankles, feet- Non-tender. Spine:   tender traps, entire thoraic, lumbar and sacral spine, and perispinal musculature. Negative SLR, Nicki Pronto.              LABS      Lab Results   Component Value Date    WBC 8.2 04/17/2020    HGB 14.7 04/17/2020    MCV 90.0 04/17/2020    MCHC 34.7 04/17/2020     04/17/2020    LYMPHSABS 2.1 04/17/2020    EOSABS 0.2 04/17/2020    BASOSABS 0.1 04/17/2020         Chemistry        Component Value Date/Time     04/17/2020 1830    K 3.9 04/17/2020 1830     04/17/2020 1830    CO2 23 04/17/2020 1830    BUN 8 04/17/2020 1830    CREATININE 0.7 04/17/2020 1830 Component Value Date/Time    CALCIUM 9.0 04/17/2020 1830    ALKPHOS 73 02/18/2020 0032    AST 15 02/18/2020 0032    ALT 21 02/18/2020 0032    BILITOT 0.4 02/18/2020 0032            Lab Results   Component Value Date    SEDRATE 9 04/17/2020    SEDRATE 14 06/11/2019    CRP 0.41 04/17/2020    CRP 0.12 09/03/2019    CRP 0.13 06/11/2019       No results found for: VITD25    C3 Complement   Date Value Ref Range Status   06/11/2019 147 88 - 201 mg/dL Final     Comment:     REFERENCE INTERVAL: Complement Component 3  Access complete set of age- and/or gender-specific reference  intervals for this test in the [facility] Test Directory  ([email or web address].com). Performed by [address] [address], [address] [phone]  www. Abida Cartagena MD - Lab. Director       C4 Complement   Date Value Ref Range Status   06/11/2019 26 10 - 40 mg/dL Final     Comment:     REFERENCE INTERVAL: Complement Component 4  Access complete set of age- and/or gender-specific reference  intervals for this test in the [facility] Test Directory  ([facility].com). Performed by [address]  [employer] [address], [address] [phone]  www. Abida Cartagena MD - Lab. Director     ]    No results found for: ANASCRN  No results found for: SSA  No results found for: SSB  No results found for: ANTI-SMITH  No results found for: DSDNAAB   No results found for: ANTIRNP  Lab Results   Component Value Date    C3 147 06/11/2019    C4 26 06/11/2019     No results found for: CCPAB  No results found for: RF        RADIOLOGY / PROCEDURES:     ASSESSMENT/PLAN:     No diagnosis found. 1 systemic lupus erythematosus (SLE) (HCC) -- negative serologies at this time. - attempt to request Rheumatology records from [facility])               - off  Plaquenil 200mg daily since Dec 2019.                - f/u in 1 year. -- if stable will d/c.      - repeat serologies evaluation as below.       2. Chronic back pain  Chronic back pain diagnosed with DDD for several years. MRI recently with ? Syrinx in T  With repeat imaging recommended. + floaters in eyes. + paresthesias of hands with arms with tinel testing at wrist and cubital tunnel. demnished DTR bilat upper and loewr ext. S/p : NSAID, phys therapy. Spinal injections    - x-ray  SI joint for possible inflammatory etiology. - recently referred pain amangment by PCP. 3. Bilateral shoulder pain -- ? Tendonitis                - phys therapy recently started 9/14/2021 - ordered by PCP      4. Med monitoring  - 1. Chronic midline low back pain, unspecified whether sciatica present  -     XR SACROILIAC JOINTS (MIN 3 VIEWS); Future  -     CBC Auto Differential; Future  -     Comprehensive Metabolic Panel; Future  -     C-Reactive Protein; Future  -     Sedimentation Rate; Future  2. H/O systemic lupus erythematosus (SLE) (HCC)  -     CBC Auto Differential; Future  -     Comprehensive Metabolic Panel; Future  -     C-Reactive Protein; Future  -     Sedimentation Rate; Future  -     C4 Complement; Future  -     C3 Complement; Future  -     Urinalysis; Future  -     Protein / creatinine ratio, urine; Future          No follow-ups on file. New Prescriptions    No medications on file       9/15/2021    Electronically signed by Marciano Malagon DO on 9/15/21 at 10:43 AM EDT  Please contact the office if you have any questions or change of symptoms.

## 2021-09-16 ENCOUNTER — PATIENT MESSAGE (OUTPATIENT)
Dept: RHEUMATOLOGY | Age: 41
End: 2021-09-16

## 2021-09-16 DIAGNOSIS — M54.89 INFLAMMATORY BACK PAIN: Primary | ICD-10-CM

## 2021-09-16 DIAGNOSIS — M46.1 DEGENERATIVE JOINT DISEASE OF SACROILIAC JOINT (HCC): ICD-10-CM

## 2021-09-16 NOTE — TELEPHONE ENCOUNTER
From: Arlet Reynoso  To: Luis Greene DO  Sent: 9/16/2021 9:32 AM EDT  Subject: Test Results Question    What does my test results mean? Am I in kidney failure and when do I need to do the MRI?

## 2021-09-20 NOTE — TELEPHONE ENCOUNTER
Diagnosis Orders   1. Inflammatory back pain  MRI PELVIS WO CONTRAST   2.  Degenerative joint disease of sacroiliac joint ([address]  MRI PELVIS WO CONTRAST

## 2021-09-21 ENCOUNTER — TELEPHONE (OUTPATIENT)
Dept: FAMILY MEDICINE CLINIC | Age: 41
End: 2021-09-21

## 2021-09-21 ENCOUNTER — OFFICE VISIT (OUTPATIENT)
Dept: FAMILY MEDICINE CLINIC | Age: 41
End: 2021-09-21
Payer: MEDICAID

## 2021-09-21 VITALS
SYSTOLIC BLOOD PRESSURE: 116 MMHG | DIASTOLIC BLOOD PRESSURE: 76 MMHG | OXYGEN SATURATION: 98 % | WEIGHT: 203.4 LBS | BODY MASS INDEX: 34.9 KG/M2 | HEART RATE: 80 BPM | TEMPERATURE: 98.6 F | RESPIRATION RATE: 16 BRPM

## 2021-09-21 DIAGNOSIS — L30.9 PERIORBITAL DERMATITIS: ICD-10-CM

## 2021-09-21 DIAGNOSIS — M32.9 H/O SYSTEMIC LUPUS ERYTHEMATOSUS (SLE) (HCC): Primary | ICD-10-CM

## 2021-09-21 PROBLEM — M17.9 OSTEOARTHRITIS OF KNEE: Status: ACTIVE | Noted: 2021-09-21

## 2021-09-21 PROCEDURE — G8417 CALC BMI ABV UP PARAM F/U: HCPCS | Performed by: FAMILY MEDICINE

## 2021-09-21 PROCEDURE — 4004F PT TOBACCO SCREEN RCVD TLK: CPT | Performed by: FAMILY MEDICINE

## 2021-09-21 PROCEDURE — G8427 DOCREV CUR MEDS BY ELIG CLIN: HCPCS | Performed by: FAMILY MEDICINE

## 2021-09-21 PROCEDURE — 99213 OFFICE O/P EST LOW 20 MIN: CPT | Performed by: FAMILY MEDICINE

## 2021-09-21 RX ORDER — ALCLOMETASONE DIPROPIONATE 0.5 MG/G
OINTMENT TOPICAL
Qty: 45 G | Refills: 2 | Status: SHIPPED | OUTPATIENT
Start: 2021-09-21

## 2021-09-21 ASSESSMENT — ENCOUNTER SYMPTOMS
BLOOD IN STOOL: 0
NAUSEA: 0
CONSTIPATION: 0
SHORTNESS OF BREATH: 0
DIARRHEA: 0
EYE PAIN: 0
WHEEZING: 0
ABDOMINAL PAIN: 0
CHEST TIGHTNESS: 0
BACK PAIN: 0
COUGH: 0
VOMITING: 0
RHINORRHEA: 0
SORE THROAT: 0

## 2021-09-21 NOTE — PROGRESS NOTES
Miguel Olson (:  1980) is a 39 y.o. female,Established patient, here for evaluation of the following chief complaint(s):  Check-Up (follow up after blood work)         ASSESSMENT/PLAN:  1. H/O systemic lupus erythematosus (SLE) ([facility] [address]  2. Periorbital dermatitis  -     alclomethasone (ACLOVATE) 0.05 % ointment; Apply topically 2 times daily. , Disp-45 g, R-2, Normal  -monitor sxs, call if not improving      No follow-ups on file. Subjective   SUBJECTIVE/OBJECTIVE:  HPI   Patient here today for a check up. Reviewed BMI of  35. Encouraged diet, exercise and weight loss. Here to follow  Up rheumatology blood work. REviewed with patient. Upcoming MRI pelvis. GFR of 79, only abnormality. Continues to have joint pain. No rheum meds yet. Partnered, current smoker, pmh reviewed. Review of Systems   Constitutional: Negative for chills, fatigue, fever and unexpected weight change. HENT: Negative for congestion, ear pain, rhinorrhea and sore throat. Eyes: Negative for pain and visual disturbance. Respiratory: Negative for cough, chest tightness, shortness of breath and wheezing. Cardiovascular: Negative for chest pain and palpitations. Gastrointestinal: Negative for abdominal pain, blood in stool, constipation, diarrhea, nausea and vomiting. Genitourinary: Negative for difficulty urinating, frequency, hematuria and urgency. Musculoskeletal: Positive for arthralgias. Negative for back pain, joint swelling, myalgias and neck pain. Skin: Positive for rash. Neurological: Negative for dizziness and headaches. Hematological: Negative for adenopathy. Does not bruise/bleed easily. Psychiatric/Behavioral: Negative for behavioral problems and sleep disturbance. The patient is not nervous/anxious. Objective   Physical Exam  Vitals and nursing note reviewed. Constitutional:       Appearance: She is well-developed. HENT:      Head: Normocephalic and atraumatic.       Right Ear: External ear normal.      Left Ear: External ear normal.      Nose: Nose normal.      Mouth/Throat:      Mouth: Mucous membranes are moist.   Eyes:      Pupils: Pupils are equal, round, and reactive to light. Neck:      Thyroid: No thyromegaly. Cardiovascular:      Rate and Rhythm: Normal rate and regular rhythm. Heart sounds: Normal heart sounds. Pulmonary:      Breath sounds: Normal breath sounds. No wheezing or rales. Abdominal:      General: Bowel sounds are normal.      Palpations: Abdomen is soft. Tenderness: There is no abdominal tenderness. There is no guarding or rebound. Musculoskeletal:         General: Normal range of motion. Cervical back: Neck supple. Lymphadenopathy:      Cervical: No cervical adenopathy. Skin:     General: Skin is warm and dry. Findings: Rash present. Rash is scaling. Neurological:      Mental Status: She is alert and oriented to person, place, and time. Cranial Nerves: No cranial nerve deficit. Deep Tendon Reflexes: Reflexes are normal and symmetric. An electronic signature was used to authenticate this note.     --Kayy Washington MD

## 2021-09-21 NOTE — TELEPHONE ENCOUNTER
Hardik Cassidy called from [employer]. She says that when she ran the Alclometasone Dipropionate ointment, it came back that ins wants her to get 15 g tubes and not 45 g, but when she ran it through as a 15 g tube, it needs a prior auth. Do you want to Rx something else or do a prior auth?   Call her back

## 2021-09-21 NOTE — PATIENT INSTRUCTIONS
more tired, talk to your doctor, a mental health professional, or both. Take care of your skin  · Ask your doctor about the use of corticosteroid creams for skin symptoms. · If you are bothered by the way a lupus rash looks on your face or if you have scars from lupus, you can try makeup, such as Covermark, to cover the rash or scars. · Stay out of the sun, especially when the sun's rays are the strongest, usually between 10 a.m. and 4 p.m. If you must be in the sun, cover your arms and legs, and wear a hat. Make sure to use a broad-spectrum sunscreen that has a sun protection factor (SPF) of 50 or higher. Put more sunscreen on after swimming, sweating, or toweling off. Practice good self-care  · Learn more about lupus and how to take care of yourself. · Take your medicines exactly as prescribed. Call your doctor if you have any problems with your medicine. · Do not smoke. If you need help quitting, talk to your doctor about stop-smoking programs and medicines. These can increase your chances of quitting for good. · Eat a healthy, balanced diet. A balanced diet includes whole grains, dairy, fruits and vegetables, and protein. Eat a variety of foods from each of those groups so you get all the nutrients you need. · Avoid other people who are sick with colds or the flu. These illnesses can cause lupus flares. Talk with your doctor about any vaccines you may need, including flu shots and pneumococcal vaccines. If you do get sick or think you are getting an infection, talk with your doctor so you can treat your symptoms right away. · Brush and floss your teeth each day. See your dentist two times a year. · Get regular eye exams. · Build a support system of family, friends, and health professionals. When should you call for help? Call 911 anytime you think you may need emergency care. For example, call if:    · You have symptoms of a heart attack. These may include:  ?  Chest pain or pressure, or a strange feeling in the chest.  ? Sweating. ? Shortness of breath. ? Nausea or vomiting. ? Pain, pressure, or a strange feeling in the back, neck, jaw, or upper belly or in one or both shoulders or arms. ? Lightheadedness or sudden weakness. ? A fast or irregular heartbeat. After you call 911, the  may tell you to chew 1 adult-strength or 2 to 4 low-dose aspirin. Wait for an ambulance. Do not try to drive yourself. Call your doctor now or seek immediate medical care if:    · You are short of breath.     · You have blood in your urine or are urinating less often and in smaller amounts than usual.     · You have a fever.     · You feel depressed or notice any changes in your behavior or thinking.     · You are dizzy or have muscle weakness.     · You have swelling of the lower legs or feet. Watch closely for changes in your health, and be sure to contact your doctor if:    · Your symptoms get worse or you develop any new symptoms. These may include aching or swollen joints, increased fatigue, loss of appetite, hair loss, skin rashes, or new sores in your mouth or nose. Where can you learn more? Go to https://[employer]. org and sign in to your b-datum account. Enter U512 in the [employer] box to learn more about \"Lupus: Care Instructions. \"     If you do not have an account, please click on the \"Sign Up Now\" link. Current as of: August 5, 2020               Content Version: 12.9  © 2006-2021 [employer]. Care instructions adapted under license by [facility] ([facility]). If you have questions about a medical condition or this instruction, always ask your healthcare professional. [place] [address] any warranty or liability for your use of this information. Patient Education        Dermatitis: Care Instructions  Your Care Instructions  Dermatitis is the general name used for any rash or inflammation of the skin.  Different kinds of dermatitis cause different kinds of rashes. Common causes of a rash include new medicines, plants (such as poison oak or poison ivy), heat, and stress. Certain illnesses can also cause a rash. An allergic reaction to something that touches your skin, such as latex, nickel, or poison ivy, is called contact dermatitis. Contact dermatitis may also be caused by something that irritates the skin, such as bleach, a chemical, or soap. These types of rashes cannot be spread from person to person. How long your rash will last depends on what caused it. Rashes may last a few days or months. Follow-up care is a key part of your treatment and safety. Be sure to make and go to all appointments, and call your doctor if you are having problems. It's also a good idea to know your test results and keep a list of the medicines you take. How can you care for yourself at home? · Do not scratch the rash. Cut your nails short, and file them smooth. Or wear gloves if this helps keep you from scratching. · Wash the area with water only. Pat dry. · Put cold, wet cloths on the rash to reduce itching. · Keep cool, and stay out of the sun. · Leave the rash open to the air as much as possible. · If the rash itches, use hydrocortisone cream. Follow the directions on the label. Calamine lotion may help for plant rashes. · Take an over-the-counter antihistamine, such as diphenhydramine (Benadryl) or loratadine (Claritin), to help calm the itching. Read and follow all instructions on the label. · If your doctor prescribed a cream, use it as directed. If your doctor prescribed medicine, take it exactly as directed. When should you call for help? Call your doctor now or seek immediate medical care if:    · You have symptoms of infection, such as:  ? Increased pain, swelling, warmth, or redness. ? Red streaks leading from the area. ? Pus draining from the area. ? A fever.     · You have joint pain along with the rash.    Watch closely for changes in your health, and be sure to contact your doctor if:    · Your rash is changing or getting worse.     · You are not getting better as expected. Where can you learn more? Go to https://[employer] Microstarsoftpelashonda"[email or web address]eb.Kanmu. org and sign in to your [employer] account. Enter [phone] in the [facility] box to learn more about \"Dermatitis: Care Instructions. \"     If you do not have an account, please click on the \"Sign Up Now\" link. Current as of: March 3, 2021               Content Version: 12.9  © 6892-7322 [employer], [employer]. Care instructions adapted under license by [address] If you have questions about a medical condition or this instruction, always ask your healthcare professional. [address] any warranty or liability for your use of this information.

## 2021-10-15 ENCOUNTER — HOSPITAL ENCOUNTER (OUTPATIENT)
Dept: MRI IMAGING | Age: 41
Discharge: HOME | End: 2021-10-15
Payer: MEDICAID

## 2021-10-15 DIAGNOSIS — M46.1 DEGENERATIVE JOINT DISEASE OF SACROILIAC JOINT (HCC): ICD-10-CM

## 2021-10-15 DIAGNOSIS — M54.89 INFLAMMATORY BACK PAIN: ICD-10-CM

## 2021-10-15 PROCEDURE — 72195 MRI PELVIS W/O DYE: CPT

## 2021-10-18 ENCOUNTER — PATIENT MESSAGE (OUTPATIENT)
Dept: RHEUMATOLOGY | Age: 41
End: 2021-10-18

## 2021-10-18 DIAGNOSIS — G89.29 CHRONIC MIDLINE LOW BACK PAIN, UNSPECIFIED WHETHER SCIATICA PRESENT: Primary | ICD-10-CM

## 2021-10-18 DIAGNOSIS — M54.50 CHRONIC MIDLINE LOW BACK PAIN, UNSPECIFIED WHETHER SCIATICA PRESENT: Primary | ICD-10-CM

## 2021-10-20 RX ORDER — DULOXETIN HYDROCHLORIDE 30 MG/1
30 CAPSULE, DELAYED RELEASE ORAL DAILY
Qty: 30 CAPSULE | Refills: 3 | Status: SHIPPED | OUTPATIENT
Start: 2021-10-20 | End: 2021-12-22 | Stop reason: ALTCHOICE

## 2021-10-20 NOTE — TELEPHONE ENCOUNTER
Diagnosis Orders   1.  Chronic midline low back pain, unspecified whether sciatica present  DULoxetine (CYMBALTA) 30 MG extended release capsule

## 2021-10-25 RX ORDER — PREDNISONE 10 MG/1
TABLET ORAL
Qty: 15 TABLET | Refills: 0 | Status: SHIPPED | OUTPATIENT
Start: 2021-10-25 | End: 2021-11-04

## 2021-10-25 RX ORDER — TIZANIDINE 2 MG/1
2 TABLET ORAL EVERY 8 HOURS PRN
Qty: 30 TABLET | Refills: 0 | Status: SHIPPED | OUTPATIENT
Start: 2021-10-25 | End: 2021-11-04

## 2021-11-12 ENCOUNTER — VIRTUAL VISIT (OUTPATIENT)
Dept: FAMILY MEDICINE CLINIC | Age: 41
End: 2021-11-12
Payer: MEDICAID

## 2021-11-12 DIAGNOSIS — J01.90 ACUTE BACTERIAL SINUSITIS: Primary | ICD-10-CM

## 2021-11-12 DIAGNOSIS — R05.9 COUGH: ICD-10-CM

## 2021-11-12 DIAGNOSIS — B96.89 ACUTE BACTERIAL SINUSITIS: Primary | ICD-10-CM

## 2021-11-12 PROCEDURE — 99213 OFFICE O/P EST LOW 20 MIN: CPT | Performed by: FAMILY MEDICINE

## 2021-11-12 PROCEDURE — G8427 DOCREV CUR MEDS BY ELIG CLIN: HCPCS | Performed by: FAMILY MEDICINE

## 2021-11-12 RX ORDER — SULFAMETHOXAZOLE AND TRIMETHOPRIM 800; 160 MG/1; MG/1
1 TABLET ORAL 2 TIMES DAILY
Qty: 20 TABLET | Refills: 0 | Status: SHIPPED | OUTPATIENT
Start: 2021-11-12 | End: 2021-11-22

## 2021-11-12 RX ORDER — PREDNISONE 20 MG/1
TABLET ORAL
Qty: 15 TABLET | Refills: 0 | Status: SHIPPED | OUTPATIENT
Start: 2021-11-12 | End: 2021-12-22 | Stop reason: ALTCHOICE

## 2021-11-12 ASSESSMENT — PATIENT HEALTH QUESTIONNAIRE - PHQ9
SUM OF ALL RESPONSES TO PHQ QUESTIONS 1-9: 0
SUM OF ALL RESPONSES TO PHQ QUESTIONS 1-9: 0
1. LITTLE INTEREST OR PLEASURE IN DOING THINGS: 0
2. FEELING DOWN, DEPRESSED OR HOPELESS: 0
SUM OF ALL RESPONSES TO PHQ9 QUESTIONS 1 & 2: 0
SUM OF ALL RESPONSES TO PHQ QUESTIONS 1-9: 0

## 2021-11-12 ASSESSMENT — ENCOUNTER SYMPTOMS
NAUSEA: 0
CONSTIPATION: 0
BLOOD IN STOOL: 0
BACK PAIN: 0
SHORTNESS OF BREATH: 0
ABDOMINAL PAIN: 0
CHEST TIGHTNESS: 0
VOMITING: 0
EYE PAIN: 0
DIARRHEA: 0
RHINORRHEA: 0
COUGH: 1
SINUS PRESSURE: 1
WHEEZING: 0
SORE THROAT: 0

## 2021-11-12 NOTE — PROGRESS NOTES
Jai Melendez (:  1980) is a 39 y.o. female,Established patient, here for evaluation of the following chief complaint(s): Sinus Problem         ASSESSMENT/PLAN:  1. Acute bacterial sinusitis  -     sulfamethoxazole-trimethoprim (BACTRIM DS) 800-160 MG per tablet; Take 1 tablet by mouth 2 times daily for 10 days, Disp-20 tablet, R-0Normal  2. Cough  -     predniSONE (DELTASONE) 20 MG tablet; Take 1 tab BID x 5 days, then 1 tab daily x 5 days. Take with food. , Disp-15 tablet, R-0Normal  -monitor sxs, call if not improving      No follow-ups on file. SUBJECTIVE/OBJECTIVE:  HPI  Pt seen for  4 days of sinus drainage, causing cough, chest pressure. Phlegm is clear. No fever or chills. , current smoker, pmh reviewed. Review of Systems   Constitutional: Negative for chills, fatigue, fever and unexpected weight change. HENT: Positive for postnasal drip and sinus pressure. Negative for congestion, ear pain, rhinorrhea and sore throat. Eyes: Negative for pain and visual disturbance. Respiratory: Positive for cough. Negative for chest tightness, shortness of breath and wheezing. Cardiovascular: Negative for chest pain and palpitations. Gastrointestinal: Negative for abdominal pain, blood in stool, constipation, diarrhea, nausea and vomiting. Genitourinary: Negative for difficulty urinating, frequency, hematuria and urgency. Musculoskeletal: Negative for back pain, joint swelling, myalgias and neck pain. Skin: Negative for rash. Neurological: Negative for dizziness and headaches. Hematological: Negative for adenopathy. Does not bruise/bleed easily. Psychiatric/Behavioral: Negative for behavioral problems and sleep disturbance. The patient is not nervous/anxious.         Patient-Reported Vitals 2021   Patient-Reported Weight 201.2   Patient-Reported Height 5'4   Patient-Reported Systolic 261   Patient-Reported Diastolic 66   Patient-Reported Pulse 107   Patient-Reported Temperature 97.9        Physical Exam  Constitutional:       General: She is not in acute distress. HENT:      Head: Normocephalic and atraumatic. Right Ear: External ear normal.      Left Ear: External ear normal.   Eyes:      General: No scleral icterus. Right eye: No discharge. Left eye: No discharge. Pulmonary:      Effort: Pulmonary effort is normal. No respiratory distress. Musculoskeletal:      Cervical back: Normal range of motion. Skin:     Findings: No rash. Neurological:      Mental Status: She is alert and oriented to person, place, and time. Psychiatric:         Mood and Affect: Mood normal.         Behavior: Behavior normal.                   Johan Koo, was evaluated through a synchronous (real-time) audio-video encounter. The patient (or guardian if applicable) is aware that this is a billable service. Verbal consent to proceed has been obtained within the past 12 months. The visit was conducted pursuant to the emergency declaration under the [facility], [address] [place] [address] authority and the "[employer] and [employer] General Act. Patient identification was verified, and a caregiver was present when appropriate. The patient was located in a state where the provider was credentialed to provide care. An electronic signature was used to authenticate this note.     --Leora Wilkinson MD

## 2021-11-12 NOTE — PATIENT INSTRUCTIONS
Patient Education        Sinusitis: Care Instructions  Your Care Instructions     Sinusitis is an infection of the lining of the sinus cavities in your head. Sinusitis often follows a cold. It causes pain and pressure in your head and face. In most cases, sinusitis gets better on its own in 1 to 2 weeks. But some mild symptoms may last for several weeks. Sometimes antibiotics are needed. Follow-up care is a key part of your treatment and safety. Be sure to make and go to all appointments, and call your doctor if you are having problems. It's also a good idea to know your test results and keep a list of the medicines you take. How can you care for yourself at home? · Take an over-the-counter pain medicine, such as acetaminophen (Tylenol), ibuprofen (Advil, Motrin), or naproxen (Aleve). Read and follow all instructions on the label. · If the doctor prescribed antibiotics, take them as directed. Do not stop taking them just because you feel better. You need to take the full course of antibiotics. · Be careful when taking over-the-counter cold or flu medicines and Tylenol at the same time. Many of these medicines have acetaminophen, which is Tylenol. Read the labels to make sure that you are not taking more than the recommended dose. Too much acetaminophen (Tylenol) can be harmful. · Breathe warm, moist air from a steamy shower, a hot bath, or a sink filled with hot water. Avoid cold, dry air. Using a humidifier in your home may help. Follow the directions for cleaning the machine. · Use saline (saltwater) nasal washes. This can help keep your nasal passages open and wash out mucus and bacteria. You can buy saline nose drops at a grocery store or drugstore. Or you can make your own at home by adding 1 teaspoon of salt and 1 teaspoon of baking soda to 2 cups of distilled water. If you make your own, fill a bulb syringe with the solution, insert the tip into your nostril, and squeeze gently.  Nitish powell nose.  · Put a hot, wet towel or a warm gel pack on your face 3 or 4 times a day for 5 to 10 minutes each time. · Try a decongestant nasal spray like oxymetazoline (Afrin). Do not use it for more than 3 days in a row. Using it for more than 3 days can make your congestion worse. When should you call for help? Call your doctor now or seek immediate medical care if:    · You have new or worse swelling or redness in your face or around your eyes.     · You have a new or higher fever. Watch closely for changes in your health, and be sure to contact your doctor if:    · You have new or worse facial pain.     · The mucus from your nose becomes thicker (like pus) or has new blood in it.     · You are not getting better as expected. Where can you learn more? Go to https://The ADEXpeSonda41.Truveris. org and sign in to your [employer] account. Enter E539 in the Jotky box to learn more about \"Sinusitis: Care Instructions. \"     If you do not have an account, please click on the \"Sign Up Now\" link. Current as of: December 2, 2020               Content Version: 13.0  © 0137-1067 Healthwise, [facility]. Care instructions adapted under license by [facility] ([facility]). If you have questions about a medical condition or this instruction, always ask your healthcare professional. [address]  any warranty or liability for your use of this information.

## 2021-11-18 ENCOUNTER — HOSPITAL ENCOUNTER (OUTPATIENT)
Age: 41
Discharge: HOME | End: 2021-11-18
Payer: MEDICAID

## 2021-11-18 ENCOUNTER — TELEPHONE (OUTPATIENT)
Dept: FAMILY MEDICINE CLINIC | Age: 41
End: 2021-11-18

## 2021-11-18 ENCOUNTER — HOSPITAL ENCOUNTER (OUTPATIENT)
Dept: GENERAL RADIOLOGY | Age: 41
Discharge: HOME | End: 2021-11-18
Payer: MEDICAID

## 2021-11-18 DIAGNOSIS — R05.9 COUGH: ICD-10-CM

## 2021-11-18 DIAGNOSIS — B96.89 ACUTE BACTERIAL SINUSITIS: ICD-10-CM

## 2021-11-18 DIAGNOSIS — J01.90 ACUTE BACTERIAL SINUSITIS: ICD-10-CM

## 2021-11-18 LAB
BASOPHILS # BLD: 0.7 %
BASOPHILS ABSOLUTE: 0.1 THOU/MM3 (ref 0–0.1)
EOSINOPHIL # BLD: 3.4 %
EOSINOPHILS ABSOLUTE: 0.3 THOU/MM3 (ref 0–0.4)
ERYTHROCYTE [DISTWIDTH] IN BLOOD BY AUTOMATED COUNT: 12.3 % (ref 11.5–14.5)
ERYTHROCYTE [DISTWIDTH] IN BLOOD BY AUTOMATED COUNT: 40.5 FL (ref 35–45)
HCT VFR BLD CALC: 45.9 % (ref 37–47)
HEMOGLOBIN: 15.8 GM/DL (ref 12–16)
IMMATURE GRANS (ABS): 0.02 THOU/MM3 (ref 0–0.07)
IMMATURE GRANULOCYTES: 0.2 %
LYMPHOCYTES # BLD: 31.4 %
LYMPHOCYTES ABSOLUTE: 2.9 THOU/MM3 (ref 1–4.8)
MCH RBC QN AUTO: 31.5 PG (ref 26–33)
MCHC RBC AUTO-ENTMCNC: 34.4 GM/DL (ref 32.2–35.5)
MCV RBC AUTO: 91.4 FL (ref 81–99)
MONOCYTES # BLD: 5.2 %
MONOCYTES ABSOLUTE: 0.5 THOU/MM3 (ref 0.4–1.3)
NUCLEATED RED BLOOD CELLS: 0 /100 WBC
PLATELET # BLD: 316 THOU/MM3 (ref 130–400)
PMV BLD AUTO: 10.7 FL (ref 9.4–12.4)
RBC # BLD: 5.02 MILL/MM3 (ref 4.2–5.4)
SEDIMENTATION RATE, ERYTHROCYTE: 13 MM/HR (ref 0–20)
SEG NEUTROPHILS: 59.1 %
SEGMENTED NEUTROPHILS ABSOLUTE COUNT: 5.4 THOU/MM3 (ref 1.8–7.7)
WBC # BLD: 9.2 THOU/MM3 (ref 4.8–10.8)

## 2021-11-18 PROCEDURE — 36415 COLL VENOUS BLD VENIPUNCTURE: CPT

## 2021-11-18 PROCEDURE — 85651 RBC SED RATE NONAUTOMATED: CPT

## 2021-11-18 PROCEDURE — 85025 COMPLETE CBC W/AUTO DIFF WBC: CPT

## 2021-11-18 PROCEDURE — 71046 X-RAY EXAM CHEST 2 VIEWS: CPT

## 2021-11-23 ENCOUNTER — OFFICE VISIT (OUTPATIENT)
Dept: FAMILY MEDICINE CLINIC | Age: 41
End: 2021-11-23
Payer: MEDICAID

## 2021-11-23 VITALS
TEMPERATURE: 98.6 F | SYSTOLIC BLOOD PRESSURE: 118 MMHG | HEART RATE: 85 BPM | OXYGEN SATURATION: 98 % | RESPIRATION RATE: 20 BRPM | WEIGHT: 200.8 LBS | BODY MASS INDEX: 34.45 KG/M2 | DIASTOLIC BLOOD PRESSURE: 70 MMHG

## 2021-11-23 DIAGNOSIS — L98.9 SKIN LESION OF RIGHT ARM: ICD-10-CM

## 2021-11-23 DIAGNOSIS — B96.89 ACUTE BACTERIAL SINUSITIS: Primary | ICD-10-CM

## 2021-11-23 DIAGNOSIS — J01.90 ACUTE BACTERIAL SINUSITIS: Primary | ICD-10-CM

## 2021-11-23 PROCEDURE — G8484 FLU IMMUNIZE NO ADMIN: HCPCS | Performed by: FAMILY MEDICINE

## 2021-11-23 PROCEDURE — 4004F PT TOBACCO SCREEN RCVD TLK: CPT | Performed by: FAMILY MEDICINE

## 2021-11-23 PROCEDURE — G8417 CALC BMI ABV UP PARAM F/U: HCPCS | Performed by: FAMILY MEDICINE

## 2021-11-23 PROCEDURE — G8427 DOCREV CUR MEDS BY ELIG CLIN: HCPCS | Performed by: FAMILY MEDICINE

## 2021-11-23 PROCEDURE — 99213 OFFICE O/P EST LOW 20 MIN: CPT | Performed by: FAMILY MEDICINE

## 2021-11-23 RX ORDER — CEPHALEXIN 250 MG/5ML
POWDER, FOR SUSPENSION ORAL
Qty: 300 ML | Refills: 0 | Status: SHIPPED | OUTPATIENT
Start: 2021-11-23 | End: 2021-12-01

## 2021-11-23 ASSESSMENT — ENCOUNTER SYMPTOMS
BACK PAIN: 0
CONSTIPATION: 0
ABDOMINAL PAIN: 0
SHORTNESS OF BREATH: 0
CHEST TIGHTNESS: 0
BLOOD IN STOOL: 0
VOMITING: 0
NAUSEA: 0
EYE PAIN: 0
SORE THROAT: 0
DIARRHEA: 0
WHEEZING: 0
RHINORRHEA: 1
COUGH: 1

## 2021-11-23 NOTE — PATIENT INSTRUCTIONS
Patient Education        Sinusitis: Care Instructions  Your Care Instructions     Sinusitis is an infection of the lining of the sinus cavities in your head. Sinusitis often follows a cold. It causes pain and pressure in your head and face. In most cases, sinusitis gets better on its own in 1 to 2 weeks. But some mild symptoms may last for several weeks. Sometimes antibiotics are needed. Follow-up care is a key part of your treatment and safety. Be sure to make and go to all appointments, and call your doctor if you are having problems. It's also a good idea to know your test results and keep a list of the medicines you take. How can you care for yourself at home? · Take an over-the-counter pain medicine, such as acetaminophen (Tylenol), ibuprofen (Advil, Motrin), or naproxen (Aleve). Read and follow all instructions on the label. · If the doctor prescribed antibiotics, take them as directed. Do not stop taking them just because you feel better. You need to take the full course of antibiotics. · Be careful when taking over-the-counter cold or flu medicines and Tylenol at the same time. Many of these medicines have acetaminophen, which is Tylenol. Read the labels to make sure that you are not taking more than the recommended dose. Too much acetaminophen (Tylenol) can be harmful. · Breathe warm, moist air from a steamy shower, a hot bath, or a sink filled with hot water. Avoid cold, dry air. Using a humidifier in your home may help. Follow the directions for cleaning the machine. · Use saline (saltwater) nasal washes. This can help keep your nasal passages open and wash out mucus and bacteria. You can buy saline nose drops at a grocery store or drugstore. Or you can make your own at home by adding 1 teaspoon of salt and 1 teaspoon of baking soda to 2 cups of distilled water. If you make your own, fill a bulb syringe with the solution, insert the tip into your nostril, and squeeze gently.  Nitish powell nose.  · Put a hot, wet towel or a warm gel pack on your face 3 or 4 times a day for 5 to 10 minutes each time. · Try a decongestant nasal spray like oxymetazoline (Afrin). Do not use it for more than 3 days in a row. Using it for more than 3 days can make your congestion worse. When should you call for help? Call your doctor now or seek immediate medical care if:    · You have new or worse swelling or redness in your face or around your eyes.     · You have a new or higher fever. Watch closely for changes in your health, and be sure to contact your doctor if:    · You have new or worse facial pain.     · The mucus from your nose becomes thicker (like pus) or has new blood in it.     · You are not getting better as expected. Where can you learn more? Go to https://CorvaliuspeLiquidSpace.ND Acquisitions. org and sign in to your [employer] account. Enter B786 in the Soapets box to learn more about \"Sinusitis: Care Instructions. \"     If you do not have an account, please click on the \"Sign Up Now\" link. Current as of: December 2, 2020               Content Version: 13.0  © 6821-7106 Healthwise, [facility]. Care instructions adapted under license by [facility] ([facility]). If you have questions about a medical condition or this instruction, always ask your healthcare professional. [address] any warranty or liability for your use of this information.

## 2021-11-23 NOTE — PROGRESS NOTES
Steffanie Ryder (:  1980) is a 39 y.o. female,Established patient, here for evaluation of the following chief complaint(s):  Skin Lesion (right wrist, congestion, cough, nasal drainage)         ASSESSMENT/PLAN:  1. Acute bacterial sinusitis  -     cephALEXin (KEFLEX) 250 MG/5ML suspension; Take 2 tsp by mouth three times a day for 10 days. , Disp-300 mL, R-0Normal  -monitor sxs, call if not improving    2. Skin lesion of right arm  -     cephALEXin (KEFLEX) 250 MG/5ML suspension; Take 2 tsp by mouth three times a day for 10 days. , Disp-300 mL, R-0Normal  -monitor lesion, if does not change with antibiotic, recommend excision. No follow-ups on file. Subjective   SUBJECTIVE/OBJECTIVE:  HPI  Patient here today for a check up. Reviewed BMI of  34. Encouraged diet, exercise and weight loss. Has a red lesion on her right wrist area. Has had for about 3 weeks. It is enlarging. Tried aclovate, no help. Also congestion, cough, nasal drainage. Has had for about 3 weeks. Took bactrim and prednisone, no help. Partnered, current smoker, pmh reviewed. Review of Systems   Constitutional: Negative for chills, fatigue, fever and unexpected weight change. HENT: Positive for congestion and rhinorrhea. Negative for ear pain and sore throat. Eyes: Negative for pain and visual disturbance. Respiratory: Positive for cough. Negative for chest tightness, shortness of breath and wheezing. Cardiovascular: Negative for chest pain and palpitations. Gastrointestinal: Negative for abdominal pain, blood in stool, constipation, diarrhea, nausea and vomiting. Genitourinary: Negative for difficulty urinating, frequency, hematuria and urgency. Musculoskeletal: Negative for back pain, joint swelling, myalgias and neck pain. Skin: Negative for rash. Neurological: Negative for dizziness and headaches. Hematological: Negative for adenopathy. Does not bruise/bleed easily.    Psychiatric/Behavioral: Negative for behavioral problems and sleep disturbance. The patient is not nervous/anxious. Objective   Physical Exam  Vitals and nursing note reviewed. Constitutional:       Appearance: She is well-developed. HENT:      Head: Normocephalic and atraumatic. Right Ear: External ear normal.      Left Ear: External ear normal.      Nose:      Right Sinus: Frontal sinus tenderness present. Left Sinus: Frontal sinus tenderness present. Mouth/Throat:      Mouth: Mucous membranes are moist.   Eyes:      Pupils: Pupils are equal, round, and reactive to light. Neck:      Thyroid: No thyromegaly. Cardiovascular:      Rate and Rhythm: Normal rate and regular rhythm. Heart sounds: Normal heart sounds. Pulmonary:      Breath sounds: Normal breath sounds. No wheezing or rales. Abdominal:      General: Bowel sounds are normal.      Palpations: Abdomen is soft. Tenderness: There is no abdominal tenderness. There is no guarding or rebound. Musculoskeletal:         General: Normal range of motion. Cervical back: Neck supple. Lymphadenopathy:      Cervical: No cervical adenopathy. Skin:     General: Skin is warm and dry. Findings: No rash. Neurological:      Mental Status: She is alert and oriented to person, place, and time. Cranial Nerves: No cranial nerve deficit. Deep Tendon Reflexes: Reflexes are normal and symmetric. An electronic signature was used to authenticate this note.     --Idella Boas, MD

## 2021-12-01 ENCOUNTER — APPOINTMENT (OUTPATIENT)
Dept: GENERAL RADIOLOGY | Age: 41
End: 2021-12-01
Payer: MEDICAID

## 2021-12-01 ENCOUNTER — HOSPITAL ENCOUNTER (EMERGENCY)
Age: 41
Discharge: HOME | End: 2021-12-01
Attending: EMERGENCY MEDICINE
Payer: MEDICAID

## 2021-12-01 ENCOUNTER — PATIENT MESSAGE (OUTPATIENT)
Dept: FAMILY MEDICINE CLINIC | Age: 41
End: 2021-12-01

## 2021-12-01 VITALS
OXYGEN SATURATION: 96 % | TEMPERATURE: 98.7 F | HEART RATE: 89 BPM | RESPIRATION RATE: 18 BRPM | DIASTOLIC BLOOD PRESSURE: 91 MMHG | SYSTOLIC BLOOD PRESSURE: 111 MMHG

## 2021-12-01 DIAGNOSIS — B96.89 ACUTE BACTERIAL SINUSITIS: Primary | ICD-10-CM

## 2021-12-01 DIAGNOSIS — J01.90 ACUTE BACTERIAL SINUSITIS: Primary | ICD-10-CM

## 2021-12-01 DIAGNOSIS — R09.81 CONGESTION OF NASAL SINUS: Primary | ICD-10-CM

## 2021-12-01 LAB
ALBUMIN SERPL-MCNC: 4.4 G/DL (ref 3.5–5.1)
ALP BLD-CCNC: 94 U/L (ref 38–126)
ALT SERPL-CCNC: 15 U/L (ref 11–66)
ANION GAP SERPL CALCULATED.3IONS-SCNC: 13 MEQ/L (ref 8–16)
AST SERPL-CCNC: 11 U/L (ref 5–40)
BASOPHILS # BLD: 0.7 %
BASOPHILS ABSOLUTE: 0.1 THOU/MM3 (ref 0–0.1)
BILIRUB SERPL-MCNC: 0.6 MG/DL (ref 0.3–1.2)
BILIRUBIN URINE: NEGATIVE
BLOOD, URINE: NEGATIVE
BUN BLDV-MCNC: 11 MG/DL (ref 7–22)
C-REACTIVE PROTEIN: 0.48 MG/DL (ref 0–1)
CALCIUM SERPL-MCNC: 9.3 MG/DL (ref 8.5–10.5)
CHARACTER, URINE: CLEAR
CHLORIDE BLD-SCNC: 105 MEQ/L (ref 98–111)
CO2: 21 MEQ/L (ref 23–33)
COLOR: YELLOW
CREAT SERPL-MCNC: 0.8 MG/DL (ref 0.4–1.2)
EOSINOPHIL # BLD: 2.5 %
EOSINOPHILS ABSOLUTE: 0.2 THOU/MM3 (ref 0–0.4)
ERYTHROCYTE [DISTWIDTH] IN BLOOD BY AUTOMATED COUNT: 12.4 % (ref 11.5–14.5)
ERYTHROCYTE [DISTWIDTH] IN BLOOD BY AUTOMATED COUNT: 39.9 FL (ref 35–45)
FLU A ANTIGEN: NEGATIVE
FLU B ANTIGEN: NEGATIVE
GFR SERPL CREATININE-BSD FRML MDRD: 79 ML/MIN/1.73M2
GLUCOSE BLD-MCNC: 95 MG/DL (ref 70–108)
GLUCOSE URINE: NEGATIVE MG/DL
HCT VFR BLD CALC: 44.9 % (ref 37–47)
HEMOGLOBIN: 15.8 GM/DL (ref 12–16)
HETEROPHILE ANTIBODIES: NEGATIVE
IMMATURE GRANS (ABS): 0.01 THOU/MM3 (ref 0–0.07)
IMMATURE GRANULOCYTES: 0.1 %
KETONES, URINE: NEGATIVE
LEUKOCYTE ESTERASE, URINE: NEGATIVE
LYMPHOCYTES # BLD: 27.3 %
LYMPHOCYTES ABSOLUTE: 2.1 THOU/MM3 (ref 1–4.8)
MCH RBC QN AUTO: 31.7 PG (ref 26–33)
MCHC RBC AUTO-ENTMCNC: 35.2 GM/DL (ref 32.2–35.5)
MCV RBC AUTO: 90 FL (ref 81–99)
MONOCYTES # BLD: 7.6 %
MONOCYTES ABSOLUTE: 0.6 THOU/MM3 (ref 0.4–1.3)
NITRITE, URINE: NEGATIVE
NUCLEATED RED BLOOD CELLS: 0 /100 WBC
OSMOLALITY CALCULATION: 276.7 MOSMOL/KG (ref 275–300)
PH UA: 6 (ref 5–9)
PLATELET # BLD: 274 THOU/MM3 (ref 130–400)
PMV BLD AUTO: 10.8 FL (ref 9.4–12.4)
POTASSIUM REFLEX MAGNESIUM: 4 MEQ/L (ref 3.5–5.2)
PREGNANCY, SERUM: NEGATIVE
PRO-BNP: 6.2 PG/ML (ref 0–450)
PROCALCITONIN: 0.03 NG/ML (ref 0.01–0.09)
PROTEIN UA: NEGATIVE
RBC # BLD: 4.99 MILL/MM3 (ref 4.2–5.4)
SARS-COV-2, NAAT: NOT  DETECTED
SEDIMENTATION RATE, ERYTHROCYTE: 7 MM/HR (ref 0–20)
SEG NEUTROPHILS: 61.8 %
SEGMENTED NEUTROPHILS ABSOLUTE COUNT: 4.7 THOU/MM3 (ref 1.8–7.7)
SODIUM BLD-SCNC: 139 MEQ/L (ref 135–145)
SPECIFIC GRAVITY, URINE: 1.01 (ref 1–1.03)
TOTAL PROTEIN: 7.6 G/DL (ref 6.1–8)
TROPONIN T: < 0.01 NG/ML
UROBILINOGEN, URINE: 0.2 EU/DL (ref 0–1)
WBC # BLD: 7.6 THOU/MM3 (ref 4.8–10.8)

## 2021-12-01 PROCEDURE — 80053 COMPREHEN METABOLIC PANEL: CPT

## 2021-12-01 PROCEDURE — 81003 URINALYSIS AUTO W/O SCOPE: CPT

## 2021-12-01 PROCEDURE — 87804 INFLUENZA ASSAY W/OPTIC: CPT

## 2021-12-01 PROCEDURE — 99282 EMERGENCY DEPT VISIT SF MDM: CPT

## 2021-12-01 PROCEDURE — 85651 RBC SED RATE NONAUTOMATED: CPT

## 2021-12-01 PROCEDURE — 85025 COMPLETE CBC W/AUTO DIFF WBC: CPT

## 2021-12-01 PROCEDURE — 84145 PROCALCITONIN (PCT): CPT

## 2021-12-01 PROCEDURE — 71046 X-RAY EXAM CHEST 2 VIEWS: CPT

## 2021-12-01 PROCEDURE — 86140 C-REACTIVE PROTEIN: CPT

## 2021-12-01 PROCEDURE — 83880 ASSAY OF NATRIURETIC PEPTIDE: CPT

## 2021-12-01 PROCEDURE — 36415 COLL VENOUS BLD VENIPUNCTURE: CPT

## 2021-12-01 PROCEDURE — 84484 ASSAY OF TROPONIN QUANT: CPT

## 2021-12-01 PROCEDURE — 93005 ELECTROCARDIOGRAM TRACING: CPT | Performed by: STUDENT IN AN ORGANIZED HEALTH CARE EDUCATION/TRAINING PROGRAM

## 2021-12-01 PROCEDURE — 86308 HETEROPHILE ANTIBODY SCREEN: CPT

## 2021-12-01 PROCEDURE — 84703 CHORIONIC GONADOTROPIN ASSAY: CPT

## 2021-12-01 PROCEDURE — 87635 SARS-COV-2 COVID-19 AMP PRB: CPT

## 2021-12-01 RX ORDER — SULFAMETHOXAZOLE AND TRIMETHOPRIM 800; 160 MG/1; MG/1
1 TABLET ORAL 2 TIMES DAILY
Qty: 20 TABLET | Refills: 0 | Status: SHIPPED | OUTPATIENT
Start: 2021-12-01 | End: 2021-12-11

## 2021-12-01 ASSESSMENT — ENCOUNTER SYMPTOMS
COUGH: 1
DIARRHEA: 1
SHORTNESS OF BREATH: 1
SINUS PAIN: 1
SINUS PRESSURE: 1
BACK PAIN: 0
RHINORRHEA: 1
CONSTIPATION: 0
EYE PAIN: 0
NAUSEA: 0
VOMITING: 0
ABDOMINAL PAIN: 0
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
## 2021-12-01 NOTE — ED NOTES
Pt to the ED via self with family. Patient presents with complaints of being sick for over a month. Patient states that no doctor has been able to tell her what's wrong. Patient is alert and oriented x 4. Respirations are regular and unlabored. Call light within reach.      Terence Valadez RN  12/01/21 9817

## 2021-12-02 LAB
EKG ATRIAL RATE: 79 BPM
EKG P AXIS: 75 DEGREES
EKG P-R INTERVAL: 140 MS
EKG Q-T INTERVAL: 378 MS
EKG QRS DURATION: 72 MS
EKG QTC CALCULATION (BAZETT): 433 MS
EKG R AXIS: 72 DEGREES
EKG T AXIS: 67 DEGREES
EKG VENTRICULAR RATE: 79 BPM

## 2021-12-02 PROCEDURE — 93010 ELECTROCARDIOGRAM REPORT: CPT | Performed by: INTERNAL MEDICINE

## 2021-12-22 ENCOUNTER — OFFICE VISIT (OUTPATIENT)
Dept: RHEUMATOLOGY | Age: 41
End: 2021-12-22
Payer: MEDICAID

## 2021-12-22 VITALS
SYSTOLIC BLOOD PRESSURE: 116 MMHG | HEART RATE: 77 BPM | HEIGHT: 64 IN | DIASTOLIC BLOOD PRESSURE: 68 MMHG | BODY MASS INDEX: 35.3 KG/M2 | WEIGHT: 206.8 LBS | OXYGEN SATURATION: 97 %

## 2021-12-22 DIAGNOSIS — M54.50 CHRONIC MIDLINE LOW BACK PAIN, UNSPECIFIED WHETHER SCIATICA PRESENT: ICD-10-CM

## 2021-12-22 DIAGNOSIS — R21 SCALY PATCH RASH: Primary | ICD-10-CM

## 2021-12-22 DIAGNOSIS — M46.1 DEGENERATIVE JOINT DISEASE OF SACROILIAC JOINT (HCC): ICD-10-CM

## 2021-12-22 DIAGNOSIS — G89.29 CHRONIC MIDLINE LOW BACK PAIN, UNSPECIFIED WHETHER SCIATICA PRESENT: ICD-10-CM

## 2021-12-22 DIAGNOSIS — M32.9 H/O SYSTEMIC LUPUS ERYTHEMATOSUS (SLE) (HCC): ICD-10-CM

## 2021-12-22 PROCEDURE — 4004F PT TOBACCO SCREEN RCVD TLK: CPT | Performed by: INTERNAL MEDICINE

## 2021-12-22 PROCEDURE — G8427 DOCREV CUR MEDS BY ELIG CLIN: HCPCS | Performed by: INTERNAL MEDICINE

## 2021-12-22 PROCEDURE — 99213 OFFICE O/P EST LOW 20 MIN: CPT | Performed by: INTERNAL MEDICINE

## 2021-12-22 PROCEDURE — G8484 FLU IMMUNIZE NO ADMIN: HCPCS | Performed by: INTERNAL MEDICINE

## 2021-12-22 PROCEDURE — G8417 CALC BMI ABV UP PARAM F/U: HCPCS | Performed by: INTERNAL MEDICINE

## 2021-12-22 ASSESSMENT — ENCOUNTER SYMPTOMS
DIARRHEA: 0
NAUSEA: 0
COUGH: 1
EYE PAIN: 0
EYES NEGATIVE: 1
VOMITING: 0
SHORTNESS OF BREATH: 1
EYE REDNESS: 0
CONSTIPATION: 0
WHEEZING: 1

## 2021-12-22 NOTE — PROGRESS NOTES
[facility] RHEUMATOLOGY FOLLOW UP NOTE     Date Of Service: 12/22/2021  Provider: Shannon Lara DO , DO  PCP: Justin Su MD   Name: Raúl Collins   MRN: [id number]    ASSESSMENT/PLAN:   1. Scaly patch rash  -     External Referral To Dermatology    1 h/o systemic lupus erythematosus (SLE) ([facility] [address] -- negative serologies at this time.                - attempt to request Rheumatology records from [place] ([address])               - off  Plaquenil 200mg daily since Dec 2019.                - f/u in 1 year. -- if stable will d/c.    - no evidence of SLE - holding on medication at this time.         2. Chronic back pain  Chronic back pain diagnosed with DDD for several years. MRI recently with ? Syrinx in T  With repeat imaging recommended. + floaters in eyes. + paresthesias of hands with arms with tinel testing at wrist and cubital tunnel. demnished DTR bilat upper and loewr ext. S/p : NSAID, phys therapy. Spinal injections. MRI w/ SI joint DJD no sacroiliits. - pain management evaluation scheduled for jan. 2022.                  3. Bilateral shoulder pain -- improvement                - phys therapy recently started 9/14/2021 - improved pain     4. Asthma - continued flares - using nebulizer daily - schedule for pulmonary f/u appointment on 12/29/2021. Return in about 6 months (around 6/22/2022). New Prescriptions    No medications on file     History of Present Illness (HPI)     Chief Complaint   Patient presents with    3 Month Follow-Up        Raúl Collins  is a(n)41 y.o. female with a hx of  has a past medical history of Anxiety, Asthma, DDD (degenerative disc disease), cervical, DDD (degenerative disc disease), thoracolumbar, Depression, and GERD (gastroesophageal reflux disease). here for the f/u evaluation     Interval hx:   Left shoulder pain - improvement w/ physical atheapry.      Chronic back pain / polyarthralgia ---- Cymbalta started at 30 mg daily October 2021. - stopped by patient 3 weeks afterward b/c fatigue. Evaluation by pain management in jan 2021. Ongoing arthralgi in the left wrist, left elbows, left > right shoulder, left ankle, neck and lower back. -- URI since nov. 2021 - diagnosed in as sinusitis and URI s/p 2 antibiotic courses. Continued congestion, nasal drainage, cough w/ clear to greenish phelm. - SOB , wheezing  , asthma - increased since uri sx's. --- using the nebulizer 8-10 times per day. Denies fevers, chilles, hearing chnages, ear congestion, ear pains. Epistaxis, nasal sores,  hot flashes. -- rash on forehead, / eye brows pereists. Denies associated itching. REVIEW OF SYSTEMS: (ROS)    Review of Systems   Constitutional: Positive for fatigue. Negative for diaphoresis and fever. HENT: Positive for congestion. Negative for hearing loss and nosebleeds. Eyes: Negative. Negative for pain and redness. Respiratory: Positive for cough, shortness of breath and wheezing. Cardiovascular: Negative. Negative for chest pain. Gastrointestinal: Negative for constipation, diarrhea, nausea and vomiting. Genitourinary: Negative for difficulty urinating, frequency and hematuria. Musculoskeletal: Negative for myalgias. Skin: Negative for rash. Neurological: Negative for dizziness, weakness and headaches. Hematological: Does not bruise/bleed easily. Psychiatric/Behavioral: Negative for sleep disturbance. The patient is not nervous/anxious. PmHx:  has a past medical history of Anxiety, Asthma, DDD (degenerative disc disease), cervical, DDD (degenerative disc disease), thoracolumbar, Depression, and GERD (gastroesophageal reflux disease). Social History:  reports that she has been smoking cigarettes. She has been smoking about 1.00 pack per day. She has never used smokeless tobacco. She reports previous drug use. She reports that she does not drink alcohol.     ALLERGIES     Allergies   Allergen Reactions    Hydrocodone \"makes me feel completely out of whack\"    Prozac [Fluoxetine Hcl] Other (See Comments)     \"I want to kill myself\"       CURRENT MEDICATIONS      Current Outpatient Medications:     predniSONE (DELTASONE) 20 MG tablet, Take 1 tab BID x 5 days, then 1 tab daily x 5 days. Take with food. , Disp: 15 tablet, Rfl: 0    DULoxetine (CYMBALTA) 30 MG extended release capsule, Take 1 capsule by mouth daily, Disp: 30 capsule, Rfl: 3    alclomethasone (ACLOVATE) 0.05 % ointment, Apply topically 2 times daily. , Disp: 45 g, Rfl: 2    albuterol sulfate  (90 Base) MCG/ACT inhaler, INHALE TWO (2) PUFFS INTO THE LUNGS EVERY FOUR (4) HOURS AS NEEDED FOR WHEEZING , Disp: 18 g, Rfl: 11    albuterol (PROVENTIL) (2.5 MG/3ML) 0.083% nebulizer solution, Take 3 mLs by nebulization every 6 hours as needed for Wheezing or Shortness of Breath, Disp: 120 vial, Rfl: 11    Spacer/Aero-Holding Chambers (VORTEX VALVED HOLDING CHAMBER) SUJATHA, 1 Device by Does not apply route daily Use with inhalers, Disp: 1 Device, Rfl: 0    lidocaine (XYLOCAINE) 5 % ointment, Apply topically as needed. , Disp: 50 g, Rfl: 0    RaNITidine HCl (ACID REDUCER PO), Take by mouth as needed , Disp: , Rfl:     PHYSICAL EXAMINATION / OBJECTIVE     Objective:  /68 (Site: Left Upper Arm, Position: Sitting, Cuff Size: Medium Adult)   Pulse 77   Ht 5' 4.02\" (1.626 m)   Wt 206 lb 12.8 oz (93.8 kg)   SpO2 97%   BMI 35.48 kg/m²     Physical Exam    General Appearance: General appearance:  AAO x 3 ,  well-developed and well nourished  Head: NCAT  Eyes: No abnormalities. ,  Sclera non-icteric,   Ears / Nose:  normal  appearance  ears and nose. No active drainage from nose. Mouth:  MMM, ears w/o deformities  Neck: No jugular venous distention, appears symmetric, good ROM  Lymph: no cervical adenopathy   Pulmonary/Chest: CTA bilateral ,  symmetric chest expansion.    Cardiovascular: Normal S1 and S2, NO murmur, rub, gallop  Neurologic: Speech normal, no facial droop,  Skin: red plaque bilat eye brows, glabella, and frontal partial of scalp. Scalp rash with white flaking palques.    Musculoskeletal:  Upper extremities:  Non-tender, No swelling   Lower extremities:  Non-tender, No swelling   Spine: C-spine, T-spine & L-spine:  Tender mid to upper lumbar spine       MDHAQ:   12/22/2021 --- MDHAQ  2.3 + 2 + 2 = 6.3       Remission: <3  Low Disease Activity: <6  Moderate Disease Activity: >=6 and <=12  High Disease Activity: >12     LABS      Lab Results   Component Value Date    WBC 7.6 12/01/2021    HGB 15.8 12/01/2021    MCV 90.0 12/01/2021    MCHC 35.2 12/01/2021     12/01/2021    LYMPHSABS 2.1 12/01/2021    EOSABS 0.2 12/01/2021    BASOSABS 0.1 12/01/2021         Chemistry        Component Value Date/Time     12/01/2021 1541    K 4.0 12/01/2021 1541     12/01/2021 1541    CO2 21 (L) 12/01/2021 1541    BUN 11 12/01/2021 1541    CREATININE 0.8 12/01/2021 1541        Component Value Date/Time    CALCIUM 9.3 12/01/2021 1541    ALKPHOS 94 12/01/2021 1541    AST 11 12/01/2021 1541    ALT 15 12/01/2021 1541    BILITOT 0.6 12/01/2021 1541            Lab Results   Component Value Date    SEDRATE 7 12/01/2021    SEDRATE 13 11/18/2021    SEDRATE 5 09/15/2021    CRP 0.48 12/01/2021    CRP < 0.30 09/15/2021    CRP 0.41 04/17/2020       No results found for: VITD25    No results found for: ANASCRN  No results found for: SSA  No results found for: SSB  No results found for: ANTI-SMITH  No results found for: DSDNAAB   No results found for: ANTIRNP  Lab Results   Component Value Date    C3 141 09/15/2021    C4 21 09/15/2021     No results found for: CCPAB  No results found for: RF        RADIOLOGY / PROCEDURES:     PROCEDURE: MRI PELVIS WO CONTRAST       CLINICAL INFORMATION: Inflammatory back pain, Degenerative joint disease of sacroiliac joint ([facility] [address]       COMPARISON: Radiographs of the SI joint 9/15/2020       TECHNIQUE: Routine MRI pelvis without contrast.         FINDINGS:    ALIGNMENT: Anatomic.       MARROW SIGNAL: There is preponderance of red marrow signal..       MARROW EDEMA/FRACTURE: No acute fracture.       TENDONS: Intact.       MUSCULATURE: Intact.       SACRUM/SACROILIAC JOINTS: The right SI joint shows mild degeneration. Mild degeneration along the left SI joint. There is sclerosis at the inferior left SI joint without bone marrow edema.       OTHER: None.       INTRAPELVIC CONTENTS: Multiple nabothian cysts seen in the cervix. Myometrial cyst are also seen in the lower uterine segment. The uterus is anteflexed. Incidental note is made of a mild disc bulge with annular tear at L4-L5. . 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               Impression       1. Mild degenerative changes of both SI joints, left greater than right.       2. Preponderance of red marrow signal is noted. Differential consideration would include normal variant, increased physiologic stress such as with exercise, smoking, obesity, and hematological disorders. Clinical correlation is recommended.       3. Mild disc bulge with annular tear is seen at L4-L5.                     Electronically signed by Essie CABALLERO DO on 12/22/21 at 11:28 AM EST  Please contact the office if you have any questions or change of symptoms.

## 2021-12-29 ENCOUNTER — OFFICE VISIT (OUTPATIENT)
Dept: PULMONOLOGY | Age: 41
End: 2021-12-29
Payer: MEDICAID

## 2021-12-29 VITALS
OXYGEN SATURATION: 99 % | TEMPERATURE: 97.7 F | BODY MASS INDEX: 34.38 KG/M2 | HEART RATE: 93 BPM | HEIGHT: 64 IN | SYSTOLIC BLOOD PRESSURE: 116 MMHG | WEIGHT: 201.4 LBS | DIASTOLIC BLOOD PRESSURE: 74 MMHG

## 2021-12-29 DIAGNOSIS — R05.8 PRODUCTIVE COUGH: ICD-10-CM

## 2021-12-29 DIAGNOSIS — E66.9 OBESITY (BMI 30-39.9): ICD-10-CM

## 2021-12-29 DIAGNOSIS — J45.40 MODERATE PERSISTENT ASTHMA WITHOUT COMPLICATION: Primary | ICD-10-CM

## 2021-12-29 DIAGNOSIS — Z91.14 NON COMPLIANCE W MEDICATION REGIMEN: ICD-10-CM

## 2021-12-29 DIAGNOSIS — F17.200 SMOKER UNMOTIVATED TO QUIT: ICD-10-CM

## 2021-12-29 PROCEDURE — G8417 CALC BMI ABV UP PARAM F/U: HCPCS | Performed by: NURSE PRACTITIONER

## 2021-12-29 PROCEDURE — 4004F PT TOBACCO SCREEN RCVD TLK: CPT | Performed by: NURSE PRACTITIONER

## 2021-12-29 PROCEDURE — 94010 BREATHING CAPACITY TEST: CPT | Performed by: NURSE PRACTITIONER

## 2021-12-29 PROCEDURE — G8484 FLU IMMUNIZE NO ADMIN: HCPCS | Performed by: NURSE PRACTITIONER

## 2021-12-29 PROCEDURE — G8427 DOCREV CUR MEDS BY ELIG CLIN: HCPCS | Performed by: NURSE PRACTITIONER

## 2021-12-29 PROCEDURE — 99214 OFFICE O/P EST MOD 30 MIN: CPT | Performed by: NURSE PRACTITIONER

## 2021-12-29 RX ORDER — ALBUTEROL SULFATE 90 UG/1
2 AEROSOL, METERED RESPIRATORY (INHALATION) 4 TIMES DAILY PRN
Qty: 18 G | Refills: 5 | Status: SHIPPED | OUTPATIENT
Start: 2021-12-29 | End: 2022-03-03 | Stop reason: SDUPTHER

## 2021-12-29 RX ORDER — BENZONATATE 100 MG/1
100 CAPSULE ORAL 3 TIMES DAILY PRN
Qty: 30 CAPSULE | Refills: 0 | Status: SHIPPED | OUTPATIENT
Start: 2021-12-29 | End: 2022-01-13

## 2021-12-29 ASSESSMENT — ENCOUNTER SYMPTOMS
CHEST TIGHTNESS: 1
NAUSEA: 0
ALLERGIC/IMMUNOLOGIC NEGATIVE: 1
SHORTNESS OF BREATH: 1
ABDOMINAL PAIN: 0
VOMITING: 0
DIARRHEA: 0
COUGH: 1
EYES NEGATIVE: 1
WHEEZING: 1

## 2021-12-29 NOTE — PROGRESS NOTES
[facility]     Patient: Zoraida Barriga, 39 y.o.   : 2021    Pt of Dr. Lanie Spencer   Patient presents with    Follow-up     Mild intermittent asthma without complications 16 month pulonary follow up         HPI  Nara Banegas is here to get established and get refills  [place], [place] cough for 2 months , started with white mucous, now greenish color, chest tightness, intermittent wheezing, gagging on mucous. Ran out of inhalers back in 2021  Was treated with 2 different types of antibiotics with no improvement  Was treated with steroid burst, no improvement   Had COVID and flu testing end of November-  was negative   Muccinex/ Nyquil  has not helped the cough   Still smoking 0.5 ppD,  no desire to quit   Clear CXR 21  cx PFT last year due to family illness   Last PFT  - mod obstruction and air trapping   SLE on Plaquenil  Not working     Current Inhalers:  Proventil nebs- takes seldomly due to increased HR,  Liked Ventolin HFA   Previous Inhalers AirDUo- prescribed never started. , Advair       SOCIAL HISTORY:  Social History     Tobacco Use    Smoking status: Current Every Day Smoker     Packs/day: 1.00     Types: Cigarettes    Smokeless tobacco: Never Used   Vaping Use    Vaping Use: Former    Substances: Always   Substance Use Topics    Alcohol use: No    Drug use: Not Currently     Comment: Not currently -- meth last use  and last pot use 2016  years ago.           CURRENT MEDICATIONS:  Current Outpatient Medications   Medication Sig Dispense Refill    fluticasone-salmeterol (ADVAIR) 100-50 MCG/DOSE diskus inhaler Inhale 1 puff into the lungs every 12 hours Rinse mouth after use 60 each 3    albuterol sulfate HFA (VENTOLIN HFA) 108 (90 Base) MCG/ACT inhaler Inhale 2 puffs into the lungs 4 times daily as needed for Wheezing 18 g 5    benzonatate (TESSALON) 100 MG capsule Take 1 capsule by mouth 3 times daily as needed for Cough 30 capsule 0    alclomethasone (ACLOVATE) 0.05 % ointment Apply topically 2 times daily. 45 g 2    albuterol (PROVENTIL) (2.5 MG/3ML) 0.083% nebulizer solution Take 3 mLs by nebulization every 6 hours as needed for Wheezing or Shortness of Breath 120 vial 11    Spacer/Aero-Holding Chambers (VORTEX VALVED HOLDING CHAMBER) SUJATHA 1 Device by Does not apply route daily Use with inhalers 1 Device 0    RaNITidine HCl (ACID REDUCER PO) Take by mouth as needed       albuterol sulfate  (90 Base) MCG/ACT inhaler INHALE TWO (2) PUFFS INTO THE LUNGS EVERY FOUR (4) HOURS AS NEEDED FOR WHEEZING  (Patient not taking: Reported on 12/29/2021) 18 g 11     No current facility-administered medications for this visit. ROS   Review of Systems   Constitutional: Negative for activity change, appetite change, chills, fatigue, fever and unexpected weight change. HENT: Negative. Eyes: Negative. Respiratory: Positive for cough, chest tightness, shortness of breath and wheezing. Cardiovascular: Negative for chest pain, palpitations and leg swelling. Gastrointestinal: Negative for abdominal pain, diarrhea, nausea and vomiting. Genitourinary: Negative. Musculoskeletal: Negative. Skin: Negative. Allergic/Immunologic: Negative. Neurological: Negative. Hematological: Negative. Psychiatric/Behavioral: Negative for sleep disturbance and suicidal ideas. The patient is nervous/anxious. Physical exam   /74 (Site: Left Upper Arm, Position: Sitting)   Pulse 93   Temp 97.7 °F (36.5 °C)   Ht 5' 4\" (1.626 m)   Wt 201 lb 6.4 oz (91.4 kg)   SpO2 99% Comment: on RA  BMI 34.57 kg/m²      Wt Readings from Last 3 Encounters:   12/29/21 201 lb 6.4 oz (91.4 kg)   12/22/21 206 lb 12.8 oz (93.8 kg)   11/23/21 200 lb 12.8 oz (91.1 kg)     Physical Exam  Vitals and nursing note reviewed.    Constitutional:       General: She is not in acute distress. Appearance: She is well-developed. HENT:      Mouth/Throat:      Lips: Pink. Mouth: Mucous membranes are moist.      Pharynx: Oropharynx is clear. No oropharyngeal exudate or posterior oropharyngeal erythema. Eyes:      Conjunctiva/sclera: Conjunctivae normal.   Neck:      Vascular: No JVD. Cardiovascular:      Rate and Rhythm: Normal rate and regular rhythm. Heart sounds: No murmur heard. No friction rub. Pulmonary:      Effort: Pulmonary effort is normal. No accessory muscle usage or respiratory distress. Breath sounds: Normal breath sounds. No wheezing, rhonchi or rales. Chest:      Chest wall: No tenderness. Musculoskeletal:      Right lower leg: No edema. Left lower leg: No edema. Skin:     General: Skin is warm and dry. Capillary Refill: Capillary refill takes less than 2 seconds. Nails: There is no clubbing. Neurological:      Mental Status: She is alert and oriented to person, place, and time. Psychiatric:         Mood and Affect: Mood normal.         Behavior: Behavior normal.         Thought Content: Thought content normal.         Judgment: Judgment normal.          Test results   Lung Nodule Screening     [] Qualifies    [x]Does not qualify   [] Declined    [] Completed     CXR 12/1/21- reviewed   TECHNIQUE: PA and lateral views of the chest performed.           FINDINGS:    Lines/tubes: None.       Heart/mediastinum: The heart size is normal. The pulmonary vascularity is unremarkable.       Lungs: The lungs are clear. No focal consolidation, pleural effusion, or pneumothorax is identified.       Bones: The visualized skeletal structures appear intact.               Impression   No acute intrathoracic process. Clear lungs.               **This report has been created using voice recognition software.  It may contain minor errors which are inherent in voice recognition technology. **       Final report electronically signed by  Mian Ardon on 12/1/2021 3:58 PM                 Fractional Exhaled Nitric Oxide Level today is 6 , indicating No/ LOW  Th2 driven airway inflammation. Based on this patient would not, likely benefit from corticosteroids. Assessment      Diagnosis Orders   1. Moderate persistent asthma without complication  POCT Nitric Oxide    Full PFT Study With Bronchodilator   2. Productive cough  POCT Nitric Oxide   3. Non compliance w medication regimen     4. Obesity (BMI 30-39.9)     5. Smoker unmotivated to quit       Plan    Needs to stop smoking, she unfortunately declines to discuss smoking cessation with no desire to quit  Needs maintenance therapy for her asthma for better control , with obstruction and air trapping on PFT recommend dual agent. Will start low dose ICS/LABA with low FENO .    Start Advair 100/50 diskus BID , rinse after use  No indication for repeat oral steroids   Resume PRN Ventolin   Recommend weight reduction   Avoidance of ill contacts  Schedule in hospital PFT     Total time spent on encounter was 30 minutes    Will see Lori Ordonez in: 3 months with PFT     Electronically signed by BINH Trimble CNP on 12/29/2021 at 3:38 PM

## 2021-12-29 NOTE — PATIENT INSTRUCTIONS
Patient Education        Stopping Smoking: Care Instructions  Your Care Instructions     Cigarette smokers crave the nicotine in cigarettes. Giving it up is much harder than simply changing a habit. Your body has to stop craving the nicotine. It is hard to quit, but you can do it. There are many tools that people use to quit smoking. You may find that combining tools works best for you. There are several steps to quitting. First you get ready to quit. Then you get support to help you. After that, you learn new skills and behaviors to become a nonsmoker. For many people, a necessary step is getting and using medicine. Your doctor will help you set up the plan that best meets your needs. You may want to attend a smoking cessation program to help you quit smoking. When you choose a program, look for one that has proven success. Ask your doctor for ideas. You will greatly increase your chances of success if you take medicine as well as get counseling or join a cessation program.  Some of the changes you feel when you first quit tobacco are uncomfortable. Your body will miss the nicotine at first, and you may feel short-tempered and grumpy. You may have trouble sleeping or concentrating. Medicine can help you deal with these symptoms. You may struggle with changing your smoking habits and rituals. The last step is the tricky one: Be prepared for the smoking urge to continue for a time. This is a lot to deal with, but keep at it. You will feel better. Follow-up care is a key part of your treatment and safety. Be sure to make and go to all appointments, and call your doctor if you are having problems. It's also a good idea to know your test results and keep a list of the medicines you take. How can you care for yourself at home? · Ask your family, friends, and coworkers for support. You have a better chance of quitting if you have help and support.   · Join a support group, such as Nicotine Anonymous, for people who are trying to quit smoking. · Consider signing up for a smoking cessation program, such as the American Lung Association's Freedom from Smoking program.  · Get text messaging support. Go to the website at www.smokefree. gov to sign up for the [facility] program.  · Set a quit date. Pick your date carefully so that it is not right in the middle of a big deadline or stressful time. Once you quit, do not even take a puff. Get rid of all ashtrays and lighters after your last cigarette. Clean your house and your clothes so that they do not smell of smoke. · Learn how to be a nonsmoker. Think about ways you can avoid those things that make you reach for a cigarette. ? Avoid situations that put you at greatest risk for smoking. For some people, it is hard to have a drink with friends without smoking. For others, they might skip a coffee break with coworkers who smoke. ? Change your daily routine. Take a different route to work or eat a meal in a different place. · Cut down on stress. Calm yourself or release tension by doing an activity you enjoy, such as reading a book, taking a hot bath, or gardening. · Talk to your doctor or pharmacist about nicotine replacement therapy, which replaces the nicotine in your body. You still get nicotine but you do not use tobacco. Nicotine replacement products help you slowly reduce the amount of nicotine you need. These products come in several forms, many of them available over-the-counter:  ? Nicotine patches  ? Nicotine gum and lozenges  ? Nicotine inhaler  · Ask your doctor about bupropion (Wellbutrin) or varenicline (Chantix), which are prescription medicines. They do not contain nicotine. They help you by reducing withdrawal symptoms, such as stress and anxiety. · Some people find hypnosis, acupuncture, and massage helpful for ending the smoking habit. · Eat a healthy diet and get regular exercise.  Having healthy habits will help your body move past its craving for nicotine. · Be prepared to keep trying. Most people are not successful the first few times they try to quit. Do not get mad at yourself if you smoke again. Make a list of things you learned and think about when you want to try again, such as next week, next month, or next year. Where can you learn more? Go to https://Dexetrapelashondaewveronika.itsDapper. org and sign in to your [employer] account. Enter W128 in the AFINOS box to learn more about \"Stopping Smoking: Care Instructions. \"     If you do not have an account, please click on the \"Sign Up Now\" link. Current as of: February 11, 2021               Content Version: 13.1  © 2006-2021 [employer]. Care instructions adapted under license by [facility] [address] If you have questions about a medical condition or this instruction, always ask your healthcare professional. [place] [address] any warranty or liability for your use of this information.

## 2022-01-02 ENCOUNTER — PATIENT MESSAGE (OUTPATIENT)
Dept: FAMILY MEDICINE CLINIC | Age: 42
End: 2022-01-02

## 2022-01-02 DIAGNOSIS — K04.7 ABSCESSED TOOTH: Primary | ICD-10-CM

## 2022-01-03 RX ORDER — PENICILLIN V POTASSIUM 500 MG/1
500 TABLET ORAL 4 TIMES DAILY
Qty: 40 TABLET | Refills: 0 | Status: SHIPPED | OUTPATIENT
Start: 2022-01-03 | End: 2022-01-13

## 2022-01-03 NOTE — TELEPHONE ENCOUNTER
From: Stefany Koo  To: Dr. Melinda Colin: 1/2/2022 5:47 PM EST  Subject: Infected tooth    I have an infected tooth.  I am currently looking for a dentist that takes my insurance is there any way u can prescribe me some antibiotics until I can find a dentist

## 2022-01-06 ENCOUNTER — OFFICE VISIT (OUTPATIENT)
Dept: PHYSICAL MEDICINE AND REHAB | Age: 42
End: 2022-01-06
Payer: MEDICAID

## 2022-01-06 VITALS
SYSTOLIC BLOOD PRESSURE: 98 MMHG | HEART RATE: 70 BPM | OXYGEN SATURATION: 97 % | WEIGHT: 206 LBS | BODY MASS INDEX: 35.17 KG/M2 | HEIGHT: 64 IN | DIASTOLIC BLOOD PRESSURE: 76 MMHG

## 2022-01-06 DIAGNOSIS — M47.819 FACET ARTHROPATHY: ICD-10-CM

## 2022-01-06 DIAGNOSIS — G95.0 SYRINX (HCC): ICD-10-CM

## 2022-01-06 DIAGNOSIS — M47.816 LUMBAR SPONDYLOSIS: Primary | ICD-10-CM

## 2022-01-06 DIAGNOSIS — G89.4 CHRONIC PAIN SYNDROME: ICD-10-CM

## 2022-01-06 PROCEDURE — 4004F PT TOBACCO SCREEN RCVD TLK: CPT | Performed by: ANESTHESIOLOGY

## 2022-01-06 PROCEDURE — 99204 OFFICE O/P NEW MOD 45 MIN: CPT | Performed by: ANESTHESIOLOGY

## 2022-01-06 PROCEDURE — G8484 FLU IMMUNIZE NO ADMIN: HCPCS | Performed by: ANESTHESIOLOGY

## 2022-01-06 PROCEDURE — G8427 DOCREV CUR MEDS BY ELIG CLIN: HCPCS | Performed by: ANESTHESIOLOGY

## 2022-01-06 PROCEDURE — G8417 CALC BMI ABV UP PARAM F/U: HCPCS | Performed by: ANESTHESIOLOGY

## 2022-01-06 RX ORDER — FLUOCINONIDE TOPICAL SOLUTION USP, 0.05% 0.5 MG/ML
SOLUTION TOPICAL
COMMUNITY
Start: 2022-01-03 | End: 2022-07-19

## 2022-01-06 RX ORDER — KETOCONAZOLE 20 MG/ML
SHAMPOO TOPICAL
COMMUNITY
Start: 2022-01-03

## 2022-01-06 RX ORDER — TRIAMCINOLONE ACETONIDE 0.25 MG/G
CREAM TOPICAL
COMMUNITY
Start: 2022-01-03

## 2022-01-06 NOTE — PROGRESS NOTES
Chronic Pain/PM&R Clinic Note     Encounter Date: 1/6/22    Subjective:   Chief Complaint:   Chief Complaint   Patient presents with    New Patient     Lumbar Pain       History of Present Illness: Janina Coello is a 39 y.o. female seen in the clinic initially on 01/06/22 upon request from Juan Ryan MD  for her history of lumbar pain. Patient states pain started after a lifting accident at work in 2008 where she had an injury to her thoracic spine. Patient states back pain has become gradually worse ever since. Patient states pain is worse when she is sitting, standing in 1 position, or doing activities such as cleaning around the house. Patient states she will occasionally get pain that radiates down bilateral legs, left more than right with strenuous activity. Patient states when pain gets severe she will lay down which seems to help. Patient does have trouble sleeping occasionally as she has some aching and twitching in bilateral legs. Patient states she has tried physical therapy and decompression several times which have been ineffective. It has been several years since she has done physical therapy. Patient states she does not currently work as she was unable to tolerate it. Patient denies weakness in bilateral legs but states she will occasionally feel like her knees want to give out. Patient denies falls. Patient denies saddle anesthesia or bowel or bladder incontinence. Patient also mentions other pain such as in her left shoulder, left wrist, and left knee. 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 She has had ongoing thoracic pain since then. History of Interventions:   Surgery: No previous thoracic or lumbar surgeries  Injections: Thoracic epidural?  Several years ago    Current Treatment Medications:    Toradol as needed- mild effectiveness      Historical Treatment Medications:   Cymbalta - fatigue  Ibuprofen - ineffective  Tylenol - ineffective   OTC muscle rub -ineffective    Imaging:    (Lumbar Xray 02/02/2021)    Narrative   2 views lumbar spine       Comparison:  MR  - MRI LUMBAR SPINE WO CONTRAST  - 02/22/2019 11:08 AM EST    CR,SR  - XR LUMBAR SPINE (2-3 VIEWS)  - 12/14/2018 01:50 PM EST       Findings   Normal vertebral body alignment. No acute fractures or dislocation. No significant degenerative change           Impression   No acute findings       This document has been electronically signed by: Myles Fulton MD on    02/02/2021 01:58 AM       Lumbar MRI (02/22/2019)  Narrative   PROCEDURE: MRI LUMBAR SPINE WO CONTRAST       CLINICAL INFORMATION: DDD (degenerative disc disease), lumbar. Additional history obtained from the electronic medical record indicates the patient has right-sided numbness when laying on the right side. Severe back pain.       COMPARISON: None available. Correlation is made to radiographs of the lumbar spine dated December 14, 2018.       TECHNIQUE: Sagittal and axial T1 and T2-weighted images were obtained through the lumbar spine.       FINDINGS:       The lumbar spine is imaged from the inferior aspect of T10 to the superior aspect of S3. The conus medullaris terminates at the L1 level. No abnormal signal or expansion is present within the conus. The visualized nerve roots are evenly distributed    throughout the thecal sac.       There is preservation of the expected lumbar lordosis. The vertebral body heights and alignment are maintained. No compression fracture deformity or suspicious marrow replacing lesion is identified.       Degenerative facet arthropathy is present at every level. With regards to the disc spaces, at L1-L2, the spinal canal and neural foramina are patent.       At L2-L3, the spinal canal and neural foramina are patent.       At L3-L4, there is disc space narrowing, disc desiccation and a disc bulge.  The spinal canal and neural foramina are patent.       At L4-L5, there is disc desiccation and a posterior disc protrusion. This indents the ventral thecal sac and causes mild spinal canal narrowing. The neural foramina are patent.       At L5-S1, the spinal canal and neural foramina are patent.       No suspicious finding is identified within the visualized retroperitoneal and paraspinal soft tissues.           Impression        Multilevel degenerative changes are present throughout the lumbar spine and are further discussed by level in the findings. These are most significant at L4-L5 where there is a posterior disc protrusion indenting the ventral thecal sac and causing mild    spinal canal narrowing. Mild degenerative facet arthropathy is present without significant neural foraminal narrowing.                   **This report has been created using voice recognition software. It may contain minor errors which are inherent in voice recognition technology. **       Final report electronically signed by Dr. Joel Barrientos on 2/22/2019 1:06 PM       Past Medical History:   Diagnosis Date    Anxiety     Asthma     DDD (degenerative disc disease), cervical     DDD (degenerative disc disease), thoracolumbar     Depression     GERD (gastroesophageal reflux disease)        History reviewed. No pertinent surgical history.     Family History   Problem Relation Age of Onset    Depression Mother    Jacome COPD Father     Asthma Sister     Depression Sister     Lupus Paternal Aunt     No Known Problems Daughter     No Known Problems Daughter     Asthma Daughter     No Known Problems Brother          Medications & Allergies:   Current Outpatient Medications   Medication Instructions    albuterol (PROVENTIL) 2.5 mg, Nebulization, EVERY 6 HOURS PRN    albuterol sulfate HFA (VENTOLIN HFA) 108 (90 Base) MCG/ACT inhaler 2 puffs, Inhalation, 4 TIMES DAILY PRN    albuterol sulfate  (90 Base) MCG/ACT inhaler INHALE TWO (2) PUFFS INTO THE LUNGS EVERY FOUR (4) HOURS AS NEEDED FOR WHEEZING     alclomethasone (ACLOVATE) 0.05 % ointment Apply topically 2 times daily.  benzonatate (TESSALON) 100 mg, Oral, 3 TIMES DAILY PRN    fluocinonide (LIDEX) 0.05 % external solution No dose, route, or frequency recorded.  fluticasone-salmeterol (ADVAIR) 100-50 MCG/DOSE diskus inhaler 1 puff, Inhalation, EVERY 12 HOURS, Rinse mouth after use    ketoconazole (NIZORAL) 2 % shampoo No dose, route, or frequency recorded.  penicillin v potassium (VEETID) 500 mg, Oral, 4 TIMES DAILY    RaNITidine HCl (ACID REDUCER PO) Oral, PRN    Spacer/Aero-Holding Chambers (VORTEX VALVED HOLDING CHAMBER) SUJATHA 1 Device, Does not apply, DAILY, Use with inhalers    triamcinolone (KENALOG) 0.025 % cream No dose, route, or frequency recorded. Allergies   Allergen Reactions    Hydrocodone      \"makes me feel completely out of whack\"    Prozac [Fluoxetine Hcl] Other (See Comments)     \"I want to kill myself\"       Review of Systems:   Constitutional: negative for weight changes or fevers  Genitourinary: negative for bowel/bladder incontinence   Musculoskeletal: positive for low back pain. Neurological: negative for any leg weakness or numbness/tingling  Behavioral/Psych: negative for anxiety/depression   All other systems reviewed and are negative    Objective:     Vitals:    01/06/22 1003   BP: 98/76   Pulse: 70   SpO2: 97%       Constitutional: Pleasant, no acute distress   Head: Normocephalic, atraumatic   Eyes: Conjunctivae normal   Neck: Supple, symmetrical   Lungs: Normal respiratory effort, non-labored breathing   Cardiovascular: Limbs warm and well perfused   Abdomen: Non-protruded   Musculoskeletal: Muscle bulk symmetric, no atrophy, no gross deformities   · Lower Extremities: ROM WNL. · Thorax: No paraspinal tenderness bilaterally. No scoliosis or kyphosis. · Lumbar Spine: ROM WNL. Lumbar paraspinals mild tenderness with palpation bilaterally. SLR neg bilaterally. MATEUSZ neg bilaterally. GAENSLEN neg bilaterally. Positive facet loading bilaterally. Right SI joint nontender with palpation, mild tenderness to left SI joint with palpation. Bilateral greater trochanters non-tender to palpation. Neurological: Cranial nerves II-XII grossly intact. · Gait - Normal, non-antalgic gait. Ambulates without assistive device. · Motor: 5/5 muscle strength in bilateral hip flexion, knee flexion, knee extension, ankle dorsiflexion, and ankle plantar flexion   · Sensory: LT sensation intact in lower limbs   · Reflexes: 2+ symmetrical in bilateral achilles, 2+ bilateral patellar, negative ankle clonus, downgoing babinski   Skin: No rashes or lesions present   Psychological: Cooperative, no exaggerated pain behaviors     Assessment:    Diagnosis Orders   1. Lumbar spondylosis  CHG FLUOR NEEDLE/CATH SPINE/PARASPINAL DX/THER ADDON    SC INJ DX/THER AGNT PARAVERT FACET JOINT, LUMBAR/SAC, 1ST LEVEL    SC INJ DX/THER AGNT PARAVERT FACET JOINT, LUMBAR/SAC, 2ND LEVEL   2. Facet arthropathy     3. Chronic pain syndrome     4. [facility]         Derek Torres is a 39 y. o.female presenting to the pain clinic for evaluation of lumbar back pain. Patient's history and physical consistent with lumbar facet mediated pain lumbar spondylosis as evident on her imaging. We have set her up for a lumbar medial branch block at bilateral L4/5 and L5/S1. We have discussed that she has several pain generators that may be addressed in the future. Plan: The following treatment recommendations and plan were discussed in detail with Derek Torres. Imaging:   I have reviewed patients imaging of lumbar xray and lumbar MRI and results were discussed with patient today. Analgesics:   Patient is taking Toradol. Patient is advised to take as prescribed and not take on an empty stomach. Adjuvants:   None    Interventions:    In presence of lumbar axial back pain/cervical neck and with physical exam consistent for facetal pain, the option of medial branch blocks at bilateral L4-5 and L5-S1 was chosen. The risks and benefits were discussed in detail with the patient. Patient wants to proceed with the injection. Anticoagulation/NPO Recommendations:   Patient does not need to hold any medications prior to the procedure. Patient will need to be NPO x 8 hours prior to the procedure. We will start an IV prior to the procedure  Patient will need a  for the procedure    Multidisciplinary Pain Management:   In the presence of complex, chronic, and multi-factorial pain, the importance of a multidisciplinary approach to pain management in the patients management regimen was emphasized and discussed in great detail. PHYSICAL THERAPY: Patient is advised to see a physical therapist for gentle stretching exercises and conditioning exercises for management of pain.      Referrals:  None    Prescriptions Written This Visit:   none    Follow-up: Lumbar medial branch block at bilateral L4/5 and L5/S1      Liborio Gale, DO  Interventional Pain Management/PM&R   [place]

## 2022-01-12 ENCOUNTER — OFFICE VISIT (OUTPATIENT)
Dept: FAMILY MEDICINE CLINIC | Age: 42
End: 2022-01-12
Payer: MEDICAID

## 2022-01-12 VITALS
HEART RATE: 91 BPM | DIASTOLIC BLOOD PRESSURE: 78 MMHG | OXYGEN SATURATION: 97 % | WEIGHT: 203 LBS | SYSTOLIC BLOOD PRESSURE: 116 MMHG | RESPIRATION RATE: 12 BRPM | HEIGHT: 64 IN | BODY MASS INDEX: 34.66 KG/M2

## 2022-01-12 DIAGNOSIS — R11.0 NAUSEA: Primary | ICD-10-CM

## 2022-01-12 DIAGNOSIS — F33.2 SEVERE EPISODE OF RECURRENT MAJOR DEPRESSIVE DISORDER, WITHOUT PSYCHOTIC FEATURES (HCC): ICD-10-CM

## 2022-01-12 DIAGNOSIS — M32.9 H/O SYSTEMIC LUPUS ERYTHEMATOSUS (SLE) (HCC): ICD-10-CM

## 2022-01-12 DIAGNOSIS — F51.01 PRIMARY INSOMNIA: ICD-10-CM

## 2022-01-12 DIAGNOSIS — J41.0 SIMPLE CHRONIC BRONCHITIS (HCC): ICD-10-CM

## 2022-01-12 PROCEDURE — 4004F PT TOBACCO SCREEN RCVD TLK: CPT | Performed by: FAMILY MEDICINE

## 2022-01-12 PROCEDURE — 3023F SPIROM DOC REV: CPT | Performed by: FAMILY MEDICINE

## 2022-01-12 PROCEDURE — G8417 CALC BMI ABV UP PARAM F/U: HCPCS | Performed by: FAMILY MEDICINE

## 2022-01-12 PROCEDURE — 99214 OFFICE O/P EST MOD 30 MIN: CPT | Performed by: FAMILY MEDICINE

## 2022-01-12 PROCEDURE — G8484 FLU IMMUNIZE NO ADMIN: HCPCS | Performed by: FAMILY MEDICINE

## 2022-01-12 PROCEDURE — G8427 DOCREV CUR MEDS BY ELIG CLIN: HCPCS | Performed by: FAMILY MEDICINE

## 2022-01-12 RX ORDER — ONDANSETRON 4 MG/1
4 TABLET, FILM COATED ORAL EVERY 8 HOURS PRN
Qty: 30 TABLET | Refills: 0 | Status: SHIPPED | OUTPATIENT
Start: 2022-01-12

## 2022-01-12 RX ORDER — DOXEPIN HYDROCHLORIDE 25 MG/1
25 CAPSULE ORAL NIGHTLY
Qty: 30 CAPSULE | Refills: 2 | Status: SHIPPED | OUTPATIENT
Start: 2022-01-12 | End: 2022-07-19

## 2022-01-12 ASSESSMENT — ENCOUNTER SYMPTOMS
CHEST TIGHTNESS: 0
ABDOMINAL PAIN: 0
BLOOD IN STOOL: 0
RHINORRHEA: 0
COUGH: 0
CONSTIPATION: 0
SHORTNESS OF BREATH: 0
WHEEZING: 0
NAUSEA: 1
SORE THROAT: 0
EYE PAIN: 0
BACK PAIN: 0
VOMITING: 0
DIARRHEA: 0

## 2022-01-12 ASSESSMENT — PATIENT HEALTH QUESTIONNAIRE - PHQ9
6. FEELING BAD ABOUT YOURSELF - OR THAT YOU ARE A FAILURE OR HAVE LET YOURSELF OR YOUR FAMILY DOWN: 0
SUM OF ALL RESPONSES TO PHQ QUESTIONS 1-9: 14
2. FEELING DOWN, DEPRESSED OR HOPELESS: 3
4. FEELING TIRED OR HAVING LITTLE ENERGY: 3
1. LITTLE INTEREST OR PLEASURE IN DOING THINGS: 3
8. MOVING OR SPEAKING SO SLOWLY THAT OTHER PEOPLE COULD HAVE NOTICED. OR THE OPPOSITE, BEING SO FIGETY OR RESTLESS THAT YOU HAVE BEEN MOVING AROUND A LOT MORE THAN USUAL: 0
SUM OF ALL RESPONSES TO PHQ QUESTIONS 1-9: 14
SUM OF ALL RESPONSES TO PHQ9 QUESTIONS 1 & 2: 6
10. IF YOU CHECKED OFF ANY PROBLEMS, HOW DIFFICULT HAVE THESE PROBLEMS MADE IT FOR YOU TO DO YOUR WORK, TAKE CARE OF THINGS AT HOME, OR GET ALONG WITH OTHER PEOPLE: 2
5. POOR APPETITE OR OVEREATING: 3
3. TROUBLE FALLING OR STAYING ASLEEP: 2
9. THOUGHTS THAT YOU WOULD BE BETTER OFF DEAD, OR OF HURTING YOURSELF: 0
SUM OF ALL RESPONSES TO PHQ QUESTIONS 1-9: 14
7. TROUBLE CONCENTRATING ON THINGS, SUCH AS READING THE NEWSPAPER OR WATCHING TELEVISION: 0
SUM OF ALL RESPONSES TO PHQ QUESTIONS 1-9: 14

## 2022-01-12 NOTE — PROGRESS NOTES
Sonali Anguiano (:  1980) is a 39 y.o. female,Established patient, here for evaluation of the following chief complaint(s):  Check-Up (nausea, extreme fatigue)         ASSESSMENT/PLAN:  1. Nausea  -     ondansetron (ZOFRAN) 4 MG tablet; Take 1 tablet by mouth every 8 hours as needed for Nausea, Disp-30 tablet, R-0Normal  -unknown diagnosis/prognosis, trial of zofran  2. Primary insomnia  -     doxepin (SINEQUAN) 25 MG capsule; Take 1 capsule by mouth nightly, Disp-30 capsule, R-2Normal  -new problem, normal labs, trial of doxepin. 3. Severe episode of recurrent major depressive disorder, without psychotic features ([facility]  -stable no current antidepressants. 4. H/O systemic lupus erythematosus (SLE) (HCC)  -stable, normal inflammatory markers. 5. Simple chronic bronchitis (HCC)  -stable on rosalia and albuterol. No follow-ups on file. Subjective   SUBJECTIVE/OBJECTIVE:  HPI  Patient here today for a check up. Reviewed BMI of 35. Encouraged diet, exercise and weight loss. Longterm fatigue, tired all the time. Mind won't shut down. Snores when ill. Does not feel rested in AMs, some daytime naps. Nausea x 2 weeks. Mostly at night, Not food associated, no vomiting. Partnered, current smoker, pmh reviewed. Review of Systems   Constitutional: Positive for fatigue. Negative for chills, fever and unexpected weight change. HENT: Negative for congestion, ear pain, rhinorrhea and sore throat. Eyes: Negative for pain and visual disturbance. Respiratory: Negative for cough, chest tightness, shortness of breath and wheezing. Cardiovascular: Negative for chest pain and palpitations. Gastrointestinal: Positive for nausea. Negative for abdominal pain, blood in stool, constipation, diarrhea and vomiting. Genitourinary: Negative for difficulty urinating, frequency, hematuria and urgency. Musculoskeletal: Negative for back pain, joint swelling, myalgias and neck pain.    Skin: Negative for rash. Neurological: Negative for dizziness and headaches. Hematological: Negative for adenopathy. Does not bruise/bleed easily. Psychiatric/Behavioral: Positive for sleep disturbance. Negative for behavioral problems. The patient is not nervous/anxious. Objective   Physical Exam  Vitals and nursing note reviewed. Constitutional:       Appearance: She is well-developed. HENT:      Head: Normocephalic and atraumatic. Right Ear: External ear normal.      Left Ear: External ear normal.      Nose: Nose normal.      Mouth/Throat:      Mouth: Mucous membranes are moist.   Eyes:      Pupils: Pupils are equal, round, and reactive to light. Neck:      Thyroid: No thyromegaly. Cardiovascular:      Rate and Rhythm: Normal rate and regular rhythm. Heart sounds: Normal heart sounds. Pulmonary:      Breath sounds: Normal breath sounds. No wheezing or rales. Abdominal:      General: Bowel sounds are normal.      Palpations: Abdomen is soft. Tenderness: There is no abdominal tenderness. There is no guarding or rebound. Musculoskeletal:         General: Normal range of motion. Cervical back: Neck supple. Lymphadenopathy:      Cervical: No cervical adenopathy. Skin:     General: Skin is warm and dry. Findings: No rash. Neurological:      Mental Status: She is alert and oriented to person, place, and time. Cranial Nerves: No cranial nerve deficit. Deep Tendon Reflexes: Reflexes are normal and symmetric. An electronic signature was used to authenticate this note.     --Ana Caballero MD

## 2022-01-12 NOTE — PATIENT INSTRUCTIONS
Patient Education        Learning About Sleeping Well  What does sleeping well mean? Sleeping well means getting enough sleep to feel good and stay healthy. How much sleep is enough varies among people. The number of hours you sleep and how you feel when you wake up are both important. If you do not feel refreshed, you probably need more sleep. Another sign of not getting enough sleep is feeling tired during the day. Experts recommend that adults get at least 7 or more hours of sleep per day. Children and older adults need more sleep. Why is getting enough sleep important? Getting enough quality sleep is a basic part of good health. When your sleep suffers, your physical health, mood, and your thoughts can suffer too. You may find yourself feeling more grumpy or stressed. Not getting enough sleep also can lead to serious problems, including injury, accidents, anxiety, and depression. What might cause poor sleeping? Many things can cause sleep problems, including:  · Changes to your sleep schedule. · Stress. Stress can be caused by fear about a single event, such as giving a speech. Or you may have ongoing stress, such as worry about work or school. · Depression, anxiety, and other mental or emotional conditions. · Changes in your sleep habits or surroundings. This includes changes that happen where you sleep, such as noise, light, or sleeping in a different bed. It also includes changes in your sleep pattern, such as having jet lag or working a late shift. · Health problems, such as pain, breathing problems, and restless legs syndrome. · Lack of regular exercise. · Using alcohol, nicotine, or caffeine before bed. How can you help yourself? Here are some tips that may help you sleep more soundly and wake up feeling more refreshed. Your sleeping area   · Use your bedroom only for sleeping and sex. A bit of light reading may help you fall asleep.  But if it doesn't, do your reading elsewhere in the house. Try not to use your TV, computer, smartphone, or tablet while you are in bed. · Be sure your bed is big enough to stretch out comfortably, especially if you have a sleep partner. · Keep your bedroom quiet, dark, and cool. Use curtains, blinds, or a sleep mask to block out light. To block out noise, use earplugs, soothing music, or a \"white noise\" machine. Your evening and bedtime routine   · Create a relaxing bedtime routine. You might want to take a warm shower or bath, or listen to soothing music. · Go to bed at the same time every night. And get up at the same time every morning, even if you feel tired. What to avoid   · Limit caffeine (coffee, tea, caffeinated sodas) during the day, and don't have any for at least 6 hours before bedtime. · Avoid drinking alcohol before bedtime. Alcohol can cause you to wake up more often during the night. · Try not to smoke or use tobacco, especially in the evening. Nicotine can keep you awake. · Limit naps during the day, especially close to bedtime. · Avoid lying in bed awake for too long. If you can't fall asleep or if you wake up in the middle of the night and can't get back to sleep within about 20 minutes, get out of bed and go to another room until you feel sleepy. · Avoid taking medicine right before bed that may keep you awake or make you feel hyper or energized. Your doctor can tell you if your medicine may do this and if you can take it earlier in the day. If you can't sleep   · Imagine yourself in a peaceful, pleasant scene. Focus on the details and feelings of being in a place that is relaxing. · Get up and do a quiet or boring activity until you feel sleepy. · Avoid drinking any liquids before going to bed to help prevent waking up often to use the bathroom. Where can you learn more? Go to https://sabine.[employer]. org and sign in to your [employer] account.  Enter M445 in the [employer] box to learn more about \"Learning About Sleeping Well. \"     If you do not have an account, please click on the \"Sign Up Now\" link. Current as of: June 16, 2021               Content Version: 13.1  © 4932-7195 [employer]. Care instructions adapted under license by [facility] ([facility]). If you have questions about a medical condition or this instruction, always ask your healthcare professional. [address] any warranty or liability for your use of this information.

## 2022-01-27 ENCOUNTER — PREP FOR PROCEDURE (OUTPATIENT)
Dept: PHYSICAL MEDICINE AND REHAB | Age: 42
End: 2022-01-27

## 2022-01-30 NOTE — H&P
Today, patient presents for planned medial branch blocks at bilateral L4-5 and L5-S1. This note is reflective of the patient's previous visit for evaluation. We will proceed with today's planned procedure. Since patient's last visit for evaluation, there have been no interval changes in medical history. Patient has no new numbness, weakness, or focal neurological deficit since evaluation. Patient has no contraindications to injection (no anticoagulation or recent antibiotic intake for active infections), and has a  present or is able to drive themselves (as discussed and cleared by physician). Allergies to latex, contrast dye, and steroid medications have been confirmed with the patient prior to the procedure. NPO necessity has been assessed and accepted based on procedure complexity. The risks and benefits of the procedure have been explained including but are not limited to infection, bleeding, paralysis, immediate post procedure weakness, and dizziness; the patient acknowledges understanding and desires to proceed with the procedure. Patient has signed consent for same procedure as discussed in previous clinic encounter. All other questions and concerns were addressed at bedside. See procedure note for full details. Post procedure Instructions: The patient was advised not to drive during the day of the procedure and not to engage in any significant decision making (unless otherwise states by physician). The patient was also advised to be cautious with walking/activity for 24 hours following today's visit and asked not to engage in over-exertion (unless otherwise states by physician). After this time, it is ok to resume pre-procedure level of activity. Patient advised to apply ice to site of injection in situations of pain and discomfort. Patient advised to not submerge site of injection during bath or pool activities for approximately 24 hours post-procedure.  Patient attested to understanding post procedure directions / restrictions. All other questions and concerns addressed before patient discharge in ambulatory fashion. Chronic Pain/PM&R Clinic Note     Encounter Date: 1/6/22    Subjective:   Chief Complaint:   No chief complaint on file. History of Present Illness: Derek Torres is a 39 y.o. female seen in the clinic initially on 01/30/22 upon request from No ref. provider found  for her history of lumbar pain. Patient states pain started after a lifting accident at work in 2008 where she had an injury to her thoracic spine. Patient states back pain has become gradually worse ever since. Patient states pain is worse when she is sitting, standing in 1 position, or doing activities such as cleaning around the house. Patient states she will occasionally get pain that radiates down bilateral legs, left more than right with strenuous activity. Patient states when pain gets severe she will lay down which seems to help. Patient does have trouble sleeping occasionally as she has some aching and twitching in bilateral legs. Patient states she has tried physical therapy and decompression several times which have been ineffective. It has been several years since she has done physical therapy. Patient states she does not currently work as she was unable to tolerate it. Patient denies weakness in bilateral legs but states she will occasionally feel like her knees want to give out. Patient denies falls. Patient denies saddle anesthesia or bowel or bladder incontinence. Patient also mentions other pain such as in her left shoulder, left wrist, and left knee. 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 She has had ongoing thoracic pain since then. History of Interventions:   Surgery: No previous thoracic or lumbar surgeries  Injections: Thoracic epidural?  Several years ago    Current Treatment Medications:    Toradol as needed- mild effectiveness      Historical Treatment Medications:   Cymbalta - fatigue  Ibuprofen - ineffective  Tylenol - ineffective   OTC muscle rub -ineffective    Imaging:    (Lumbar Xray 02/02/2021)    Narrative   2 views lumbar spine       Comparison:  MR  - MRI LUMBAR SPINE WO CONTRAST  - 02/22/2019 11:08 AM EST    CR,SR  - XR LUMBAR SPINE (2-3 VIEWS)  - 12/14/2018 01:50 PM EST       Findings   Normal vertebral body alignment. No acute fractures or dislocation. No significant degenerative change           Impression   No acute findings       This document has been electronically signed by: Geraldo Marinelli MD on    02/02/2021 01:58 AM       Lumbar MRI (02/22/2019)  Narrative   PROCEDURE: MRI LUMBAR SPINE WO CONTRAST       CLINICAL INFORMATION: DDD (degenerative disc disease), lumbar. Additional history obtained from the electronic medical record indicates the patient has right-sided numbness when laying on the right side. Severe back pain.       COMPARISON: None available. Correlation is made to radiographs of the lumbar spine dated December 14, 2018.       TECHNIQUE: Sagittal and axial T1 and T2-weighted images were obtained through the lumbar spine.       FINDINGS:       The lumbar spine is imaged from the inferior aspect of T10 to the superior aspect of S3. The conus medullaris terminates at the L1 level. No abnormal signal or expansion is present within the conus. The visualized nerve roots are evenly distributed    throughout the thecal sac.       There is preservation of the expected lumbar lordosis. The vertebral body heights and alignment are maintained. No compression fracture deformity or suspicious marrow replacing lesion is identified.       Degenerative facet arthropathy is present at every level.  With regards to the disc spaces, at L1-L2, the spinal canal and neural foramina are patent.       At L2-L3, the spinal canal and neural foramina are patent.       At L3-L4, there is disc space narrowing, disc desiccation and a disc bulge. The spinal canal and neural foramina are patent.       At L4-L5, there is disc desiccation and a posterior disc protrusion. This indents the ventral thecal sac and causes mild spinal canal narrowing. The neural foramina are patent.       At L5-S1, the spinal canal and neural foramina are patent.       No suspicious finding is identified within the visualized retroperitoneal and paraspinal soft tissues.           Impression        Multilevel degenerative changes are present throughout the lumbar spine and are further discussed by level in the findings. These are most significant at L4-L5 where there is a posterior disc protrusion indenting the ventral thecal sac and causing mild    spinal canal narrowing. Mild degenerative facet arthropathy is present without significant neural foraminal narrowing.                   **This report has been created using voice recognition software. It may contain minor errors which are inherent in voice recognition technology. **       Final report electronically signed by Dr. Governor Reyes on 2/22/2019 1:06 PM       Past Medical History:   Diagnosis Date    Anxiety     Asthma     DDD (degenerative disc disease), cervical     DDD (degenerative disc disease), thoracolumbar     Depression     GERD (gastroesophageal reflux disease)        No past surgical history on file.     Family History   Problem Relation Age of Onset    Depression Mother    Sangeetha Oneill COPD Father     Asthma Sister     Depression Sister     Lupus Paternal Aunt     No Known Problems Daughter     No Known Problems Daughter     Asthma Daughter     No Known Problems Brother          Medications & Allergies:   Current Outpatient Medications   Medication Instructions    albuterol (PROVENTIL) 2.5 mg, Nebulization, EVERY 6 HOURS PRN    albuterol sulfate HFA (VENTOLIN HFA) 108 (90 Base) MCG/ACT inhaler 2 puffs, Inhalation, 4 TIMES DAILY PRN    albuterol sulfate  (90 Base) MCG/ACT inhaler INHALE TWO (2) PUFFS INTO THE LUNGS EVERY FOUR (4) HOURS AS NEEDED FOR WHEEZING     alclomethasone (ACLOVATE) 0.05 % ointment Apply topically 2 times daily.  doxepin (SINEQUAN) 25 mg, Oral, NIGHTLY    fluocinonide (LIDEX) 0.05 % external solution No dose, route, or frequency recorded.  fluticasone-salmeterol (ADVAIR) 100-50 MCG/DOSE diskus inhaler 1 puff, Inhalation, EVERY 12 HOURS, Rinse mouth after use    ketoconazole (NIZORAL) 2 % shampoo No dose, route, or frequency recorded.  ondansetron (ZOFRAN) 4 mg, Oral, EVERY 8 HOURS PRN    RaNITidine HCl (ACID REDUCER PO) Oral, PRN    Spacer/Aero-Holding Chambers (VORTEX VALVED HOLDING CHAMBER) SUJATHA 1 Device, Does not apply, DAILY, Use with inhalers    triamcinolone (KENALOG) 0.025 % cream No dose, route, or frequency recorded. Allergies   Allergen Reactions    Hydrocodone      \"makes me feel completely out of whack\"    Prozac [Fluoxetine Hcl] Other (See Comments)     \"I want to kill myself\"       Review of Systems:   Constitutional: negative for weight changes or fevers  Genitourinary: negative for bowel/bladder incontinence   Musculoskeletal: positive for low back pain. Neurological: negative for any leg weakness or numbness/tingling  Behavioral/Psych: negative for anxiety/depression   All other systems reviewed and are negative    Objective: There were no vitals filed for this visit. Constitutional: Pleasant, no acute distress   Head: Normocephalic, atraumatic   Eyes: Conjunctivae normal   Neck: Supple, symmetrical   Lungs: Normal respiratory effort, non-labored breathing   Cardiovascular: Limbs warm and well perfused   Abdomen: Non-protruded   Musculoskeletal: Muscle bulk symmetric, no atrophy, no gross deformities   · Lower Extremities: ROM WNL. · Thorax: No paraspinal tenderness bilaterally. No scoliosis or kyphosis. · Lumbar Spine: ROM WNL. Lumbar paraspinals mild tenderness with palpation bilaterally. SLR neg bilaterally. MATEUSZ neg bilaterally. GAENSLEN neg bilaterally. Positive facet loading bilaterally. Right SI joint nontender with palpation, mild tenderness to left SI joint with palpation. Bilateral greater trochanters non-tender to palpation. Neurological: Cranial nerves II-XII grossly intact. · Gait - Normal, non-antalgic gait. Ambulates without assistive device. · Motor: 5/5 muscle strength in bilateral hip flexion, knee flexion, knee extension, ankle dorsiflexion, and ankle plantar flexion   · Sensory: LT sensation intact in lower limbs   · Reflexes: 2+ symmetrical in bilateral achilles, 2+ bilateral patellar, negative ankle clonus, downgoing babinski   Skin: No rashes or lesions present   Psychological: Cooperative, no exaggerated pain behaviors     Assessment:    Diagnosis Orders   1. Lumbar spondylosis  CHG FLUOR NEEDLE/CATH SPINE/PARASPINAL DX/THER ADDON    AK INJ DX/THER AGNT PARAVERT FACET JOINT, LUMBAR/SAC, 1ST LEVEL    AK INJ DX/THER AGNT PARAVERT FACET JOINT, LUMBAR/SAC, 2ND LEVEL   2. Facet arthropathy     3. Chronic pain syndrome     4. Syrinx ([facility] [address]         Emi Palomares is a 39 y. o.female presenting to the pain clinic for evaluation of lumbar back pain. Patient's history and physical consistent with lumbar facet mediated pain lumbar spondylosis as evident on her imaging. We have set her up for a lumbar medial branch block at bilateral L4/5 and L5/S1. We have discussed that she has several pain generators that may be addressed in the future. Plan: The following treatment recommendations and plan were discussed in detail with Emi Palomares. Imaging:   I have reviewed patients imaging of lumbar xray and lumbar MRI and results were discussed with patient today. Analgesics:   Patient is taking Toradol. Patient is advised to take as prescribed and not take on an empty stomach. Adjuvants:   None    Interventions:    In presence of lumbar axial back pain/cervical neck and with physical exam consistent for facetal pain, the option of medial branch blocks at bilateral L4-5 and L5-S1 was chosen. The risks and benefits were discussed in detail with the patient. Patient wants to proceed with the injection. Anticoagulation/NPO Recommendations:   Patient does not need to hold any medications prior to the procedure. Patient will need to be NPO x 8 hours prior to the procedure. We will start an IV prior to the procedure  Patient will need a  for the procedure    Multidisciplinary Pain Management:   In the presence of complex, chronic, and multi-factorial pain, the importance of a multidisciplinary approach to pain management in the patients management regimen was emphasized and discussed in great detail. PHYSICAL THERAPY: Patient is advised to see a physical therapist for gentle stretching exercises and conditioning exercises for management of pain.      Referrals:  None    Prescriptions Written This Visit:   none    Follow-up: Lumbar medial branch block at bilateral L4/5 and L5/S1      Liborio Perez, DO  Interventional Pain Management/PM&R   [place]

## 2022-02-01 ENCOUNTER — APPOINTMENT (OUTPATIENT)
Dept: GENERAL RADIOLOGY | Age: 42
End: 2022-02-01
Attending: ANESTHESIOLOGY
Payer: MEDICAID

## 2022-02-01 ENCOUNTER — HOSPITAL ENCOUNTER (OUTPATIENT)
Age: 42
Setting detail: OUTPATIENT SURGERY
Discharge: HOME | End: 2022-02-01
Attending: ANESTHESIOLOGY | Admitting: ANESTHESIOLOGY
Payer: MEDICAID

## 2022-02-01 VITALS
BODY MASS INDEX: 34.72 KG/M2 | WEIGHT: 203.4 LBS | HEIGHT: 64 IN | DIASTOLIC BLOOD PRESSURE: 84 MMHG | OXYGEN SATURATION: 98 % | SYSTOLIC BLOOD PRESSURE: 133 MMHG | RESPIRATION RATE: 18 BRPM | TEMPERATURE: 97.4 F | HEART RATE: 84 BPM

## 2022-02-01 PROCEDURE — 64494 INJ PARAVERT F JNT L/S 2 LEV: CPT | Performed by: ANESTHESIOLOGY

## 2022-02-01 PROCEDURE — 2500000003 HC RX 250 WO HCPCS: Performed by: ANESTHESIOLOGY

## 2022-02-01 PROCEDURE — 6360000002 HC RX W HCPCS: Performed by: ANESTHESIOLOGY

## 2022-02-01 PROCEDURE — 7100000011 HC PHASE II RECOVERY - ADDTL 15 MIN: Performed by: ANESTHESIOLOGY

## 2022-02-01 PROCEDURE — 7100000010 HC PHASE II RECOVERY - FIRST 15 MIN: Performed by: ANESTHESIOLOGY

## 2022-02-01 PROCEDURE — 99152 MOD SED SAME PHYS/QHP 5/>YRS: CPT | Performed by: ANESTHESIOLOGY

## 2022-02-01 PROCEDURE — 2709999900 HC NON-CHARGEABLE SUPPLY: Performed by: ANESTHESIOLOGY

## 2022-02-01 PROCEDURE — 3600000056 HC PAIN LEVEL 4 BASE: Performed by: ANESTHESIOLOGY

## 2022-02-01 PROCEDURE — 64493 INJ PARAVERT F JNT L/S 1 LEV: CPT | Performed by: ANESTHESIOLOGY

## 2022-02-01 PROCEDURE — 3209999900 FLUORO FOR SURGICAL PROCEDURES

## 2022-02-01 RX ORDER — FENTANYL CITRATE 50 UG/ML
INJECTION, SOLUTION INTRAMUSCULAR; INTRAVENOUS PRN
Status: DISCONTINUED | OUTPATIENT
Start: 2022-02-01 | End: 2022-02-01 | Stop reason: ALTCHOICE

## 2022-02-01 RX ORDER — BUPIVACAINE HYDROCHLORIDE 5 MG/ML
INJECTION, SOLUTION PERINEURAL PRN
Status: DISCONTINUED | OUTPATIENT
Start: 2022-02-01 | End: 2022-02-01 | Stop reason: ALTCHOICE

## 2022-02-01 RX ORDER — LIDOCAINE HYDROCHLORIDE 10 MG/ML
INJECTION, SOLUTION EPIDURAL; INFILTRATION; INTRACAUDAL; PERINEURAL PRN
Status: DISCONTINUED | OUTPATIENT
Start: 2022-02-01 | End: 2022-02-01 | Stop reason: ALTCHOICE

## 2022-02-01 RX ORDER — MIDAZOLAM HYDROCHLORIDE 1 MG/ML
INJECTION INTRAMUSCULAR; INTRAVENOUS PRN
Status: DISCONTINUED | OUTPATIENT
Start: 2022-02-01 | End: 2022-02-01 | Stop reason: ALTCHOICE

## 2022-02-01 ASSESSMENT — PAIN - FUNCTIONAL ASSESSMENT: PAIN_FUNCTIONAL_ASSESSMENT: 0-10

## 2022-02-01 ASSESSMENT — PAIN SCALES - GENERAL: PAINLEVEL_OUTOF10: 0

## 2022-02-01 NOTE — PROCEDURES
Pre-operative Diagnosis: Lumbar facet pain     Post-operative Diagnosis: Lumbar facet pain     Procedure: Bilateral lumbar medial branch blocks targeting facet joints of L4/L5 and L5/S1     Procedure Description:  After consent was obtained, the patient was placed in the prone position having pressure points appropriately padded. The lower back was prepped with chloraprep and draped in a sterile fashion. 0.5 cc of 1 % lidocaine was used for local anesthesia of the skin and superficial subcutaneous tissues. Three 22-gauge 3.5-inch needles were advanced under fluoroscopy in an AP view with caudad angulation: one at the sacral ala and two at the junction of the right superior articular process and the transverse process of the L4 and L5 vertebrae. There was no paresthesias, heme, or CSF obtained. Needle placement was confirmed using AP and oblique views. Then 0.5 cc of 0.5% bupivacaine was injected at the site without complications. The procedure was repeated at L4, L5, and sacral ala on the left side. The patient tolerated the procedure well, transported to the recovery room, and observed for 15 minutes prior to being discharged in ambulatory fashion.      Procedural Complications: None  Estimated Blood Loss: 0 mL      IV sedation was used during the procedure:  - Moderate intravenous conscious sedation was supervised by Dr. Miriam Valdez  - The patient was independently monitored by a Registered Nurse assigned to the procedure room  - Monitoring included automated blood pressure, continuous EKG, and continuous pulse oximetry  - The detailed conscious record is permanently stored in the [place] [id number]  - The following is the conscious sedation record:  Start Time: 09:28  End Time : 09:43  Duration: 15 minutes   Medications Administered: 2 mg Versed, 100 mcg Fentanyl     Liborio Blackmon DO  Interventional Pain Management/PM&R   [place]

## 2022-02-01 NOTE — PRE SEDATION
nebulizer solution Take 3 mLs by nebulization every 6 hours as needed for Wheezing or Shortness of Breath 3/25/20 12/29/21  BINH Price CNP   Spacer/Aero-Holding Ck [place] (VORTEX VALVED HOLDING CHAMBER) SUJATHA 1 Device by Does not apply route daily Use with inhalers 9/30/19   BINH Price CNP     PHYSICAL:   Vitals:    02/01/22 0939   BP: 133/84   Pulse: 84   Resp: 18   Temp: 97.4 °F (36.3 °C)   SpO2: 98%     PLANNED PROCEDURE   See procedure note  SEDATION  Planned agent: Versed and Fentanyl  ASA Classification: 1  Class 1: A normal healthy patient  Class 2: Pt with mild to moderate systemic disease  Class 3: Severe systemic disease or disturbance  Class 4: Severe systemic disorders that are already life threatening. Class 5: Moribund pt with little chances of survival, for more than 24 hours. Mallampati I Airway Classification: 1    1. Pre-procedure diagnostic studies complete and results available. 2. Previous sedation/anesthesia experiences assessed. 3. The patient is an appropriate candidate to undergo the planned procedure sedation and anesthesia. (Refer to nursing sedation/analgesia documentation record)  4. Formulation and discussion of sedation/procedure plan, risks, and expectations with patient and/or responsible adult completed. 5. Patient examined immediately prior to the procedure.  (Refer to nursing sedation/analgesia documentation record)    Сергей Poe DO  Electronically signed 2/1/2022 at 10:06 AM

## 2022-02-01 NOTE — POST SEDATION
[facility]  Sedation/Analgesia Post Sedation Record    Pt Name: Arvind Oh  MRN: [id number]  YOB: 1980  Procedure Performed By: Lillian Hills DO  Primary Care Physician: Karen Loera MD    POST-PROCEDURE    Physicians/Assistants: Lillian Hills DO  Procedure Performed: See Procedure Note   Sedation/Anesthesia: Versed and Fentanyl (See procedure note for amount and duration)  Estimated Blood Loss:     0  ml  Specimens Removed: None        Complications: None           Liborio Jones DO  Electronically signed 2/1/2022 at 10:07 AM

## 2022-02-01 NOTE — PROGRESS NOTES
0737: Patient to Phase II Recovery via bed in stable condition on room air at this time. 2455: Patient requesting snack at this time. 3593: IV removed. No complications. 3065: Patient getting dressed at this time. 0957: Patient discharged to vehicle with this RN.

## 2022-02-16 NOTE — PROGRESS NOTES
Chronic Pain/PM&R Clinic Note     Encounter Date: 2/17/22    Subjective:   Chief Complaint:   Chief Complaint   Patient presents with    Follow Up After Procedure       History of Present Illness: Yue Dumont is a 39 y.o. female seen in the clinic initially on 02/17/22 upon request from Nakia Singh MD for her history of lumbar pain. Patient states pain started after a lifting accident at work in 2008 where she had an injury to her thoracic spine. Patient states back pain has become gradually worse ever since. Patient states pain is worse when she is sitting, standing in 1 position, or doing activities such as cleaning around the house. Patient states she will occasionally get pain that radiates down bilateral legs, left more than right with strenuous activity. Patient states when pain gets severe she will lay down which seems to help. Patient does have trouble sleeping occasionally as she has some aching and twitching in bilateral legs. Patient states she has tried physical therapy and decompression several times which have been ineffective. It has been several years since she has done physical therapy. Patient states she does not currently work as she was unable to tolerate it. Patient denies weakness in bilateral legs but states she will occasionally feel like her knees want to give out. Patient denies falls. Patient denies saddle anesthesia or bowel or bladder incontinence. Patient also mentions other pain such as in her left shoulder, left wrist, and left knee. 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 She has had ongoing thoracic pain since then. Today, 02/17/2022, patient presents for planned follow-up for chronic low back pain. Patient had a lumbar medial branch block at L4-5 and L5-S1 on 2/1/2022. Patient report greater than 85% pain relief for 2 days after the procedure.  Patient states she was able to do the dishes without pain, which caused her issues previously. Patient would like to proceed with a second lumbar medial branch block with plans to complete the lumbar radiofrequency ablation. Patient denies any new symptoms. Patient denies saddle anesthesia, or bowel or bladder incontinence. History of Interventions:   Surgery: No previous thoracic or lumbar surgeries  Injections: Thoracic epidural?  Several years ago  Lumbar MBB L4-5 and L5-S1 (02/01/2022) - >85% relief x 2 days    Current Treatment Medications: Toradol as needed- mild effectiveness    Historical Treatment Medications:   Cymbalta - fatigue  Ibuprofen - ineffective  Tylenol - ineffective   OTC muscle rub -ineffective    Imaging:    (Lumbar Xray 02/02/2021)    Narrative   2 views lumbar spine       Comparison:  MR  - MRI LUMBAR SPINE WO CONTRAST  - 02/22/2019 11:08 AM EST    CR,SR  - XR LUMBAR SPINE (2-3 VIEWS)  - 12/14/2018 01:50 PM EST       Findings   Normal vertebral body alignment. No acute fractures or dislocation. No significant degenerative change           Impression   No acute findings       This document has been electronically signed by: Royal Servin MD on    02/02/2021 01:58 AM       Lumbar MRI (02/22/2019)  Narrative   PROCEDURE: MRI LUMBAR SPINE WO CONTRAST       CLINICAL INFORMATION: DDD (degenerative disc disease), lumbar. Additional history obtained from the electronic medical record indicates the patient has right-sided numbness when laying on the right side. Severe back pain.       COMPARISON: None available. Correlation is made to radiographs of the lumbar spine dated December 14, 2018.       TECHNIQUE: Sagittal and axial T1 and T2-weighted images were obtained through the lumbar spine.       FINDINGS:       The lumbar spine is imaged from the inferior aspect of T10 to the superior aspect of S3. The conus medullaris terminates at the L1 level. No abnormal signal or expansion is present within the conus.  The visualized nerve roots are evenly distributed    throughout the thecal sac.       There is preservation of the expected lumbar lordosis. The vertebral body heights and alignment are maintained. No compression fracture deformity or suspicious marrow replacing lesion is identified.       Degenerative facet arthropathy is present at every level. With regards to the disc spaces, at L1-L2, the spinal canal and neural foramina are patent.       At L2-L3, the spinal canal and neural foramina are patent.       At L3-L4, there is disc space narrowing, disc desiccation and a disc bulge. The spinal canal and neural foramina are patent.       At L4-L5, there is disc desiccation and a posterior disc protrusion. This indents the ventral thecal sac and causes mild spinal canal narrowing. The neural foramina are patent.       At L5-S1, the spinal canal and neural foramina are patent.       No suspicious finding is identified within the visualized retroperitoneal and paraspinal soft tissues.           Impression        Multilevel degenerative changes are present throughout the lumbar spine and are further discussed by level in the findings. These are most significant at L4-L5 where there is a posterior disc protrusion indenting the ventral thecal sac and causing mild    spinal canal narrowing. Mild degenerative facet arthropathy is present without significant neural foraminal narrowing.                   **This report has been created using voice recognition software. It may contain minor errors which are inherent in voice recognition technology. **       Final report electronically signed by Dr. Fernando Quintana on 2/22/2019 1:06 PM       Past Medical History:   Diagnosis Date    Anxiety     Asthma     DDD (degenerative disc disease), cervical     DDD (degenerative disc disease), thoracolumbar     Depression     GERD (gastroesophageal reflux disease)        Past Surgical History:   Procedure Laterality Date    PAIN MANAGEMENT PROCEDURE Bilateral 2/1/2022    Lumbar medial branch block at bilateral L4-5 and L5-S1 [facility] [address] [facility]       Family History   Problem Relation Age of Onset    Depression Mother     COPD Father     Asthma Sister     Depression Sister     Lupus Paternal Aunt     No Known Problems Daughter     No Known Problems Daughter     Asthma Daughter     No Known Problems Brother          Medications & Allergies:   Current Outpatient Medications   Medication Instructions    albuterol (PROVENTIL) 2.5 mg, Nebulization, EVERY 6 HOURS PRN    albuterol sulfate HFA (VENTOLIN HFA) 108 (90 Base) MCG/ACT inhaler 2 puffs, Inhalation, 4 TIMES DAILY PRN    albuterol sulfate  (90 Base) MCG/ACT inhaler INHALE TWO (2) PUFFS INTO THE LUNGS EVERY FOUR (4) HOURS AS NEEDED FOR WHEEZING     alclomethasone (ACLOVATE) 0.05 % ointment Apply topically 2 times daily.  doxepin (SINEQUAN) 25 mg, Oral, NIGHTLY    fluocinonide (LIDEX) 0.05 % external solution No dose, route, or frequency recorded.  fluticasone-salmeterol (ADVAIR) 100-50 MCG/DOSE diskus inhaler 1 puff, Inhalation, EVERY 12 HOURS, Rinse mouth after use    ketoconazole (NIZORAL) 2 % shampoo No dose, route, or frequency recorded.  ondansetron (ZOFRAN) 4 mg, Oral, EVERY 8 HOURS PRN    Spacer/Aero-Holding Chambers (VORTEX VALVED HOLDING CHAMBER) SUJATHA 1 Device, Does not apply, DAILY, Use with inhalers    triamcinolone (KENALOG) 0.025 % cream No dose, route, or frequency recorded. Allergies   Allergen Reactions    Hydrocodone      \"makes me feel completely out of whack\"    Prozac [Fluoxetine Hcl] Other (See Comments)     \"I want to kill myself\"       Review of Systems:   Constitutional: negative for weight changes or fevers  Genitourinary: negative for bowel/bladder incontinence   Musculoskeletal: positive for low back pain.    Neurological: negative for any leg weakness or numbness/tingling  Behavioral/Psych: negative for anxiety/depression   All other systems reviewed and are negative    Objective:     Vitals:    02/17/22 0936   BP: 98/76   Pulse: 87   SpO2: 97%       Constitutional: Pleasant, no acute distress   Head: Normocephalic, atraumatic   Eyes: Conjunctivae normal   Neck: Supple, symmetrical   Lungs: Normal respiratory effort, non-labored breathing   Cardiovascular: Limbs warm and well perfused   Abdomen: Non-protruded   Musculoskeletal: Muscle bulk symmetric, no atrophy, no gross deformities   · Lower Extremities: ROM WNL. · Thorax: No paraspinal tenderness bilaterally. No scoliosis or kyphosis. · Lumbar Spine: ROM WNL. Lumbar paraspinals mild tenderness with palpation bilaterally. SLR neg bilaterally. MATEUSZ neg bilaterally. GAENSLEN neg bilaterally. Positive facet loading bilaterally. Right SI joint nontender with palpation, mild tenderness to left SI joint with palpation. Bilateral greater trochanters non-tender to palpation. Neurological: Cranial nerves II-XII grossly intact. · Gait - Normal, non-antalgic gait. Ambulates without assistive device. · Motor: 5/5 muscle strength in bilateral hip flexion, knee flexion, knee extension, ankle dorsiflexion, and ankle plantar flexion   · Sensory: LT sensation intact in lower limbs   · Reflexes: 2+ symmetrical in bilateral achilles, 2+ bilateral patellar, negative ankle clonus, downgoing babinski   Skin: No rashes or lesions present   Psychological: Cooperative, no exaggerated pain behaviors     Assessment:    Diagnosis Orders   1. Lumbar spondylosis  CHG FLUOR NEEDLE/CATH SPINE/PARASPINAL DX/THER ADDON    ID INJ DX/THER AGNT PARAVERT FACET JOINT, LUMBAR/SAC, 1ST LEVEL    ID INJ DX/THER AGNT PARAVERT FACET JOINT, LUMBAR/SAC, 2ND LEVEL   2. Chronic pain syndrome     3. Facet arthropathy         Tatum Julien is a 39 y. o.female presenting to the pain clinic for evaluation of lumbar back pain.   Patient's history and physical consistent with lumbar facet mediated pain lumbar spondylosis as evident on her imaging. We have set her up for a lumbar medial branch block at bilateral L4/5 and L5/S1. We have discussed that she has several pain generators that may be addressed in the future. With the patient having a significant response to the first lumbar medial branch blocks we will proceed with confirmatory lumbar medial branch blocks at L4-5 and L5-S1 with Dr. Nely Heath. We will follow up 1 week after the procedure to discuss next steps and plan on completing the lumbar RFA. Plan: The following treatment recommendations and plan were discussed in detail with Nanette Silva. Imaging:   I have reviewed patients imaging of lumbar xray and lumbar MRI and results were discussed with patient today. Analgesics:   Patient is taking Toradol. Patient is advised to take as prescribed and not take on an empty stomach. Adjuvants:   None    Interventions: In presence of lumbar axial back pain and with physical exam consistent for facetal pain, the option of confirmatory medial branch blocks at bilateral L4-5 and L5-S1 was chosen. The risks and benefits were discussed in detail with the patient. Patient wants to proceed with the injection. Anticoagulation/NPO Recommendations:   Patient does not need to hold any medications prior to the procedure. Patient will need to be NPO x 8 hours prior to the procedure. We will start an IV prior to the procedure  Patient will need a  for the procedure    Multidisciplinary Pain Management:   In the presence of complex, chronic, and multi-factorial pain, the importance of a multidisciplinary approach to pain management in the patients management regimen was emphasized and discussed in great detail. PHYSICAL THERAPY: Patient is advised to see a physical therapist for gentle stretching exercises and conditioning exercises for management of pain.      Referrals:  None    Prescriptions Written This Visit: none    Follow-up: Confirmatory lumbar medial branch block at bilateral L4/5 and L5/S1, follow up 1 week after the procedure.     Will Longoria, APRN - CNP

## 2022-02-17 ENCOUNTER — OFFICE VISIT (OUTPATIENT)
Dept: PHYSICAL MEDICINE AND REHAB | Age: 42
End: 2022-02-17
Payer: MEDICAID

## 2022-02-17 VITALS
DIASTOLIC BLOOD PRESSURE: 76 MMHG | OXYGEN SATURATION: 97 % | HEART RATE: 87 BPM | BODY MASS INDEX: 34.66 KG/M2 | HEIGHT: 64 IN | WEIGHT: 203 LBS | SYSTOLIC BLOOD PRESSURE: 98 MMHG

## 2022-02-17 DIAGNOSIS — M47.819 FACET ARTHROPATHY: ICD-10-CM

## 2022-02-17 DIAGNOSIS — M47.816 LUMBAR SPONDYLOSIS: Primary | ICD-10-CM

## 2022-02-17 DIAGNOSIS — G89.4 CHRONIC PAIN SYNDROME: ICD-10-CM

## 2022-02-17 PROCEDURE — G8484 FLU IMMUNIZE NO ADMIN: HCPCS | Performed by: NURSE PRACTITIONER

## 2022-02-17 PROCEDURE — G8427 DOCREV CUR MEDS BY ELIG CLIN: HCPCS | Performed by: NURSE PRACTITIONER

## 2022-02-17 PROCEDURE — 4004F PT TOBACCO SCREEN RCVD TLK: CPT | Performed by: NURSE PRACTITIONER

## 2022-02-17 PROCEDURE — G8417 CALC BMI ABV UP PARAM F/U: HCPCS | Performed by: NURSE PRACTITIONER

## 2022-02-17 PROCEDURE — 99214 OFFICE O/P EST MOD 30 MIN: CPT | Performed by: NURSE PRACTITIONER

## 2022-03-03 NOTE — TELEPHONE ENCOUNTER
Received paper from [facility] that they can not fill patients albuterol they request name silvano castellanos     Paper scanned in to media 3/3/22    [facility]

## 2022-03-10 ENCOUNTER — PREP FOR PROCEDURE (OUTPATIENT)
Dept: PHYSICAL MEDICINE AND REHAB | Age: 42
End: 2022-03-10

## 2022-03-14 NOTE — H&P
understanding post procedure directions / restrictions. All other questions and concerns addressed before patient discharge in ambulatory fashion. Chronic Pain/PM&R Clinic Note     Encounter Date: 2/17/22    Subjective:   Chief Complaint:   No chief complaint on file. History of Present Illness: Jacqueline Whitehead is a 39 y.o. female seen in the clinic initially on 03/14/22 upon request from Glendy Roger MD for her history of lumbar pain. Patient states pain started after a lifting accident at work in 2008 where she had an injury to her thoracic spine. Patient states back pain has become gradually worse ever since. Patient states pain is worse when she is sitting, standing in 1 position, or doing activities such as cleaning around the house. Patient states she will occasionally get pain that radiates down bilateral legs, left more than right with strenuous activity. Patient states when pain gets severe she will lay down which seems to help. Patient does have trouble sleeping occasionally as she has some aching and twitching in bilateral legs. Patient states she has tried physical therapy and decompression several times which have been ineffective. It has been several years since she has done physical therapy. Patient states she does not currently work as she was unable to tolerate it. Patient denies weakness in bilateral legs but states she will occasionally feel like her knees want to give out. Patient denies falls. Patient denies saddle anesthesia or bowel or bladder incontinence. Patient also mentions other pain such as in her left shoulder, left wrist, and left knee. 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 She has had ongoing thoracic pain since then. Today, 02/17/2022, patient presents for planned follow-up for chronic low back pain. Patient had a lumbar medial branch block at L4-5 and L5-S1 on 2/1/2022. Patient report greater than 85% pain relief for 2 days after the procedure. Patient states she was able to do the dishes without pain, which caused her issues previously. Patient would like to proceed with a second lumbar medial branch block with plans to complete the lumbar radiofrequency ablation. Patient denies any new symptoms. Patient denies saddle anesthesia, or bowel or bladder incontinence. History of Interventions:   Surgery: No previous thoracic or lumbar surgeries  Injections: Thoracic epidural?  Several years ago  Lumbar MBB L4-5 and L5-S1 (02/01/2022) - >85% relief x 2 days    Current Treatment Medications: Toradol as needed- mild effectiveness    Historical Treatment Medications:   Cymbalta - fatigue  Ibuprofen - ineffective  Tylenol - ineffective   OTC muscle rub -ineffective    Imaging:    (Lumbar Xray 02/02/2021)    Narrative   2 views lumbar spine       Comparison:  MR  - MRI LUMBAR SPINE WO CONTRAST  - 02/22/2019 11:08 AM EST    CR,SR  - XR LUMBAR SPINE (2-3 VIEWS)  - 12/14/2018 01:50 PM EST       Findings   Normal vertebral body alignment. No acute fractures or dislocation. No significant degenerative change           Impression   No acute findings       This document has been electronically signed by: Mart De La Cruz MD on    02/02/2021 01:58 AM       Lumbar MRI (02/22/2019)  Narrative   PROCEDURE: MRI LUMBAR SPINE WO CONTRAST       CLINICAL INFORMATION: DDD (degenerative disc disease), lumbar. Additional history obtained from the electronic medical record indicates the patient has right-sided numbness when laying on the right side. Severe back pain.       COMPARISON: None available. Correlation is made to radiographs of the lumbar spine dated December 14, 2018.       TECHNIQUE: Sagittal and axial T1 and T2-weighted images were obtained through the lumbar spine.       FINDINGS:       The lumbar spine is imaged from the inferior aspect of T10 to the superior aspect of S3.  The conus medullaris terminates at the L1 level. No abnormal signal or expansion is present within the conus. The visualized nerve roots are evenly distributed    throughout the thecal sac.       There is preservation of the expected lumbar lordosis. The vertebral body heights and alignment are maintained. No compression fracture deformity or suspicious marrow replacing lesion is identified.       Degenerative facet arthropathy is present at every level. With regards to the disc spaces, at L1-L2, the spinal canal and neural foramina are patent.       At L2-L3, the spinal canal and neural foramina are patent.       At L3-L4, there is disc space narrowing, disc desiccation and a disc bulge. The spinal canal and neural foramina are patent.       At L4-L5, there is disc desiccation and a posterior disc protrusion. This indents the ventral thecal sac and causes mild spinal canal narrowing. The neural foramina are patent.       At L5-S1, the spinal canal and neural foramina are patent.       No suspicious finding is identified within the visualized retroperitoneal and paraspinal soft tissues.           Impression        Multilevel degenerative changes are present throughout the lumbar spine and are further discussed by level in the findings. These are most significant at L4-L5 where there is a posterior disc protrusion indenting the ventral thecal sac and causing mild    spinal canal narrowing. Mild degenerative facet arthropathy is present without significant neural foraminal narrowing.                   **This report has been created using voice recognition software. It may contain minor errors which are inherent in voice recognition technology. **       Final report electronically signed by Dr. Deon Solo on 2/22/2019 1:06 PM       Past Medical History:   Diagnosis Date    Anxiety     Asthma     DDD (degenerative disc disease), cervical     DDD (degenerative disc disease), thoracolumbar     Depression     GERD (gastroesophageal reflux disease)        Past Surgical History:   Procedure Laterality Date    PAIN MANAGEMENT PROCEDURE Bilateral 2/1/2022    Lumbar medial branch block at bilateral L4-5 and L5-S1 performed by Radha Gonzalez DO at [address] [facility]       Family History   Problem Relation Age of Onset    Depression Mother     COPD Father     Asthma Sister     Depression Sister     Lupus Paternal Aunt     No Known Problems Daughter     No Known Problems Daughter     Asthma Daughter     No Known Problems Brother          Medications & Allergies:   Current Outpatient Medications   Medication Instructions    albuterol (PROVENTIL) 2.5 mg, Nebulization, EVERY 6 HOURS PRN    albuterol sulfate  (90 Base) MCG/ACT inhaler INHALE TWO (2) PUFFS INTO THE LUNGS EVERY FOUR (4) HOURS AS NEEDED FOR WHEEZING     alclomethasone (ACLOVATE) 0.05 % ointment Apply topically 2 times daily.  doxepin (SINEQUAN) 25 mg, Oral, NIGHTLY    fluocinonide (LIDEX) 0.05 % external solution No dose, route, or frequency recorded.  fluticasone-salmeterol (ADVAIR) 100-50 MCG/DOSE diskus inhaler 1 puff, Inhalation, EVERY 12 HOURS, Rinse mouth after use    ketoconazole (NIZORAL) 2 % shampoo No dose, route, or frequency recorded.  ondansetron (ZOFRAN) 4 mg, Oral, EVERY 8 HOURS PRN    Spacer/Aero-Holding Chambers (VORTEX VALVED HOLDING CHAMBER) SUJATHA 1 Device, Does not apply, DAILY, Use with inhalers    triamcinolone (KENALOG) 0.025 % cream No dose, route, or frequency recorded.  VENTOLIN  (90 Base) MCG/ACT inhaler 2 puffs, Inhalation, 4 TIMES DAILY PRN       Allergies   Allergen Reactions    Hydrocodone      \"makes me feel completely out of whack\"    Prozac [Fluoxetine Hcl] Other (See Comments)     \"I want to kill myself\"       Review of Systems:   Constitutional: negative for weight changes or fevers  Genitourinary: negative for bowel/bladder incontinence   Musculoskeletal: positive for low back pain. Neurological: negative for any leg weakness or numbness/tingling  Behavioral/Psych: negative for anxiety/depression   All other systems reviewed and are negative    Objective: There were no vitals filed for this visit. Constitutional: Pleasant, no acute distress   Head: Normocephalic, atraumatic   Eyes: Conjunctivae normal   Neck: Supple, symmetrical   Lungs: Normal respiratory effort, non-labored breathing   Cardiovascular: Limbs warm and well perfused   Abdomen: Non-protruded   Musculoskeletal: Muscle bulk symmetric, no atrophy, no gross deformities   · Lower Extremities: ROM WNL. · Thorax: No paraspinal tenderness bilaterally. No scoliosis or kyphosis. · Lumbar Spine: ROM WNL. Lumbar paraspinals mild tenderness with palpation bilaterally. SLR neg bilaterally. MATEUSZ neg bilaterally. GAENSLEN neg bilaterally. Positive facet loading bilaterally. Right SI joint nontender with palpation, mild tenderness to left SI joint with palpation. Bilateral greater trochanters non-tender to palpation. Neurological: Cranial nerves II-XII grossly intact. · Gait - Normal, non-antalgic gait. Ambulates without assistive device. · Motor: 5/5 muscle strength in bilateral hip flexion, knee flexion, knee extension, ankle dorsiflexion, and ankle plantar flexion   · Sensory: LT sensation intact in lower limbs   · Reflexes: 2+ symmetrical in bilateral achilles, 2+ bilateral patellar, negative ankle clonus, downgoing babinski   Skin: No rashes or lesions present   Psychological: Cooperative, no exaggerated pain behaviors     Assessment:    Diagnosis Orders   1. Lumbar spondylosis  CHG FLUOR NEEDLE/CATH SPINE/PARASPINAL DX/THER ADDON    MN INJ DX/THER AGNT PARAVERT FACET JOINT, LUMBAR/SAC, 1ST LEVEL    MN INJ DX/THER AGNT PARAVERT FACET JOINT, LUMBAR/SAC, 2ND LEVEL   2. Chronic pain syndrome     3. Facet arthropathy         Tu Long is a 39 y. o.female presenting to the pain clinic for evaluation of lumbar back pain. Patient's history and physical consistent with lumbar facet mediated pain lumbar spondylosis as evident on her imaging. We have set her up for a lumbar medial branch block at bilateral L4/5 and L5/S1. We have discussed that she has several pain generators that may be addressed in the future. With the patient having a significant response to the first lumbar medial branch blocks we will proceed with confirmatory lumbar medial branch blocks at L4-5 and L5-S1 with Dr. Nichelle Morales. We will follow up 1 week after the procedure to discuss next steps and plan on completing the lumbar RFA. Plan: The following treatment recommendations and plan were discussed in detail with Grace Rosas. Imaging:   I have reviewed patients imaging of lumbar xray and lumbar MRI and results were discussed with patient today. Analgesics:   Patient is taking Toradol. Patient is advised to take as prescribed and not take on an empty stomach. Adjuvants:   None    Interventions: In presence of lumbar axial back pain and with physical exam consistent for facetal pain, the option of confirmatory medial branch blocks at bilateral L4-5 and L5-S1 was chosen. The risks and benefits were discussed in detail with the patient. Patient wants to proceed with the injection. Anticoagulation/NPO Recommendations:   Patient does not need to hold any medications prior to the procedure. Patient will need to be NPO x 8 hours prior to the procedure. We will start an IV prior to the procedure  Patient will need a  for the procedure    Multidisciplinary Pain Management:   In the presence of complex, chronic, and multi-factorial pain, the importance of a multidisciplinary approach to pain management in the patients management regimen was emphasized and discussed in great detail. PHYSICAL THERAPY: Patient is advised to see a physical therapist for gentle stretching exercises and conditioning exercises for management of pain. Referrals:  None    Prescriptions Written This Visit:   none    Follow-up: Confirmatory lumbar medial branch block at bilateral L4/5 and L5/S1, follow up 1 week after the procedure.     Radha Gonzalez DO

## 2022-03-15 ENCOUNTER — HOSPITAL ENCOUNTER (OUTPATIENT)
Age: 42
Setting detail: OUTPATIENT SURGERY
Discharge: HOME | End: 2022-03-15
Attending: ANESTHESIOLOGY | Admitting: ANESTHESIOLOGY
Payer: MEDICAID

## 2022-03-15 ENCOUNTER — APPOINTMENT (OUTPATIENT)
Dept: GENERAL RADIOLOGY | Age: 42
End: 2022-03-15
Attending: ANESTHESIOLOGY
Payer: MEDICAID

## 2022-03-15 VITALS
OXYGEN SATURATION: 98 % | HEIGHT: 64 IN | RESPIRATION RATE: 16 BRPM | HEART RATE: 75 BPM | SYSTOLIC BLOOD PRESSURE: 108 MMHG | DIASTOLIC BLOOD PRESSURE: 78 MMHG | WEIGHT: 208 LBS | BODY MASS INDEX: 35.51 KG/M2 | TEMPERATURE: 96.6 F

## 2022-03-15 PROCEDURE — 6360000002 HC RX W HCPCS: Performed by: ANESTHESIOLOGY

## 2022-03-15 PROCEDURE — 99152 MOD SED SAME PHYS/QHP 5/>YRS: CPT | Performed by: ANESTHESIOLOGY

## 2022-03-15 PROCEDURE — 64494 INJ PARAVERT F JNT L/S 2 LEV: CPT | Performed by: ANESTHESIOLOGY

## 2022-03-15 PROCEDURE — 3209999900 FLUORO FOR SURGICAL PROCEDURES

## 2022-03-15 PROCEDURE — 64493 INJ PARAVERT F JNT L/S 1 LEV: CPT | Performed by: ANESTHESIOLOGY

## 2022-03-15 PROCEDURE — 3600000056 HC PAIN LEVEL 4 BASE: Performed by: ANESTHESIOLOGY

## 2022-03-15 PROCEDURE — 2709999900 HC NON-CHARGEABLE SUPPLY: Performed by: ANESTHESIOLOGY

## 2022-03-15 PROCEDURE — 7100000010 HC PHASE II RECOVERY - FIRST 15 MIN: Performed by: ANESTHESIOLOGY

## 2022-03-15 PROCEDURE — 2500000003 HC RX 250 WO HCPCS: Performed by: ANESTHESIOLOGY

## 2022-03-15 RX ORDER — BUPIVACAINE HYDROCHLORIDE 5 MG/ML
INJECTION, SOLUTION PERINEURAL PRN
Status: DISCONTINUED | OUTPATIENT
Start: 2022-03-15 | End: 2022-03-15 | Stop reason: ALTCHOICE

## 2022-03-15 RX ORDER — LIDOCAINE HYDROCHLORIDE 10 MG/ML
INJECTION, SOLUTION EPIDURAL; INFILTRATION; INTRACAUDAL; PERINEURAL PRN
Status: DISCONTINUED | OUTPATIENT
Start: 2022-03-15 | End: 2022-03-15 | Stop reason: ALTCHOICE

## 2022-03-15 RX ORDER — MIDAZOLAM HYDROCHLORIDE 1 MG/ML
INJECTION INTRAMUSCULAR; INTRAVENOUS PRN
Status: DISCONTINUED | OUTPATIENT
Start: 2022-03-15 | End: 2022-03-15 | Stop reason: ALTCHOICE

## 2022-03-15 RX ORDER — FENTANYL CITRATE 50 UG/ML
INJECTION, SOLUTION INTRAMUSCULAR; INTRAVENOUS PRN
Status: DISCONTINUED | OUTPATIENT
Start: 2022-03-15 | End: 2022-03-15 | Stop reason: ALTCHOICE

## 2022-03-15 ASSESSMENT — PAIN - FUNCTIONAL ASSESSMENT: PAIN_FUNCTIONAL_ASSESSMENT: 0-10

## 2022-03-15 ASSESSMENT — PAIN SCALES - GENERAL: PAINLEVEL_OUTOF10: 0

## 2022-03-15 NOTE — PROCEDURES
Pre-operative Diagnosis: Lumbar facet pain     Post-operative Diagnosis: Lumbar facet pain     Procedure: Bilateral lumbar medial branch blocks targeting facet joints of L4/L5 and L5/S1     Procedure Description:  After consent was obtained, the patient was placed in the prone position having pressure points appropriately padded. The lower back was prepped with chloraprep and draped in a sterile fashion. 0.5 cc of 1 % lidocaine was used for local anesthesia of the skin and superficial subcutaneous tissues. Three 22-gauge 3.5-inch needles were advanced under fluoroscopy in an AP view with caudad angulation: one at the sacral ala and two at the junction of the right superior articular process and the transverse process of the L4 and L5 vertebrae. There was no paresthesias, heme, or CSF obtained. Needle placement was confirmed using AP and oblique views. Then 0.5 cc of 0.5% bupivacaine was injected at the site without complications. The procedure was repeated at L4, L5, and sacral ala on the left side.  The patient tolerated the procedure well, transported to the recovery room, and observed for 15 minutes prior to being discharged in ambulatory fashion.     Procedural Complications: None  Estimated Blood Loss: 0 mL        IV sedation was used during the procedure:  - Moderate intravenous conscious sedation was supervised by Dr. Lauren Apodaca  - The patient was independently monitored by a Registered Nurse assigned to the procedure room  - Monitoring included automated blood pressure, continuous EKG, and continuous pulse oximetry  - The detailed conscious record is permanently stored in the [place] [id number]  - The following is the conscious sedation record:  Start Time: 10:30  End Time : 10:45  Duration: 15 minutes   Medications Administered: 2 mg Versed, 50 mcg Fentanyl     Yesenia Morgan DO  Interventional Pain Management/PM&R   [facility]

## 2022-03-15 NOTE — PROGRESS NOTES
1038 Received in Phase 2 . Drowsy responsive to verbal stimuli. Airway patent. O2 sat  98% Injection site clean and dry. Tearful on arrival denies pain. 1042 Drink and snack given. 1050 . Assisted to sit at side of cart to get dressed  1057Discharged home via private car without complaint.

## 2022-03-15 NOTE — POST SEDATION
[address] [address]  Sedation/Analgesia Post Sedation Record    Pt Name: Jorge Brownlee  MRN: [id number]  YOB: 1980  Procedure Performed By: Britt Keller DO  Primary Care Physician: Su Pratt MD    POST-PROCEDURE    Physicians/Assistants: Britt Keller DO  Procedure Performed: See Procedure Note   Sedation/Anesthesia: Versed and Fentanyl (See procedure note for amount and duration)  Estimated Blood Loss:     0  ml  Specimens Removed: None        Complications: None           Liborio Loyd DO  Electronically signed 3/15/2022 at 1:59 PM

## 2022-03-15 NOTE — PRE SEDATION
[address] [address]  Pre-Sedation/Analgesia History & Physical    Pt Name: Janina Coello  MRN: [id number]  YOB: 1980  Provider Performing Procedure: Ej Parikh DO   Primary Care Physician: Mil Parmar MD      MEDICAL HISTORY       has a past medical history of Anxiety, Asthma, DDD (degenerative disc disease), cervical, DDD (degenerative disc disease), thoracolumbar, Depression, and GERD (gastroesophageal reflux disease). SURGICAL HISTORY   has a past surgical history that includes Pain management procedure (Bilateral, 2/1/2022). ALLERGIES   Allergies as of 02/17/2022 - Fully Reviewed 02/17/2022   Allergen Reaction Noted    Hydrocodone  02/27/2020    Prozac [fluoxetine hcl] Other (See Comments) 12/05/2018       MEDICATIONS   Prior to Admission medications    Medication Sig Start Date End Date Taking? Authorizing Provider   VENTOLIN  (90 Base) MCG/ACT inhaler Inhale 2 puffs into the lungs 4 times daily as needed for Wheezing 3/3/22  Yes BINH Waterman CNP   doxepin (SINEQUAN) 25 MG capsule Take 1 capsule by mouth nightly 1/12/22  Yes Mil Parmar MD   ondansetron (ZOFRAN) 4 MG tablet Take 1 tablet by mouth every 8 hours as needed for Nausea 1/12/22  Yes Mil Parmar MD   ketoconazole (NIZORAL) 2 % shampoo  1/3/22  Yes Historical Provider, MD   triamcinolone (KENALOG) 0.025 % cream  1/3/22  Yes Historical Provider, MD   fluocinonide (LIDEX) 0.05 % external solution  1/3/22  Yes Historical Provider, MD   fluticasone-salmeterol (ADVAIR) 100-50 MCG/DOSE diskus inhaler Inhale 1 puff into the lungs every 12 hours Rinse mouth after use 12/29/21  Yes BINH Waterman CNP   alclomethasone (ACLOVATE) 0.05 % ointment Apply topically 2 times daily.  9/21/21  Yes Mil Parmar MD   albuterol sulfate  (90 Base) MCG/ACT inhaler INHALE TWO (2) PUFFS INTO THE LUNGS EVERY FOUR (4) HOURS AS NEEDED FOR WHEEZING  12/30/20  Yes BINH Haas CNP albuterol (PROVENTIL) (2.5 MG/3ML) 0.083% nebulizer solution Take 3 mLs by nebulization every 6 hours as needed for Wheezing or Shortness of Breath 3/25/20 12/29/21  BINH Loyd CNP   Spacer/Aero-Holding Chambers (VORTEX VALVED HOLDING CHAMBER) SUJATHA 1 Device by Does not apply route daily Use with inhalers 9/30/19   BINH Loyd CNP     PHYSICAL:   Vitals:    03/15/22 1038   BP: 108/78   Pulse: 75   Resp: 16   Temp:    SpO2: 98%     PLANNED PROCEDURE   See procedure note  SEDATION  Planned agent: Versed and Fentanyl  ASA Classification: 1  Class 1: A normal healthy patient  Class 2: Pt with mild to moderate systemic disease  Class 3: Severe systemic disease or disturbance  Class 4: Severe systemic disorders that are already life threatening. Class 5: Moribund pt with little chances of survival, for more than 24 hours. Mallampati I Airway Classification: 1    1. Pre-procedure diagnostic studies complete and results available. 2. Previous sedation/anesthesia experiences assessed. 3. The patient is an appropriate candidate to undergo the planned procedure sedation and anesthesia. (Refer to nursing sedation/analgesia documentation record)  4. Formulation and discussion of sedation/procedure plan, risks, and expectations with patient and/or responsible adult completed. 5. Patient examined immediately prior to the procedure.  (Refer to nursing sedation/analgesia documentation record)    Eva Ames DO  Electronically signed 3/15/2022 at 1:59 PM

## 2022-03-15 NOTE — FLOWSHEET NOTE
Pt. Admitted in stable condition. Consent signed. VS WNL. INT inserted without complications. Discharge instructions reviewed with the pt. Pt. Denies questions. Call light left within reach.

## 2022-03-22 ENCOUNTER — OFFICE VISIT (OUTPATIENT)
Dept: PHYSICAL MEDICINE AND REHAB | Age: 42
End: 2022-03-22
Payer: MEDICAID

## 2022-03-22 ENCOUNTER — HOSPITAL ENCOUNTER (OUTPATIENT)
Dept: PULMONOLOGY | Age: 42
Discharge: HOME | End: 2022-03-22
Payer: MEDICAID

## 2022-03-22 VITALS
HEIGHT: 64 IN | WEIGHT: 208 LBS | DIASTOLIC BLOOD PRESSURE: 86 MMHG | BODY MASS INDEX: 35.51 KG/M2 | SYSTOLIC BLOOD PRESSURE: 112 MMHG

## 2022-03-22 DIAGNOSIS — M47.819 FACET ARTHROPATHY: ICD-10-CM

## 2022-03-22 DIAGNOSIS — J45.40 MODERATE PERSISTENT ASTHMA WITHOUT COMPLICATION: ICD-10-CM

## 2022-03-22 DIAGNOSIS — G89.29 CHRONIC LEFT SHOULDER PAIN: ICD-10-CM

## 2022-03-22 DIAGNOSIS — M47.816 LUMBAR SPONDYLOSIS: Primary | ICD-10-CM

## 2022-03-22 DIAGNOSIS — G95.0 SYRINX (HCC): ICD-10-CM

## 2022-03-22 DIAGNOSIS — G89.4 CHRONIC PAIN SYNDROME: ICD-10-CM

## 2022-03-22 DIAGNOSIS — M25.512 CHRONIC LEFT SHOULDER PAIN: ICD-10-CM

## 2022-03-22 PROCEDURE — 99214 OFFICE O/P EST MOD 30 MIN: CPT | Performed by: NURSE PRACTITIONER

## 2022-03-22 PROCEDURE — G8417 CALC BMI ABV UP PARAM F/U: HCPCS | Performed by: NURSE PRACTITIONER

## 2022-03-22 PROCEDURE — 94060 EVALUATION OF WHEEZING: CPT

## 2022-03-22 PROCEDURE — G8484 FLU IMMUNIZE NO ADMIN: HCPCS | Performed by: NURSE PRACTITIONER

## 2022-03-22 PROCEDURE — 94729 DIFFUSING CAPACITY: CPT

## 2022-03-22 PROCEDURE — 94726 PLETHYSMOGRAPHY LUNG VOLUMES: CPT

## 2022-03-22 PROCEDURE — G8427 DOCREV CUR MEDS BY ELIG CLIN: HCPCS | Performed by: NURSE PRACTITIONER

## 2022-03-22 PROCEDURE — 4004F PT TOBACCO SCREEN RCVD TLK: CPT | Performed by: NURSE PRACTITIONER

## 2022-03-22 NOTE — PROGRESS NOTES
Chronic Pain/PM&R Clinic Note     Encounter Date: 3/22/22    Subjective:   Chief Complaint:   Chief Complaint   Patient presents with    Follow Up After Procedure    Shoulder Pain     left        History of Present Illness: Autumn Tenorio is a 39 y.o. female seen in the clinic initially on 03/22/22 upon request from Kishore Price MD for her history of lumbar pain. Patient states pain started after a lifting accident at work in 2008 where she had an injury to her thoracic spine. Patient states back pain has become gradually worse ever since. Patient states pain is worse when she is sitting, standing in 1 position, or doing activities such as cleaning around the house. Patient states she will occasionally get pain that radiates down bilateral legs, left more than right with strenuous activity. Patient states when pain gets severe she will lay down which seems to help. Patient does have trouble sleeping occasionally as she has some aching and twitching in bilateral legs. Patient states she has tried physical therapy and decompression several times which have been ineffective. It has been several years since she has done physical therapy. Patient states she does not currently work as she was unable to tolerate it. Patient denies weakness in bilateral legs but states she will occasionally feel like her knees want to give out. Patient denies falls. Patient denies saddle anesthesia or bowel or bladder incontinence. Patient also mentions other pain such as in her left shoulder, left wrist, and left knee. 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 She has had ongoing thoracic pain since then. Today, 02/17/2022, patient presents for planned follow-up for chronic low back pain. Patient had a lumbar medial branch block at L4-5 and L5-S1 on 2/1/2022. Patient report greater than 85% pain relief for 2 days after the procedure.  Patient states she was able to do the dishes without pain, which caused her issues previously. Patient would like to proceed with a second lumbar medial branch block with plans to complete the lumbar radiofrequency ablation. Patient denies any new symptoms. Patient denies saddle anesthesia, or bowel or bladder incontinence. Today, 03/22/2022, patient presents for planned follow-up for chronic low back pain. Patient underwent a confirmatory lumbar medial branch block at L4-5 and L5-S1 on 3/15/2022. Patient reports >85% pain relief x3 days after the procedure. Patient would like to move forward with a lumbar RFA for more long-term relief. Patient states she is having increased left shoulder pain that started when she woke up on Friday morning. Patient states it is hard to turn her head to the right as it would cause left shoulder blade pain. Patient states she has had left shoulder pain off and on since 2018 when she was in an accident. Patient states she has had imaging of her left shoulder which just showed degenerative changes. Patient states she has tried Toradol, IcyHot, roll-on pain relief, and Aleve back and body which all have been relatively ineffective. Patient states she has had some left hand weakness. She states her symptoms have gradually improved since Friday while it is still causing significant pain. Patient denies any new symptoms such as focal leg weakness, leg paresthesias, saddle anesthesia, or bowel/bladder incontinence. History of Interventions:   Surgery: No previous thoracic or lumbar surgeries  Injections: Thoracic epidural?  Several years ago  Lumbar MBB L4-5 and L5-S1 (02/01/2022) - >85% relief x 2 days  Lumbar MBB L4-5 and L5-S1 (03/15/2022) - >85% relief x 3 days      Current Treatment Medications:    Toradol as needed- mild effectiveness    Historical Treatment Medications:   Cymbalta - fatigue  Ibuprofen - ineffective  Tylenol - ineffective   OTC muscle rub -ineffective    Imaging:    (Lumbar Xray 02/02/2021)    Narrative   2 views lumbar spine       Comparison:  MR  - MRI LUMBAR SPINE WO CONTRAST  - 02/22/2019 11:08 AM EST    CR,SR  - XR LUMBAR SPINE (2-3 VIEWS)  - 12/14/2018 01:50 PM EST       Findings   Normal vertebral body alignment. No acute fractures or dislocation. No significant degenerative change           Impression   No acute findings       This document has been electronically signed by: Thea Arroyo MD on    02/02/2021 01:58 AM       Lumbar MRI (02/22/2019)  Narrative   PROCEDURE: MRI LUMBAR SPINE WO CONTRAST       CLINICAL INFORMATION: DDD (degenerative disc disease), lumbar. Additional history obtained from the electronic medical record indicates the patient has right-sided numbness when laying on the right side. Severe back pain.       COMPARISON: None available. Correlation is made to radiographs of the lumbar spine dated December 14, 2018.       TECHNIQUE: Sagittal and axial T1 and T2-weighted images were obtained through the lumbar spine.       FINDINGS:       The lumbar spine is imaged from the inferior aspect of T10 to the superior aspect of S3. The conus medullaris terminates at the L1 level. No abnormal signal or expansion is present within the conus. The visualized nerve roots are evenly distributed    throughout the thecal sac.       There is preservation of the expected lumbar lordosis. The vertebral body heights and alignment are maintained. No compression fracture deformity or suspicious marrow replacing lesion is identified.       Degenerative facet arthropathy is present at every level. With regards to the disc spaces, at L1-L2, the spinal canal and neural foramina are patent.       At L2-L3, the spinal canal and neural foramina are patent.       At L3-L4, there is disc space narrowing, disc desiccation and a disc bulge.  The spinal canal and neural foramina are patent.       At L4-L5, there is disc desiccation and a posterior disc protrusion. This indents the ventral thecal sac and causes mild spinal canal narrowing. The neural foramina are patent.       At L5-S1, the spinal canal and neural foramina are patent.       No suspicious finding is identified within the visualized retroperitoneal and paraspinal soft tissues.           Impression        Multilevel degenerative changes are present throughout the lumbar spine and are further discussed by level in the findings. These are most significant at L4-L5 where there is a posterior disc protrusion indenting the ventral thecal sac and causing mild    spinal canal narrowing. Mild degenerative facet arthropathy is present without significant neural foraminal narrowing.                   **This report has been created using voice recognition software. It may contain minor errors which are inherent in voice recognition technology. **       Final report electronically signed by Dr. Silas Dailey on 2/22/2019 1:06 PM       Past Medical History:   Diagnosis Date    Anxiety     Asthma     DDD (degenerative disc disease), cervical     DDD (degenerative disc disease), thoracolumbar     Depression     GERD (gastroesophageal reflux disease)        Past Surgical History:   Procedure Laterality Date    PAIN MANAGEMENT PROCEDURE Bilateral 2/1/2022    Lumbar medial branch block at bilateral L4-5 and L5-S1 performed by Reji Lopez DO at [address] Bilateral 3/15/2022    medial branch blocks at bilateral L4-5 and L5-S1 performed by Reji Lopez DO at [address] [place]       Family History   Problem Relation Age of Onset    Depression Mother    Jacome COPD Father     Asthma Sister     Depression Sister     Lupus Paternal Aunt     No Known Problems Daughter     No Known Problems Daughter     Asthma Daughter     No Known Problems Brother          Medications & Allergies:   Current Outpatient Medications   Medication Instructions    albuterol (PROVENTIL) 2.5 mg, Nebulization, EVERY 6 HOURS PRN    albuterol sulfate  (90 Base) MCG/ACT inhaler INHALE TWO (2) PUFFS INTO THE LUNGS EVERY FOUR (4) HOURS AS NEEDED FOR WHEEZING     alclomethasone (ACLOVATE) 0.05 % ointment Apply topically 2 times daily.  doxepin (SINEQUAN) 25 mg, Oral, NIGHTLY    fluocinonide (LIDEX) 0.05 % external solution No dose, route, or frequency recorded.  fluticasone-salmeterol (ADVAIR) 100-50 MCG/DOSE diskus inhaler 1 puff, Inhalation, EVERY 12 HOURS, Rinse mouth after use    ketoconazole (NIZORAL) 2 % shampoo No dose, route, or frequency recorded.  ondansetron (ZOFRAN) 4 mg, Oral, EVERY 8 HOURS PRN    Spacer/Aero-Holding Chambers (VORTEX VALVED HOLDING CHAMBER) SUJATHA 1 Device, Does not apply, DAILY, Use with inhalers    triamcinolone (KENALOG) 0.025 % cream No dose, route, or frequency recorded.  VENTOLIN  (90 Base) MCG/ACT inhaler 2 puffs, Inhalation, 4 TIMES DAILY PRN       Allergies   Allergen Reactions    Hydrocodone      \"makes me feel completely out of whack\"    Prozac [Fluoxetine Hcl] Other (See Comments)     \"I want to kill myself\"       Review of Systems:   Constitutional: negative for weight changes or fevers  Genitourinary: negative for bowel/bladder incontinence   Musculoskeletal: positive for low back pain. Neurological: negative for any leg weakness or numbness/tingling  Behavioral/Psych: negative for anxiety/depression   All other systems reviewed and are negative    Objective:     Vitals:    03/22/22 1035   BP: 112/86       Constitutional: Pleasant, no acute distress   Head: Normocephalic, atraumatic   Eyes: Conjunctivae normal   Neck: Supple, symmetrical   Lungs: Normal respiratory effort, non-labored breathing   Cardiovascular: Limbs warm and well perfused   Abdomen: Non-protruded   Musculoskeletal: Muscle bulk symmetric, no atrophy, no gross deformities   · Lower Extremities: ROM WNL.    · Thorax: Left trapezius and paraspinal tenderness. No scoliosis or kyphosis. Decreased left arm abduction - 120 degrees, otherwise ROM WNL. Negative Neers test.  Negative Cooper test.  Negative lift-off test.   · Lumbar Spine: ROM WNL. Lumbar paraspinals mild tenderness with palpation bilaterally. SLR neg bilaterally. MATEUSZ neg bilaterally. GAENSLEN neg bilaterally. Positive facet loading bilaterally. Right SI joint nontender with palpation, mild tenderness to left SI joint with palpation. Bilateral greater trochanters non-tender to palpation. Neurological: Cranial nerves II-XII grossly intact. · Gait - Normal, non-antalgic gait. Ambulates without assistive device. · Motor: 5/5 muscle strength in bilateral hip flexion, knee flexion, knee extension, ankle dorsiflexion, and ankle plantar flexion   · Sensory: LT sensation intact in lower limbs   · Reflexes: 2+ symmetrical in bilateral achilles, 2+ bilateral patellar, negative ankle clonus, downgoing babinski   Skin: No rashes or lesions present   Psychological: Cooperative, no exaggerated pain behaviors     Assessment:    Diagnosis Orders   1. Lumbar spondylosis  CHG FLUOR NEEDLE/CATH SPINE/PARASPINAL DX/THER ADDON    TX RADIOFREQUENCY NEUROTOMY LUMBAR OR SACRAL, W IMAGE GUIDANCE, SINGLE    TX RADIOFREQ NEUROTOMY LUMBAR OR SACRAL, W IMAGE GUIDE,EA ADDL LEVEL   2. Facet arthropathy     3. Chronic pain syndrome     4. Syrinx ([address]     5. Chronic left shoulder pain         Lorena Patricio is a 39 y. o.female presenting to the pain clinic for evaluation of lumbar back pain. Patient's history and physical consistent with lumbar facet mediated pain lumbar spondylosis as evident on her imaging. We have set her up for a lumbar medial branch block at bilateral L4/5 and L5/S1. We have discussed that she has several pain generators that may be addressed in the future.      With the patient having a significant response to the first lumbar medial branch blocks we will proceed with confirmatory lumbar medial branch blocks at L4-5 and L5-S1 with Dr. Davide Strickland. We will follow up 1 week after the procedure to discuss next steps and plan on completing the lumbar RFA. Patient had a significant response to the bilateral confirmatory lumbar medial branch blocks at L4-5 and L5-S1. We have set her up for the bilateral lumbar RFA at these levels starting with the right side first.  In regards to her left shoulder pain I offered trigger point injections in office today, patient declined. I also offered a Toradol injection or muscle relaxer to help with pain control, patient declined. Patient would like to just work on home exercises for pain. We will look into this further if her pain continues. Plan: The following treatment recommendations and plan were discussed in detail with Clay Drew. Imaging:   I have reviewed patients imaging of lumbar xray and lumbar MRI and results were discussed with patient today. I have reviewed patient's imaging of left shoulder x-ray and results were discussed with patient today. Analgesics:   Patient is taking Toradol. Patient is advised to take as prescribed and not take on an empty stomach. Adjuvants:   None    Interventions: In presence of lumbar axial back pain and with physical exam consistent for facetal pain, the option of   lumbar radiofrequency ablation at bilateral L4-5 and L5-S1, RIGHT side first was chosen. The risks and benefits were discussed in detail with the patient. Patient wants to proceed with the injection. Anticoagulation/NPO Recommendations:   Patient does not need to hold any medications prior to the procedure. Patient will need to be NPO x 8 hours prior to the procedure.   We will start an IV prior to the procedure  Patient will need a  for the procedure    Multidisciplinary Pain Management:   In the presence of complex, chronic, and multi-factorial pain, the importance of a multidisciplinary approach to pain management in the patients management regimen was emphasized and discussed in great detail. PHYSICAL THERAPY: Patient is advised to see a physical therapist for gentle stretching exercises and conditioning exercises for management of pain. Referrals:  None    Prescriptions Written This Visit:   none    Follow-up: Lumbar RFA B/L L4-5 and L5-S1, RIGHT side first. F/u 2 weeks after to set up left side.      Divine Villalba, BINH - CNP

## 2022-03-28 ENCOUNTER — OFFICE VISIT (OUTPATIENT)
Dept: PULMONOLOGY | Age: 42
End: 2022-03-28
Payer: MEDICAID

## 2022-03-28 VITALS
OXYGEN SATURATION: 98 % | DIASTOLIC BLOOD PRESSURE: 72 MMHG | BODY MASS INDEX: 35.85 KG/M2 | HEART RATE: 80 BPM | TEMPERATURE: 98 F | SYSTOLIC BLOOD PRESSURE: 120 MMHG | HEIGHT: 64 IN | WEIGHT: 210 LBS

## 2022-03-28 DIAGNOSIS — E66.9 OBESITY (BMI 30-39.9): ICD-10-CM

## 2022-03-28 DIAGNOSIS — Z72.0 TOBACCO ABUSE DISORDER: ICD-10-CM

## 2022-03-28 DIAGNOSIS — F06.4 ANXIETY DISORDER DUE TO KNOWN PHYSIOLOGICAL CONDITION: ICD-10-CM

## 2022-03-28 DIAGNOSIS — J45.20 MILD INTERMITTENT ASTHMA WITHOUT COMPLICATION: Primary | ICD-10-CM

## 2022-03-28 PROCEDURE — G8484 FLU IMMUNIZE NO ADMIN: HCPCS | Performed by: NURSE PRACTITIONER

## 2022-03-28 PROCEDURE — 4004F PT TOBACCO SCREEN RCVD TLK: CPT | Performed by: NURSE PRACTITIONER

## 2022-03-28 PROCEDURE — 99214 OFFICE O/P EST MOD 30 MIN: CPT | Performed by: NURSE PRACTITIONER

## 2022-03-28 PROCEDURE — G8417 CALC BMI ABV UP PARAM F/U: HCPCS | Performed by: NURSE PRACTITIONER

## 2022-03-28 PROCEDURE — G8427 DOCREV CUR MEDS BY ELIG CLIN: HCPCS | Performed by: NURSE PRACTITIONER

## 2022-03-28 ASSESSMENT — ENCOUNTER SYMPTOMS
SHORTNESS OF BREATH: 0
WHEEZING: 1
DIARRHEA: 0
ABDOMINAL PAIN: 0
NAUSEA: 0
COUGH: 0
VOMITING: 0
EYES NEGATIVE: 1

## 2022-03-28 NOTE — PROGRESS NOTES
[facility]     Patient: Charbel Camejo, 39 y.o.   : 1980  3/28/2022    Pt of Dr. Vashti Gayle   Patient presents with    Follow-up     3 month Asthma follow up, PFT 3/22/22        HPI  Venecia Mast is here for 3 months follow up for Asthma with PFT   Current Inhalers:  Advair 100/50 1 puff BID - using compliantly , is rinsing , denies sores in mouth   PRN albuterol - has not needed lately   Long distance walking / cold weather causes increased dyspnea and wheezing   Active smoker 1 PPD , planning a \"lifestyle change\" to help loose weight. And wanting to decrease smoking   Feels her SOB/ cough are stable    Any recent exacerbations that have required oral atb or steroids No  Any new medical issues since last visit : weight gain     Patient is not on home oxygen therapy:     Has not had COVID vaccine, no personal history of COVID    Internal Administration   First Dose      Second Dose           Last COVID Lab SARS-CoV-2 (no units)   Date Value   2020 Not Detected     SARS-CoV-2, NAAT (no units)   Date Value   2021 NOT  DETECTED               SOCIAL HISTORY:  Social History     Tobacco Use    Smoking status: Current Every Day Smoker     Packs/day: 1.50     Years: 27.00     Pack years: 40.50     Types: Cigarettes     Start date: 1995    Smokeless tobacco: Never Used    Tobacco comment: currently a pack a day 3/28/22   Vaping Use    Vaping Use: Former    Substances: Always   Substance Use Topics    Alcohol use: No    Drug use: Not Currently     Comment: Not currently -- meth last use  and last pot use 2016  years ago.           CURRENT MEDICATIONS:  Current Outpatient Medications   Medication Sig Dispense Refill    fluticasone-salmeterol (ADVAIR) 100-50 MCG/DOSE diskus inhaler Inhale 1 puff into the lungs every 12 hours Rinse mouth after use 60 each 11    VENTOLIN  (90 Base) MCG/ACT inhaler Inhale 2 puffs into the lungs 4 times daily as needed for Wheezing 18 g 5    doxepin (SINEQUAN) 25 MG capsule Take 1 capsule by mouth nightly 30 capsule 2    ondansetron (ZOFRAN) 4 MG tablet Take 1 tablet by mouth every 8 hours as needed for Nausea 30 tablet 0    ketoconazole (NIZORAL) 2 % shampoo       triamcinolone (KENALOG) 0.025 % cream       fluocinonide (LIDEX) 0.05 % external solution       alclomethasone (ACLOVATE) 0.05 % ointment Apply topically 2 times daily. 45 g 2    albuterol sulfate  (90 Base) MCG/ACT inhaler INHALE TWO (2) PUFFS INTO THE LUNGS EVERY FOUR (4) HOURS AS NEEDED FOR WHEEZING  18 g 11    albuterol (PROVENTIL) (2.5 MG/3ML) 0.083% nebulizer solution Take 3 mLs by nebulization every 6 hours as needed for Wheezing or Shortness of Breath 120 vial 11    Spacer/Aero-Holding Chambers (VORTEX VALVED HOLDING CHAMBER) SUJATHA 1 Device by Does not apply route daily Use with inhalers 1 Device 0     No current facility-administered medications for this visit. Rometta Favre ROS   Review of Systems   Constitutional: Negative for activity change, appetite change, chills, fatigue, fever and unexpected weight change. HENT: Positive for postnasal drip. Eyes: Negative. Respiratory: Positive for wheezing (rarely ). Negative for cough and shortness of breath. Cardiovascular: Negative for chest pain, palpitations and leg swelling. Gastrointestinal: Negative for abdominal pain, diarrhea, nausea and vomiting. Genitourinary: Negative. Musculoskeletal: Negative. Skin: Negative. Neurological: Negative. Hematological: Negative. Psychiatric/Behavioral: Negative. Physical exam   /72   Pulse 80   Temp 98 °F (36.7 °C)   Ht 5' 4\" (1.626 m)   Wt 210 lb (95.3 kg)   SpO2 98% Comment: r/a  BMI 36.05 kg/m²      Wt Readings from Last 3 Encounters:   03/28/22 210 lb (95.3 kg)   03/22/22 208 lb (94.3 kg)   03/15/22 208 lb (94.3 kg)     Physical Exam  Vitals and nursing note reviewed. Constitutional:       General: She is not in acute distress. Appearance: She is well-developed and overweight. HENT:      Mouth/Throat:      Lips: Pink. Mouth: Mucous membranes are moist.      Pharynx: Oropharynx is clear. No oropharyngeal exudate or posterior oropharyngeal erythema. Eyes:      Conjunctiva/sclera: Conjunctivae normal.   Neck:      Vascular: No JVD. Cardiovascular:      Rate and Rhythm: Normal rate and regular rhythm. Heart sounds: No murmur heard. No friction rub. Pulmonary:      Effort: Pulmonary effort is normal. No accessory muscle usage or respiratory distress. Breath sounds: Normal breath sounds. No wheezing, rhonchi or rales. Chest:      Chest wall: No tenderness. Musculoskeletal:      Right lower leg: No edema. Left lower leg: No edema. Skin:     General: Skin is warm and dry. Capillary Refill: Capillary refill takes less than 2 seconds. Nails: There is no clubbing. Neurological:      Mental Status: She is alert and oriented to person, place, and time. Psychiatric:         Mood and Affect: Mood normal.         Behavior: Behavior normal.         Thought Content: Thought content normal.         Judgment: Judgment normal.          Test results   Lung Nodule Screening     [] Qualifies    [x]Does not qualify   [] Declined    [] Completed      Full PFT w/ BD 3/22/2022         Assessment      Diagnosis Orders   1. Mild intermittent asthma without complication     2. Obesity (BMI 30-39.9)     3. Tobacco abuse disorder     4. Anxiety disorder due to known physiological condition       Plan    -encouragement to work on smoking cessation, she has no desire to discuss smoking cessation today, plans to work on weight loss first.   -asthma under good control now that she is compliant with Advair use.  Continue 100/50- 1 puff BID, refills escribed  -PRN albuterol   -avoid known triggers/ allergens  -PFT wnl , monitor PRN   -avoid ill contacts  -keep UTD on recommended vaccinations    Will see Mary Escobar in: 1 year  billing based on medical decision making   Electronically signed by BINH Cote CNP on 3/28/2022 at 11:27 AM

## 2022-04-07 ENCOUNTER — PREP FOR PROCEDURE (OUTPATIENT)
Dept: PHYSICAL MEDICINE AND REHAB | Age: 42
End: 2022-04-07

## 2022-04-11 NOTE — H&P
Today, patient presents for planned lumbar radiofrequency ablation at RIGHT L4-5 and L5-S1. This note is reflective of the patient's previous visit for evaluation. We will proceed with today's planned procedure. Since patient's last visit for evaluation, there have been no interval changes in medical history. Patient has no new numbness, weakness, or focal neurological deficit since evaluation. Patient has no contraindications to injection (no anticoagulation or recent antibiotic intake for active infections), and has a  present or is able to drive themselves (as discussed and cleared by physician). Allergies to latex, contrast dye, and steroid medications have been confirmed with the patient prior to the procedure. NPO necessity has been assessed and accepted based on procedure complexity. The risks and benefits of the procedure have been explained including but are not limited to infection, bleeding, paralysis, immediate post procedure weakness, and dizziness; the patient acknowledges understanding and desires to proceed with the procedure. Patient has signed consent for same procedure as discussed in previous clinic encounter. All other questions and concerns were addressed at bedside. See procedure note for full details. Post procedure Instructions: The patient was advised not to drive during the day of the procedure and not to engage in any significant decision making (unless otherwise states by physician). The patient was also advised to be cautious with walking/activity for 24 hours following today's visit and asked not to engage in over-exertion (unless otherwise states by physician). After this time, it is ok to resume pre-procedure level of activity. Patient advised to apply ice to site of injection in situations of pain and discomfort. Patient advised to not submerge site of injection during bath or pool activities for approximately 24 hours post-procedure.  Patient attested to understanding post procedure directions / restrictions. All other questions and concerns addressed before patient discharge in ambulatory fashion. Chronic Pain/PM&R Clinic Note     Encounter Date: 3/22/22    Subjective:   Chief Complaint:   No chief complaint on file. History of Present Illness: Michelle Torres is a 39 y.o. female seen in the clinic initially on 04/10/22 upon request from Earsmo López MD for her history of lumbar pain. Patient states pain started after a lifting accident at work in 2008 where she had an injury to her thoracic spine. Patient states back pain has become gradually worse ever since. Patient states pain is worse when she is sitting, standing in 1 position, or doing activities such as cleaning around the house. Patient states she will occasionally get pain that radiates down bilateral legs, left more than right with strenuous activity. Patient states when pain gets severe she will lay down which seems to help. Patient does have trouble sleeping occasionally as she has some aching and twitching in bilateral legs. Patient states she has tried physical therapy and decompression several times which have been ineffective. It has been several years since she has done physical therapy. Patient states she does not currently work as she was unable to tolerate it. Patient denies weakness in bilateral legs but states she will occasionally feel like her knees want to give out. Patient denies falls. Patient denies saddle anesthesia or bowel or bladder incontinence. Patient also mentions other pain such as in her left shoulder, left wrist, and left knee. 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 She has had ongoing thoracic pain since then. Today, 02/17/2022, patient presents for planned follow-up for chronic low back pain. Patient had a lumbar medial branch block at L4-5 and L5-S1 on 2/1/2022. Patient report greater than 85% pain relief for 2 days after the procedure. Patient states she was able to do the dishes without pain, which caused her issues previously. Patient would like to proceed with a second lumbar medial branch block with plans to complete the lumbar radiofrequency ablation. Patient denies any new symptoms. Patient denies saddle anesthesia, or bowel or bladder incontinence. Today, 03/22/2022, patient presents for planned follow-up for chronic low back pain. Patient underwent a confirmatory lumbar medial branch block at L4-5 and L5-S1 on 3/15/2022. Patient reports >85% pain relief x3 days after the procedure. Patient would like to move forward with a lumbar RFA for more long-term relief. Patient states she is having increased left shoulder pain that started when she woke up on Friday morning. Patient states it is hard to turn her head to the right as it would cause left shoulder blade pain. Patient states she has had left shoulder pain off and on since 2018 when she was in an accident. Patient states she has had imaging of her left shoulder which just showed degenerative changes. Patient states she has tried Toradol, IcyHot, roll-on pain relief, and Aleve back and body which all have been relatively ineffective. Patient states she has had some left hand weakness. She states her symptoms have gradually improved since Friday while it is still causing significant pain. Patient denies any new symptoms such as focal leg weakness, leg paresthesias, saddle anesthesia, or bowel/bladder incontinence. History of Interventions:   Surgery: No previous thoracic or lumbar surgeries  Injections: Thoracic epidural?  Several years ago  Lumbar MBB L4-5 and L5-S1 (02/01/2022) - >85% relief x 2 days  Lumbar MBB L4-5 and L5-S1 (03/15/2022) - >85% relief x 3 days      Current Treatment Medications:    Toradol as needed- mild effectiveness    Historical Treatment Medications:   Cymbalta - are patent.       At L4-L5, there is disc desiccation and a posterior disc protrusion. This indents the ventral thecal sac and causes mild spinal canal narrowing. The neural foramina are patent.       At L5-S1, the spinal canal and neural foramina are patent.       No suspicious finding is identified within the visualized retroperitoneal and paraspinal soft tissues.           Impression        Multilevel degenerative changes are present throughout the lumbar spine and are further discussed by level in the findings. These are most significant at L4-L5 where there is a posterior disc protrusion indenting the ventral thecal sac and causing mild    spinal canal narrowing. Mild degenerative facet arthropathy is present without significant neural foraminal narrowing.                   **This report has been created using voice recognition software. It may contain minor errors which are inherent in voice recognition technology. **       Final report electronically signed by Dr. Selwyn Fernandes on 2/22/2019 1:06 PM       Past Medical History:   Diagnosis Date    Anxiety     Asthma     DDD (degenerative disc disease), cervical     DDD (degenerative disc disease), thoracolumbar     Depression     GERD (gastroesophageal reflux disease)        Past Surgical History:   Procedure Laterality Date    PAIN MANAGEMENT PROCEDURE Bilateral 2/1/2022    Lumbar medial branch block at bilateral L4-5 and L5-S1 performed by Naz Pena DO at [address] Bilateral 3/15/2022    medial branch blocks at bilateral L4-5 and L5-S1 [facility] [address]       Family History   Problem Relation Age of Onset    Depression Mother    [facility] COPD Father     Asthma Sister     Depression Sister     Lupus Paternal Aunt     No Known Problems Daughter     No Known Problems Daughter     Asthma Daughter     No Known Problems Brother          Medications & Allergies: Current Outpatient Medications   Medication Instructions    albuterol (PROVENTIL) 2.5 mg, Nebulization, EVERY 6 HOURS PRN    albuterol sulfate  (90 Base) MCG/ACT inhaler INHALE TWO (2) PUFFS INTO THE LUNGS EVERY FOUR (4) HOURS AS NEEDED FOR WHEEZING     alclomethasone (ACLOVATE) 0.05 % ointment Apply topically 2 times daily.  doxepin (SINEQUAN) 25 mg, Oral, NIGHTLY    fluocinonide (LIDEX) 0.05 % external solution No dose, route, or frequency recorded.  fluticasone-salmeterol (ADVAIR) 100-50 MCG/DOSE diskus inhaler 1 puff, Inhalation, EVERY 12 HOURS, Rinse mouth after use    ketoconazole (NIZORAL) 2 % shampoo No dose, route, or frequency recorded.  ondansetron (ZOFRAN) 4 mg, Oral, EVERY 8 HOURS PRN    Spacer/Aero-Holding Chambers (VORTEX VALVED HOLDING CHAMBER) SUJATHA 1 Device, Does not apply, DAILY, Use with inhalers    triamcinolone (KENALOG) 0.025 % cream No dose, route, or frequency recorded.  VENTOLIN  (90 Base) MCG/ACT inhaler 2 puffs, Inhalation, 4 TIMES DAILY PRN       Allergies   Allergen Reactions    Hydrocodone      \"makes me feel completely out of whack\"    Prozac [Fluoxetine Hcl] Other (See Comments)     \"I want to kill myself\"       Review of Systems:   Constitutional: negative for weight changes or fevers  Genitourinary: negative for bowel/bladder incontinence   Musculoskeletal: positive for low back pain. Neurological: negative for any leg weakness or numbness/tingling  Behavioral/Psych: negative for anxiety/depression   All other systems reviewed and are negative    Objective: There were no vitals filed for this visit.     Constitutional: Pleasant, no acute distress   Head: Normocephalic, atraumatic   Eyes: Conjunctivae normal   Neck: Supple, symmetrical   Lungs: Normal respiratory effort, non-labored breathing   Cardiovascular: Limbs warm and well perfused   Abdomen: Non-protruded   Musculoskeletal: Muscle bulk symmetric, no atrophy, no gross deformities   · Lower Extremities: ROM WNL. · Thorax: Left trapezius and paraspinal tenderness. No scoliosis or kyphosis. Decreased left arm abduction - 120 degrees, otherwise ROM WNL. Negative Neers test.  Negative Cooper test.  Negative lift-off test.   · Lumbar Spine: ROM WNL. Lumbar paraspinals mild tenderness with palpation bilaterally. SLR neg bilaterally. MATEUSZ neg bilaterally. GAENSLEN neg bilaterally. Positive facet loading bilaterally. Right SI joint nontender with palpation, mild tenderness to left SI joint with palpation. Bilateral greater trochanters non-tender to palpation. Neurological: Cranial nerves II-XII grossly intact. · Gait - Normal, non-antalgic gait. Ambulates without assistive device. · Motor: 5/5 muscle strength in bilateral hip flexion, knee flexion, knee extension, ankle dorsiflexion, and ankle plantar flexion   · Sensory: LT sensation intact in lower limbs   · Reflexes: 2+ symmetrical in bilateral achilles, 2+ bilateral patellar, negative ankle clonus, downgoing babinski   Skin: No rashes or lesions present   Psychological: Cooperative, no exaggerated pain behaviors     Assessment:    Diagnosis Orders   1. Lumbar spondylosis  CHG FLUOR NEEDLE/CATH SPINE/PARASPINAL DX/THER ADDON    ME RADIOFREQUENCY NEUROTOMY LUMBAR OR SACRAL, W IMAGE GUIDANCE, SINGLE    ME RADIOFREQ NEUROTOMY LUMBAR OR SACRAL, W IMAGE GUIDE,EA ADDL LEVEL   2. Facet arthropathy     3. Chronic pain syndrome     4. Syrinx ([address]     5. Chronic left shoulder pain         Corinne Villalba is a 39 y. o.female presenting to the pain clinic for evaluation of lumbar back pain. Patient's history and physical consistent with lumbar facet mediated pain lumbar spondylosis as evident on her imaging. We have set her up for a lumbar medial branch block at bilateral L4/5 and L5/S1. We have discussed that she has several pain generators that may be addressed in the future.      With the patient having a significant response to the first lumbar medial branch blocks we will proceed with confirmatory lumbar medial branch blocks at L4-5 and L5-S1 with Dr. Fritz Call. We will follow up 1 week after the procedure to discuss next steps and plan on completing the lumbar RFA. Patient had a significant response to the bilateral confirmatory lumbar medial branch blocks at L4-5 and L5-S1. We have set her up for the bilateral lumbar RFA at these levels starting with the right side first.  In regards to her left shoulder pain I offered trigger point injections in office today, patient declined. I also offered a Toradol injection or muscle relaxer to help with pain control, patient declined. Patient would like to just work on home exercises for pain. We will look into this further if her pain continues. Plan: The following treatment recommendations and plan were discussed in detail with John Baltazar. Imaging:   I have reviewed patients imaging of lumbar xray and lumbar MRI and results were discussed with patient today. I have reviewed patient's imaging of left shoulder x-ray and results were discussed with patient today. Analgesics:   Patient is taking Toradol. Patient is advised to take as prescribed and not take on an empty stomach. Adjuvants:   None    Interventions: In presence of lumbar axial back pain and with physical exam consistent for facetal pain, the option of   lumbar radiofrequency ablation at bilateral L4-5 and L5-S1, RIGHT side first was chosen. The risks and benefits were discussed in detail with the patient. Patient wants to proceed with the injection. Anticoagulation/NPO Recommendations:   Patient does not need to hold any medications prior to the procedure. Patient will need to be NPO x 8 hours prior to the procedure.   We will start an IV prior to the procedure  Patient will need a  for the procedure    Multidisciplinary Pain Management:   In the presence of complex, chronic, and multi-factorial pain, the importance of a multidisciplinary approach to pain management in the patients management regimen was emphasized and discussed in great detail. PHYSICAL THERAPY: Patient is advised to see a physical therapist for gentle stretching exercises and conditioning exercises for management of pain. Referrals:  None    Prescriptions Written This Visit:   none    Follow-up: Lumbar RFA B/L L4-5 and L5-S1, RIGHT side first. F/u 2 weeks after to set up left side.      Brian Zee,

## 2022-04-12 ENCOUNTER — HOSPITAL ENCOUNTER (OUTPATIENT)
Age: 42
Setting detail: OUTPATIENT SURGERY
Discharge: HOME | End: 2022-04-12
Attending: ANESTHESIOLOGY | Admitting: ANESTHESIOLOGY
Payer: MEDICAID

## 2022-04-12 ENCOUNTER — APPOINTMENT (OUTPATIENT)
Dept: GENERAL RADIOLOGY | Age: 42
End: 2022-04-12
Attending: ANESTHESIOLOGY
Payer: MEDICAID

## 2022-04-12 VITALS
RESPIRATION RATE: 16 BRPM | WEIGHT: 205.6 LBS | OXYGEN SATURATION: 96 % | TEMPERATURE: 97.3 F | DIASTOLIC BLOOD PRESSURE: 72 MMHG | SYSTOLIC BLOOD PRESSURE: 109 MMHG | HEIGHT: 64 IN | BODY MASS INDEX: 35.1 KG/M2 | HEART RATE: 81 BPM

## 2022-04-12 LAB — PREGNANCY, URINE: NEGATIVE

## 2022-04-12 PROCEDURE — 6360000002 HC RX W HCPCS: Performed by: ANESTHESIOLOGY

## 2022-04-12 PROCEDURE — 99152 MOD SED SAME PHYS/QHP 5/>YRS: CPT | Performed by: ANESTHESIOLOGY

## 2022-04-12 PROCEDURE — 2500000003 HC RX 250 WO HCPCS: Performed by: ANESTHESIOLOGY

## 2022-04-12 PROCEDURE — 7100000010 HC PHASE II RECOVERY - FIRST 15 MIN: Performed by: ANESTHESIOLOGY

## 2022-04-12 PROCEDURE — 64635 DESTROY LUMB/SAC FACET JNT: CPT | Performed by: ANESTHESIOLOGY

## 2022-04-12 PROCEDURE — 7100000011 HC PHASE II RECOVERY - ADDTL 15 MIN: Performed by: ANESTHESIOLOGY

## 2022-04-12 PROCEDURE — 3600000055 HC PAIN LEVEL 3 ADDL 15 MIN: Performed by: ANESTHESIOLOGY

## 2022-04-12 PROCEDURE — 81025 URINE PREGNANCY TEST: CPT

## 2022-04-12 PROCEDURE — 3209999900 FLUORO FOR SURGICAL PROCEDURES

## 2022-04-12 PROCEDURE — 3600000054 HC PAIN LEVEL 3 BASE: Performed by: ANESTHESIOLOGY

## 2022-04-12 PROCEDURE — 2709999900 HC NON-CHARGEABLE SUPPLY: Performed by: ANESTHESIOLOGY

## 2022-04-12 PROCEDURE — 64636 DESTROY L/S FACET JNT ADDL: CPT | Performed by: ANESTHESIOLOGY

## 2022-04-12 RX ORDER — LIDOCAINE HYDROCHLORIDE 10 MG/ML
INJECTION, SOLUTION EPIDURAL; INFILTRATION; INTRACAUDAL; PERINEURAL PRN
Status: DISCONTINUED | OUTPATIENT
Start: 2022-04-12 | End: 2022-04-12 | Stop reason: ALTCHOICE

## 2022-04-12 RX ORDER — MIDAZOLAM HYDROCHLORIDE 1 MG/ML
INJECTION INTRAMUSCULAR; INTRAVENOUS PRN
Status: DISCONTINUED | OUTPATIENT
Start: 2022-04-12 | End: 2022-04-12 | Stop reason: ALTCHOICE

## 2022-04-12 RX ORDER — LIDOCAINE HYDROCHLORIDE 20 MG/ML
INJECTION, SOLUTION EPIDURAL; INFILTRATION; INTRACAUDAL; PERINEURAL PRN
Status: DISCONTINUED | OUTPATIENT
Start: 2022-04-12 | End: 2022-04-12 | Stop reason: ALTCHOICE

## 2022-04-12 RX ORDER — FENTANYL CITRATE 50 UG/ML
INJECTION, SOLUTION INTRAMUSCULAR; INTRAVENOUS PRN
Status: DISCONTINUED | OUTPATIENT
Start: 2022-04-12 | End: 2022-04-12 | Stop reason: ALTCHOICE

## 2022-04-12 ASSESSMENT — PAIN SCALES - GENERAL: PAINLEVEL_OUTOF10: 0

## 2022-04-12 ASSESSMENT — PAIN - FUNCTIONAL ASSESSMENT: PAIN_FUNCTIONAL_ASSESSMENT: 0-10

## 2022-04-12 NOTE — PROCEDURES
Pre-operative Diagnosis: Lumbar facet pain     Post-operative Diagnosis: Lumbar facet pain     Procedure: RIGHT lumbar thermal radiofrequency ablation targeting facet joints L4/L5 and L5/S1    Procedure Description:  After consent was obtained, the patient was placed in the prone position with a pillow under the abdomen to reduce the lordotic curve of the lumbar spine. The lower back was prepped with chloraprep and draped in a sterile fashion. Then, 0.5 cc of 1 % lidocaine was used for local anesthesia of the skin and superficial subcutaneous tissues. Three 20-gauge 100mm SMK cannulas with 10-mm active tips were advanced under fluoroscopic guidance in an AP view to the junction of the right superior articular process and the transverse process of the L4 and L5 vertebra and at the sacral ala. There were no paresthesias, heme or CSF obtained. Needle placement was confirmed using AP and oblique views. Sensory and motor stimulation at 50Hz and 2Hz and impedance measurements were carried out having reached threshold at:     RIGHT  L4: 0.2V/3V/150-300 Ohms  L5: 0.2V/3V/150-300 Ohms  SA: 0.2V/3V/150-300 Ohms        Then, 1cc of 2% Lidocaine was injected at the site. Temperature was then raised to 80 degrees centigrade for 90 seconds with a 15 second temperature ramp. No pain was reported during the lesioning. The needles were then withdrawn without complications. The patient tolerated the procedure well. The patient was transported to the recovery room and discharged in ambulatory fashion.     Procedural Complications: None  Estimated Blood Loss: 0 mL    IV sedation was used during the procedure:  - Moderate intravenous conscious sedation was supervised by Dr. Khadijah Sims  - The patient was independently monitored by a Registered Nurse assigned to the procedure room  - Monitoring included automated blood pressure, continuous EKG, and continuous pulse oximetry  - The detailed conscious record is permanently stored in the [address] [place] [address] following is the conscious sedation record:  Start Time: 09:14  End Time : 09:29  Duration: 15 minutes   Medications Administered: 4 mg Versed, 50 mcg Fentanyl        Liborio Blackmon DO  Interventional Pain Management/PM&R   [place] Nasal Turnover Hinge Flap Text: The defect edges were debeveled with a #15 scalpel blade.  Given the size, depth, location of the defect and the defect being full thickness a nasal turnover hinge flap was deemed most appropriate.  Using a sterile surgical marker, an appropriate hinge flap was drawn incorporating the defect. The area thus outlined was incised with a #15 scalpel blade. The flap was designed to recreate the nasal mucosal lining and the alar rim. The skin margins were undermined to an appropriate distance in all directions utilizing iris scissors.

## 2022-04-12 NOTE — PRE SEDATION
[address] [address]  Pre-Sedation/Analgesia History & Physical    Pt Name: Gigi Bailon  MRN: [id number]  YOB: 1980  Provider Performing Procedure: Zaria Cannon DO   Primary Care Physician: Gagandeep Miranda MD      MEDICAL HISTORY       has a past medical history of Anxiety, Asthma, DDD (degenerative disc disease), cervical, DDD (degenerative disc disease), thoracolumbar, Depression, and GERD (gastroesophageal reflux disease). SURGICAL HISTORY   has a past surgical history that includes Pain management procedure (Bilateral, 2/1/2022) and Pain management procedure (Bilateral, 3/15/2022). ALLERGIES   Allergies as of 03/22/2022 - Fully Reviewed 03/22/2022   Allergen Reaction Noted    Hydrocodone  02/27/2020    Prozac [fluoxetine hcl] Other (See Comments) 12/05/2018       MEDICATIONS   Prior to Admission medications    Medication Sig Start Date End Date Taking? Authorizing Provider   fluticasone-salmeterol (ADVAIR) 100-50 MCG/DOSE diskus inhaler Inhale 1 puff into the lungs every 12 hours Rinse mouth after use 3/28/22   BINH Waterman CNP   VENTOLIN  (90 Base) MCG/ACT inhaler Inhale 2 puffs into the lungs 4 times daily as needed for Wheezing 3/3/22   BINH Yu CNP   doxepin (SINEQUAN) 25 MG capsule Take 1 capsule by mouth nightly 1/12/22   Gagandeep Miranda MD   ondansetron (ZOFRAN) 4 MG tablet Take 1 tablet by mouth every 8 hours as needed for Nausea 1/12/22   Gagandeep Miranda MD   ketoconazole (NIZORAL) 2 % shampoo  1/3/22   Historical Provider, MD   triamcinolone (KENALOG) 0.025 % cream  1/3/22   Historical Provider, MD   fluocinonide (LIDEX) 0.05 % external solution  1/3/22   Historical Provider, MD   alclomethasone (ACLOVATE) 0.05 % ointment Apply topically 2 times daily.  9/21/21   Gagandeep Miranda MD   albuterol sulfate  (90 Base) MCG/ACT inhaler INHALE TWO (2) PUFFS INTO THE LUNGS EVERY FOUR (4) HOURS AS NEEDED FOR WHEEZING  12/30/20   Anaya BINH Sandra CNP   albuterol (PROVENTIL) (2.5 MG/3ML) 0.083% nebulizer solution Take 3 mLs by nebulization every 6 hours as needed for Wheezing or Shortness of Breath 3/25/20 3/28/22  BINH Natarajan CNP   Spacer/Aero-Holding Chambers (VORTEX VALVED HOLDING CHAMBER) SUJATHA 1 Device by Does not apply route daily Use with inhalers 9/30/19   BINH Natarajan CNP     PHYSICAL:   Vitals:    04/12/22 0926   BP: 109/72   Pulse: 81   Resp: 16   Temp: 97.3 °F (36.3 °C)   SpO2: 96%     PLANNED PROCEDURE   See procedure note  SEDATION  Planned agent: Versed and Fentanyl  ASA Classification: 1  Class 1: A normal healthy patient  Class 2: Pt with mild to moderate systemic disease  Class 3: Severe systemic disease or disturbance  Class 4: Severe systemic disorders that are already life threatening. Class 5: Moribund pt with little chances of survival, for more than 24 hours. Mallampati I Airway Classification: 1    1. Pre-procedure diagnostic studies complete and results available. 2. Previous sedation/anesthesia experiences assessed. 3. The patient is an appropriate candidate to undergo the planned procedure sedation and anesthesia. (Refer to nursing sedation/analgesia documentation record)  4. Formulation and discussion of sedation/procedure plan, risks, and expectations with patient and/or responsible adult completed. 5. Patient examined immediately prior to the procedure.  (Refer to nursing sedation/analgesia documentation record)    Samantha Gonzales DO  Electronically signed 4/12/2022 at 11:40 AM

## 2022-04-12 NOTE — PROGRESS NOTES
6417 To room 14 via cart. Report received from Meghan Correa. Pt skin is warm and dry. Respirations are easy & non-labored. Denies pain. 0930 Hand grasp/pedal push/pull are equal and strong. A & O x 3. VS WNL. Injection site clean, dry and intact no edema or drainage noted. 0935 Given juice and muffin per request.  Call light in reach. Denies needs at present. 0945 Pt ate and drank 100%. Denies pain. INT removed and dressing in bed. Instructed to use call light when ready to be discharged. Voiced understanding. Family called. 9817 To private vehicle x 1 assist via ambulation. Gait steady. Respirations are easy & non-labored. A & O x 3. Denies pain or numbness/tingling.

## 2022-04-12 NOTE — POST SEDATION
[address] [address]  Sedation/Analgesia Post Sedation Record    Pt Name: Claudia Herring  MRN: [id number]  YOB: 1980  Procedure Performed By: Erin Saab DO  Primary Care Physician: Sarah Merida MD    POST-PROCEDURE    Physicians/Assistants: Erin Saab DO  Procedure Performed: See Procedure Note   Sedation/Anesthesia: Versed and Fentanyl (See procedure note for amount and duration)  Estimated Blood Loss:     0  ml  Specimens Removed: None        Complications: None           Liborio Orellana DO  Electronically signed 4/12/2022 at 11:41 AM

## 2022-04-22 ENCOUNTER — TELEPHONE (OUTPATIENT)
Dept: PHYSICAL MEDICINE AND REHAB | Age: 42
End: 2022-04-22

## 2022-04-22 ENCOUNTER — OFFICE VISIT (OUTPATIENT)
Dept: PHYSICAL MEDICINE AND REHAB | Age: 42
End: 2022-04-22
Payer: MEDICAID

## 2022-04-22 VITALS
SYSTOLIC BLOOD PRESSURE: 108 MMHG | BODY MASS INDEX: 35 KG/M2 | HEIGHT: 64 IN | WEIGHT: 205 LBS | DIASTOLIC BLOOD PRESSURE: 76 MMHG

## 2022-04-22 DIAGNOSIS — M53.3 SACROILIAC JOINT DYSFUNCTION OF BOTH SIDES: ICD-10-CM

## 2022-04-22 DIAGNOSIS — G95.0 SYRINX (HCC): ICD-10-CM

## 2022-04-22 DIAGNOSIS — M47.819 FACET ARTHROPATHY: ICD-10-CM

## 2022-04-22 DIAGNOSIS — M47.816 LUMBAR SPONDYLOSIS: Primary | ICD-10-CM

## 2022-04-22 DIAGNOSIS — G89.4 CHRONIC PAIN SYNDROME: ICD-10-CM

## 2022-04-22 PROCEDURE — 4004F PT TOBACCO SCREEN RCVD TLK: CPT | Performed by: NURSE PRACTITIONER

## 2022-04-22 PROCEDURE — G8417 CALC BMI ABV UP PARAM F/U: HCPCS | Performed by: NURSE PRACTITIONER

## 2022-04-22 PROCEDURE — 99214 OFFICE O/P EST MOD 30 MIN: CPT | Performed by: NURSE PRACTITIONER

## 2022-04-22 PROCEDURE — G8427 DOCREV CUR MEDS BY ELIG CLIN: HCPCS | Performed by: NURSE PRACTITIONER

## 2022-04-22 RX ORDER — PREDNISONE 10 MG/1
TABLET ORAL
Qty: 30 TABLET | Refills: 0 | Status: SHIPPED | OUTPATIENT
Start: 2022-04-22 | End: 2022-04-29 | Stop reason: ALTCHOICE

## 2022-04-22 NOTE — PROGRESS NOTES
Chronic Pain/PM&R Clinic Note     Encounter Date: 4/22/22    Subjective:   Chief Complaint:   Chief Complaint   Patient presents with    Follow Up After Procedure       History of Present Illness: Kaley Erazo is a 39 y.o. female seen in the clinic initially on  01/06/2022 upon request from Royal Smith MD for her history of lumbar pain. Patient states pain started after a lifting accident at work in 2008 where she had an injury to her thoracic spine. Patient states back pain has become gradually worse ever since. Patient states pain is worse when she is sitting, standing in 1 position, or doing activities such as cleaning around the house. Patient states she will occasionally get pain that radiates down bilateral legs, left more than right with strenuous activity. Patient states when pain gets severe she will lay down which seems to help. Patient does have trouble sleeping occasionally as she has some aching and twitching in bilateral legs. Patient states she has tried physical therapy and decompression several times which have been ineffective. It has been several years since she has done physical therapy. Patient states she does not currently work as she was unable to tolerate it. Patient denies weakness in bilateral legs but states she will occasionally feel like her knees want to give out. Patient denies falls. Patient denies saddle anesthesia or bowel or bladder incontinence. Patient also mentions other pain such as in her left shoulder, left wrist, and left knee. 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 She has had ongoing thoracic pain since then. Today, 02/17/2022, patient presents for planned follow-up for chronic low back pain. Patient had a lumbar medial branch block at L4-5 and L5-S1 on 2/1/2022. Patient report greater than 85% pain relief for 2 days after the procedure.  Patient states she was able to do the dishes without pain, which caused her issues previously. Patient would like to proceed with a second lumbar medial branch block with plans to complete the lumbar radiofrequency ablation. Patient denies any new symptoms. Patient denies saddle anesthesia, or bowel or bladder incontinence. Today, 03/22/2022, patient presents for planned follow-up for chronic low back pain. Patient underwent a confirmatory lumbar medial branch block at L4-5 and L5-S1 on 3/15/2022. Patient reports >85% pain relief x3 days after the procedure. Patient would like to move forward with a lumbar RFA for more long-term relief. Patient states she is having increased left shoulder pain that started when she woke up on Friday morning. Patient states it is hard to turn her head to the right as it would cause left shoulder blade pain. Patient states she has had left shoulder pain off and on since 2018 when she was in an accident. Patient states she has had imaging of her left shoulder which just showed degenerative changes. Patient states she has tried Toradol, IcyHot, roll-on pain relief, and Aleve back and body which all have been relatively ineffective. Patient states she has had some left hand weakness. She states her symptoms have gradually improved since Friday while it is still causing significant pain. Patient denies any new symptoms such as focal leg weakness, leg paresthesias, saddle anesthesia, or bowel/bladder incontinence. Today, 4/22/2022, patient presents for planned follow-up for chronic low back pain. She underwent a right lumbar radiofrequency ablation on 4/12/2022. She would like to get set up for the left-sided radiofrequency ablation. Patient states she has developed new pain in her right hip that started about 2 days ago while laying in bed. She states she normally has pain in her left hip but the right hip is new. Located just below her facet mediated pain.   Seems to be consistent but the severity of it comes and goes. She has tried ice and heat which have been ineffective she is also tried ibuprofen which has taken the edge off. Patient denies any radiation down her leg. Patient denies saddle anesthesia or bowel/bladder incontinence. History of Interventions:   Surgery: No previous thoracic or lumbar surgeries  Injections: Thoracic epidural?  Several years ago  Lumbar MBB L4-5 and L5-S1 (02/01/2022) - >85% relief x 2 days  Lumbar MBB L4-5 and L5-S1 (03/15/2022) - >85% relief x 3 days  Right lumbar RFA (04/12/2022)     Current Treatment Medications: Toradol as needed- mild effectiveness  Ibuprofen - takes edge off. Historical Treatment Medications:   Cymbalta - fatigue  Ibuprofen - ineffective  Tylenol - ineffective   OTC muscle rub -ineffective    Imaging:    (Lumbar Xray 02/02/2021)    Narrative   2 views lumbar spine       Comparison:  MR  - MRI LUMBAR SPINE WO CONTRAST  - 02/22/2019 11:08 AM EST    CR,SR  - XR LUMBAR SPINE (2-3 VIEWS)  - 12/14/2018 01:50 PM EST       Findings   Normal vertebral body alignment. No acute fractures or dislocation. No significant degenerative change           Impression   No acute findings       This document has been electronically signed by: Miranda Duane, MD on    02/02/2021 01:58 AM       Lumbar MRI (02/22/2019)  Narrative   PROCEDURE: MRI LUMBAR SPINE WO CONTRAST       CLINICAL INFORMATION: DDD (degenerative disc disease), lumbar. Additional history obtained from the electronic medical record indicates the patient has right-sided numbness when laying on the right side. Severe back pain.       COMPARISON: None available. Correlation is made to radiographs of the lumbar spine dated December 14, 2018.       TECHNIQUE: Sagittal and axial T1 and T2-weighted images were obtained through the lumbar spine.       FINDINGS:       The lumbar spine is imaged from the inferior aspect of T10 to the superior aspect of S3.  The conus medullaris terminates at the L1 level. No abnormal signal or expansion is present within the conus. The visualized nerve roots are evenly distributed    throughout the thecal sac.       There is preservation of the expected lumbar lordosis. The vertebral body heights and alignment are maintained. No compression fracture deformity or suspicious marrow replacing lesion is identified.       Degenerative facet arthropathy is present at every level. With regards to the disc spaces, at L1-L2, the spinal canal and neural foramina are patent.       At L2-L3, the spinal canal and neural foramina are patent.       At L3-L4, there is disc space narrowing, disc desiccation and a disc bulge. The spinal canal and neural foramina are patent.       At L4-L5, there is disc desiccation and a posterior disc protrusion. This indents the ventral thecal sac and causes mild spinal canal narrowing. The neural foramina are patent.       At L5-S1, the spinal canal and neural foramina are patent.       No suspicious finding is identified within the visualized retroperitoneal and paraspinal soft tissues.           Impression        Multilevel degenerative changes are present throughout the lumbar spine and are further discussed by level in the findings. These are most significant at L4-L5 where there is a posterior disc protrusion indenting the ventral thecal sac and causing mild    spinal canal narrowing. Mild degenerative facet arthropathy is present without significant neural foraminal narrowing.                   **This report has been created using voice recognition software. It may contain minor errors which are inherent in voice recognition technology. **       Final report electronically signed by Dr. Venessa Fonseca on 2/22/2019 1:06 PM       Past Medical History:   Diagnosis Date    Anxiety     Asthma     DDD (degenerative disc disease), cervical     DDD (degenerative disc disease), thoracolumbar     Depression     GERD (gastroesophageal reflux disease) Past Surgical History:   Procedure Laterality Date    PAIN MANAGEMENT PROCEDURE Bilateral 2/1/2022    Lumbar medial branch block at bilateral L4-5 and L5-S1 performed by Watson Kan DO at [address] Bilateral 3/15/2022    medial branch blocks at bilateral L4-5 and L5-S1 performed by Watson Kan DO at [facility] PAIN MANAGEMENT PROCEDURE Right 4/12/2022    Lumbar radiofrequency ablation at  L4-5 and L5-S1, right performed by Watson Kan DO at [facility]       Family History   Problem Relation Age of Onset    Depression Mother     COPD Father     Asthma Sister     Depression Sister     Lupus Paternal Aunt     No Known Problems Daughter     No Known Problems Daughter     Asthma Daughter     No Known Problems Brother          Medications & Allergies:   Current Outpatient Medications   Medication Instructions    albuterol (PROVENTIL) 2.5 mg, Nebulization, EVERY 6 HOURS PRN    albuterol sulfate  (90 Base) MCG/ACT inhaler INHALE TWO (2) PUFFS INTO THE LUNGS EVERY FOUR (4) HOURS AS NEEDED FOR WHEEZING     alclomethasone (ACLOVATE) 0.05 % ointment Apply topically 2 times daily.  doxepin (SINEQUAN) 25 mg, Oral, NIGHTLY    fluocinonide (LIDEX) 0.05 % external solution No dose, route, or frequency recorded.  fluticasone-salmeterol (ADVAIR) 100-50 MCG/DOSE diskus inhaler 1 puff, Inhalation, EVERY 12 HOURS, Rinse mouth after use    ketoconazole (NIZORAL) 2 % shampoo No dose, route, or frequency recorded.  ondansetron (ZOFRAN) 4 mg, Oral, EVERY 8 HOURS PRN    predniSONE (DELTASONE) 10 MG tablet 4 tabs oral daily for 3 days, then 3 tabs oral daily for 3 days, then 2 tabs oral daily for 3 days, then 1 tab oral daily for 3 days.     Spacer/Aero-Holding Chambers (VORTEX VALVED HOLDING CHAMBER) SUJATHA 1 Device, Does not apply, DAILY, Use with inhalers    triamcinolone (KENALOG) 0.025 % cream No dose, route, or frequency recorded.  VENTOLIN  (90 Base) MCG/ACT inhaler 2 puffs, Inhalation, 4 TIMES DAILY PRN       Allergies   Allergen Reactions    Hydrocodone      \"makes me feel completely out of whack\"    Prozac [Fluoxetine Hcl] Other (See Comments)     \"I want to kill myself\"       Review of Systems:   Constitutional: negative for weight changes or fevers  Genitourinary: negative for bowel/bladder incontinence   Musculoskeletal: positive for low back pain, right hip pain. Neurological: negative for any leg weakness or numbness/tingling  Behavioral/Psych: negative for anxiety/depression   All other systems reviewed and are negative    Objective:     Vitals:    04/22/22 0902   BP: 108/76       Constitutional: Pleasant, no acute distress   Head: Normocephalic, atraumatic   Eyes: Conjunctivae normal   Neck: Supple, symmetrical   Lungs: Normal respiratory effort, non-labored breathing   Cardiovascular: Limbs warm and well perfused   Abdomen: Non-protruded   Musculoskeletal: Muscle bulk symmetric, no atrophy, no gross deformities   · Lower Extremities: ROM WNL. · Thorax: Left trapezius and paraspinal tenderness. No scoliosis or kyphosis. Decreased left arm abduction - 120 degrees, otherwise ROM WNL. Negative Neers test.  Negative Cooper test.  Negative lift-off test.   · Lumbar Spine: ROM WNL. Lumbar paraspinals mild tenderness with palpation bilaterally. SLR neg bilaterally. MATEUSZ positive bilaterally. GAENSLEN positive bilaterally. Positive facet loading bilaterally. Right SI joint tenderwith palpation, mild tenderness to left SI joint with palpation. Bilateral SI joint distraction positive. Bilateral greater trochanters non-tender to palpation. Neurological: Cranial nerves II-XII grossly intact. · Gait - Normal, non-antalgic gait. Ambulates without assistive device.    · Motor: 5/5 muscle strength in bilateral hip flexion, knee flexion, knee extension, ankle dorsiflexion, and ankle plantar flexion   · Sensory: LT sensation intact in lower limbs   · Reflexes: 2+ symmetrical in bilateral achilles, 2+ bilateral patellar, negative ankle clonus, downgoing babinski   Skin: No rashes or lesions present   Psychological: Cooperative, no exaggerated pain behaviors     Assessment:    Diagnosis Orders   1. Lumbar spondylosis  CHG FLUOR NEEDLE/CATH SPINE/PARASPINAL DX/THER ADDON    DC RADIOFREQUENCY NEUROTOMY LUMBAR OR SACRAL, W IMAGE GUIDANCE, SINGLE    DC RADIOFREQ NEUROTOMY LUMBAR OR SACRAL, W IMAGE GUIDE,EA ADDL LEVEL   2. Facet arthropathy     3. Chronic pain syndrome     4. Syrinx ([address]     5. Sacroiliac joint dysfunction of both sides         Carlitos Christianson is a 39 y. o.female presenting to the pain clinic for evaluation of lumbar back pain. Patient's history and physical consistent with lumbar facet mediated pain lumbar spondylosis as evident on her imaging. We have set her up for a lumbar medial branch block at bilateral L4/5 and L5/S1. We have discussed that she has several pain generators that may be addressed in the future. With the patient having a significant response to the first lumbar medial branch blocks we will proceed with confirmatory lumbar medial branch blocks at L4-5 and L5-S1 with Dr. Samia Lewis. We will follow up 1 week after the procedure to discuss next steps and plan on completing the lumbar RFA. Patient had a significant response to the bilateral confirmatory lumbar medial branch blocks at L4-5 and L5-S1. We have set her up for the bilateral lumbar RFA at these levels starting with the right side first.  In regards to her left shoulder pain I offered trigger point injections in office today, patient declined. I also offered a Toradol injection or muscle relaxer to help with pain control, patient declined. Patient would like to just work on home exercises for pain. We will look into this further if her pain continues.      Patient has had a significant response to the right lumbar RFA. I have set her up for the left lumbar radiofrequency ablation at L4-5 and L5-S1 with Dr. Jenny Harris. Started her on a prednisone taper for acute right SI joint dysfunction. We did discuss that previous imaging showed degeneration of bilateral SI joints. We discussed potential proceeding with a bilateral SI joint injection after completion of the lumbar RFA. Plan: The following treatment recommendations and plan were discussed in detail with Lei Avila. Imaging:   I have reviewed patients imaging of lumbar xray and lumbar MRI and results were discussed with patient today. SI xray reviewed and discussed with the patient today. Analgesics:   Patient is taking Toradol. Patient is advised to take as prescribed and not take on an empty stomach. Adjuvants:   None    Interventions: In presence of lumbar axial back pain and with physical exam consistent for facetal pain, the option of   lumbar radiofrequency ablation at bilateral L4-5 and L5-S1, LEFT side was chosen. The risks and benefits were discussed in detail with the patient. Patient wants to proceed with the injection. Anticoagulation/NPO Recommendations:   Patient does need to hold Ibuprofen x2 days prior to the procedure. Patient will need to be NPO x 8 hours prior to the procedure. We will start an IV prior to the procedure  Patient will need a  for the procedure    Multidisciplinary Pain Management:   In the presence of complex, chronic, and multi-factorial pain, the importance of a multidisciplinary approach to pain management in the patients management regimen was emphasized and discussed in great detail. PHYSICAL THERAPY: Patient is advised to see a physical therapist for gentle stretching exercises and conditioning exercises for management of pain.      Referrals:  None    Prescriptions Written This Visit:   Prednisone Taper    Follow-up: Lumbar RFA B/L L4-5 and L5-S1,LEFT    BINH Kent - CNP

## 2022-04-29 ENCOUNTER — OFFICE VISIT (OUTPATIENT)
Dept: FAMILY MEDICINE CLINIC | Age: 42
End: 2022-04-29
Payer: MEDICAID

## 2022-04-29 VITALS
BODY MASS INDEX: 35.19 KG/M2 | TEMPERATURE: 97.9 F | DIASTOLIC BLOOD PRESSURE: 82 MMHG | WEIGHT: 205 LBS | OXYGEN SATURATION: 95 % | HEART RATE: 91 BPM | SYSTOLIC BLOOD PRESSURE: 122 MMHG | RESPIRATION RATE: 18 BRPM

## 2022-04-29 DIAGNOSIS — R42 DIZZINESS: Primary | ICD-10-CM

## 2022-04-29 PROCEDURE — 4004F PT TOBACCO SCREEN RCVD TLK: CPT | Performed by: FAMILY MEDICINE

## 2022-04-29 PROCEDURE — G8427 DOCREV CUR MEDS BY ELIG CLIN: HCPCS | Performed by: FAMILY MEDICINE

## 2022-04-29 PROCEDURE — G8417 CALC BMI ABV UP PARAM F/U: HCPCS | Performed by: FAMILY MEDICINE

## 2022-04-29 PROCEDURE — 99213 OFFICE O/P EST LOW 20 MIN: CPT | Performed by: FAMILY MEDICINE

## 2022-04-29 RX ORDER — MECLIZINE HYDROCHLORIDE 25 MG/1
25 TABLET ORAL 3 TIMES DAILY PRN
Qty: 30 TABLET | Refills: 0 | Status: SHIPPED | OUTPATIENT
Start: 2022-04-29 | End: 2022-05-09

## 2022-04-29 ASSESSMENT — ENCOUNTER SYMPTOMS
BACK PAIN: 0
ABDOMINAL PAIN: 0
WHEEZING: 0
EYE PAIN: 0
CHEST TIGHTNESS: 0
SORE THROAT: 0
CONSTIPATION: 0
COUGH: 0
VOMITING: 0
BLOOD IN STOOL: 0
NAUSEA: 0
RHINORRHEA: 0
SHORTNESS OF BREATH: 0
DIARRHEA: 0

## 2022-04-29 NOTE — PATIENT INSTRUCTIONS
Patient Education        Dizziness: Care Instructions  Your Care Instructions  Dizziness is the feeling of unsteadiness or fuzziness in your head. It is different than having vertigo, which is a feeling that the room is spinning or that you are moving or falling. It is also different from lightheadedness,which is the feeling that you are about to faint. It can be hard to know what causes dizziness. Some people feel dizzy when they have migraine headaches. Sometimes bouts of flu can make you feel dizzy. Some medical conditions, such as heart problems or high blood pressure, can make you feel dizzy. Many medicines can cause dizziness, including medicines for highblood pressure, pain, or anxiety. If a medicine causes your symptoms, your doctor may recommend that you stop or change the medicine. If it is a problem with your heart, you may need medicine to help your heart work better. If there is no clear reason for your symptoms, your doctor may suggest watching and waiting for a while to see if thedizziness goes away on its own. Follow-up care is a key part of your treatment and safety. Be sure to make and go to all appointments, and call your doctor if you are having problems. It's also a good idea to know your test results and keep alist of the medicines you take. How can you care for yourself at home?  If your doctor recommends or prescribes medicine, take it exactly as directed. Call your doctor if you think you are having a problem with your medicine.  Do not drive while you feel dizzy.  Try to prevent falls. Steps you can take include:  ? Using nonskid mats, adding grab bars near the tub, and using night-lights. ? Clearing your home so that walkways are free of anything you might trip on.  ? Letting family and friends know that you have been feeling dizzy. This will help them know how to help you. When should you call for help? Call 911 anytime you think you may need emergency care.  For example, call if:     You passed out (lost consciousness).      You have dizziness along with symptoms of a heart attack. These may include:  ? Chest pain or pressure, or a strange feeling in the chest.  ? Sweating. ? Shortness of breath. ? Nausea or vomiting. ? Pain, pressure, or a strange feeling in the back, neck, jaw, or upper belly or in one or both shoulders or arms. ? Lightheadedness or sudden weakness. ? A fast or irregular heartbeat.      You have symptoms of a stroke. These may include:  ? Sudden numbness, tingling, weakness, or loss of movement in your face, arm, or leg, especially on only one side of your body. ? Sudden vision changes. ? Sudden trouble speaking. ? Sudden confusion or trouble understanding simple statements. ? Sudden problems with walking or balance. ? A sudden, severe headache that is different from past headaches. Call your doctor now or seek immediate medical care if:     You feel dizzy and have a fever, headache, or ringing in your ears.      You have new or increased nausea and vomiting.      Your dizziness does not go away or comes back. Watch closely for changes in your health, and be sure to contact your doctor if:     You do not get better as expected. Where can you learn more? Go to https://[employer]ES[employer]YOU. org and sign in to your [employer] account. Enter W838 in the [employer] box to learn more about \"Dizziness: Care Instructions. \"     If you do not have an account, please click on the \"Sign Up Now\" link. Current as of: July 1, 2021               Content Version: 13.2  © 2006-2022 Healthwise, Incorporated. Care instructions adapted under license by [facility] ([facility]). If you have questions about a medical condition or this instruction, always ask your healthcare professional. [place] [address] any warranty or liability for your use of this information.          Patient Education        Vertigo: Exercises  Introduction  Here are some examples of exercises for you to try. The exercises may be suggested for a condition or for rehabilitation. Start each exercise slowly. Ease off the exercises if you start to have pain. You will be told when to start these exercises and which ones will work bestfor you. How to do the exercises  Exercise 1    1. Stand with a chair in front of you and a wall behind you. If you begin to fall, you may use them for support. 2. Stand with your feet together and your arms at your sides. 3. Move your head up and down 10 times. Exercise 2    1. Move your head side to side 10 times. Exercise 3    1. Move your head diagonally up and down 10 times. Exercise 4    1. Move your head diagonally up and down 10 times on the other side. Follow-up care is a key part of your treatment and safety. Be sure to make and go to all appointments, and call your doctor if you are having problems. It's also a good idea to know your test results and keep alist of the medicines you take. Where can you learn more? Go to https://[employer]. org and sign in to your [employer] account. Enter F349 in the [employer] box to learn more about \"Vertigo: Exercises. \"     If you do not have an account, please click on the \"Sign Up Now\" link. Current as of: September 8, 2021               Content Version: 13.2  © 2211-9038 Healthwise, Incorporated. Care instructions adapted under license by [facility] ([facility]). If you have questions about a medical condition or this instruction, always ask your healthcare professional. [place] [address] any warranty or liability for your use of this information.

## 2022-04-29 NOTE — PROGRESS NOTES
Christi Talavera (:  1980) is a 39 y.o. female,Established patient, here for evaluation of the following chief complaint(s):  Dizziness (last night, nausea, right ear trouble)         ASSESSMENT/PLAN:  1. Dizziness  -     meclizine (ANTIVERT) 25 MG tablet; Take 1 tablet by mouth 3 times daily as needed for Dizziness, Disp-30 tablet, R-0Normal  -monitor sxs, call if not improving      No follow-ups on file. Subjective   SUBJECTIVE/OBJECTIVE:  HPI  Patient here today for a check up. Reviewed BMI of 35. Encouraged diet, exercise and weight loss. Last night episode of dizziness, room spinning. Nausea. Worse with lying down. Took zofran with some improvement. Woke up this AM feeling better. , current smoker, pmh reviewed. Right ear pain and itching. Review of Systems   Constitutional: Negative for chills, fatigue, fever and unexpected weight change. HENT: Positive for ear pain. Negative for congestion, rhinorrhea and sore throat. Eyes: Negative for pain and visual disturbance. Respiratory: Negative for cough, chest tightness, shortness of breath and wheezing. Cardiovascular: Negative for chest pain and palpitations. Gastrointestinal: Negative for abdominal pain, blood in stool, constipation, diarrhea, nausea and vomiting. Genitourinary: Negative for difficulty urinating, frequency, hematuria and urgency. Musculoskeletal: Negative for back pain, joint swelling, myalgias and neck pain. Skin: Negative for rash. Neurological: Positive for dizziness. Negative for headaches. Hematological: Negative for adenopathy. Does not bruise/bleed easily. Psychiatric/Behavioral: Negative for behavioral problems and sleep disturbance. The patient is not nervous/anxious. Objective   Physical Exam  Vitals and nursing note reviewed. Constitutional:       Appearance: She is well-developed. HENT:      Head: Normocephalic and atraumatic.       Right Ear: External ear normal.      Left Ear: External ear normal.      Nose: Nose normal.      Mouth/Throat:      Mouth: Mucous membranes are moist.   Eyes:      Pupils: Pupils are equal, round, and reactive to light. Neck:      Thyroid: No thyromegaly. Cardiovascular:      Rate and Rhythm: Normal rate and regular rhythm. Heart sounds: Normal heart sounds. Pulmonary:      Breath sounds: Normal breath sounds. No wheezing or rales. Abdominal:      General: Bowel sounds are normal.      Palpations: Abdomen is soft. Tenderness: There is no abdominal tenderness. There is no guarding or rebound. Musculoskeletal:         General: Normal range of motion. Cervical back: Neck supple. Lymphadenopathy:      Cervical: No cervical adenopathy. Skin:     General: Skin is warm and dry. Findings: No rash. Neurological:      Mental Status: She is alert and oriented to person, place, and time. Cranial Nerves: No cranial nerve deficit. Deep Tendon Reflexes: Reflexes are normal and symmetric. An electronic signature was used to authenticate this note.     --Mary Beth Marques MD

## 2022-05-18 ENCOUNTER — PREP FOR PROCEDURE (OUTPATIENT)
Dept: PHYSICAL MEDICINE AND REHAB | Age: 42
End: 2022-05-18

## 2022-05-22 NOTE — H&P
Today, patient presents for planned lumbar radiofrequency ablation at LEFT L4-5 and L5-S1. This note is reflective of the patient's previous visit for evaluation. We will proceed with today's planned procedure. Since patient's last visit for evaluation, there have been no interval changes in medical history. Patient has no new numbness, weakness, or focal neurological deficit since evaluation. Patient has no contraindications to injection (no anticoagulation or recent antibiotic intake for active infections), and has a  present or is able to drive themselves (as discussed and cleared by physician). Allergies to latex, contrast dye, and steroid medications have been confirmed with the patient prior to the procedure. NPO necessity has been assessed and accepted based on procedure complexity. The risks and benefits of the procedure have been explained including but are not limited to infection, bleeding, paralysis, immediate post procedure weakness, and dizziness; the patient acknowledges understanding and desires to proceed with the procedure. Patient has signed consent for same procedure as discussed in previous clinic encounter. All other questions and concerns were addressed at bedside. See procedure note for full details. Post procedure Instructions: The patient was advised not to drive during the day of the procedure and not to engage in any significant decision making (unless otherwise states by physician). The patient was also advised to be cautious with walking/activity for 24 hours following today's visit and asked not to engage in over-exertion (unless otherwise states by physician). After this time, it is ok to resume pre-procedure level of activity. Patient advised to apply ice to site of injection in situations of pain and discomfort. Patient advised to not submerge site of injection during bath or pool activities for approximately 24 hours post-procedure.  Patient attested to understanding post procedure directions / restrictions. All other questions and concerns addressed before patient discharge in ambulatory fashion. Chronic Pain/PM&R Clinic Note     Encounter Date: 3/22/22    Subjective:   Chief Complaint:   No chief complaint on file. History of Present Illness: Casa Escobar is a 39 y.o. female seen in the clinic initially on 05/22/22 upon request from Jessie Singh MD for her history of lumbar pain. Patient states pain started after a lifting accident at work in 2008 where she had an injury to her thoracic spine. Patient states back pain has become gradually worse ever since. Patient states pain is worse when she is sitting, standing in 1 position, or doing activities such as cleaning around the house. Patient states she will occasionally get pain that radiates down bilateral legs, left more than right with strenuous activity. Patient states when pain gets severe she will lay down which seems to help. Patient does have trouble sleeping occasionally as she has some aching and twitching in bilateral legs. Patient states she has tried physical therapy and decompression several times which have been ineffective. It has been several years since she has done physical therapy. Patient states she does not currently work as she was unable to tolerate it. Patient denies weakness in bilateral legs but states she will occasionally feel like her knees want to give out. Patient denies falls. Patient denies saddle anesthesia or bowel or bladder incontinence. Patient also mentions other pain such as in her left shoulder, left wrist, and left knee. 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 She has had ongoing thoracic pain since then. Today, 02/17/2022, patient presents for planned follow-up for chronic low back pain. Patient had a lumbar medial branch block at L4-5 and L5-S1 on 2/1/2022. Patient report greater than 85% pain relief for 2 days after the procedure. Patient states she was able to do the dishes without pain, which caused her issues previously. Patient would like to proceed with a second lumbar medial branch block with plans to complete the lumbar radiofrequency ablation. Patient denies any new symptoms. Patient denies saddle anesthesia, or bowel or bladder incontinence. Today, 03/22/2022, patient presents for planned follow-up for chronic low back pain. Patient underwent a confirmatory lumbar medial branch block at L4-5 and L5-S1 on 3/15/2022. Patient reports >85% pain relief x3 days after the procedure. Patient would like to move forward with a lumbar RFA for more long-term relief. Patient states she is having increased left shoulder pain that started when she woke up on Friday morning. Patient states it is hard to turn her head to the right as it would cause left shoulder blade pain. Patient states she has had left shoulder pain off and on since 2018 when she was in an accident. Patient states she has had imaging of her left shoulder which just showed degenerative changes. Patient states she has tried Toradol, IcyHot, roll-on pain relief, and Aleve back and body which all have been relatively ineffective. Patient states she has had some left hand weakness. She states her symptoms have gradually improved since Friday while it is still causing significant pain. Patient denies any new symptoms such as focal leg weakness, leg paresthesias, saddle anesthesia, or bowel/bladder incontinence. History of Interventions:   Surgery: No previous thoracic or lumbar surgeries  Injections: Thoracic epidural?  Several years ago  Lumbar MBB L4-5 and L5-S1 (02/01/2022) - >85% relief x 2 days  Lumbar MBB L4-5 and L5-S1 (03/15/2022) - >85% relief x 3 days      Current Treatment Medications:    Toradol as needed- mild effectiveness    Historical Treatment Medications:   Cymbalta - fatigue  Ibuprofen - ineffective  Tylenol - ineffective   OTC muscle rub -ineffective    Imaging:    (Lumbar Xray 02/02/2021)    Narrative   2 views lumbar spine       Comparison:  MR  - MRI LUMBAR SPINE WO CONTRAST  - 02/22/2019 11:08 AM EST    CR,SR  - XR LUMBAR SPINE (2-3 VIEWS)  - 12/14/2018 01:50 PM EST       Findings   Normal vertebral body alignment. No acute fractures or dislocation. No significant degenerative change           Impression   No acute findings       This document has been electronically signed by: Mary Frias MD on    02/02/2021 01:58 AM       Lumbar MRI (02/22/2019)  Narrative   PROCEDURE: MRI LUMBAR SPINE WO CONTRAST       CLINICAL INFORMATION: DDD (degenerative disc disease), lumbar. Additional history obtained from the electronic medical record indicates the patient has right-sided numbness when laying on the right side. Severe back pain.       COMPARISON: None available. Correlation is made to radiographs of the lumbar spine dated December 14, 2018.       TECHNIQUE: Sagittal and axial T1 and T2-weighted images were obtained through the lumbar spine.       FINDINGS:       The lumbar spine is imaged from the inferior aspect of T10 to the superior aspect of S3. The conus medullaris terminates at the L1 level. No abnormal signal or expansion is present within the conus. The visualized nerve roots are evenly distributed    throughout the thecal sac.       There is preservation of the expected lumbar lordosis. The vertebral body heights and alignment are maintained. No compression fracture deformity or suspicious marrow replacing lesion is identified.       Degenerative facet arthropathy is present at every level. With regards to the disc spaces, at L1-L2, the spinal canal and neural foramina are patent.       At L2-L3, the spinal canal and neural foramina are patent.       At L3-L4, there is disc space narrowing, disc desiccation and a disc bulge.  The spinal canal and neural foramina are patent.       At L4-L5, there is disc desiccation and a posterior disc protrusion. This indents the ventral thecal sac and causes mild spinal canal narrowing. The neural foramina are patent.       At L5-S1, the spinal canal and neural foramina are patent.       No suspicious finding is identified within the visualized retroperitoneal and paraspinal soft tissues.           Impression        Multilevel degenerative changes are present throughout the lumbar spine and are further discussed by level in the findings. These are most significant at L4-L5 where there is a posterior disc protrusion indenting the ventral thecal sac and causing mild    spinal canal narrowing. Mild degenerative facet arthropathy is present without significant neural foraminal narrowing.                   **This report has been created using voice recognition software. It may contain minor errors which are inherent in voice recognition technology. **       Final report electronically signed by Dr. Maribel Aragon on 2/22/2019 1:06 PM       Past Medical History:   Diagnosis Date    Anxiety     Asthma     DDD (degenerative disc disease), cervical     DDD (degenerative disc disease), thoracolumbar     Depression     GERD (gastroesophageal reflux disease)        Past Surgical History:   Procedure Laterality Date    PAIN MANAGEMENT PROCEDURE Bilateral 2/1/2022    Lumbar medial branch block at bilateral L4-5 and L5-S1 performed by Jack Cronin DO at [address] Bilateral 3/15/2022    medial branch blocks at bilateral L4-5 and L5-S1 performed by Jack Cronin DO at [address] Right 4/12/2022    Lumbar radiofrequency ablation at  L4-5 and L5-S1, right performed by Jack Cronin DO at [address]       Family History   Problem Relation Age of Onset    Depression Mother    [facility] COPD Father     Asthma Sister     Depression Sister    [facility] Lupus Paternal Aunt     No Known Problems Daughter     No Known Problems Daughter     Asthma Daughter     No Known Problems Brother          Medications & Allergies:   Current Outpatient Medications   Medication Instructions    albuterol (PROVENTIL) 2.5 mg, Nebulization, EVERY 6 HOURS PRN    albuterol sulfate  (90 Base) MCG/ACT inhaler INHALE TWO (2) PUFFS INTO THE LUNGS EVERY FOUR (4) HOURS AS NEEDED FOR WHEEZING     alclomethasone (ACLOVATE) 0.05 % ointment Apply topically 2 times daily.  doxepin (SINEQUAN) 25 mg, Oral, NIGHTLY    fluocinonide (LIDEX) 0.05 % external solution No dose, route, or frequency recorded.  fluticasone-salmeterol (ADVAIR) 100-50 MCG/DOSE diskus inhaler 1 puff, Inhalation, EVERY 12 HOURS, Rinse mouth after use    ketoconazole (NIZORAL) 2 % shampoo No dose, route, or frequency recorded.  ondansetron (ZOFRAN) 4 mg, Oral, EVERY 8 HOURS PRN    Spacer/Aero-Holding Chambers (VORTEX VALVED HOLDING CHAMBER) SUJATHA 1 Device, Does not apply, DAILY, Use with inhalers    triamcinolone (KENALOG) 0.025 % cream No dose, route, or frequency recorded.  VENTOLIN  (90 Base) MCG/ACT inhaler 2 puffs, Inhalation, 4 TIMES DAILY PRN       Allergies   Allergen Reactions    Hydrocodone      \"makes me feel completely out of whack\"    Prozac [Fluoxetine Hcl] Other (See Comments)     \"I want to kill myself\"       Review of Systems:   Constitutional: negative for weight changes or fevers  Genitourinary: negative for bowel/bladder incontinence   Musculoskeletal: positive for low back pain. Neurological: negative for any leg weakness or numbness/tingling  Behavioral/Psych: negative for anxiety/depression   All other systems reviewed and are negative    Objective: There were no vitals filed for this visit.     Constitutional: Pleasant, no acute distress   Head: Normocephalic, atraumatic   Eyes: Conjunctivae normal   Neck: Supple, symmetrical   Lungs: Normal respiratory effort, non-labored breathing   Cardiovascular: Limbs warm and well perfused   Abdomen: Non-protruded   Musculoskeletal: Muscle bulk symmetric, no atrophy, no gross deformities   · Lower Extremities: ROM WNL. · Thorax: Left trapezius and paraspinal tenderness. No scoliosis or kyphosis. Decreased left arm abduction - 120 degrees, otherwise ROM WNL. Negative Neers test.  Negative Cooper test.  Negative lift-off test.   · Lumbar Spine: ROM WNL. Lumbar paraspinals mild tenderness with palpation bilaterally. SLR neg bilaterally. MATEUSZ neg bilaterally. GAENSLEN neg bilaterally. Positive facet loading bilaterally. Right SI joint nontender with palpation, mild tenderness to left SI joint with palpation. Bilateral greater trochanters non-tender to palpation. Neurological: Cranial nerves II-XII grossly intact. · Gait - Normal, non-antalgic gait. Ambulates without assistive device. · Motor: 5/5 muscle strength in bilateral hip flexion, knee flexion, knee extension, ankle dorsiflexion, and ankle plantar flexion   · Sensory: LT sensation intact in lower limbs   · Reflexes: 2+ symmetrical in bilateral achilles, 2+ bilateral patellar, negative ankle clonus, downgoing babinski   Skin: No rashes or lesions present   Psychological: Cooperative, no exaggerated pain behaviors     Assessment:    Diagnosis Orders   1. Lumbar spondylosis  CHG FLUOR NEEDLE/CATH SPINE/PARASPINAL DX/THER ADDON    OH RADIOFREQUENCY NEUROTOMY LUMBAR OR SACRAL, W IMAGE GUIDANCE, SINGLE    OH RADIOFREQ NEUROTOMY LUMBAR OR SACRAL, W IMAGE GUIDE,EA ADDL LEVEL   2. Facet arthropathy     3. Chronic pain syndrome     4. Syrinx ([address]     5. Chronic left shoulder pain         Shiv Dunham is a 39 y. o.female presenting to the pain clinic for evaluation of lumbar back pain. Patient's history and physical consistent with lumbar facet mediated pain lumbar spondylosis as evident on her imaging.   We have set her up for a lumbar medial branch block at bilateral L4/5 and L5/S1. We have discussed that she has several pain generators that may be addressed in the future. With the patient having a significant response to the first lumbar medial branch blocks we will proceed with confirmatory lumbar medial branch blocks at L4-5 and L5-S1 with Dr. Chepe Alba. We will follow up 1 week after the procedure to discuss next steps and plan on completing the lumbar RFA. Patient had a significant response to the bilateral confirmatory lumbar medial branch blocks at L4-5 and L5-S1. We have set her up for the bilateral lumbar RFA at these levels starting with the right side first.  In regards to her left shoulder pain I offered trigger point injections in office today, patient declined. I also offered a Toradol injection or muscle relaxer to help with pain control, patient declined. Patient would like to just work on home exercises for pain. We will look into this further if her pain continues. Plan: The following treatment recommendations and plan were discussed in detail with Anusha Britton. Imaging:   I have reviewed patients imaging of lumbar xray and lumbar MRI and results were discussed with patient today. I have reviewed patient's imaging of left shoulder x-ray and results were discussed with patient today. Analgesics:   Patient is taking Toradol. Patient is advised to take as prescribed and not take on an empty stomach. Adjuvants:   None    Interventions: In presence of lumbar axial back pain and with physical exam consistent for facetal pain, the option of   lumbar radiofrequency ablation at bilateral L4-5 and L5-S1, RIGHT side first was chosen. The risks and benefits were discussed in detail with the patient. Patient wants to proceed with the injection. Anticoagulation/NPO Recommendations:   Patient does not need to hold any medications prior to the procedure. Patient will need to be NPO x 8 hours prior to the procedure.   We will start an IV prior to the procedure  Patient will need a  for the procedure    Multidisciplinary Pain Management:   In the presence of complex, chronic, and multi-factorial pain, the importance of a multidisciplinary approach to pain management in the patients management regimen was emphasized and discussed in great detail. PHYSICAL THERAPY: Patient is advised to see a physical therapist for gentle stretching exercises and conditioning exercises for management of pain. Referrals:  None    Prescriptions Written This Visit:   none    Follow-up: Lumbar RFA B/L L4-5 and L5-S1, RIGHT side first. F/u 2 weeks after to set up left side.      Watson Kan DO

## 2022-05-24 ENCOUNTER — HOSPITAL ENCOUNTER (OUTPATIENT)
Age: 42
Setting detail: OUTPATIENT SURGERY
Discharge: HOME | End: 2022-05-24
Attending: ANESTHESIOLOGY | Admitting: ANESTHESIOLOGY
Payer: MEDICAID

## 2022-05-24 ENCOUNTER — APPOINTMENT (OUTPATIENT)
Dept: GENERAL RADIOLOGY | Age: 42
End: 2022-05-24
Attending: ANESTHESIOLOGY
Payer: MEDICAID

## 2022-05-24 VITALS
DIASTOLIC BLOOD PRESSURE: 71 MMHG | WEIGHT: 200.8 LBS | BODY MASS INDEX: 34.28 KG/M2 | OXYGEN SATURATION: 93 % | SYSTOLIC BLOOD PRESSURE: 116 MMHG | HEART RATE: 70 BPM | HEIGHT: 64 IN | TEMPERATURE: 97 F | RESPIRATION RATE: 14 BRPM

## 2022-05-24 LAB — PREGNANCY, URINE: NEGATIVE

## 2022-05-24 PROCEDURE — 3600000057 HC PAIN LEVEL 4 ADDL 15 MIN: Performed by: ANESTHESIOLOGY

## 2022-05-24 PROCEDURE — 7100000011 HC PHASE II RECOVERY - ADDTL 15 MIN: Performed by: ANESTHESIOLOGY

## 2022-05-24 PROCEDURE — 6360000002 HC RX W HCPCS: Performed by: ANESTHESIOLOGY

## 2022-05-24 PROCEDURE — 3600000056 HC PAIN LEVEL 4 BASE: Performed by: ANESTHESIOLOGY

## 2022-05-24 PROCEDURE — 7100000010 HC PHASE II RECOVERY - FIRST 15 MIN: Performed by: ANESTHESIOLOGY

## 2022-05-24 PROCEDURE — 81025 URINE PREGNANCY TEST: CPT

## 2022-05-24 PROCEDURE — 3209999900 FLUORO FOR SURGICAL PROCEDURES

## 2022-05-24 PROCEDURE — 2709999900 HC NON-CHARGEABLE SUPPLY: Performed by: ANESTHESIOLOGY

## 2022-05-24 PROCEDURE — 64636 DESTROY L/S FACET JNT ADDL: CPT | Performed by: ANESTHESIOLOGY

## 2022-05-24 PROCEDURE — 64635 DESTROY LUMB/SAC FACET JNT: CPT | Performed by: ANESTHESIOLOGY

## 2022-05-24 PROCEDURE — 2500000003 HC RX 250 WO HCPCS: Performed by: ANESTHESIOLOGY

## 2022-05-24 PROCEDURE — 99152 MOD SED SAME PHYS/QHP 5/>YRS: CPT | Performed by: ANESTHESIOLOGY

## 2022-05-24 RX ORDER — LIDOCAINE HYDROCHLORIDE 20 MG/ML
INJECTION, SOLUTION EPIDURAL; INFILTRATION; INTRACAUDAL; PERINEURAL PRN
Status: DISCONTINUED | OUTPATIENT
Start: 2022-05-24 | End: 2022-05-24 | Stop reason: ALTCHOICE

## 2022-05-24 RX ORDER — LIDOCAINE HYDROCHLORIDE 10 MG/ML
INJECTION, SOLUTION EPIDURAL; INFILTRATION; INTRACAUDAL; PERINEURAL PRN
Status: DISCONTINUED | OUTPATIENT
Start: 2022-05-24 | End: 2022-05-24 | Stop reason: ALTCHOICE

## 2022-05-24 RX ORDER — MIDAZOLAM HYDROCHLORIDE 1 MG/ML
INJECTION INTRAMUSCULAR; INTRAVENOUS PRN
Status: DISCONTINUED | OUTPATIENT
Start: 2022-05-24 | End: 2022-05-24 | Stop reason: ALTCHOICE

## 2022-05-24 RX ORDER — FENTANYL CITRATE 50 UG/ML
INJECTION, SOLUTION INTRAMUSCULAR; INTRAVENOUS PRN
Status: DISCONTINUED | OUTPATIENT
Start: 2022-05-24 | End: 2022-05-24 | Stop reason: ALTCHOICE

## 2022-05-24 ASSESSMENT — PAIN SCALES - GENERAL: PAINLEVEL_OUTOF10: 0

## 2022-05-24 ASSESSMENT — PAIN - FUNCTIONAL ASSESSMENT: PAIN_FUNCTIONAL_ASSESSMENT: NONE - DENIES PAIN

## 2022-05-24 NOTE — PROCEDURES
Pre-operative Diagnosis: Lumbar facet pain     Post-operative Diagnosis: Lumbar facet pain     Procedure: LEFT lumbar thermal radiofrequency ablation targeting facet joints L4/L5 and L5/S1     Procedure Description:  After consent was obtained, the patient was placed in the prone position with a pillow under the abdomen to reduce the lordotic curve of the lumbar spine. The lower back was prepped with chloraprep and draped in a sterile fashion.  Then, 0.5 cc of 1 % lidocaine was used for local anesthesia of the skin and superficial subcutaneous tissues.  Three 20-gauge 100mm SMK cannulas with 10-mm active tips were advanced under fluoroscopic guidance in an AP view to the junction of the LEFT superior articular process and the transverse process of the L4 and L5 vertebra and at the sacral ala. There were no paresthesias, heme or CSF obtained.  Needle placement was confirmed using AP and oblique views.      Sensory and motor stimulation at 50Hz and 2Hz and impedance measurements were carried out having reached threshold at:     LEFT  L4: 0.2V/3V/150-300 Ohms  L5: 0.2V/3V/150-300 Ohms  SA: 0.2V/3V/150-300 Ohms        Then, 1cc of 2% Lidocaine was injected at the site. Temperature was then raised to 80 degrees centigrade for 90 seconds with a 15 second temperature ramp. No pain was reported during the lesioning. The needles were then withdrawn without complications. The patient tolerated the procedure well.  The patient was transported to the recovery room and discharged in ambulatory fashion.     Procedural Complications: None  Estimated Blood Loss: 0 mL     IV sedation was used during the procedure:  - Moderate intravenous conscious sedation was supervised by Dr. Sanjuana Addison  - The patient was independently monitored by a Registered Nurse assigned to the procedure room  - Monitoring included automated blood pressure, continuous EKG, and continuous pulse oximetry  - The detailed conscious record is permanently stored in the [facility] following is the conscious sedation record:  Start Time: 08:56  End Time : 09:11  Duration: 15 minutes   Medications Administered: 4 mg Versed, 50 mcg Fentanyl         Liborio Mancini DO  Interventional Pain Management/PM&R   [facility] [place]

## 2022-05-24 NOTE — PROGRESS NOTES
0911: Patient arrives to recovery room via cart. Spontaneous respirations, vss, report received from surgical RN. Patient denies pain, numbness, tingling , nausea. Injection site clean, dry, intact. HOB elevated. IV capped. Snack and drink given. Call light within reach. 0930: patient denies needs, sitting up in bed. Denies pain. 0945: patient ambulated to discharge lobby in stable condition. Patient discharged home with .

## 2022-05-24 NOTE — PRE SEDATION
[facility]  Pre-Sedation/Analgesia History & Physical    Pt Name: Tobi Benson  MRN: [id number]  YOB: 1980  Provider Performing Procedure: Watson Kan DO   Primary Care Physician: Kecia Farr MD      MEDICAL HISTORY       has a past medical history of Anxiety, Asthma, DDD (degenerative disc disease), cervical, DDD (degenerative disc disease), thoracolumbar, Depression, and GERD (gastroesophageal reflux disease). SURGICAL HISTORY   has a past surgical history that includes Pain management procedure (Bilateral, 2/1/2022); Pain management procedure (Bilateral, 3/15/2022); and Pain management procedure (Right, 4/12/2022). ALLERGIES   Allergies as of 04/22/2022 - Fully Reviewed 04/22/2022   Allergen Reaction Noted    Hydrocodone  02/27/2020    Prozac [fluoxetine hcl] Other (See Comments) 12/05/2018       MEDICATIONS   Prior to Admission medications    Medication Sig Start Date End Date Taking? Authorizing Provider   fluticasone-salmeterol (ADVAIR) 100-50 MCG/DOSE diskus inhaler Inhale 1 puff into the lungs every 12 hours Rinse mouth after use 3/28/22   BINH Waterman CNP   VENTOLIN  (90 Base) MCG/ACT inhaler Inhale 2 puffs into the lungs 4 times daily as needed for Wheezing  Patient not taking: Reported on 5/24/2022 3/3/22   BINH Dawson CNP   doxepin (SINEQUAN) 25 MG capsule Take 1 capsule by mouth nightly 1/12/22   Kecia Farr MD   ondansetron (ZOFRAN) 4 MG tablet Take 1 tablet by mouth every 8 hours as needed for Nausea 1/12/22   Kecia Farr MD   ketoconazole (NIZORAL) 2 % shampoo  1/3/22   Historical Provider, MD   triamcinolone (KENALOG) 0.025 % cream  1/3/22   Historical Provider, MD   fluocinonide (LIDEX) 0.05 % external solution  1/3/22   Historical Provider, MD   alclomethasone (ACLOVATE) 0.05 % ointment Apply topically 2 times daily.  9/21/21   Kecia Farr MD   albuterol sulfate  (90 Base) MCG/ACT inhaler INHALE TWO (2) PUFFS INTO THE LUNGS EVERY FOUR (4) HOURS AS NEEDED FOR WHEEZING  12/30/20   BINH Waterman CNP   albuterol (PROVENTIL) (2.5 MG/3ML) 0.083% nebulizer solution Take 3 mLs by nebulization every 6 hours as needed for Wheezing or Shortness of Breath 3/25/20 4/22/22  BINH Schwarz CNP   Spacer/Aero-Holding Chambers (VORTEX VALVED HOLDING CHAMBER) SUJATHA 1 Device by Does not apply route daily Use with inhalers 9/30/19   BINH Schwarz CNP     PHYSICAL:   Vitals:    05/24/22 0911   BP: 116/71   Pulse: 70   Resp: 14   Temp: 97 °F (36.1 °C)   SpO2: 93%     PLANNED PROCEDURE   See procedure note  SEDATION  Planned agent: Versed and Fentanyl  ASA Classification: 2  Class 1: A normal healthy patient  Class 2: Pt with mild to moderate systemic disease  Class 3: Severe systemic disease or disturbance  Class 4: Severe systemic disorders that are already life threatening. Class 5: Moribund pt with little chances of survival, for more than 24 hours. Mallampati I Airway Classification: 2    1. Pre-procedure diagnostic studies complete and results available. 2. Previous sedation/anesthesia experiences assessed. 3. The patient is an appropriate candidate to undergo the planned procedure sedation and anesthesia. (Refer to nursing sedation/analgesia documentation record)  4. Formulation and discussion of sedation/procedure plan, risks, and expectations with patient and/or responsible adult completed. 5. Patient examined immediately prior to the procedure.  (Refer to nursing sedation/analgesia documentation record)    Jack Cronin DO  Electronically signed 5/24/2022 at 10:43 AM

## 2022-05-26 NOTE — ED TRIAGE NOTES
ASSESSMENT / PLAN:  (E78.5) Hyperlipidemia LDL goal <100  (primary encounter diagnosis)  Comment: needs refill   Plan: atorvastatin (LIPITOR) 40 MG tablet        Will due 1/2 dosage with weight loss. Reveiwed risks and side effects of medication  Recheck in 6 months      (R63.0) Poor appetite  Comment: depression/dementia vs ?  Plan: sertraline (ZOLOFT) 25 MG tablet        Reveiwed risks and side effects of medication  Start with 1/2 pill. If SUICIAL IDEATION OR HOMOCIDAL IDEATION OR MARTITA TO ER . Return to clinic 6 weeks for med/labs/wt recheck. Sooner if worse. Back to GI if needed?    (R53.83) Other fatigue  Comment: lack of exercise?  Plan: sertraline (ZOLOFT) 25 MG tablet        Exercise - walking. Reveiwed risks and side effects of medication  Return to clinic if worse    (R55) Syncope, unspecified syncope type  Comment: vasovagal?  Plan: more water and 1/2 toprol. zio patch pending. Seeing cardiology. Get up slowly and closely monitor sugars    (I10) Essential hypertension with goal blood pressure less than 140/90  Comment: lower readings at home  Plan: metoprolol succinate ER (TOPROL XL) 50 MG 24 hr        tablet        1/2 dosage and continue monitor.         Subjective   Frandy is a 70 year old who presents for the following health issues  accompanied by his spouse.  Follow-up syncopal episode- thought vaso-vagal.. Seen in ER and given zio patch/IVF and blood sugar was low.   History dm and pancreatic mass- negative for cancer.   Had renal panel/cbc/trop/lft ok. Ct scan head - no acute cva/bleeding. Echo and us carotids done.   Outside blood pressure a little lower. No chest pain. No shortness of breath.   Appetite poor.   zio patch one.   No ensure.   Blood sugar lower 63.   Abdominal pain a little better.   Exercise..   HPI       Hospital Follow-up Visit:    Hospital/Nursing Home/IP Rehab Facility: [facility]   Date of Admission: 5/22/2022  Date of Discharge: 05/24/2022  Reason(s) for  Pt to ed with dental pain and facial swelling. Pt reports that she was seen in [facility] yesterday and prescribed tramadol and pcn and reports no relief and decided to be reassessed. Admission: syncope and collapse       Was your hospitalization related to COVID-19? No   Problems taking medications regularly:  Wife sets up medications, needs to be reminded.   Medication changes since discharge: Yes   Problems adhering to non-medication therapy:  None    Summary of hospitalization:  [facility] discharge summary reviewed  Diagnostic Tests/Treatments reviewed.  Follow up needed: none  Other Healthcare Providers Involved in Patient s Care:         None  Update since discharge: improved. Post Discharge Medication Reconciliation: discharge medications reconciled, continue medications without change.  Plan of care communicated with patient and family              Review of Systems         Objective    /69   Pulse 80   Temp 98.3  F (36.8  C) (Tympanic)   Resp 16   Wt 79.6 kg (175 lb 6.4 oz)   SpO2 98%   BMI 27.47 kg/m    Body mass index is 27.47 kg/m .  Physical Exam   GENERAL: healthy, alert and no distress  EYES: Eyes grossly normal to inspection, PERRL and conjunctivae and sclerae normal  HENT: ear canals and TM's normal, nose and mouth without ulcers or lesions  NECK: no adenopathy, no asymmetry, masses, or scars and thyroid normal to palpation  RESP: lungs clear to auscultation - no rales, rhonchi or wheezes  CV: regular rate and rhythm, normal S1 S2, no S3 or S4, no murmur, click or rub, no peripheral edema and peripheral pulses strong  ABDOMEN: soft, nontender, no hepatosplenomegaly, no masses and bowel sounds normal  MS: no gross musculoskeletal defects noted, no edema  NEURO: Normal strength and tone, mentation intact and speech normal  NEURO: poor historian. Here with wife  PSYCH: mentation appears normal, affect a little flat

## 2022-06-03 ENCOUNTER — OFFICE VISIT (OUTPATIENT)
Dept: PHYSICAL MEDICINE AND REHAB | Age: 42
End: 2022-06-03
Payer: MEDICAID

## 2022-06-03 VITALS
HEIGHT: 64 IN | SYSTOLIC BLOOD PRESSURE: 122 MMHG | BODY MASS INDEX: 34.15 KG/M2 | DIASTOLIC BLOOD PRESSURE: 84 MMHG | WEIGHT: 200 LBS

## 2022-06-03 DIAGNOSIS — G89.4 CHRONIC PAIN SYNDROME: ICD-10-CM

## 2022-06-03 DIAGNOSIS — M79.2 NEUROPATHIC PAIN: ICD-10-CM

## 2022-06-03 DIAGNOSIS — M47.819 FACET ARTHROPATHY: ICD-10-CM

## 2022-06-03 DIAGNOSIS — M70.61 GREATER TROCHANTERIC BURSITIS OF RIGHT HIP: Primary | ICD-10-CM

## 2022-06-03 DIAGNOSIS — M47.816 LUMBAR SPONDYLOSIS: ICD-10-CM

## 2022-06-03 DIAGNOSIS — M70.62 GREATER TROCHANTERIC BURSITIS OF LEFT HIP: ICD-10-CM

## 2022-06-03 DIAGNOSIS — M53.3 SACROILIAC JOINT DYSFUNCTION OF BOTH SIDES: ICD-10-CM

## 2022-06-03 PROCEDURE — 4004F PT TOBACCO SCREEN RCVD TLK: CPT | Performed by: NURSE PRACTITIONER

## 2022-06-03 PROCEDURE — 99214 OFFICE O/P EST MOD 30 MIN: CPT | Performed by: NURSE PRACTITIONER

## 2022-06-03 PROCEDURE — G8417 CALC BMI ABV UP PARAM F/U: HCPCS | Performed by: NURSE PRACTITIONER

## 2022-06-03 PROCEDURE — G8427 DOCREV CUR MEDS BY ELIG CLIN: HCPCS | Performed by: NURSE PRACTITIONER

## 2022-06-03 NOTE — PROGRESS NOTES
Chronic Pain/PM&R Clinic Note     Encounter Date: 6/3/22    Subjective:   Chief Complaint:   Chief Complaint   Patient presents with    Follow-up       History of Present Illness: Elieser Chang is a 39 y.o. female seen in the clinic initially on  01/06/2022 upon request from Tushar Brown MD for her history of lumbar pain. Patient states pain started after a lifting accident at work in 2008 where she had an injury to her thoracic spine. Patient states back pain has become gradually worse ever since. Patient states pain is worse when she is sitting, standing in 1 position, or doing activities such as cleaning around the house. Patient states she will occasionally get pain that radiates down bilateral legs, left more than right with strenuous activity. Patient states when pain gets severe she will lay down which seems to help. Patient does have trouble sleeping occasionally as she has some aching and twitching in bilateral legs. Patient states she has tried physical therapy and decompression several times which have been ineffective. It has been several years since she has done physical therapy. Patient states she does not currently work as she was unable to tolerate it. Patient denies weakness in bilateral legs but states she will occasionally feel like her knees want to give out. Patient denies falls. Patient denies saddle anesthesia or bowel or bladder incontinence. Patient also mentions other pain such as in her left shoulder, left wrist, and left knee. 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 She has had ongoing thoracic pain since then. Today, 6/3/2022, patient presents for planned follow-up on chronic pain. Patient has now completed the bilateral lumbar radiofrequency ablation. She has noticed significant relief in her low back pain. She does continue to have bilateral hip pain.   She states it is painful for her to sleep on either side due to the pain in her hips. She states she has also noticed more throbbing and stabbing pain in bilateral lower extremities from her knees to her ankles. She states this is most noticeable when she goes to lay down at night as it is hard for her to keep her legs still. She states this is something that has been going on for several years but she has just dealt with it, it is recently become more bothersome. Patient denies history of diabetes. She states she has never had an EMG of her legs. She denies any focal leg weakness but does state if she stands for a long time she feels like her knees are going to give out. Patient denies history of falls. Patient denies saddle anesthesia or bowel/bladder incontinence. History of Interventions:   Surgery: No previous thoracic or lumbar surgeries  Injections: Thoracic epidural?  Several years ago  Lumbar MBB L4-5 and L5-S1 (02/01/2022) - >85% relief x 2 days  Lumbar MBB L4-5 and L5-S1 (03/15/2022) - >85% relief x 3 days  Right lumbar RFA (04/12/2022) - significant relief  Left lumbar RFA (05/24/2022) - significant relief    Current Treatment Medications: Toradol as needed- mild effectiveness  Ibuprofen - takes edge off. Historical Treatment Medications:   Cymbalta - fatigue  Ibuprofen - ineffective  Tylenol - ineffective   OTC muscle rub -ineffective    Imaging:    (Lumbar Xray 02/02/2021)    Narrative   2 views lumbar spine       Comparison:  MR  - MRI LUMBAR SPINE WO CONTRAST  - 02/22/2019 11:08 AM EST    CR,SR  - XR LUMBAR SPINE (2-3 VIEWS)  - 12/14/2018 01:50 PM EST       Findings   Normal vertebral body alignment. No acute fractures or dislocation. No significant degenerative change           Impression   No acute findings       This document has been electronically signed by:  Erin Car MD on    02/02/2021 01:58 AM       Lumbar MRI (02/22/2019)  Narrative   PROCEDURE: MRI LUMBAR SPINE WO CONTRAST       CLINICAL INFORMATION: DDD (degenerative disc disease), lumbar. Additional history obtained from the electronic medical record indicates the patient has right-sided numbness when laying on the right side. Severe back pain.       COMPARISON: None available. Correlation is made to radiographs of the lumbar spine dated December 14, 2018.       TECHNIQUE: Sagittal and axial T1 and T2-weighted images were obtained through the lumbar spine.       FINDINGS:       The lumbar spine is imaged from the inferior aspect of T10 to the superior aspect of S3. The conus medullaris terminates at the L1 level. No abnormal signal or expansion is present within the conus. The visualized nerve roots are evenly distributed    throughout the thecal sac.       There is preservation of the expected lumbar lordosis. The vertebral body heights and alignment are maintained. No compression fracture deformity or suspicious marrow replacing lesion is identified.       Degenerative facet arthropathy is present at every level. With regards to the disc spaces, at L1-L2, the spinal canal and neural foramina are patent.       At L2-L3, the spinal canal and neural foramina are patent.       At L3-L4, there is disc space narrowing, disc desiccation and a disc bulge. The spinal canal and neural foramina are patent.       At L4-L5, there is disc desiccation and a posterior disc protrusion. This indents the ventral thecal sac and causes mild spinal canal narrowing. The neural foramina are patent.       At L5-S1, the spinal canal and neural foramina are patent.       No suspicious finding is identified within the visualized retroperitoneal and paraspinal soft tissues.           Impression        Multilevel degenerative changes are present throughout the lumbar spine and are further discussed by level in the findings.  These are most significant at L4-L5 where there is a posterior disc protrusion indenting the ventral thecal sac and causing mild    spinal canal narrowing. Mild degenerative facet arthropathy is present without significant neural foraminal narrowing.                   **This report has been created using voice recognition software. It may contain minor errors which are inherent in voice recognition technology. **       Final report electronically signed by Dr. Dipti Lester on 2/22/2019 1:06 PM       Past Medical History:   Diagnosis Date    Anxiety     Asthma     DDD (degenerative disc disease), cervical     DDD (degenerative disc disease), thoracolumbar     Depression     GERD (gastroesophageal reflux disease)        Past Surgical History:   Procedure Laterality Date    PAIN MANAGEMENT PROCEDURE Bilateral 2/1/2022    Lumbar medial branch block at bilateral L4-5 and L5-S1 performed by Brian Zee DO at [address] Bilateral 3/15/2022    medial branch blocks at bilateral L4-5 and L5-S1 performed by Brian Zee DO at [address] Right 4/12/2022    Lumbar radiofrequency ablation at  L4-5 and L5-S1, right performed by Brian Zee DO at [address] Left 5/24/2022    Lumbar RFA B/L L4-5 and L5-S1,LEFT performed by Brian Zee DO at [address] [place]       Family History   Problem Relation Age of Onset    Depression Mother     COPD Father     Asthma Sister     Depression Sister     Lupus Paternal Aunt     No Known Problems Daughter     No Known Problems Daughter     Asthma Daughter     No Known Problems Brother        Medications & Allergies:   Current Outpatient Medications   Medication Instructions    albuterol (PROVENTIL) 2.5 mg, Nebulization, EVERY 6 HOURS PRN    albuterol sulfate  (90 Base) MCG/ACT inhaler INHALE TWO (2) PUFFS INTO THE LUNGS EVERY FOUR (4) HOURS AS NEEDED FOR WHEEZING     alclomethasone (ACLOVATE) 0.05 % ointment Apply topically 2 times daily.     doxepin (SINEQUAN) 25 mg, Oral, NIGHTLY    fluocinonide (LIDEX) 0.05 % external solution No dose, route, or frequency recorded.  fluticasone-salmeterol (ADVAIR) 100-50 MCG/DOSE diskus inhaler 1 puff, Inhalation, EVERY 12 HOURS, Rinse mouth after use    ketoconazole (NIZORAL) 2 % shampoo No dose, route, or frequency recorded.  ondansetron (ZOFRAN) 4 mg, Oral, EVERY 8 HOURS PRN    Spacer/Aero-Holding Chambers (VORTEX VALVED HOLDING CHAMBER) SUJATHA 1 Device, Does not apply, DAILY, Use with inhalers    triamcinolone (KENALOG) 0.025 % cream No dose, route, or frequency recorded.  VENTOLIN  (90 Base) MCG/ACT inhaler 2 puffs, Inhalation, 4 TIMES DAILY PRN       Allergies   Allergen Reactions    Hydrocodone      \"makes me feel completely out of whack\"    Prozac [Fluoxetine Hcl] Other (See Comments)     \"I want to kill myself\"       Review of Systems:   Constitutional: negative for weight changes or fevers  Genitourinary: negative for bowel/bladder incontinence   Musculoskeletal: positive for low back pain, bilateral hip pain, bilateral leg pain. Neurological: negative for any leg weakness or numbness/tingling  Behavioral/Psych: negative for anxiety/depression   All other systems reviewed and are negative    Objective:     Vitals:    06/03/22 0807   BP: 122/84       Constitutional: Pleasant, no acute distress   Head: Normocephalic, atraumatic   Eyes: Conjunctivae normal   Neck: Supple, symmetrical   Lungs: Normal respiratory effort, non-labored breathing   Cardiovascular: Limbs warm and well perfused   Abdomen: Non-protruded   Musculoskeletal: Muscle bulk symmetric, no atrophy, no gross deformities   · Lower Extremities: ROM WNL. · Thorax: Left trapezius and paraspinal tenderness. No scoliosis or kyphosis. Decreased left arm abduction - 120 degrees, otherwise ROM WNL. Negative Neers test.  Negative Cooper test.  Negative lift-off test.   · Lumbar Spine: ROM WNL.  Lumbar paraspinals mild tenderness with palpation bilaterally. SLR positive bilaterally. MATEUSZ equivocal bilaterally. GAENSLEN positive bilaterally. Positive facet loading bilaterally. Bilateral SI joint tenderwith palpation, mild tenderness to left SI joint with palpation. Bilateral SI joint distraction positive. Bilateral greater trochanters tender to palpation. Neurological: Cranial nerves II-XII grossly intact. · Gait - Normal, non-antalgic gait. Ambulates without assistive device. · Motor: 5/5 muscle strength in bilateral hip flexion, knee flexion, knee extension, ankle dorsiflexion, and ankle plantar flexion   · Sensory: LT sensation intact in lower limbs   · Reflexes: 2+ symmetrical in bilateral achilles, 2+ bilateral patellar, negative ankle clonus, downgoing babinski   Skin: No rashes or lesions present   Psychological: Cooperative, no exaggerated pain behaviors     Assessment:    Diagnosis Orders   1. Greater trochanteric bursitis of right hip  CHG FLUOR NEEDLE/CATH SPINE/PARASPINAL DX/THER ADDON    AZ ARTHROCENTESIS ASPIR&/INJ MAJOR JT/BURSA W/O US   2. Greater trochanteric bursitis of left hip  CHG FLUOR NEEDLE/CATH SPINE/PARASPINAL DX/THER ADDON    AZ ARTHROCENTESIS ASPIR&/INJ MAJOR JT/BURSA W/O US   3. Neuropathic pain  Debneto Robert DO, Physical Medicine [facility] Rehab, [facility] [place]   4. Chronic pain syndrome  Deb Robert DO, Physical Medicine & Rehab, [facility] [place]   5. Lumbar spondylosis  Debneto Robert DO, Physical Medicine & Rehab, [facility] [place]   6. Facet arthropathy     7. Sacroiliac joint dysfunction of both sides         Michelle Torres is a 39 y. o.female presenting to the pain clinic for evaluation of lumbar back pain. Patient's history and physical consistent with lumbar facet mediated pain lumbar spondylosis as evident on her imaging. We have set her up for a lumbar medial branch block at bilateral L4/5 and L5/S1. We have discussed that she has several pain generators that may be addressed in the future.      Patient has had a significant sponsor to the bilateral lumbar RFA. She is now dealing with a lot of bilateral hip pain. Her history and physical are consistent with bilateral greater trochanteric bursitis. We discussed she may also have SI joint dysfunction, while the bursitis seems to be the overriding factor. I have set her up for a bilateral GTB injection with Dr. Radha Thomas under fluoroscopy. I have ordered an EMG of her bilateral lower extremities for more information in regards to her bilateral stabbing/throbbing leg pain. We discussed the potential of doing a lumbar epidural steroid injection but will wait on the EMG results before determining next steps. I have offered to start her on Lyrica. The patient would like to hold off at this time. Patient advised to call the office if she does change her mind. Plan: The following treatment recommendations and plan were discussed in detail with Janelle Avila. Imaging:   I have reviewed patients imaging of lumbar xray and lumbar MRI and results were discussed with patient today. SI xray reviewed and discussed with the patient today. Analgesics:   Patient is taking Toradol. Patient is advised to take as prescribed and not take on an empty stomach. Adjuvants:   None    Interventions:   Bilateral greater trochanteric bursa injection    Anticoagulation/NPO Recommendations:   Patient does not need to hold any medication prior to the procedure. Patient does not need to be NPO prior to the procedure. We will do a local injection    Multidisciplinary Pain Management:   In the presence of complex, chronic, and multi-factorial pain, the importance of a multidisciplinary approach to pain management in the patients management regimen was emphasized and discussed in great detail. PHYSICAL THERAPY: Patient is advised to see a physical therapist for gentle stretching exercises and conditioning exercises for management of pain.      Referrals:  Dr. Mery Thompson - B/L lower extremity

## 2022-06-15 ENCOUNTER — PREP FOR PROCEDURE (OUTPATIENT)
Dept: PHYSICAL MEDICINE AND REHAB | Age: 42
End: 2022-06-15

## 2022-06-20 NOTE — H&P
Today, patient presents for planned Bilateral greater trochanteric bursa injection. This note is reflective of the patient's previous visit for evaluation. We will proceed with today's planned procedure. Since patient's last visit for evaluation, there have been no interval changes in medical history. Patient has no new numbness, weakness, or focal neurological deficit since evaluation. Patient has no contraindications to injection (no anticoagulation or recent antibiotic intake for active infections), and has a  present or is able to drive themselves (as discussed and cleared by physician). Allergies to latex, contrast dye, and steroid medications have been confirmed with the patient prior to the procedure. NPO necessity has been assessed and accepted based on procedure complexity. The risks and benefits of the procedure have been explained including but are not limited to infection, bleeding, paralysis, immediate post procedure weakness, and dizziness; the patient acknowledges understanding and desires to proceed with the procedure. Patient has signed consent for same procedure as discussed in previous clinic encounter. All other questions and concerns were addressed at bedside. See procedure note for full details. Post procedure Instructions: The patient was advised not to drive during the day of the procedure and not to engage in any significant decision making (unless otherwise states by physician). The patient was also advised to be cautious with walking/activity for 24 hours following today's visit and asked not to engage in over-exertion (unless otherwise states by physician). After this time, it is ok to resume pre-procedure level of activity. Patient advised to apply ice to site of injection in situations of pain and discomfort. Patient advised to not submerge site of injection during bath or pool activities for approximately 24 hours post-procedure.  Patient attested to understanding post procedure directions / restrictions. All other questions and concerns addressed before patient discharge in ambulatory fashion. Chronic Pain/PM&R Clinic Note     Encounter Date: 6/3/22    Subjective:   Chief Complaint:   No chief complaint on file. History of Present Illness: Janelle Avila is a 43 y.o. female seen in the clinic initially on  01/06/2022 upon request from Armin Michael MD for her history of lumbar pain. Patient states pain started after a lifting accident at work in 2008 where she had an injury to her thoracic spine. Patient states back pain has become gradually worse ever since. Patient states pain is worse when she is sitting, standing in 1 position, or doing activities such as cleaning around the house. Patient states she will occasionally get pain that radiates down bilateral legs, left more than right with strenuous activity. Patient states when pain gets severe she will lay down which seems to help. Patient does have trouble sleeping occasionally as she has some aching and twitching in bilateral legs. Patient states she has tried physical therapy and decompression several times which have been ineffective. It has been several years since she has done physical therapy. Patient states she does not currently work as she was unable to tolerate it. Patient denies weakness in bilateral legs but states she will occasionally feel like her knees want to give out. Patient denies falls. Patient denies saddle anesthesia or bowel or bladder incontinence. Patient also mentions other pain such as in her left shoulder, left wrist, and left knee. 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 She has had ongoing thoracic pain since then. Today, 6/3/2022, patient presents for planned follow-up on chronic pain. Patient has now completed the bilateral lumbar radiofrequency ablation.   She has noticed significant relief in her low back pain. She does continue to have bilateral hip pain. She states it is painful for her to sleep on either side due to the pain in her hips. She states she has also noticed more throbbing and stabbing pain in bilateral lower extremities from her knees to her ankles. She states this is most noticeable when she goes to lay down at night as it is hard for her to keep her legs still. She states this is something that has been going on for several years but she has just dealt with it, it is recently become more bothersome. Patient denies history of diabetes. She states she has never had an EMG of her legs. She denies any focal leg weakness but does state if she stands for a long time she feels like her knees are going to give out. Patient denies history of falls. Patient denies saddle anesthesia or bowel/bladder incontinence. History of Interventions:   Surgery: No previous thoracic or lumbar surgeries  Injections: Thoracic epidural?  Several years ago  Lumbar MBB L4-5 and L5-S1 (02/01/2022) - >85% relief x 2 days  Lumbar MBB L4-5 and L5-S1 (03/15/2022) - >85% relief x 3 days  Right lumbar RFA (04/12/2022) - significant relief  Left lumbar RFA (05/24/2022) - significant relief    Current Treatment Medications: Toradol as needed- mild effectiveness  Ibuprofen - takes edge off. Historical Treatment Medications:   Cymbalta - fatigue  Ibuprofen - ineffective  Tylenol - ineffective   OTC muscle rub -ineffective    Imaging:    (Lumbar Xray 02/02/2021)    Narrative   2 views lumbar spine       Comparison:  MR  - MRI LUMBAR SPINE WO CONTRAST  - 02/22/2019 11:08 AM EST    CR,SR  - XR LUMBAR SPINE (2-3 VIEWS)  - 12/14/2018 01:50 PM EST       Findings   Normal vertebral body alignment. No acute fractures or dislocation. No significant degenerative change           Impression   No acute findings       This document has been electronically signed by:  Shamir Celaya MD on    02/02/2021 01:58 AM       Lumbar MRI (02/22/2019)  Narrative   PROCEDURE: MRI LUMBAR SPINE WO CONTRAST       CLINICAL INFORMATION: DDD (degenerative disc disease), lumbar. Additional history obtained from the electronic medical record indicates the patient has right-sided numbness when laying on the right side. Severe back pain.       COMPARISON: None available. Correlation is made to radiographs of the lumbar spine dated December 14, 2018.       TECHNIQUE: Sagittal and axial T1 and T2-weighted images were obtained through the lumbar spine.       FINDINGS:       The lumbar spine is imaged from the inferior aspect of T10 to the superior aspect of S3. The conus medullaris terminates at the L1 level. No abnormal signal or expansion is present within the conus. The visualized nerve roots are evenly distributed    throughout the thecal sac.       There is preservation of the expected lumbar lordosis. The vertebral body heights and alignment are maintained. No compression fracture deformity or suspicious marrow replacing lesion is identified.       Degenerative facet arthropathy is present at every level. With regards to the disc spaces, at L1-L2, the spinal canal and neural foramina are patent.       At L2-L3, the spinal canal and neural foramina are patent.       At L3-L4, there is disc space narrowing, disc desiccation and a disc bulge. The spinal canal and neural foramina are patent.       At L4-L5, there is disc desiccation and a posterior disc protrusion. This indents the ventral thecal sac and causes mild spinal canal narrowing. The neural foramina are patent.       At L5-S1, the spinal canal and neural foramina are patent.       No suspicious finding is identified within the visualized retroperitoneal and paraspinal soft tissues.           Impression        Multilevel degenerative changes are present throughout the lumbar spine and are further discussed by level in the findings.  These are most significant at L4-L5 where there is a posterior disc protrusion indenting the ventral thecal sac and causing mild    spinal canal narrowing. Mild degenerative facet arthropathy is present without significant neural foraminal narrowing.                   **This report has been created using voice recognition software. It may contain minor errors which are inherent in voice recognition technology. **       Final report electronically signed by Dr. Darlene Leung on 2/22/2019 1:06 PM       Past Medical History:   Diagnosis Date    Anxiety     Asthma     DDD (degenerative disc disease), cervical     DDD (degenerative disc disease), thoracolumbar     Depression     GERD (gastroesophageal reflux disease)        Past Surgical History:   Procedure Laterality Date    PAIN MANAGEMENT PROCEDURE Bilateral 2/1/2022    Lumbar medial branch block at bilateral L4-5 and L5-S1 performed by Meagan Gunn, DO at [address] Bilateral 3/15/2022    medial branch blocks at bilateral L4-5 and L5-S1 performed by Meagan Gunn, DO at [address] Right 4/12/2022    Lumbar radiofrequency ablation at  L4-5 and L5-S1, right performed by Meagan Gunn, DO at [address] Left 5/24/2022    Lumbar RFA B/L L4-5 and L5-S1,LEFT performed by Meagan Gunn, DO at [address] [place]       Family History   Problem Relation Age of Onset    Depression Mother    Deann Rocha COPD Father     Asthma Sister     Depression Sister     Lupus Paternal Aunt     No Known Problems Daughter     No Known Problems Daughter     Asthma Daughter     No Known Problems Brother        Medications & Allergies:   Current Outpatient Medications   Medication Instructions    albuterol (PROVENTIL) 2.5 mg, Nebulization, EVERY 6 HOURS PRN    albuterol sulfate  (90 Base) MCG/ACT inhaler INHALE TWO (2) PUFFS INTO THE LUNGS EVERY FOUR (4) HOURS AS NEEDED FOR WHEEZING     alclomethasone (ACLOVATE) 0.05 % ointment Apply topically 2 times daily.  doxepin (SINEQUAN) 25 mg, Oral, NIGHTLY    fluocinonide (LIDEX) 0.05 % external solution No dose, route, or frequency recorded.  fluticasone-salmeterol (ADVAIR) 100-50 MCG/DOSE diskus inhaler 1 puff, Inhalation, EVERY 12 HOURS, Rinse mouth after use    ketoconazole (NIZORAL) 2 % shampoo No dose, route, or frequency recorded.  ondansetron (ZOFRAN) 4 mg, Oral, EVERY 8 HOURS PRN    Spacer/Aero-Holding Chambers (VORTEX VALVED HOLDING CHAMBER) SUJATHA 1 Device, Does not apply, DAILY, Use with inhalers    triamcinolone (KENALOG) 0.025 % cream No dose, route, or frequency recorded.  VENTOLIN  (90 Base) MCG/ACT inhaler 2 puffs, Inhalation, 4 TIMES DAILY PRN       Allergies   Allergen Reactions    Hydrocodone      \"makes me feel completely out of whack\"    Prozac [Fluoxetine Hcl] Other (See Comments)     \"I want to kill myself\"       Review of Systems:   Constitutional: negative for weight changes or fevers  Genitourinary: negative for bowel/bladder incontinence   Musculoskeletal: positive for low back pain, bilateral hip pain, bilateral leg pain. Neurological: negative for any leg weakness or numbness/tingling  Behavioral/Psych: negative for anxiety/depression   All other systems reviewed and are negative    Objective: There were no vitals filed for this visit. Constitutional: Pleasant, no acute distress   Head: Normocephalic, atraumatic   Eyes: Conjunctivae normal   Neck: Supple, symmetrical   Lungs: Normal respiratory effort, non-labored breathing   Cardiovascular: Limbs warm and well perfused   Abdomen: Non-protruded   Musculoskeletal: Muscle bulk symmetric, no atrophy, no gross deformities   · Lower Extremities: ROM WNL. · Thorax: Left trapezius and paraspinal tenderness. No scoliosis or kyphosis. Decreased left arm abduction - 120 degrees, otherwise ROM WNL.   Negative Neers test.  Negative Silvia Cull test.  Negative lift-off test.   · Lumbar Spine: ROM WNL. Lumbar paraspinals mild tenderness with palpation bilaterally. SLR positive bilaterally. MATEUSZ equivocal bilaterally. GAENSLEN positive bilaterally. Positive facet loading bilaterally. Bilateral SI joint tenderwith palpation, mild tenderness to left SI joint with palpation. Bilateral SI joint distraction positive. Bilateral greater trochanters tender to palpation. Neurological: Cranial nerves II-XII grossly intact. · Gait - Normal, non-antalgic gait. Ambulates without assistive device. · Motor: 5/5 muscle strength in bilateral hip flexion, knee flexion, knee extension, ankle dorsiflexion, and ankle plantar flexion   · Sensory: LT sensation intact in lower limbs   · Reflexes: 2+ symmetrical in bilateral achilles, 2+ bilateral patellar, negative ankle clonus, downgoing babinski   Skin: No rashes or lesions present   Psychological: Cooperative, no exaggerated pain behaviors     Assessment:    Diagnosis Orders   1. Greater trochanteric bursitis of right hip  CHG FLUOR NEEDLE/CATH SPINE/PARASPINAL DX/THER ADDON    MI ARTHROCENTESIS ASPIR&/INJ MAJOR JT/BURSA W/O US   2. Greater trochanteric bursitis of left hip  CHG FLUOR NEEDLE/CATH SPINE/PARASPINAL DX/THER ADDON    MI ARTHROCENTESIS ASPIR&/INJ MAJOR JT/BURSA W/O US   3. Neuropathic pain  Deb Robert DO, Physical Medicine [facility] Re[facility], [facility]   4. Chronic pain syndrome  Deb Robert DO, Physical Medicine & Rehab, [facility]   5. Lumbar spondylosis  Deb Robert DO, Physical Medicine [facility] Rehab, [facility]   6. Facet arthropathy     7. Sacroiliac joint dysfunction of both sides         Michelle Torres is a 43 y. o.female presenting to the pain clinic for evaluation of lumbar back pain. Patient's history and physical consistent with lumbar facet mediated pain lumbar spondylosis as evident on her imaging. We have set her up for a lumbar medial branch block at bilateral L4/5 and L5/S1.   We have discussed that she has several pain generators that may be addressed in the future. Patient has had a significant sponsor to the bilateral lumbar RFA. She is now dealing with a lot of bilateral hip pain. Her history and physical are consistent with bilateral greater trochanteric bursitis. We discussed she may also have SI joint dysfunction, while the bursitis seems to be the overriding factor. I have set her up for a bilateral GTB injection with Dr. Vonda Lee under fluoroscopy. I have ordered an EMG of her bilateral lower extremities for more information in regards to her bilateral stabbing/throbbing leg pain. We discussed the potential of doing a lumbar epidural steroid injection but will wait on the EMG results before determining next steps. I have offered to start her on Lyrica. The patient would like to hold off at this time. Patient advised to call the office if she does change her mind. Plan: The following treatment recommendations and plan were discussed in detail with En Beltrán. Imaging:   I have reviewed patients imaging of lumbar xray and lumbar MRI and results were discussed with patient today. SI xray reviewed and discussed with the patient today. Analgesics:   Patient is taking Toradol. Patient is advised to take as prescribed and not take on an empty stomach. Adjuvants:   None    Interventions:   Bilateral greater trochanteric bursa injection    Anticoagulation/NPO Recommendations:   Patient does not need to hold any medication prior to the procedure. Patient does not need to be NPO prior to the procedure. We will do a local injection    Multidisciplinary Pain Management:   In the presence of complex, chronic, and multi-factorial pain, the importance of a multidisciplinary approach to pain management in the patients management regimen was emphasized and discussed in great detail.    PHYSICAL THERAPY: Patient is advised to see a physical therapist for gentle stretching exercises and conditioning exercises for management of pain.      Referrals:  Dr. Jovon Stahl - B/L lower extremity EMG    Prescriptions Written This Visit:   None    Follow-up: B/L GTB injection     Sharath Carpenter DO

## 2022-06-21 ENCOUNTER — HOSPITAL ENCOUNTER (OUTPATIENT)
Age: 42
Setting detail: OUTPATIENT SURGERY
Discharge: HOME | End: 2022-06-21
Attending: ANESTHESIOLOGY | Admitting: ANESTHESIOLOGY
Payer: MEDICAID

## 2022-06-21 ENCOUNTER — APPOINTMENT (OUTPATIENT)
Dept: GENERAL RADIOLOGY | Age: 42
End: 2022-06-21
Attending: ANESTHESIOLOGY
Payer: MEDICAID

## 2022-06-21 VITALS
SYSTOLIC BLOOD PRESSURE: 121 MMHG | RESPIRATION RATE: 16 BRPM | HEART RATE: 69 BPM | DIASTOLIC BLOOD PRESSURE: 71 MMHG | TEMPERATURE: 97.6 F | HEIGHT: 64 IN | BODY MASS INDEX: 34.28 KG/M2 | WEIGHT: 200.8 LBS | OXYGEN SATURATION: 97 %

## 2022-06-21 LAB — PREGNANCY, URINE: NEGATIVE

## 2022-06-21 PROCEDURE — 6360000004 HC RX CONTRAST MEDICATION: Performed by: ANESTHESIOLOGY

## 2022-06-21 PROCEDURE — 2500000003 HC RX 250 WO HCPCS: Performed by: ANESTHESIOLOGY

## 2022-06-21 PROCEDURE — 6360000002 HC RX W HCPCS: Performed by: ANESTHESIOLOGY

## 2022-06-21 PROCEDURE — 7100000011 HC PHASE II RECOVERY - ADDTL 15 MIN: Performed by: ANESTHESIOLOGY

## 2022-06-21 PROCEDURE — 7100000010 HC PHASE II RECOVERY - FIRST 15 MIN: Performed by: ANESTHESIOLOGY

## 2022-06-21 PROCEDURE — 77002 NEEDLE LOCALIZATION BY XRAY: CPT | Performed by: ANESTHESIOLOGY

## 2022-06-21 PROCEDURE — 3600000054 HC PAIN LEVEL 3 BASE: Performed by: ANESTHESIOLOGY

## 2022-06-21 PROCEDURE — 3209999900 FLUORO FOR SURGICAL PROCEDURES

## 2022-06-21 PROCEDURE — 81025 URINE PREGNANCY TEST: CPT

## 2022-06-21 PROCEDURE — 3600000055 HC PAIN LEVEL 3 ADDL 15 MIN: Performed by: ANESTHESIOLOGY

## 2022-06-21 PROCEDURE — 20610 DRAIN/INJ JOINT/BURSA W/O US: CPT | Performed by: ANESTHESIOLOGY

## 2022-06-21 RX ORDER — LIDOCAINE HYDROCHLORIDE 10 MG/ML
INJECTION, SOLUTION EPIDURAL; INFILTRATION; INTRACAUDAL; PERINEURAL PRN
Status: DISCONTINUED | OUTPATIENT
Start: 2022-06-21 | End: 2022-06-21 | Stop reason: ALTCHOICE

## 2022-06-21 RX ORDER — METHYLPREDNISOLONE ACETATE 80 MG/ML
INJECTION, SUSPENSION INTRA-ARTICULAR; INTRALESIONAL; INTRAMUSCULAR; SOFT TISSUE PRN
Status: DISCONTINUED | OUTPATIENT
Start: 2022-06-21 | End: 2022-06-21 | Stop reason: ALTCHOICE

## 2022-06-21 ASSESSMENT — PAIN - FUNCTIONAL ASSESSMENT: PAIN_FUNCTIONAL_ASSESSMENT: 0-10

## 2022-06-21 NOTE — PROCEDURES
Pre-operative Diagnosis:  Greater trochanteric bursitis      Post-operative Diagnosis: Greater trochanteric bursitis      Procedure: BILATERAL Greater trochanteric bursa injection      Procedure Description:  After having signed the informed consent, the patient was placed in the lateral recumbent position. The patient's hip was prepped with chloraprep solution, and draped in a sterile fashion. A total of 2 ml of 1% lidocaine were used to anesthetize the skin and underlying tissues. Under fluoroscopic guidance a single 22-gauge, 3.5 inch spinal needle was advanced to lie at the RIGHT greater trochanteric bursa. Needle placement was confirmed in a lateral fluoroscopic view. After negative aspiration, 0.5 ml of Omnipaque 300 contrast was injected with appropriate spread observed covering the bursa. A total of 4 ml of 0.5% bupivicaine mixed with 40 mg depo-medrol were injected into bursa. The needle was withdrawn without any complications. This procedure was repeated on the contralateral side. The patient tolerated the procedure well. Patient was transported to the recovery room and discharged in an ambulatory fashion.       Procedural Complications: None  Estimated Blood Loss: 0 mL        Liborio Blackmon DO  Interventional Pain Management/PM&R   [facility] and [address] [place] [address]

## 2022-06-21 NOTE — PROGRESS NOTES
1103:Patient arrives to recovery room via cart. Spontaneous respirations, vss, report received from surgical RN. Patient denies pain, numbness, tingling , nausea. Injection site clean, dry, intact. HOB elevated. Snack and drink given. Call light within reach. 1120: patient ambulated to discharge lobby in stable condition. Patient discharged home with Amber Peterson.

## 2022-07-19 ENCOUNTER — OFFICE VISIT (OUTPATIENT)
Dept: ENT CLINIC | Age: 42
End: 2022-07-19
Payer: MEDICAID

## 2022-07-19 VITALS
RESPIRATION RATE: 16 BRPM | OXYGEN SATURATION: 98 % | HEIGHT: 64 IN | BODY MASS INDEX: 34.19 KG/M2 | HEART RATE: 77 BPM | TEMPERATURE: 98.2 F | SYSTOLIC BLOOD PRESSURE: 122 MMHG | DIASTOLIC BLOOD PRESSURE: 82 MMHG | WEIGHT: 200.3 LBS

## 2022-07-19 DIAGNOSIS — G50.1 ATYPICAL FACIAL PAIN: Primary | ICD-10-CM

## 2022-07-19 DIAGNOSIS — K08.9 POOR DENTITION: ICD-10-CM

## 2022-07-19 PROCEDURE — G8427 DOCREV CUR MEDS BY ELIG CLIN: HCPCS | Performed by: OTOLARYNGOLOGY

## 2022-07-19 PROCEDURE — 4004F PT TOBACCO SCREEN RCVD TLK: CPT | Performed by: OTOLARYNGOLOGY

## 2022-07-19 PROCEDURE — G8417 CALC BMI ABV UP PARAM F/U: HCPCS | Performed by: OTOLARYNGOLOGY

## 2022-07-19 PROCEDURE — 99204 OFFICE O/P NEW MOD 45 MIN: CPT | Performed by: OTOLARYNGOLOGY

## 2022-07-19 ASSESSMENT — ENCOUNTER SYMPTOMS
CHEST TIGHTNESS: 1
WHEEZING: 1

## 2022-07-19 NOTE — PATIENT INSTRUCTIONS
Please proceed with full mouth extractions. Stop smoking.   If pain persists after extractions, make another appointment with us and also with your neurologist.

## 2022-07-19 NOTE — PROGRESS NOTES
[facility] PHYSICIANS [place] [facility]  [facility] EAR, NOSE AND THROAT  [facility]  Dept: [phone]  Dept Fax: [phone]  Loc: [phone]    Breann Frazier is a 43 y.o. female who was referred byBebeto Llamas MD for:  Chief Complaint   Patient presents with    New Patient     New patient is here for bilateral ear pain and dizziness referred by Dr. Candy Gabriel. This has been ongoing for a couple of months. Donald Mack HPI:     Breann Frazier is a 43 y.o. female who presents today for Intermittent right facial pain. Pain can last up to all day. It is not a constant but rather slight somewhat throbbing that sometimes matches her heartbeat. If she touches inside her right external auditory meatus, it intensifies the discomfort. The pain extends over the whole side of her face and even up into her temple. Palpation of the temple does not precipitate her pain. She has multiple medical problems and sees several specialists. She also has very poor dentition and is trying to get full extractions. Her throat feels fine, she has no hoarseness, but she does smoke a pack a day. The pain is not changed with swallowing or phonation. She did not have the pain today. History: Allergies   Allergen Reactions    Hydrocodone      \"makes me feel completely out of whack\"    Prozac [Fluoxetine Hcl] Other (See Comments)     \"I want to kill myself\"     Current Outpatient Medications   Medication Sig Dispense Refill    fluticasone-salmeterol (ADVAIR) 100-50 MCG/DOSE diskus inhaler Inhale 1 puff into the lungs every 12 hours Rinse mouth after use 60 each 11    ondansetron (ZOFRAN) 4 MG tablet Take 1 tablet by mouth every 8 hours as needed for Nausea 30 tablet 0    ketoconazole (NIZORAL) 2 % shampoo       triamcinolone (KENALOG) 0.025 % cream       alclomethasone (ACLOVATE) 0.05 % ointment Apply topically 2 times daily.  45 g 2    albuterol sulfate  (90 Base) MCG/ACT inhaler INHALE TWO (2) PUFFS INTO THE LUNGS EVERY FOUR (4) HOURS AS NEEDED FOR WHEEZING  18 g 11    Spacer/Aero-Holding Chambers (VORTEX VALVED HOLDING CHAMBER) SUJATHA 1 Device by Does not apply route daily Use with inhalers 1 Device 0     No current facility-administered medications for this visit.      Past Medical History:   Diagnosis Date    Anxiety     Asthma     DDD (degenerative disc disease), cervical     DDD (degenerative disc disease), thoracolumbar     Depression     GERD (gastroesophageal reflux disease)       Past Surgical History:   Procedure Laterality Date    HIP SURGERY Bilateral 6/21/2022    Bilateral greater trochanteric bursa injection performed by Rhys Rangel DO at [facility] Bilateral 2/1/2022    Lumbar medial branch block at bilateral L4-5 and L5-S1 performed by Rhys Rangel DO at [facility] Bilateral 3/15/2022    medial branch blocks at bilateral L4-5 and L5-S1 performed by Rhys Rangel DO at [facility] Right 4/12/2022    Lumbar radiofrequency ablation at  L4-5 and L5-S1, right performed by Rhys Rangel DO at [facility] Left 5/24/2022    Lumbar RFA B/L L4-5 and L5-S1,LEFT performed by Rhys Rangel DO at [address] [facility]     Family History   Problem Relation Age of Onset    Depression Mother     COPD Father     Asthma Sister     Depression Sister     Lupus Paternal Aunt     No Known Problems Daughter     No Known Problems Daughter     Asthma Daughter     No Known Problems Brother      Social History     Tobacco Use    Smoking status: Every Day     Packs/day: 1.50     Years: 27.00     Pack years: 40.50     Types: Cigarettes     Start date: 1/1/1995    Smokeless tobacco: Never    Tobacco comments:     currently a pack a day 3/28/22   Substance Use Topics    Alcohol use: No       Subjective:      Review of Systems   Constitutional:  Positive for fatigue. HENT:  Positive for ear pain and tinnitus. Respiratory:  Positive for chest tightness and wheezing. Musculoskeletal:  Positive for joint swelling. Neurological:  Positive for headaches. Objective:   /82 (Site: Right Upper Arm, Position: Sitting)   Pulse 77   Temp 98.2 °F (36.8 °C) (Infrared)   Resp 16   Ht 5' 4\" (1.626 m)   Wt 200 lb 4.8 oz (90.9 kg)   SpO2 98%   BMI 34.38 kg/m²     Physical Exam  Vitals and nursing note reviewed. Constitutional:       Appearance: She is well-developed. HENT:      Head: Normocephalic and atraumatic. No laceration. Salivary Glands: Right salivary gland is not diffusely enlarged or tender. Left salivary gland is not diffusely enlarged or tender. Comments:        Right Ear: Hearing, tympanic membrane, ear canal and external ear normal. No drainage or swelling. No middle ear effusion. Tympanic membrane is not perforated or erythematous. Left Ear: Hearing, tympanic membrane, ear canal and external ear normal. No drainage or swelling. No middle ear effusion. Tympanic membrane is not perforated or erythematous. Ears:      Comments: The otoscope in her right ear started to produce a tingling and discomfort which is the beginning of her chief complaint. Nose: Nose normal. No septal deviation, mucosal edema or rhinorrhea. Mouth/Throat:      Mouth: Mucous membranes are moist. Mucous membranes are not pale and not dry. No oral lesions. Tongue: No lesions. Pharynx: Oropharynx is clear. Uvula midline. No oropharyngeal exudate or posterior oropharyngeal erythema. Comments: LIps: lips normal     Mallampati 1  Base of tongue: symmetric  Mirror exam deferred due to gag reflex. Eyes:      Extraocular Movements: Extraocular movements intact. Comments: Conjugate gaze   Neck:      Thyroid: No thyroid mass or thyromegaly. Trachea: Phonation normal. No tracheal deviation. Comments:     Pulmonary:      Effort: Pulmonary effort is normal. No retractions. Breath sounds: No stridor. Musculoskeletal:      Cervical back: Normal range of motion and neck supple. Lymphadenopathy:      Cervical: No cervical adenopathy. Neurological:      Mental Status: She is alert and oriented to person, place, and time. Cranial Nerves: Cranial nerves are intact. Cranial nerve deficit: VIIth N function intact bilat. Psychiatric:         Mood and Affect: Mood and affect normal.         Behavior: Behavior is cooperative. Data:  All of the past medical history, past surgical history, family history,social history, allergies and current medications were reviewed with the patient. Assessment & Plan   Diagnoses and all orders for this visit:     Diagnosis Orders   1. Atypical facial pain      Suspicious for trigeminal neuralgia. Not consistent with temporal arteritis      2. Poor dentition      Many missing teeth and remaining teeth are broken off the gumline. Needs full extractions. Painful to percussion. Not necessarily the same pain is her chief           The findings were explained and her questions were answered. Recommended she proceed promptly with full extractions. If the facial pain subsides no further work-up would be necessary. If it continues, she would need to be seen by neurology and/or neurosurgery for evaluation for trigeminal neuralgia. If it persists I also asked her to come back for another evaluation. Tara Duke. Ysabel Wilkerson MD    **This report has been created using voice recognition software. It may contain minor errors which are inherent in voicerecognition technology. **

## 2022-07-30 ENCOUNTER — HOSPITAL ENCOUNTER (EMERGENCY)
Age: 42
Discharge: HOME | End: 2022-07-30
Attending: STUDENT IN AN ORGANIZED HEALTH CARE EDUCATION/TRAINING PROGRAM
Payer: MEDICAID

## 2022-07-30 VITALS
BODY MASS INDEX: 34.15 KG/M2 | HEART RATE: 90 BPM | TEMPERATURE: 99 F | HEIGHT: 64 IN | DIASTOLIC BLOOD PRESSURE: 77 MMHG | SYSTOLIC BLOOD PRESSURE: 123 MMHG | RESPIRATION RATE: 18 BRPM | WEIGHT: 200 LBS | OXYGEN SATURATION: 94 %

## 2022-07-30 DIAGNOSIS — U07.1 COVID-19: Primary | ICD-10-CM

## 2022-07-30 LAB
FLU A ANTIGEN: NEGATIVE
FLU B ANTIGEN: NEGATIVE
SARS-COV-2, NAAT: DETECTED

## 2022-07-30 PROCEDURE — 87804 INFLUENZA ASSAY W/OPTIC: CPT

## 2022-07-30 PROCEDURE — 87635 SARS-COV-2 COVID-19 AMP PRB: CPT

## 2022-07-30 PROCEDURE — 99283 EMERGENCY DEPT VISIT LOW MDM: CPT

## 2022-07-30 RX ORDER — ACETAMINOPHEN 325 MG/1
650 TABLET ORAL ONCE
Status: DISCONTINUED | OUTPATIENT
Start: 2022-07-30 | End: 2022-07-30 | Stop reason: HOSPADM

## 2022-07-30 ASSESSMENT — ENCOUNTER SYMPTOMS
VOMITING: 0
WHEEZING: 0
ABDOMINAL DISTENTION: 0
ABDOMINAL PAIN: 0
TROUBLE SWALLOWING: 0
CHEST TIGHTNESS: 1
CONSTIPATION: 0
SINUS PRESSURE: 0
SORE THROAT: 0
COUGH: 1
SINUS PAIN: 0
COLOR CHANGE: 0
DIARRHEA: 0
BACK PAIN: 0
NAUSEA: 0
SHORTNESS OF BREATH: 1

## 2022-07-30 ASSESSMENT — PAIN SCALES - GENERAL: PAINLEVEL_OUTOF10: 4

## 2022-07-30 ASSESSMENT — PAIN - FUNCTIONAL ASSESSMENT: PAIN_FUNCTIONAL_ASSESSMENT: 0-10

## 2022-07-30 ASSESSMENT — PAIN DESCRIPTION - LOCATION: LOCATION: GENERALIZED

## 2022-07-30 NOTE — DISCHARGE INSTRUCTIONS
Take your pulse ox every morning and evening. Maintain 92%. Continue not to smoke. Follow-up with your primary care physician in 1 week. Take Tylenol 650 mg every 6 hours as needed for your symptoms. Return to the emergency department for any acute changes or worsening of her symptoms. Symptoms will start improving in 5 to 7 days.

## 2022-07-30 NOTE — ED NOTES
Patient to ED with complaint of cough, body aches, and headache. Patient states that she was recently exposed to someone who was COVID-19 positive. Patient is resting in chair with easy and unlabored respirations. No distress noted. Call light in reach.  Patient denies further complaints or concerns.   '     Viktoriya Hogan RN  07/30/22 7666
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
## 2022-08-01 ENCOUNTER — CARE COORDINATION (OUTPATIENT)
Dept: CARE COORDINATION | Age: 42
End: 2022-08-01

## 2022-08-01 NOTE — CARE COORDINATION
Attempted to reach patient for Care coordination monitoring call #1.    Message left on voicemail to return call to this AC at [phone]

## 2022-08-02 ENCOUNTER — CARE COORDINATION (OUTPATIENT)
Dept: CARE COORDINATION | Age: 42
End: 2022-08-02

## 2022-08-02 NOTE — CARE COORDINATION
Patient contacted regarding COVID-19 diagnosis. Discussed COVID-19 related testing which was available at this time. Test results were positive. Patient informed of results, if available? Yes. Ambulatory Care Manager contacted the patient by telephone to perform post discharge assessment. Call within 2 business days of discharge: Yes. Verified name and  with patient as identifiers. Provided introduction to self, and explanation of the CTN/ACM role, and reason for call due to risk factors for infection and/or exposure to COVID-19. Symptoms reviewed with patient who verbalized the following symptoms: cough  no new symptoms  no worsening symptoms. Due to no new or worsening symptoms encounter was routed to provider for escalation. Discussed follow-up appointments. If no appointment was previously scheduled, appointment scheduling offered: Yes. [facility] follow up appointment(s):   Future Appointments   Date Time Provider Srinath Mccarthy   3/28/2023  9:00 AM BINH Loving - CNP N Pulm Med P - [address] [facility]     Non-[facility] follow up appointment(s): na    Non-face-to-face services provided:  Obtained and reviewed discharge summary and/or continuity of care documents     Advance Care Planning:   Does patient have an Advance Directive:  decision maker updated. Educated patient about risk for severe COVID-19 due to risk factors according to CDC guidelines. ACM reviewed discharge instructions, medical action plan and red flag symptoms with the patient who verbalized understanding. Discussed COVID vaccination status: Yes and not vaccinated and refuses. Education provided on COVID-19 vaccination as appropriate. Discussed exposure protocols and quarantine with CDC Guidelines. Patient was given an opportunity to verbalize any questions and concerns and agrees to contact ACM or health care provider for questions related to their healthcare.     Reviewed and educated patient on any new and changed medications related to discharge diagnosis     Was patient discharged with a pulse oximeter? no      ACM provided contact information. No further follow-up call identified based on severity of symptoms and risk factors.     States feels much better  Still has cough, arvind at night, advised to reach out to PCP and update - patient stated she does not need an appointment at this time

## 2022-08-17 RX ORDER — DULOXETIN HYDROCHLORIDE 30 MG/1
30 CAPSULE, DELAYED RELEASE ORAL DAILY
Qty: 30 CAPSULE | Refills: 3 | Status: SHIPPED | OUTPATIENT
Start: 2022-08-17

## 2022-08-30 ENCOUNTER — OFFICE VISIT (OUTPATIENT)
Dept: FAMILY MEDICINE CLINIC | Age: 42
End: 2022-08-30
Payer: MEDICAID

## 2022-08-30 VITALS
DIASTOLIC BLOOD PRESSURE: 76 MMHG | BODY MASS INDEX: 34.67 KG/M2 | WEIGHT: 202 LBS | OXYGEN SATURATION: 98 % | HEART RATE: 79 BPM | SYSTOLIC BLOOD PRESSURE: 120 MMHG | RESPIRATION RATE: 18 BRPM

## 2022-08-30 DIAGNOSIS — M54.17 LUMBOSACRAL RADICULOPATHY AT L5: ICD-10-CM

## 2022-08-30 DIAGNOSIS — M25.551 BILATERAL HIP PAIN: ICD-10-CM

## 2022-08-30 DIAGNOSIS — M25.552 BILATERAL HIP PAIN: ICD-10-CM

## 2022-08-30 DIAGNOSIS — M79.7 FIBROMYALGIA: Primary | ICD-10-CM

## 2022-08-30 PROCEDURE — G8417 CALC BMI ABV UP PARAM F/U: HCPCS | Performed by: FAMILY MEDICINE

## 2022-08-30 PROCEDURE — 4004F PT TOBACCO SCREEN RCVD TLK: CPT | Performed by: FAMILY MEDICINE

## 2022-08-30 PROCEDURE — 99214 OFFICE O/P EST MOD 30 MIN: CPT | Performed by: FAMILY MEDICINE

## 2022-08-30 PROCEDURE — G8427 DOCREV CUR MEDS BY ELIG CLIN: HCPCS | Performed by: FAMILY MEDICINE

## 2022-08-30 RX ORDER — MELOXICAM 15 MG/1
15 TABLET ORAL DAILY
Qty: 90 TABLET | Refills: 3 | Status: SHIPPED | OUTPATIENT
Start: 2022-08-30 | End: 2023-08-25

## 2022-08-30 SDOH — ECONOMIC STABILITY: FOOD INSECURITY: WITHIN THE PAST 12 MONTHS, YOU WORRIED THAT YOUR FOOD WOULD RUN OUT BEFORE YOU GOT MONEY TO BUY MORE.: NEVER TRUE

## 2022-08-30 SDOH — ECONOMIC STABILITY: FOOD INSECURITY: WITHIN THE PAST 12 MONTHS, THE FOOD YOU BOUGHT JUST DIDN'T LAST AND YOU DIDN'T HAVE MONEY TO GET MORE.: NEVER TRUE

## 2022-08-30 ASSESSMENT — ENCOUNTER SYMPTOMS
EYE PAIN: 0
COUGH: 0
DIARRHEA: 0
SHORTNESS OF BREATH: 0
NAUSEA: 0
SORE THROAT: 0
CHEST TIGHTNESS: 0
ABDOMINAL PAIN: 0
VOMITING: 0
CONSTIPATION: 0
BLOOD IN STOOL: 0
BACK PAIN: 1
RHINORRHEA: 0
WHEEZING: 0

## 2022-08-30 ASSESSMENT — SOCIAL DETERMINANTS OF HEALTH (SDOH): HOW HARD IS IT FOR YOU TO PAY FOR THE VERY BASICS LIKE FOOD, HOUSING, MEDICAL CARE, AND HEATING?: NOT HARD AT ALL

## 2022-08-30 NOTE — PROGRESS NOTES
Mable Robles (:  1980) is a 43 y.o. female,Established patient, here for evaluation of the following chief complaint(s):  Check-Up (EMG results)         ASSESSMENT/PLAN:  1. Fibromyalgia  -     meloxicam (MOBIC) 15 MG tablet; Take 1 tablet by mouth daily, Disp-90 tablet, R-3Normal  -     External Referral To Physical Therapy  -chronic condition, exacerbated, add meloxicam and PT, -monitor sxs, call if not improving    2. Lumbosacral radiculopathy at L5  -     External Referral To Physical Therapy  -chronic condition, exacerbated, add meloxicam and PT, -monitor sxs, call if not improving  3. Bilateral hip pain  -     External Referral To Physical Therapy  -chronic condition, exacerbated, add meloxicam and PT, -monitor sxs, call if not improving  No follow-ups on file. Subjective   SUBJECTIVE/OBJECTIVE:  HPI  Patient here today for a check up. Reviewed BMI of 35. Encouraged diet, exercise and weight loss. Follow up EMG results. Showed L5 radiculopathy, fibromyalgia and SI inflammation. Recommend ing epidural injections, Si injections, PT and cymbalta. Has started cymbalta but no effects yet. Not seeing pain management til Oct, on wait list.  Needs PT started. Prefers [place]. , current smoker, pmh reviewed. Review of Systems   Constitutional:  Negative for chills, fatigue, fever and unexpected weight change. HENT:  Negative for congestion, ear pain, rhinorrhea and sore throat. Eyes:  Negative for pain and visual disturbance. Respiratory:  Negative for cough, chest tightness, shortness of breath and wheezing. Cardiovascular:  Negative for chest pain and palpitations. Gastrointestinal:  Negative for abdominal pain, blood in stool, constipation, diarrhea, nausea and vomiting. Genitourinary:  Negative for difficulty urinating, frequency, hematuria and urgency. Musculoskeletal:  Positive for arthralgias and back pain.  Negative for joint swelling, myalgias and neck pain.        Bilateral hip pain. Skin:  Negative for rash. Neurological:  Negative for dizziness and headaches. Hematological:  Negative for adenopathy. Does not bruise/bleed easily. Psychiatric/Behavioral:  Negative for behavioral problems and sleep disturbance. The patient is not nervous/anxious. Objective   Physical Exam  Vitals and nursing note reviewed. Constitutional:       Appearance: She is well-developed. HENT:      Head: Normocephalic and atraumatic. Right Ear: External ear normal.      Left Ear: External ear normal.      Nose: Nose normal.      Mouth/Throat:      Mouth: Mucous membranes are moist.   Eyes:      Pupils: Pupils are equal, round, and reactive to light. Neck:      Thyroid: No thyromegaly. Cardiovascular:      Rate and Rhythm: Normal rate and regular rhythm. Heart sounds: Normal heart sounds. Pulmonary:      Breath sounds: Normal breath sounds. No wheezing or rales. Abdominal:      General: Bowel sounds are normal.      Palpations: Abdomen is soft. Tenderness: There is no abdominal tenderness. There is no guarding or rebound. Musculoskeletal:         General: Normal range of motion. Cervical back: Neck supple. Lymphadenopathy:      Cervical: No cervical adenopathy. Skin:     General: Skin is warm and dry. Findings: No rash. Neurological:      Mental Status: She is alert and oriented to person, place, and time. Cranial Nerves: No cranial nerve deficit. Deep Tendon Reflexes: Reflexes are normal and symmetric. An electronic signature was used to authenticate this note.     --Carmen Mayberry MD

## 2022-10-05 ENCOUNTER — OFFICE VISIT (OUTPATIENT)
Dept: PHYSICAL MEDICINE AND REHAB | Age: 42
End: 2022-10-05
Payer: MEDICAID

## 2022-10-05 VITALS
SYSTOLIC BLOOD PRESSURE: 104 MMHG | DIASTOLIC BLOOD PRESSURE: 78 MMHG | BODY MASS INDEX: 34.49 KG/M2 | WEIGHT: 202 LBS | HEIGHT: 64 IN

## 2022-10-05 DIAGNOSIS — M53.3 SACROILIAC JOINT DYSFUNCTION OF BOTH SIDES: ICD-10-CM

## 2022-10-05 DIAGNOSIS — M47.816 LUMBAR SPONDYLOSIS: ICD-10-CM

## 2022-10-05 DIAGNOSIS — M79.2 NEUROPATHIC PAIN: ICD-10-CM

## 2022-10-05 DIAGNOSIS — G89.4 CHRONIC PAIN SYNDROME: ICD-10-CM

## 2022-10-05 DIAGNOSIS — M54.17 LUMBOSACRAL RADICULITIS: Primary | ICD-10-CM

## 2022-10-05 PROCEDURE — G8417 CALC BMI ABV UP PARAM F/U: HCPCS | Performed by: NURSE PRACTITIONER

## 2022-10-05 PROCEDURE — 4004F PT TOBACCO SCREEN RCVD TLK: CPT | Performed by: NURSE PRACTITIONER

## 2022-10-05 PROCEDURE — G8484 FLU IMMUNIZE NO ADMIN: HCPCS | Performed by: NURSE PRACTITIONER

## 2022-10-05 PROCEDURE — 99214 OFFICE O/P EST MOD 30 MIN: CPT | Performed by: NURSE PRACTITIONER

## 2022-10-05 PROCEDURE — G8427 DOCREV CUR MEDS BY ELIG CLIN: HCPCS | Performed by: NURSE PRACTITIONER

## 2022-10-05 NOTE — PROGRESS NOTES
Chronic Pain/PM&R Clinic Note     Encounter Date: 10/5/22    Subjective:   Chief Complaint:   Chief Complaint   Patient presents with    Follow-up     Discuss EMG results        History of Present Illness: Ronald Davila is a 43 y.o. female seen in the clinic initially on  01/06/2022 upon request from Saira Carlson MD for her history of lumbar pain. Patient states pain started after a lifting accident at work in 2008 where she had an injury to her thoracic spine. Patient states back pain has become gradually worse ever since. Patient states pain is worse when she is sitting, standing in 1 position, or doing activities such as cleaning around the house. Patient states she will occasionally get pain that radiates down bilateral legs, left more than right with strenuous activity. Patient states when pain gets severe she will lay down which seems to help. Patient does have trouble sleeping occasionally as she has some aching and twitching in bilateral legs. Patient states she has tried physical therapy and decompression several times which have been ineffective. It has been several years since she has done physical therapy. Patient states she does not currently work as she was unable to tolerate it. Patient denies weakness in bilateral legs but states she will occasionally feel like her knees want to give out. Patient denies falls. Patient denies saddle anesthesia or bowel or bladder incontinence. Patient also mentions other pain such as in her left shoulder, left wrist, and left knee. 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 She has had ongoing thoracic pain since then. Today, 10/5/2022, patient presents for planned follow-up on chronic pain. Patient would like to review her EMG results as she is having severe pain in her legs, right greater than left. She states the pain continues to radiate all the way to her toes. Patient has been referred to physical therapy per her PCP but she has not started this yet. She is wondering if there is an interventional procedure that can be done to help with pain relief. Patient states she had the GTB injection and did have increased pain for a week after but then she noticed improvement of the pain in her bilateral hips. Patient denies any new leg paresthesias, leg weakness, saddle anesthesia, bowel/bladder incontinence. History of Interventions:   Surgery: No previous thoracic or lumbar surgeries  Injections: Thoracic epidural?  Several years ago  Lumbar MBB L4-5 and L5-S1 (02/01/2022) - >85% relief x 2 days  Lumbar MBB L4-5 and L5-S1 (03/15/2022) - >85% relief x 3 days  Right lumbar RFA (04/12/2022) - significant relief  Left lumbar RFA (05/24/2022) - significant relief  B/L GTB injection (06/21/2022) - effective    Current Treatment Medications: Toradol as needed- mild effectiveness  Mobic - rare    Historical Treatment Medications:   Cymbalta - fatigue  Ibuprofen - ineffective  Tylenol - ineffective   OTC muscle rub -ineffective    Imaging:  EMG        (Lumbar Xray 02/02/2021)    Narrative   2 views lumbar spine       Comparison:  MR  - MRI LUMBAR SPINE WO CONTRAST  - 02/22/2019 11:08 AM EST    CR,SR  - XR LUMBAR SPINE (2-3 VIEWS)  - 12/14/2018 01:50 PM EST       Findings   Normal vertebral body alignment. No acute fractures or dislocation. No significant degenerative change           Impression   No acute findings       This document has been electronically signed by: Nikhil Pickett MD on    02/02/2021 01:58 AM       Lumbar MRI (02/22/2019)  Narrative   PROCEDURE: MRI LUMBAR SPINE WO CONTRAST       CLINICAL INFORMATION: DDD (degenerative disc disease), lumbar. Additional history obtained from the electronic medical record indicates the patient has right-sided numbness when laying on the right side. Severe back pain. COMPARISON: None available.  Correlation is made to radiographs of the lumbar spine dated December 14, 2018. TECHNIQUE: Sagittal and axial T1 and T2-weighted images were obtained through the lumbar spine. FINDINGS:       The lumbar spine is imaged from the inferior aspect of T10 to the superior aspect of S3. The conus medullaris terminates at the L1 level. No abnormal signal or expansion is present within the conus. The visualized nerve roots are evenly distributed    throughout the thecal sac. There is preservation of the expected lumbar lordosis. The vertebral body heights and alignment are maintained. No compression fracture deformity or suspicious marrow replacing lesion is identified. Degenerative facet arthropathy is present at every level. With regards to the disc spaces, at L1-L2, the spinal canal and neural foramina are patent. At L2-L3, the spinal canal and neural foramina are patent. At L3-L4, there is disc space narrowing, disc desiccation and a disc bulge. The spinal canal and neural foramina are patent. At L4-L5, there is disc desiccation and a posterior disc protrusion. This indents the ventral thecal sac and causes mild spinal canal narrowing. The neural foramina are patent. At L5-S1, the spinal canal and neural foramina are patent. No suspicious finding is identified within the visualized retroperitoneal and paraspinal soft tissues. Impression        Multilevel degenerative changes are present throughout the lumbar spine and are further discussed by level in the findings. These are most significant at L4-L5 where there is a posterior disc protrusion indenting the ventral thecal sac and causing mild    spinal canal narrowing. Mild degenerative facet arthropathy is present without significant neural foraminal narrowing. **This report has been created using voice recognition software. It may contain minor errors which are inherent in voice recognition technology. ** Final report electronically signed by Dr. Raul Caban on 2/22/2019 1:06 PM       Past Medical History:   Diagnosis Date    Anxiety     Asthma     DDD (degenerative disc disease), cervical     DDD (degenerative disc disease), thoracolumbar     Depression     GERD (gastroesophageal reflux disease)        Past Surgical History:   Procedure Laterality Date    HIP SURGERY Bilateral 6/21/2022    Bilateral greater trochanteric bursa injection performed by Schuyler Sever, DO at [facility] Bilateral 2/1/2022    Lumbar medial branch block at bilateral L4-5 and L5-S1 performed by Schuyler Sever, DO at [facility] Bilateral 3/15/2022    medial branch blocks at bilateral L4-5 and L5-S1 performed by Schuyler Sever, DO at [facility] Right 4/12/2022    Lumbar radiofrequency ablation at  L4-5 and L5-S1, right performed by Schuyler Sever, DO at [facility] Left 5/24/2022    Lumbar RFA B/L L4-5 and L5-S1,LEFT performed by Schuyler Sever, DO at [address] [place] [address]       Family History   Problem Relation Age of Onset    Depression Mother     COPD Father     Asthma Sister     Depression Sister     Lupus Paternal Aunt     No Known Problems Daughter     No Known Problems Daughter     Asthma Daughter     No Known Problems Brother        Medications & Allergies:   Current Outpatient Medications   Medication Instructions    albuterol sulfate  (90 Base) MCG/ACT inhaler INHALE TWO (2) PUFFS INTO THE LUNGS EVERY FOUR (4) HOURS AS NEEDED FOR WHEEZING     alclomethasone (ACLOVATE) 0.05 % ointment Apply topically 2 times daily.     DULoxetine (CYMBALTA) 30 mg, Oral, DAILY    fluticasone-salmeterol (ADVAIR) 100-50 MCG/DOSE diskus inhaler 1 puff, Inhalation, EVERY 12 HOURS, Rinse mouth after use    ketoconazole (NIZORAL) 2 % shampoo No dose, route, or frequency recorded. meloxicam (MOBIC) 15 mg, Oral, DAILY    ondansetron (ZOFRAN) 4 mg, Oral, EVERY 8 HOURS PRN    Spacer/Aero-Holding Chambers (VORTEX VALVED HOLDING CHAMBER) SUJATHA 1 Device, Does not apply, DAILY, Use with inhalers    triamcinolone (KENALOG) 0.025 % cream No dose, route, or frequency recorded. Allergies   Allergen Reactions    Hydrocodone      \"makes me feel completely out of whack\"    Prozac [Fluoxetine Hcl] Other (See Comments)     \"I want to kill myself\"       Review of Systems:   Constitutional: negative for weight changes or fevers  Genitourinary: negative for bowel/bladder incontinence   Musculoskeletal: positive for low back pain, bilateral hip pain, bilateral leg pain. Neurological: negative for any leg weakness or numbness/tingling  Behavioral/Psych: negative for anxiety/depression   All other systems reviewed and are negative    Objective:     Vitals:    10/05/22 1438   BP: 104/78       Constitutional: Pleasant, no acute distress   Head: Normocephalic, atraumatic   Eyes: Conjunctivae normal   Neck: Supple, symmetrical   Lungs: Normal respiratory effort, non-labored breathing   Cardiovascular: Limbs warm and well perfused   Abdomen: Non-protruded   Musculoskeletal: Muscle bulk symmetric, no atrophy, no gross deformities   · Lower Extremities: ROM WNL. · Thorax: Left trapezius and paraspinal tenderness. No scoliosis or kyphosis. Decreased left arm abduction - 120 degrees, otherwise ROM WNL. Negative Neers test.  Negative Cooper test.  Negative lift-off test.   · Lumbar Spine: ROM WNL. Lumbar paraspinals mild tenderness with palpation bilaterally. SLR positive bilaterally. MATEUSZ equivocal bilaterally. GAENSLEN positive bilaterally. Positive facet loading bilaterally. Bilateral SI joint tenderwith palpation, mild tenderness to left SI joint with palpation. Bilateral SI joint distraction positive. Bilateral greater trochanters tender to palpation.    Neurological: Cranial nerves II-XII grossly intact. · Gait - Normal, non-antalgic gait. Ambulates without assistive device. · Motor: 5/5 muscle strength in bilateral hip flexion, knee flexion, knee extension, ankle dorsiflexion, and ankle plantar flexion   · Sensory: LT sensation intact in lower limbs   · Reflexes: 2+ symmetrical in bilateral achilles, 2+ bilateral patellar, negative ankle clonus, downgoing babinski   Skin: No rashes or lesions present   Psychological: Cooperative, no exaggerated pain behaviors     Assessment:    Diagnosis Orders   1. Lumbosacral radiculitis  CHG FLUOR NEEDLE/CATH SPINE/PARASPINAL DX/THER ADDON    TN NJX DX/THER SBST INTRLMNR LMBR/SAC W/IMG GDN      2. Chronic pain syndrome        3. Lumbar spondylosis        4. Sacroiliac joint dysfunction of both sides        5. Neuropathic pain            Neil Blancas is a 43 y. o.female presenting to the pain clinic for evaluation of lumbar back pain. Patient's history and physical consistent with lumbar facet mediated pain lumbar spondylosis as evident on her imaging. We have set her up for a lumbar medial branch block at bilateral L4/5 and L5/S1. We have discussed that she has several pain generators that may be addressed in the future. Patient's history and physical consistent with lumbar radiculopathy at bilateral L5 as evidenced on the EMG. I have set her up for a lumbar epidural steroid injection at L5-S1 with Dr. Blair Huntley. In addition, she has bilateral SI joint dysfunction and may benefit from a bilateral SI joint injection. Plan: The following treatment recommendations and plan were discussed in detail with Neil Blancas. Imaging:   EMG results reviewed    Analgesics:   Patient is taking Toradol and Mobic. Patient is advised to take as prescribed and not take on an empty stomach. Adjuvants:   None    Interventions: In presence of lumbosacral radiating pain, the option of lumbar epidural steroid injection approach at L5-S1 was chosen.  The risks and benefits were discussed in detail with the patient. Patient wants to proceed with the injection. Anticoagulation/NPO Recommendations:   Patient does need to hold mobic x4 days prior to the procedure. Patient does need to be NPO x8 hours prior to the procedure. We will start an IV for the procedure  Patient will need a     Multidisciplinary Pain Management:   In the presence of complex, chronic, and multi-factorial pain, the importance of a multidisciplinary approach to pain management in the patients management regimen was emphasized and discussed in great detail. PHYSICAL THERAPY: Patient is advised to see a physical therapist for gentle stretching exercises and conditioning exercises for management of pain. Referrals:  None    Prescriptions Written This Visit:   None    Follow-up: [facility]. Follow up one month after in office.      BINH Giles - CNP

## 2022-11-02 ENCOUNTER — PREP FOR PROCEDURE (OUTPATIENT)
Dept: PHYSICAL MEDICINE AND REHAB | Age: 42
End: 2022-11-02

## 2022-11-07 NOTE — DISCHARGE INSTRUCTIONS

## 2022-11-07 NOTE — H&P
Today, patient presents for planned lumbar epidural steroid injection approach at L5-S1    This note is reflective of the patient's previous visit for evaluation. We will proceed with today's planned procedure. Since patient's last visit for evaluation, there have been no interval changes in medical history. Patient has no new numbness, weakness, or focal neurological deficit since evaluation. Patient has no contraindications to injection (no anticoagulation or recent antibiotic intake for active infections), and has a  present or is able to drive themselves (as discussed and cleared by physician). Allergies to latex, contrast dye, and steroid medications have been confirmed with the patient prior to the procedure. NPO necessity has been assessed and accepted based on procedure complexity. The risks and benefits of the procedure have been explained including but are not limited to infection, bleeding, paralysis, immediate post procedure weakness, and dizziness; the patient acknowledges understanding and desires to proceed with the procedure. Patient has signed consent for same procedure as discussed in previous clinic encounter. All other questions and concerns were addressed at bedside. See procedure note for full details. Post procedure Instructions: The patient was advised not to drive during the day of the procedure and not to engage in any significant decision making (unless otherwise states by physician). The patient was also advised to be cautious with walking/activity for 24 hours following today's visit and asked not to engage in over-exertion (unless otherwise states by physician). After this time, it is ok to resume pre-procedure level of activity. Patient advised to apply ice to site of injection in situations of pain and discomfort. Patient advised to not submerge site of injection during bath or pool activities for approximately 24 hours post-procedure.  Patient attested to understanding post procedure directions / restrictions. All other questions and concerns addressed before patient discharge in ambulatory fashion. Chronic Pain/PM&R Clinic Note     Encounter Date: 10/5/22    Subjective:   Chief Complaint:   No chief complaint on file. History of Present Illness: Pattie Garcia is a 43 y.o. female seen in the clinic initially on  01/06/2022 upon request from Calista Cesar MD for her history of lumbar pain. Patient states pain started after a lifting accident at work in 2008 where she had an injury to her thoracic spine. Patient states back pain has become gradually worse ever since. Patient states pain is worse when she is sitting, standing in 1 position, or doing activities such as cleaning around the house. Patient states she will occasionally get pain that radiates down bilateral legs, left more than right with strenuous activity. Patient states when pain gets severe she will lay down which seems to help. Patient does have trouble sleeping occasionally as she has some aching and twitching in bilateral legs. Patient states she has tried physical therapy and decompression several times which have been ineffective. It has been several years since she has done physical therapy. Patient states she does not currently work as she was unable to tolerate it. Patient denies weakness in bilateral legs but states she will occasionally feel like her knees want to give out. Patient denies falls. Patient denies saddle anesthesia or bowel or bladder incontinence. Patient also mentions other pain such as in her left shoulder, left wrist, and left knee. 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 She has had ongoing thoracic pain since then. Today, 10/5/2022, patient presents for planned follow-up on chronic pain.   Patient would like to review her EMG results as she is having severe pain in her legs, right greater than left. She states the pain continues to radiate all the way to her toes. Patient has been referred to physical therapy per her PCP but she has not started this yet. She is wondering if there is an interventional procedure that can be done to help with pain relief. Patient states she had the GTB injection and did have increased pain for a week after but then she noticed improvement of the pain in her bilateral hips. Patient denies any new leg paresthesias, leg weakness, saddle anesthesia, bowel/bladder incontinence. History of Interventions:   Surgery: No previous thoracic or lumbar surgeries  Injections: Thoracic epidural?  Several years ago  Lumbar MBB L4-5 and L5-S1 (02/01/2022) - >85% relief x 2 days  Lumbar MBB L4-5 and L5-S1 (03/15/2022) - >85% relief x 3 days  Right lumbar RFA (04/12/2022) - significant relief  Left lumbar RFA (05/24/2022) - significant relief  B/L GTB injection (06/21/2022) - effective    Current Treatment Medications: Toradol as needed- mild effectiveness  Mobic - rare    Historical Treatment Medications:   Cymbalta - fatigue  Ibuprofen - ineffective  Tylenol - ineffective   OTC muscle rub -ineffective    Imaging:  EMG        (Lumbar Xray 02/02/2021)    Narrative   2 views lumbar spine       Comparison:  MR  - MRI LUMBAR SPINE WO CONTRAST  - 02/22/2019 11:08 AM EST    CR,SR  - XR LUMBAR SPINE (2-3 VIEWS)  - 12/14/2018 01:50 PM EST       Findings   Normal vertebral body alignment. No acute fractures or dislocation. No significant degenerative change           Impression   No acute findings       This document has been electronically signed by: Joe Alcantar MD on    02/02/2021 01:58 AM       Lumbar MRI (02/22/2019)  Narrative   PROCEDURE: MRI LUMBAR SPINE WO CONTRAST       CLINICAL INFORMATION: DDD (degenerative disc disease), lumbar.  Additional history obtained from the electronic medical record indicates the patient has right-sided numbness when laying on the right side. Severe back pain. COMPARISON: None available. Correlation is made to radiographs of the lumbar spine dated December 14, 2018. TECHNIQUE: Sagittal and axial T1 and T2-weighted images were obtained through the lumbar spine. FINDINGS:       The lumbar spine is imaged from the inferior aspect of T10 to the superior aspect of S3. The conus medullaris terminates at the L1 level. No abnormal signal or expansion is present within the conus. The visualized nerve roots are evenly distributed    throughout the thecal sac. There is preservation of the expected lumbar lordosis. The vertebral body heights and alignment are maintained. No compression fracture deformity or suspicious marrow replacing lesion is identified. Degenerative facet arthropathy is present at every level. With regards to the disc spaces, at L1-L2, the spinal canal and neural foramina are patent. At L2-L3, the spinal canal and neural foramina are patent. At L3-L4, there is disc space narrowing, disc desiccation and a disc bulge. The spinal canal and neural foramina are patent. At L4-L5, there is disc desiccation and a posterior disc protrusion. This indents the ventral thecal sac and causes mild spinal canal narrowing. The neural foramina are patent. At L5-S1, the spinal canal and neural foramina are patent. No suspicious finding is identified within the visualized retroperitoneal and paraspinal soft tissues. Impression        Multilevel degenerative changes are present throughout the lumbar spine and are further discussed by level in the findings. These are most significant at L4-L5 where there is a posterior disc protrusion indenting the ventral thecal sac and causing mild    spinal canal narrowing. Mild degenerative facet arthropathy is present without significant neural foraminal narrowing. **This report has been created using voice recognition software.  It may contain minor errors which are inherent in voice recognition technology. **       Final report electronically signed by Dr. Karla Mesa on 2/22/2019 1:06 PM       Past Medical History:   Diagnosis Date    Anxiety     Asthma     DDD (degenerative disc disease), cervical     DDD (degenerative disc disease), thoracolumbar     Depression     GERD (gastroesophageal reflux disease)        Past Surgical History:   Procedure Laterality Date    HIP SURGERY Bilateral 6/21/2022    Bilateral greater trochanteric bursa injection performed by Saeid , DO at [facility] Bilateral 2/1/2022    Lumbar medial branch block at bilateral L4-5 and L5-S1 performed by Saeid , DO at [facility] Bilateral 3/15/2022    medial branch blocks at bilateral L4-5 and L5-S1 performed by Saeid , DO at [facility] Right 4/12/2022    Lumbar radiofrequency ablation at  L4-5 and L5-S1, right performed by Saeid , DO at [facility] Left 5/24/2022    Lumbar RFA B/L L4-5 and L5-S1,LEFT performed by Saeid , DO at [address] [place] [address]       Family History   Problem Relation Age of Onset    Depression Mother     COPD Father     Asthma Sister     Depression Sister     Lupus Paternal Aunt     No Known Problems Daughter     No Known Problems Daughter     Asthma Daughter     No Known Problems Brother        Medications & Allergies:   Current Outpatient Medications   Medication Instructions    albuterol sulfate  (90 Base) MCG/ACT inhaler INHALE TWO (2) PUFFS INTO THE LUNGS EVERY FOUR (4) HOURS AS NEEDED FOR WHEEZING     alclomethasone (ACLOVATE) 0.05 % ointment Apply topically 2 times daily.     DULoxetine (CYMBALTA) 30 mg, Oral, DAILY    fluticasone-salmeterol (ADVAIR) 100-50 MCG/DOSE diskus inhaler 1 puff, Inhalation, EVERY 12 HOURS, Rinse mouth after use    ketoconazole (NIZORAL) 2 % shampoo No dose, route, or frequency recorded. meloxicam (MOBIC) 15 mg, Oral, DAILY    ondansetron (ZOFRAN) 4 mg, Oral, EVERY 8 HOURS PRN    Spacer/Aero-Holding Chambers (VORTEX VALVED HOLDING CHAMBER) SUJATHA 1 Device, Does not apply, DAILY, Use with inhalers    triamcinolone (KENALOG) 0.025 % cream No dose, route, or frequency recorded. Allergies   Allergen Reactions    Hydrocodone      \"makes me feel completely out of whack\"    Prozac [Fluoxetine Hcl] Other (See Comments)     \"I want to kill myself\"       Review of Systems:   Constitutional: negative for weight changes or fevers  Genitourinary: negative for bowel/bladder incontinence   Musculoskeletal: positive for low back pain, bilateral hip pain, bilateral leg pain. Neurological: negative for any leg weakness or numbness/tingling  Behavioral/Psych: negative for anxiety/depression   All other systems reviewed and are negative    Objective: There were no vitals filed for this visit. Constitutional: Pleasant, no acute distress   Head: Normocephalic, atraumatic   Eyes: Conjunctivae normal   Neck: Supple, symmetrical   Lungs: Normal respiratory effort, non-labored breathing   Cardiovascular: Limbs warm and well perfused   Abdomen: Non-protruded   Musculoskeletal: Muscle bulk symmetric, no atrophy, no gross deformities   · Lower Extremities: ROM WNL. · Thorax: Left trapezius and paraspinal tenderness. No scoliosis or kyphosis. Decreased left arm abduction - 120 degrees, otherwise ROM WNL. Negative Neers test.  Negative Cooper test.  Negative lift-off test.   · Lumbar Spine: ROM WNL. Lumbar paraspinals mild tenderness with palpation bilaterally. SLR positive bilaterally. MATEUSZ equivocal bilaterally. GAENSLEN positive bilaterally. Positive facet loading bilaterally. Bilateral SI joint tenderwith palpation, mild tenderness to left SI joint with palpation. Bilateral SI joint distraction positive. Bilateral greater trochanters tender to palpation. Neurological: Cranial nerves II-XII grossly intact. · Gait - Normal, non-antalgic gait. Ambulates without assistive device. · Motor: 5/5 muscle strength in bilateral hip flexion, knee flexion, knee extension, ankle dorsiflexion, and ankle plantar flexion   · Sensory: LT sensation intact in lower limbs   · Reflexes: 2+ symmetrical in bilateral achilles, 2+ bilateral patellar, negative ankle clonus, downgoing babinski   Skin: No rashes or lesions present   Psychological: Cooperative, no exaggerated pain behaviors     Assessment:    Diagnosis Orders   1. Lumbosacral radiculitis  [facility] FLUOR NEEDLE/CATH SPINE/PARASPINAL DX/THER ADDON    PA NJX DX/THER SBST INTRLMNR LMBR/SAC W/IMG GDN      2. Chronic pain syndrome        3. Lumbar spondylosis        4. Sacroiliac joint dysfunction of both sides        5. Neuropathic pain            Zaki Webb is a 43 y. o.female presenting to the pain clinic for evaluation of lumbar back pain. Patient's history and physical consistent with lumbar facet mediated pain lumbar spondylosis as evident on her imaging. We have set her up for a lumbar medial branch block at bilateral L4/5 and L5/S1. We have discussed that she has several pain generators that may be addressed in the future. Patient's history and physical consistent with lumbar radiculopathy at bilateral L5 as evidenced on the EMG. I have set her up for a lumbar epidural steroid injection at L5-S1 with Dr. Sasha Dejesus. In addition, she has bilateral SI joint dysfunction and may benefit from a bilateral SI joint injection. Plan: The following treatment recommendations and plan were discussed in detail with Zaki Webb. Imaging:   EMG results reviewed    Analgesics:   Patient is taking Toradol and Mobic. Patient is advised to take as prescribed and not take on an empty stomach. Adjuvants:   None    Interventions:    In presence of lumbosacral radiating pain, the option of lumbar epidural steroid injection approach at L5-S1 was chosen. The risks and benefits were discussed in detail with the patient. Patient wants to proceed with the injection. Anticoagulation/NPO Recommendations:   Patient does need to hold mobic x4 days prior to the procedure. Patient does need to be NPO x8 hours prior to the procedure. We will start an IV for the procedure  Patient will need a     Multidisciplinary Pain Management:   In the presence of complex, chronic, and multi-factorial pain, the importance of a multidisciplinary approach to pain management in the patients management regimen was emphasized and discussed in great detail. PHYSICAL THERAPY: Patient is advised to see a physical therapist for gentle stretching exercises and conditioning exercises for management of pain. Referrals:  None    Prescriptions Written This Visit:   None    Follow-up: KAYLYN. Follow up one month after in office.      Yosef Barros DO

## 2022-11-08 ENCOUNTER — APPOINTMENT (OUTPATIENT)
Dept: GENERAL RADIOLOGY | Age: 42
End: 2022-11-08
Attending: ANESTHESIOLOGY
Payer: MEDICAID

## 2022-11-08 ENCOUNTER — HOSPITAL ENCOUNTER (OUTPATIENT)
Age: 42
Setting detail: OUTPATIENT SURGERY
Discharge: HOME | End: 2022-11-08
Attending: ANESTHESIOLOGY | Admitting: ANESTHESIOLOGY
Payer: MEDICAID

## 2022-11-08 VITALS
HEART RATE: 72 BPM | RESPIRATION RATE: 16 BRPM | HEIGHT: 64 IN | TEMPERATURE: 97.1 F | WEIGHT: 203 LBS | DIASTOLIC BLOOD PRESSURE: 75 MMHG | OXYGEN SATURATION: 95 % | SYSTOLIC BLOOD PRESSURE: 114 MMHG | BODY MASS INDEX: 34.66 KG/M2

## 2022-11-08 LAB — PREGNANCY, URINE: NEGATIVE

## 2022-11-08 PROCEDURE — 99152 MOD SED SAME PHYS/QHP 5/>YRS: CPT | Performed by: ANESTHESIOLOGY

## 2022-11-08 PROCEDURE — 7100000011 HC PHASE II RECOVERY - ADDTL 15 MIN: Performed by: ANESTHESIOLOGY

## 2022-11-08 PROCEDURE — 2709999900 HC NON-CHARGEABLE SUPPLY: Performed by: ANESTHESIOLOGY

## 2022-11-08 PROCEDURE — 3209999900 FLUORO FOR SURGICAL PROCEDURES

## 2022-11-08 PROCEDURE — 2500000003 HC RX 250 WO HCPCS: Performed by: ANESTHESIOLOGY

## 2022-11-08 PROCEDURE — 6360000002 HC RX W HCPCS: Performed by: ANESTHESIOLOGY

## 2022-11-08 PROCEDURE — 6360000004 HC RX CONTRAST MEDICATION: Performed by: ANESTHESIOLOGY

## 2022-11-08 PROCEDURE — 81025 URINE PREGNANCY TEST: CPT

## 2022-11-08 PROCEDURE — 62323 NJX INTERLAMINAR LMBR/SAC: CPT | Performed by: ANESTHESIOLOGY

## 2022-11-08 PROCEDURE — 3600000054 HC PAIN LEVEL 3 BASE: Performed by: ANESTHESIOLOGY

## 2022-11-08 PROCEDURE — 7100000010 HC PHASE II RECOVERY - FIRST 15 MIN: Performed by: ANESTHESIOLOGY

## 2022-11-08 RX ORDER — MIDAZOLAM HYDROCHLORIDE 1 MG/ML
INJECTION INTRAMUSCULAR; INTRAVENOUS PRN
Status: DISCONTINUED | OUTPATIENT
Start: 2022-11-08 | End: 2022-11-08 | Stop reason: ALTCHOICE

## 2022-11-08 RX ORDER — LIDOCAINE HYDROCHLORIDE 10 MG/ML
INJECTION, SOLUTION EPIDURAL; INFILTRATION; INTRACAUDAL; PERINEURAL PRN
Status: DISCONTINUED | OUTPATIENT
Start: 2022-11-08 | End: 2022-11-08 | Stop reason: ALTCHOICE

## 2022-11-08 RX ORDER — FENTANYL CITRATE 50 UG/ML
INJECTION, SOLUTION INTRAMUSCULAR; INTRAVENOUS PRN
Status: DISCONTINUED | OUTPATIENT
Start: 2022-11-08 | End: 2022-11-08 | Stop reason: ALTCHOICE

## 2022-11-08 RX ORDER — DEXAMETHASONE SODIUM PHOSPHATE 4 MG/ML
INJECTION, SOLUTION INTRA-ARTICULAR; INTRALESIONAL; INTRAMUSCULAR; INTRAVENOUS; SOFT TISSUE PRN
Status: DISCONTINUED | OUTPATIENT
Start: 2022-11-08 | End: 2022-11-08 | Stop reason: ALTCHOICE

## 2022-11-08 RX ORDER — BACTERIOSTATIC SODIUM CHLORIDE 0.9 %
VIAL (ML) INJECTION PRN
Status: DISCONTINUED | OUTPATIENT
Start: 2022-11-08 | End: 2022-11-08 | Stop reason: ALTCHOICE

## 2022-11-08 ASSESSMENT — PAIN - FUNCTIONAL ASSESSMENT: PAIN_FUNCTIONAL_ASSESSMENT: 0-10

## 2022-11-08 NOTE — PRE SEDATION
[facility]  Pre-Sedation/Analgesia History & Physical    Pt Name: Librado Cardoso  MRN: [id number]  YOB: 1980  Provider Performing Procedure: Thelma Shay DO   Primary Care Physician: Flakito Puga MD      MEDICAL HISTORY       has a past medical history of Anxiety, Asthma, DDD (degenerative disc disease), cervical, DDD (degenerative disc disease), thoracolumbar, Depression, and GERD (gastroesophageal reflux disease). SURGICAL HISTORY   has a past surgical history that includes Pain management procedure (Bilateral, 2/1/2022); Pain management procedure (Bilateral, 3/15/2022); Pain management procedure (Right, 4/12/2022); Pain management procedure (Left, 5/24/2022); and hip surgery (Bilateral, 6/21/2022). ALLERGIES   Allergies as of 10/05/2022 - Fully Reviewed 10/05/2022   Allergen Reaction Noted    Hydrocodone  02/27/2020    Prozac [fluoxetine hcl] Other (See Comments) 12/05/2018       MEDICATIONS   Prior to Admission medications    Medication Sig Start Date End Date Taking? Authorizing Provider   meloxicam (MOBIC) 15 MG tablet Take 1 tablet by mouth daily 8/30/22 8/25/23  Flakito Puga MD   DULoxetine (CYMBALTA) 30 MG extended release capsule Take 1 capsule by mouth daily 8/17/22   Liborio Blackmon DO   fluticasone-salmeterol (ADVAIR) 100-50 MCG/DOSE diskus inhaler Inhale 1 puff into the lungs every 12 hours Rinse mouth after use 3/28/22   Conrado Krabbe, APRN - CNP   ondansetron (ZOFRAN) 4 MG tablet Take 1 tablet by mouth every 8 hours as needed for Nausea 1/12/22   Flakito Puga MD   ketoconazole (NIZORAL) 2 % shampoo  1/3/22   Historical Provider, MD   triamcinolone (KENALOG) 0.025 % cream  1/3/22   Historical Provider, MD   alclomethasone (ACLOVATE) 0.05 % ointment Apply topically 2 times daily.  9/21/21   Brandon Llamas MD   albuterol sulfate  (90 Base) MCG/ACT inhaler INHALE TWO (2) PUFFS INTO THE LUNGS EVERY FOUR (4) HOURS AS NEEDED FOR WHEEZING 12/30/20   BINH Rudolph CNP   Spacer/Aero-Holding Chambers (VORTEX VALVED HOLDING CHAMBER) SUJATHA 1 Device by Does not apply route daily Use with inhalers 9/30/19   BINH Rudolph CNP     PHYSICAL:   Vitals:    11/08/22 0837   BP:    Pulse: 79   Resp: 17   Temp:    SpO2: 95%     PLANNED PROCEDURE   See procedure note  SEDATION  Planned agent: Versed and Fentanyl  ASA Classification: 1  Class 1: A normal healthy patient  Class 2: Pt with mild to moderate systemic disease  Class 3: Severe systemic disease or disturbance  Class 4: Severe systemic disorders that are already life threatening. Class 5: Moribund pt with little chances of survival, for more than 24 hours. Mallampati I Airway Classification: 1    1. Pre-procedure diagnostic studies complete and results available. 2. Previous sedation/anesthesia experiences assessed. 3. The patient is an appropriate candidate to undergo the planned procedure sedation and anesthesia. (Refer to nursing sedation/analgesia documentation record)  4. Formulation and discussion of sedation/procedure plan, risks, and expectations with patient and/or responsible adult completed. 5. Patient examined immediately prior to the procedure.  (Refer to nursing sedation/analgesia documentation record)    Evan Baeza DO  Electronically signed 11/8/2022 at 8:41 AM

## 2022-11-08 NOTE — PROCEDURES
Pre-operative Diagnosis: Radicular leg pain     Post-operative Diagnosis: Radicular leg pain     Procedure: Lumbar epidural steroid injection    Procedure Description:  After having obtained a signed informed consent, the patient was taken to the fluoroscopy suite and placed in the prone position. The patient's back was prepped with chloraprep and draped in a sterile fashion. A total of 1.5 cc of 1 % lidocaine was used to anesthetize the skin and underlying tissues. Under fluoroscopic guidance, a single 20G Tuohy needle was advanced using midline approach at the L5/S1 interspace until gaining the epidural space using the loss of resistance to saline syringe technique. There were no paresthesias, heme, or CSF aspiration. A total of 0.25 cc of Omnipaque 300 were injected having had adequate dye spread within the epidural space. Needle placement and contrast spread was confirmed using the AP and contralateral views. 10 mg of Dexamethasone with 1 cc of saline solution were injected in the epidural space. The needle was flushed and removed without any complication. The patient tolerated the procedure well and was transported to the recovery room. The patient was observed for 15 minutes to then discharged in an ambulatory fashion.     Procedural Complications: None  Estimated Blood Loss: 0 mL      IV sedation was used during the procedure:  - Moderate intravenous conscious sedation was supervised by Dr. Kinga Bowie  - The patient was independently monitored by a Registered Nurse assigned to the procedure room  - Monitoring included automated blood pressure, continuous EKG, and continuous pulse oximetry  - The detailed conscious record is permanently stored in the [place] [id number]  - The following is the conscious sedation record:  Start Time: 08:32  End Time : 08:47  Duration: 15 minutes   Medications Administered: 2 mg Versed, 50 mcg Fentanyl       Liborio Blackmon DO  Interventional Pain Management/[facility]

## 2022-11-08 NOTE — POST SEDATION
[address] [address]  Sedation/Analgesia Post Sedation Record    Pt Name: Nury Knox  MRN: [id number]  YOB: 1980  Procedure Performed By: Deepika Howard DO  Primary Care Physician: [address] [place] [address] MD Mandeep    POST-PROCEDURE    Physicians/Assistants: Deepika Howard DO  Procedure Performed: See Procedure Note   Sedation/Anesthesia: Versed and Fentanyl (See procedure note for amount and duration)  Estimated Blood Loss:     0  ml  Specimens Removed: None        Complications: None           Liborio Juarez DO  Electronically signed 11/8/2022 at 8:41 AM

## 2022-11-08 NOTE — PROGRESS NOTES
Discharge instructions reviewed with patient and . All questions were addressed and answered. Patient verbalized understanding of discharge plan.

## 2022-11-08 NOTE — PROGRESS NOTES
0840-Patient to Phase II via cart. Report received from [employer]. Patient drowsy but responsive. Vitals obtained and stable. Respirations even and unlabored on room air. Patient denies pain, nausea, numbness and tingling. Patient able to move all extremities. No drainage noted at injection sites. Patient instructed to stay in bed. Instructed on call light use. 08445-Patient provided with snack and drink. Denies needs. Call light in reach. 0856-Patient states ready for discharge. IV removed with no complications. Patient getting dressed at bedside. 0900-Patient meets discharge criteria. Discharged in stable condition with responsible . All belongings given to patient. Patient ambulated to car with assistance from RN. Patient tolerated well.

## 2022-11-22 ENCOUNTER — OFFICE VISIT (OUTPATIENT)
Dept: PHYSICAL MEDICINE AND REHAB | Age: 42
End: 2022-11-22
Payer: MEDICAID

## 2022-11-22 VITALS
SYSTOLIC BLOOD PRESSURE: 119 MMHG | DIASTOLIC BLOOD PRESSURE: 71 MMHG | WEIGHT: 206.6 LBS | HEIGHT: 64 IN | BODY MASS INDEX: 35.27 KG/M2

## 2022-11-22 DIAGNOSIS — G89.4 CHRONIC PAIN SYNDROME: ICD-10-CM

## 2022-11-22 DIAGNOSIS — M54.17 RADICULOPATHY, LUMBOSACRAL REGION: Primary | ICD-10-CM

## 2022-11-22 DIAGNOSIS — M47.819 FACET ARTHROPATHY: ICD-10-CM

## 2022-11-22 DIAGNOSIS — M53.3 SACROILIAC JOINT DYSFUNCTION OF BOTH SIDES: ICD-10-CM

## 2022-11-22 DIAGNOSIS — M54.17 LUMBOSACRAL RADICULITIS: ICD-10-CM

## 2022-11-22 DIAGNOSIS — M79.2 NEUROPATHIC PAIN: ICD-10-CM

## 2022-11-22 DIAGNOSIS — M47.816 LUMBAR SPONDYLOSIS: ICD-10-CM

## 2022-11-22 PROCEDURE — G8484 FLU IMMUNIZE NO ADMIN: HCPCS | Performed by: NURSE PRACTITIONER

## 2022-11-22 PROCEDURE — 4004F PT TOBACCO SCREEN RCVD TLK: CPT | Performed by: NURSE PRACTITIONER

## 2022-11-22 PROCEDURE — G8417 CALC BMI ABV UP PARAM F/U: HCPCS | Performed by: NURSE PRACTITIONER

## 2022-11-22 PROCEDURE — G8427 DOCREV CUR MEDS BY ELIG CLIN: HCPCS | Performed by: NURSE PRACTITIONER

## 2022-11-22 PROCEDURE — 99214 OFFICE O/P EST MOD 30 MIN: CPT | Performed by: NURSE PRACTITIONER

## 2022-11-22 NOTE — PROGRESS NOTES
Chronic Pain/PM&R Clinic Note     Encounter Date: 11/22/22    Subjective:   Chief Complaint:   Chief Complaint   Patient presents with    Follow Up After Procedure     epidural steroid injection  Lumbar 5-Sacral 1 11/8/22       History of Present Illness: Nury Knox is a 43 y.o. female seen in the clinic initially on  01/06/2022 upon request from Geneva Andujar MD for her history of lumbar pain. Patient states pain started after a lifting accident at work in 2008 where she had an injury to her thoracic spine. Patient states back pain has become gradually worse ever since. Patient states pain is worse when she is sitting, standing in 1 position, or doing activities such as cleaning around the house. Patient states she will occasionally get pain that radiates down bilateral legs, left more than right with strenuous activity. Patient states when pain gets severe she will lay down which seems to help. Patient does have trouble sleeping occasionally as she has some aching and twitching in bilateral legs. Patient states she has tried physical therapy and decompression several times which have been ineffective. It has been several years since she has done physical therapy. Patient states she does not currently work as she was unable to tolerate it. Patient denies weakness in bilateral legs but states she will occasionally feel like her knees want to give out. Patient denies falls. Patient denies saddle anesthesia or bowel or bladder incontinence. Patient also mentions other pain such as in her left shoulder, left wrist, and left knee. 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 She has had ongoing thoracic pain since then. Today, 10/5/2022, patient presents for planned follow-up on chronic pain. Patient would like to review her EMG results as she is having severe pain in her legs, right greater than left.   She states the pain continues to radiate all the way to her toes. Patient has been referred to physical therapy per her PCP but she has not started this yet. She is wondering if there is an interventional procedure that can be done to help with pain relief. Patient states she had the GTB injection and did have increased pain for a week after but then she noticed improvement of the pain in her bilateral hips. Patient denies any new leg paresthesias, leg weakness, saddle anesthesia, bowel/bladder incontinence. Today, 11/22/2022, patient presents for planned follow up on low back and leg pain. Patient underwent lumbar epidural steroid injection on 11/8/2022 and received no relief. She continues to have stabbing pain in her buttocks, right significantly worse than left. In addition, pain and weakness into her right leg, specially when standing for an extended period of time. She never started physical therapy, she planned to pursue this at [facility] but has not due to an outbreak of COVID at the nursing home. She would like to do physical therapy at [facility] in [address] [place] [address]. She also questions potential employment because she struggles with sitting standing and laying. She is wondering what options she has. She is frustrated overall, as she continues to struggle with chronic pain that continues to move. Starting with her back to her lateral hips to her buttocks and leg. She denies any new symptoms such as focal leg weakness, new leg paresthesias, saddle anesthesia, bowel/bladder incontinence.     History of Interventions:   Surgery: No previous thoracic or lumbar surgeries  Injections: Thoracic epidural?  Several years ago  Lumbar MBB L4-5 and L5-S1 (02/01/2022) - >85% relief x 2 days  Lumbar MBB L4-5 and L5-S1 (03/15/2022) - >85% relief x 3 days  Right lumbar RFA (04/12/2022) - significant relief  Left lumbar RFA (05/24/2022) - significant relief  B/L GTB injection (06/21/2022) - effective  LESI L5-S1 (11/08/2022) - no relief     Current Treatment Medications: Toradol as needed- mild effectiveness  Mobic - rare    Historical Treatment Medications:   Cymbalta - fatigue  Ibuprofen - ineffective  Tylenol - ineffective   OTC muscle rub -ineffective    Imaging:  EMG      (Lumbar Xray 02/02/2021)    Narrative   2 views lumbar spine       Comparison:  MR  - MRI LUMBAR SPINE WO CONTRAST  - 02/22/2019 11:08 AM EST    CR,SR  - XR LUMBAR SPINE (2-3 VIEWS)  - 12/14/2018 01:50 PM EST       Findings   Normal vertebral body alignment. No acute fractures or dislocation. No significant degenerative change           Impression   No acute findings       This document has been electronically signed by: Jose Miguel Fonseca MD on    02/02/2021 01:58 AM       Lumbar MRI (02/22/2019)  Narrative   PROCEDURE: MRI LUMBAR SPINE WO CONTRAST       CLINICAL INFORMATION: DDD (degenerative disc disease), lumbar. Additional history obtained from the electronic medical record indicates the patient has right-sided numbness when laying on the right side. Severe back pain. COMPARISON: None available. Correlation is made to radiographs of the lumbar spine dated December 14, 2018. TECHNIQUE: Sagittal and axial T1 and T2-weighted images were obtained through the lumbar spine. FINDINGS:       The lumbar spine is imaged from the inferior aspect of T10 to the superior aspect of S3. The conus medullaris terminates at the L1 level. No abnormal signal or expansion is present within the conus. The visualized nerve roots are evenly distributed    throughout the thecal sac. There is preservation of the expected lumbar lordosis. The vertebral body heights and alignment are maintained. No compression fracture deformity or suspicious marrow replacing lesion is identified. Degenerative facet arthropathy is present at every level. With regards to the disc spaces, at L1-L2, the spinal canal and neural foramina are patent.        At L2-L3, the spinal canal and neural foramina are patent. At L3-L4, there is disc space narrowing, disc desiccation and a disc bulge. The spinal canal and neural foramina are patent. At L4-L5, there is disc desiccation and a posterior disc protrusion. This indents the ventral thecal sac and causes mild spinal canal narrowing. The neural foramina are patent. At L5-S1, the spinal canal and neural foramina are patent. No suspicious finding is identified within the visualized retroperitoneal and paraspinal soft tissues. Impression        Multilevel degenerative changes are present throughout the lumbar spine and are further discussed by level in the findings. These are most significant at L4-L5 where there is a posterior disc protrusion indenting the ventral thecal sac and causing mild    spinal canal narrowing. Mild degenerative facet arthropathy is present without significant neural foraminal narrowing. **This report has been created using voice recognition software. It may contain minor errors which are inherent in voice recognition technology. **       Final report electronically signed by Dr. Les Maza on 2/22/2019 1:06 PM       Past Medical History:   Diagnosis Date    Anxiety     Asthma     DDD (degenerative disc disease), cervical     DDD (degenerative disc disease), thoracolumbar     Depression     GERD (gastroesophageal reflux disease)        Past Surgical History:   Procedure Laterality Date    HIP SURGERY Bilateral 6/21/2022    Bilateral greater trochanteric bursa injection performed by Hershal Lesch, DO at [facility] Bilateral 2/1/2022    Lumbar medial branch block at bilateral L4-5 and L5-S1 performed by Hershal Lesch, DO at [facility] Bilateral 3/15/2022    medial branch blocks at bilateral L4-5 and L5-S1 performed by Hershal Lesch, DO at [place] [address] MANAGEMENT PROCEDURE Right 4/12/2022    Lumbar radiofrequency ablation at  L4-5 and L5-S1, right performed by Tatyana Daniel DO at [facility] Left 5/24/2022    Lumbar RFA B/L L4-5 and L5-S1,LEFT performed by Tatyana Daniel DO at [facility] N/A 11/8/2022    epidural steroid injection  Lumbar 5-Sacral 1 [facility]       Family History   Problem Relation Age of Onset    Depression Mother     COPD Father     Asthma Sister     Depression Sister     Lupus Paternal Aunt     No Known Problems Daughter     No Known Problems Daughter     Asthma Daughter     No Known Problems Brother        Medications & Allergies:   Current Outpatient Medications   Medication Instructions    albuterol sulfate  (90 Base) MCG/ACT inhaler INHALE TWO (2) PUFFS INTO THE LUNGS EVERY FOUR (4) HOURS AS NEEDED FOR WHEEZING     alclomethasone (ACLOVATE) 0.05 % ointment Apply topically 2 times daily. DULoxetine (CYMBALTA) 30 mg, Oral, DAILY    fluticasone-salmeterol (ADVAIR) 100-50 MCG/DOSE diskus inhaler 1 puff, Inhalation, EVERY 12 HOURS, Rinse mouth after use    ketoconazole (NIZORAL) 2 % shampoo No dose, route, or frequency recorded. meloxicam (MOBIC) 15 mg, Oral, DAILY    ondansetron (ZOFRAN) 4 mg, Oral, EVERY 8 HOURS PRN    Spacer/Aero-Holding Chambers (VORTEX VALVED HOLDING CHAMBER) SUJATHA 1 Device, Does not apply, DAILY, Use with inhalers    triamcinolone (KENALOG) 0.025 % cream No dose, route, or frequency recorded.        Allergies   Allergen Reactions    Hydrocodone      \"makes me feel completely out of whack\"    Prozac [Fluoxetine Hcl] Other (See Comments)     \"I want to kill myself\"       Review of Systems:   Constitutional: negative for weight changes or fevers  Genitourinary: negative for bowel/bladder incontinence   Musculoskeletal: positive for low back pain, bilateral hip pain, bilateral buttock pain, bilateral leg pain. Neurological: positive for right leg weakness and numbness/tingling  Behavioral/Psych: negative for anxiety/depression   All other systems reviewed and are negative    Objective:     Vitals:    11/22/22 1001   BP: 119/71         Constitutional: Pleasant, no acute distress   Head: Normocephalic, atraumatic   Eyes: Conjunctivae normal   Neck: Supple, symmetrical   Lungs: Normal respiratory effort, non-labored breathing   Cardiovascular: Limbs warm and well perfused   Abdomen: Non-protruded   Musculoskeletal: Muscle bulk symmetric, no atrophy, no gross deformities   · Lower Extremities: ROM WNL. · Thorax: Left trapezius and paraspinal tenderness. No scoliosis or kyphosis. Decreased left arm abduction - 120 degrees, otherwise ROM WNL. Negative Neers test.  Negative Cooper test.  Negative lift-off test.   · Lumbar Spine: ROM WNL. Lumbar paraspinals mild tenderness with palpation bilaterally. SLR positive bilaterally. MATEUSZ equivocal bilaterally. GAENSLEN positive bilaterally. Positive facet loading bilaterally. Bilateral SI joint tenderwith palpation, mild tenderness to left SI joint with palpation. Bilateral SI joint distraction positive. Bilateral greater trochanters tender to palpation. Neurological: Cranial nerves II-XII grossly intact. · Gait - Normal, non-antalgic gait. Ambulates without assistive device. · Motor: 5/5 muscle strength in bilateral hip flexion, knee flexion, knee extension, ankle dorsiflexion, and ankle plantar flexion   · Sensory: LT sensation intact in lower limbs   · Reflexes: 2+ symmetrical in bilateral achilles, 2+ bilateral patellar, negative ankle clonus, downgoing babinski   Skin: No rashes or lesions present   Psychological: Cooperative, no exaggerated pain behaviors     Assessment:    Diagnosis Orders   1.  Radiculopathy, lumbosacral region  External Referral To Physical Therapy    MRI LUMBAR SPINE [facility] Physical Therapy [facility]      2. Lumbosacral radiculitis  External Referral To Physical [facility] Physical [facility]      3. Chronic pain syndrome  External Referral To Physical [facility]      4. Lumbar spondylosis  External Referral To Physical [facility]      5. Sacroiliac joint dysfunction of both sides  External Referral To Physical [facility] Physical [facility]    CHG FLUOR NEEDLE/CATH SPINE/PARASPINAL DX/THER ADDON    WY INJECT SI JOINT ARTHRGRPHY&/ANES/STEROID W/IMAGE      6. Neuropathic pain  External Referral To Physical [facility]      7. Facet arthropathy  External Referral To Physical Therapy    [facility]            Areli Blackman is a 43 y. o.female presenting to the pain clinic for evaluation of lumbar back pain. Patient's history and physical consistent with lumbar facet mediated pain lumbar spondylosis as evident on her imaging. We have set her up for a lumbar medial branch block at bilateral L4/5 and L5/S1. We have discussed that she has several pain generators that may be addressed in the future. Patient's history and physical consistent with lumbar radiculopathy at bilateral L5 as evidenced on the EMG. I have set her up for a lumbar epidural steroid injection at L5-S1 with Dr. Deanna Trejo. In addition, she has bilateral SI joint dysfunction and may benefit from a bilateral SI joint injection. Patient failed to respond to the lumbar epidural steroid injection. Her EMG did show a bilateral L5 radiculopathy. She also has a history of an L4-5 disc buldge. She failed to respond to the lumbar epidural steroid injection. Her last MRI was completed almost 4 years ago. I have ordered a lumbar MRI for further investigation. We did discuss potentially pursuing another epidural from a different approach versus potential discectomy.   I have referred her to physical therapy for posterior core strengthening. I have also referred her for a functional capacity evaluation in regards to seeking employment. Her history and physical are consistent with bilateral SI joint dysfunction and I have set her up for bilateral SI joint injection with Dr. Erlinda Gill. Plan: The following treatment recommendations and plan were discussed in detail with Johnathan Giles. Imaging:   Lumbar MRI reviewed and discussed with the patient today. Updated lumbar MRI ordered. Analgesics:   Patient is taking Toradol and Mobic. Patient is advised to take as prescribed and not take on an empty stomach. Adjuvants:   None    Interventions: With examination consistent with bilateral sacroiliac dysfunction/pain, we will proceed with a bilateral sacroiliac joint injection. The risks and benefits were discussed in detail with the patient. Patient wants to proceed with the injection. Anticoagulation/NPO Recommendations:   Patient does not need to hold any medication prior to the procedure. Patient does need to be NPO x8 hours prior to the procedure. We will start an IV for the procedure  Patient will need a     Multidisciplinary Pain Management:   In the presence of complex, chronic, and multi-factorial pain, the importance of a multidisciplinary approach to pain management in the patients management regimen was emphasized and discussed in great detail. PHYSICAL THERAPY: Patient is advised to see a physical therapist for gentle stretching exercises and conditioning exercises for management of pain.      Referrals:  PT  [facility]    Prescriptions Written This Visit:   None    Follow-up: B/L SI injection     BINH Rand - CNP

## 2022-11-29 ENCOUNTER — TELEPHONE (OUTPATIENT)
Dept: PHYSICAL MEDICINE AND REHAB | Age: 42
End: 2022-11-29

## 2022-12-06 DIAGNOSIS — J45.30 MILD PERSISTENT ASTHMA WITHOUT COMPLICATION: ICD-10-CM

## 2022-12-06 RX ORDER — ALBUTEROL SULFATE 90 UG/1
AEROSOL, METERED RESPIRATORY (INHALATION)
Qty: 18 G | Refills: 11 | OUTPATIENT
Start: 2022-12-06

## 2022-12-06 NOTE — TELEPHONE ENCOUNTER
Tyrone Mccoy called requesting a refill on the following medications:  Requested Prescriptions     Pending Prescriptions Disp Refills    albuterol sulfate HFA (PROVENTIL;VENTOLIN;PROAIR) 108 (90 Base) MCG/ACT inhaler 18 g 11       Date of last visit: 8/30/2022  Date of next visit (if applicable):3/28/2023  Date of last refill: 12/29/2021  Pharm[facility] Name: [facility]

## 2022-12-08 DIAGNOSIS — J45.30 MILD PERSISTENT ASTHMA WITHOUT COMPLICATION: ICD-10-CM

## 2022-12-08 NOTE — TELEPHONE ENCOUNTER
Received refill request for Ventolin inhaler. Medication was last ordered by Sandhya Angulo. Medication was last ordered on 12/29/21 with 5 refills. Patient was last seen in the office 3/28/22. Does patient have a scheduled follow up?: yes - 3/28/23    Medication needs to be sent to [address] [place] [address] [phone]. Thank you, please advise! Patient's Allergies:   Allergies   Allergen Reactions    Hydrocodone      \"makes me feel completely out of whack\"    Prozac [Fluoxetine Hcl] Other (See Comments)     \"I want to kill myself\"

## 2022-12-19 ENCOUNTER — HOSPITAL ENCOUNTER (OUTPATIENT)
Dept: MRI IMAGING | Age: 42
Discharge: HOME | End: 2022-12-19
Payer: MEDICAID

## 2022-12-19 DIAGNOSIS — M54.17 RADICULOPATHY, LUMBOSACRAL REGION: ICD-10-CM

## 2022-12-19 PROCEDURE — 72148 MRI LUMBAR SPINE W/O DYE: CPT

## 2022-12-28 ENCOUNTER — HOSPITAL ENCOUNTER (OUTPATIENT)
Dept: PHYSICAL THERAPY | Age: 42
Setting detail: THERAPIES SERIES
Discharge: HOME | End: 2022-12-28
Payer: MEDICAID

## 2022-12-28 PROCEDURE — 97750 PHYSICAL PERFORMANCE TEST: CPT

## 2022-12-28 NOTE — DISCHARGE SUMMARY
** PLEASE SIGN, DATE AND TIME CERTIFICATION BELOW AND RETURN TO [facility] OUTPATIENT REHABILITATION (FAX #: [phone]). ATTEST/CO-SIGN IF ACCESSING VIA INFuninhand. THANK YOU.**    I certify that I have examined the patient below and determined that Physical Medicine and Rehabilitation service is necessary and that I approve the established plan of care for up to 90 days or as specifically noted. Attestation, signature or co-signature of physician indicates approval of certification requirements.    ________________________ ____________ __________  Physician Signature   Date   Time        [facility]  PHYSICAL THERAPY  FUNCTIONAL CAPACITY EVALUATION      Date: 2022  Patient Name:  Ronald Davila  : 1980  MRN: [id number]  CSN: [id number]    Referring Practitioner BINH Moy*   Diagnosis Radiculopathy, lumbosacral region [M54.17]  Lumbosacral radiculitis [M54.17]  Chronic pain syndrome [G89.4]  Lumbar spondylosis [M47.816]  Sacroiliac joint dysfunction of both sides [M53.3]  Neuropathic pain [M79.2]  Facet arthropathy [L21.760]    Reason for Referral FCE   Date of Evaluation 22    Additional Pertinent History DDD lumbar and thoracic spine. Insurance: Primary: Payor: [facility] /  /  / ,   Secondary:    Authorization Information: PRECERT REQUIRED:  Precertification required after evaluation. INSURANCE THERAPY BENEFIT:  PT/OT/ST 30 visits per calender year. 30 visits is a hard limit. FCE is a covered benefit. AQUATICS COVERED: Yes  MODALITIES COVERED:  Yes, however iontophoresis (70045) is not a covered benefit. Hot/Cold Packs are not covered.       Visit # 1, 1/10 for progress note   Visits Allowed: Per pre-cert   Recertification Date:    Physician Orders: FCE     Total time of physical performance tests/measurements and analysis/interpretation:  Start time: , End time:  , Total time: 83    SUBJECTIVE: Chief Complaint/Current Symptoms  Eufemia Dodd is a year-old who presents to outpatient therapy services secondary to impaired functional mobility and decreased dexterity/coordination. Patient referred to physical therapy for functional capacity examination to determine current functional status with potential application for disability benefits. Patient states she injured her back at work in 2008, [place] things have progressed since that time.   States in January of 2008, she was lifting a box of cookie dough over her head, and twisted herself to move the box, and heard a pop in her back. Was working for [employer]. States her pain is 0/10 when not moving, but is higher than 68/83 with certain movements. States she has over 10/10 pain with doing dishes, household chores, and walking during grocery shopping. Pain is also produced with sitting on her couch. C/o occasional tingling in left and right lower extremities (to toes) and right side is worse. Denies having any metal hardware in her body (e.g. from surgeries). Denies any history of spine surgery. Denies falls in the past 1 year. States she was diagnosed with Lupus 5 years ago, and states she sees Dr. Gillian Palomo for Lupus. Social/Functional History and Current Status:  Medications and Allergies have been reviewed and are listed on Medical History Questionnaire. Precautions: Degenerative disc disease. Lupus. Bipolar. Asthma. Patient weight: 206  Patient height: 5'4  Last date worked: September 2021  Employer: [employer]  Punctuality: Early for the assessment  Appearance: Appropriate  Marital status:   Children: 3 children  Family Dynamics: Lives with shruti and her sister  Support system: Spouse/Significant Other  Education: Did not finish High School  Work history: [employer] October 2007 - January 2008. [address] (2007 for 1 month). [employer] (2007 for 3 months). States she cannot recall where she worked prior to [employer].   \"Honestly, I can't remember. That is too far back. \"    Tamia Wang lives with significant other and with family in a multiple floor home with stairs and a handrail to enter. Objective:    Pain 0/10   Sensation Numbness and tingling (intermittent) in BLE; states none today   Vision/hearing No eye glasses or contact lenses per her report       NECK RANGE OF MOTION   Flexion WNL   Extension WNL   Rotation Right Moderate restriction; tight   Rotation Left WNL   Sidebending Right WNL   Sidebending Left WNL   [facility] Motion [facility]  []     TRUNK RANGE OF MOTION   Flexion Minimal restriction; tight   Extension Minimal restriction; tight   Rotation Right Minimal restriction; tight   Rotation Left Minimal restriction; tight   Sidebending Right Minimal restriction; tight   Sidebending Left Minimal restriction; tight   Trunk Range of Motion is [facility]  []       UPPER EXTREMITY RANGE OF MOTION    Left Right COMMENTS   Shoulder Flexion      Shoulder Extension      Shoulder Abduction      Shoulder Adduction      Shoulder External Rotation      Shoulder Internal Rotation      Shoulder Range of Motion is [facility]  [x]      Elbow Flexion      Elbow Extension      Elbow Range of Motion is [facility]  [x]     LOWER EXTREMITY RANGE OF MOTION    Left Right COMMENTS   Hip Flexion      Hip Extension      Hip Abduction      Hip External Rotation      Hip Internal Rotation      Hip Range of Motion is [facility]  [x]      Knee Flexion      Knee Extension      Ankle DF      Ankle PF      Knee and Ankle Range of Motion is [facility]  [x]       UPPER EXTREMITY STRENGTH    Left Right Comments   Shoulder Flexion      Shoulder Extension      Shoulder Abduction      Shoulder Adduction      Shoulder External Rotation      Shoulder Internal Rotation      Shoulder Horizontal ABD      Shoulder Horizontal ADD      [x]  Shoulder Strength is grossly WFL. Elbow Flexion      Elbow Extension      Wrist Flexion      Wrist Extension      [x] Elbow and Wrist Strength is grossly WFL. LOWER EXTREMITY STRENGTH    Left Right COMMENTS   Hip Flexion       Hip Extension      Hip Abduction      Hip External Rotation      Hip Internal Rotation      Hip Strength is [facility]  [x]      Knee Flexion 4 4    Knee Extension 4 4    Ankle DF 4+ 4+    Ankle PF 4+ 4+    Knee and Ankle Strength is WFL  []      STRENGTH (TESTED WITH DYNAMOMETER)   Dynamometer Setting Left Left Hand Norms Right Right Hand Norms   1 20  25    2 55, 35, 50  (Average: 46.6#)  Below Average 48.5-76 40, 45, 30  (Average: 38.3#)  Below Average 57-84   3 45  35    4 40  40    5 30  30      PINCH STRENGTH (TESTED WITH PINCH METER)    Left Left Hand Norms Right Right Hand Norms   2 Point Tip 10, 10, 11  (Average: 10.3#)  Within normal limits (WNL) 8-14 10, 12, 12  (Average: 11.3#)  WNL 9-14   3 [place] 14, 15, 15  (Average: 14.6#)  WNL 13-20 14, 15, 15  (Average: 14.6#)  WNL 14-20   Lateral/Pinch 12, 16, 10  (Average: 12.6#)  Below average 13-19 15, 13, 15  (Average: 14.3#)  WNL 14-20     [place] RATE OF MANIPULATION    Placing (seconds) Turning (seconds)   Test 1 89 69   Test 2 83 75   Total 172 144   Percentile Rank Less than 1st (Very Low) Less than 1st (Very Low)     PURDUE PEGBOARD TEST    # Completed Percentile Rank   Right Hand (pegs) 13 1st percentile   Left Hand (pegs) 10 Less than 1st percentile   Both (pairs) 22 99th percentile   Assembly (pieces) 12 Less than 1st percentile     MOBILITY EVALUATION    Completed Comments   Forward Walking (10 feet) Yes    Backward Walking (10 feet) Yes    Heel/Toe Walk (10 feet) Yes    Walk on Toes (10 feet) Yes    Walk on Heels (10 feet) No 8ft, and then stopped due to pain   Balance on Right Foot (5 sec) No 4 seconds   Balance on Left Foot (5 sec) Yes 10 seconds     NON MATERIAL HANDLING    Completed Comments   Sitting (30 minutes) No Back pain is worse with sitting in chair. \"This feels as hard as a rock. \"   Standing (30 minutes) Yes During course of several items including [place] and [place] tests, walking, and upper extremity strength testing. Walking (1/2 mile) No 320 feet   Static Trunk Bending (1 minute) Yes Tearful. Complained of 8/10 pain. Overhead Reaching (1 minute) Yes Wincing face. Deeper breaths. Repeated squatting (5 reps) Yes Wincing face. Deeper breaths. Kneeling (1 minute) Yes Patient needed to use her bilateral upper extremities to steady herself on the table when moving down to the kneeling position. Stooping (5 reps) Yes Some imbalance. Reached for wall on 5th rep, and kept her balance independently. Rates pain 8/10. Crouching (1 minutes) No 42 seconds. Stumbled and reached for wall. Complained of pain. Patient is tearful. Ladder Climb (1 flight of steps) Yes Step over step. Hung onto bilateral rails. Slow. Complains of pain rated 8/10. Tearful. MATERIAL HANDLING ACTIVITIES (POUNDS)    Occasional  1-33% Frequent  34-66% Constant   %   Floor to Chair 17.5 10.5 7   Chair to Waist 12.5 7.5 5   Waist to shoulder 12.5 7.5 5   Shoulder to overhead 12.5 7.5 5   Horizontal 12.5 7.5 5   Push/pull sled  46.5 27.9 18.6   Bilateral Carrying 12.5 7.5 5   Carrying: Left UE 12.5 7.5 5   Carrying: Right UE 12.5 7.5 5     Pain at end of session: 0/10    Body Mechanics: Patient was willing to attempt all of the lifts and tasks today. Patient demonstrated limited ability when attempting various weights during the gross motor assessment. Patient complained of 8/10 low back pain, and was tearful during the lifting tasks. Patient leaned against wall after most lifting tasks, reporting pain and fatigue as limiting factors regarding trying to attempt more weight. Patient required therapist cues to improve body mechanics with all lifting techniques. Patient reported an increase in mid to low back to 8/10 by the end of the assessment when patient started the session at 0/10 pain.       Physical Demand Category:  Sedentary work: Exerting up to 10# force occasionally and/or negligible amount of force frequently to lift, push, carry, pull or otherwise move objects, including human body. Recommendations: Angie Koo was assessed for physical limitations with functional activities/capacity for potentially seeking disability benefits. Based upon the strength classifications as established by the Dictionary of Occupation [place]. [place] falls within the Sedentary Work Classifications for Material Handling Activities. The patient demonstrates below average right and left  strength, and mostly WNL pinch strength. Patient scored less than 1st percentile regarding left and right hand peg placement -- and scored in 99th percentile regarding use of both hands for peg placement. The patient scored less than 1st percentile regarding [place] test.  Patient was tearful today, had some stumbles during the lifting tasks -- keeping her balance independently -- and complained of pain 8/10 by the end of the assessment. The patient demonstrates decreased dexterity completing fine motor tasks and difficulty with gross motor activities causing increased pain. After therapist instructions on technique, patient was better able to complete activities. Patient did not show signs of self-limiting behaviors. Signs of distress and altered mechanics were consistent with reported weight limits during material handling. Thank you for the opportunity to participate in the care of this patient. If you have any question or concerns regarding this patient, please do not hesitate to contact the clinic.         Time In 1301   Time Out 1424   Timed Code Minutes: 83 min   Total Treatment Time: 83 min       Electronically Signed by: Sai Shah PT

## 2022-12-29 ENCOUNTER — OFFICE VISIT (OUTPATIENT)
Dept: PHYSICAL MEDICINE AND REHAB | Age: 42
End: 2022-12-29
Payer: MEDICAID

## 2022-12-29 VITALS
SYSTOLIC BLOOD PRESSURE: 108 MMHG | BODY MASS INDEX: 35.17 KG/M2 | WEIGHT: 206 LBS | DIASTOLIC BLOOD PRESSURE: 72 MMHG | HEIGHT: 64 IN

## 2022-12-29 DIAGNOSIS — M79.2 NEUROPATHIC PAIN: ICD-10-CM

## 2022-12-29 DIAGNOSIS — M53.3 SACROILIAC JOINT DYSFUNCTION OF BOTH SIDES: ICD-10-CM

## 2022-12-29 DIAGNOSIS — M70.61 GREATER TROCHANTERIC BURSITIS OF RIGHT HIP: ICD-10-CM

## 2022-12-29 DIAGNOSIS — M47.819 FACET ARTHROPATHY: ICD-10-CM

## 2022-12-29 DIAGNOSIS — M47.816 LUMBAR SPONDYLOSIS: ICD-10-CM

## 2022-12-29 DIAGNOSIS — G89.4 CHRONIC PAIN SYNDROME: Primary | ICD-10-CM

## 2022-12-29 PROCEDURE — G8427 DOCREV CUR MEDS BY ELIG CLIN: HCPCS | Performed by: NURSE PRACTITIONER

## 2022-12-29 PROCEDURE — G8484 FLU IMMUNIZE NO ADMIN: HCPCS | Performed by: NURSE PRACTITIONER

## 2022-12-29 PROCEDURE — G8417 CALC BMI ABV UP PARAM F/U: HCPCS | Performed by: NURSE PRACTITIONER

## 2022-12-29 PROCEDURE — 99214 OFFICE O/P EST MOD 30 MIN: CPT | Performed by: NURSE PRACTITIONER

## 2022-12-29 PROCEDURE — 4004F PT TOBACCO SCREEN RCVD TLK: CPT | Performed by: NURSE PRACTITIONER

## 2022-12-29 NOTE — DISCHARGE INSTRUCTIONS

## 2022-12-29 NOTE — PROGRESS NOTES
Chronic Pain/PM&R Clinic Note     Encounter Date: 12/29/22    Subjective:   Chief Complaint:   Chief Complaint   Patient presents with    Follow-up     MRI results       History of Present Illness: Librado Cardoso is a 43 y.o. female seen in the clinic initially on  01/06/2022 upon request from Mark Escamilla MD for her history of lumbar pain. Patient states pain started after a lifting accident at work in 2008 where she had an injury to her thoracic spine. Patient states back pain has become gradually worse ever since. Patient states pain is worse when she is sitting, standing in 1 position, or doing activities such as cleaning around the house. Patient states she will occasionally get pain that radiates down bilateral legs, left more than right with strenuous activity. Patient states when pain gets severe she will lay down which seems to help. Patient does have trouble sleeping occasionally as she has some aching and twitching in bilateral legs. Patient states she has tried physical therapy and decompression several times which have been ineffective. It has been several years since she has done physical therapy. Patient states she does not currently work as she was unable to tolerate it. Patient denies weakness in bilateral legs but states she will occasionally feel like her knees want to give out. Patient denies falls. Patient denies saddle anesthesia or bowel or bladder incontinence. Patient also mentions other pain such as in her left shoulder, left wrist, and left knee. 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 She has had ongoing thoracic pain since then. Today, 12/29/2022, patient presents for planned follow up on chronic pain. She had her lumbar MRI completed 12/19/2022 and would like to review those results. She is slated for a bilateral SI joint injection with Dr. Edgardo Quiñonez 01/03/2023.   She is had no change in her pain since her last visit. She has been continuing to do physical therapy for the last 3 weeks and feels like it continues to aggravate her pain. She has not had any benefit from the physical therapy at this point. She states she is in a lot of pain today due to having the functional capacity evaluation yesterday which seemed to flareup significantly. She states that to our evaluation was very rough on her body. She denies any new symptoms such as focal leg weakness, new leg paresthesias, saddle anesthesia, bowel/bladder incontinence. History of Interventions:   Surgery: No previous thoracic or lumbar surgeries  Injections: Thoracic epidural?  Several years ago  Lumbar MBB L4-5 and L5-S1 (02/01/2022) - >85% relief x 2 days  Lumbar MBB L4-5 and L5-S1 (03/15/2022) - >85% relief x 3 days  Right lumbar RFA (04/12/2022) - significant relief  Left lumbar RFA (05/24/2022) - significant relief  B/L GTB injection (06/21/2022) - effective  LESI L5-S1 (11/08/2022) - no relief     Current Treatment Medications: Toradol as needed- mild effectiveness  Mobic - rare, effective    Historical Treatment Medications:   Cymbalta - fatigue  Ibuprofen - ineffective  Tylenol - ineffective   OTC muscle rub -ineffective    Imaging:  EMG      (Lumbar Xray 02/02/2021)    Narrative   2 views lumbar spine       Comparison:  MR  - MRI LUMBAR SPINE WO CONTRAST  - 02/22/2019 11:08 AM EST    CR,SR  - XR LUMBAR SPINE (2-3 VIEWS)  - 12/14/2018 01:50 PM EST       Findings   Normal vertebral body alignment. No acute fractures or dislocation. No significant degenerative change           Impression   No acute findings       This document has been electronically signed by: Malik Fregoso MD on    02/02/2021 01:58 AM       Lumbar MRI (02/22/2019)  Narrative   PROCEDURE: MRI LUMBAR SPINE WO CONTRAST       CLINICAL INFORMATION: DDD (degenerative disc disease), lumbar.  Additional history obtained from the electronic medical record indicates the patient has right-sided numbness when laying on the right side. Severe back pain. COMPARISON: None available. Correlation is made to radiographs of the lumbar spine dated December 14, 2018. TECHNIQUE: Sagittal and axial T1 and T2-weighted images were obtained through the lumbar spine. FINDINGS:       The lumbar spine is imaged from the inferior aspect of T10 to the superior aspect of S3. The conus medullaris terminates at the L1 level. No abnormal signal or expansion is present within the conus. The visualized nerve roots are evenly distributed    throughout the thecal sac. There is preservation of the expected lumbar lordosis. The vertebral body heights and alignment are maintained. No compression fracture deformity or suspicious marrow replacing lesion is identified. Degenerative facet arthropathy is present at every level. With regards to the disc spaces, at L1-L2, the spinal canal and neural foramina are patent. At L2-L3, the spinal canal and neural foramina are patent. At L3-L4, there is disc space narrowing, disc desiccation and a disc bulge. The spinal canal and neural foramina are patent. At L4-L5, there is disc desiccation and a posterior disc protrusion. This indents the ventral thecal sac and causes mild spinal canal narrowing. The neural foramina are patent. At L5-S1, the spinal canal and neural foramina are patent. No suspicious finding is identified within the visualized retroperitoneal and paraspinal soft tissues. Impression        Multilevel degenerative changes are present throughout the lumbar spine and are further discussed by level in the findings. These are most significant at L4-L5 where there is a posterior disc protrusion indenting the ventral thecal sac and causing mild    spinal canal narrowing. Mild degenerative facet arthropathy is present without significant neural foraminal narrowing.                    **This report has been created using voice recognition software. It may contain minor errors which are inherent in voice recognition technology. **       Final report electronically signed by Dr. Dileep Chahal on 2/22/2019 1:06 PM       Past Medical History:   Diagnosis Date    Anxiety     Asthma     DDD (degenerative disc disease), cervical     DDD (degenerative disc disease), thoracolumbar     Depression     GERD (gastroesophageal reflux disease)        Past Surgical History:   Procedure Laterality Date    HIP SURGERY Bilateral 6/21/2022    Bilateral greater trochanteric bursa injection performed by Zander Tucker DO at [facility] Bilateral 2/1/2022    Lumbar medial branch block at bilateral L4-5 and L5-S1 performed by Zander Tucker, DO at [facility] Bilateral 3/15/2022    medial branch blocks at bilateral L4-5 and L5-S1 performed by Zander Tucker DO at [facility] Right 4/12/2022    Lumbar radiofrequency ablation at  L4-5 and L5-S1, right performed by Zander Tucker DO at [facility] Left 5/24/2022    Lumbar RFA B/L L4-5 and L5-S1,LEFT performed by Zander Tucker DO at [facility] N/A 11/8/2022    epidural steroid injection  Lumbar 5-Sacral 1 performed by Zander Tucker DO at [address] [place] [address]       Family History   Problem Relation Age of Onset    Depression Mother     COPD Father     Asthma Sister     Depression Sister     Lupus Paternal Aunt     No Known Problems Daughter     No Known Problems Daughter     Asthma Daughter     No Known Problems Brother        Medications & Allergies:   Current Outpatient Medications   Medication Instructions    alclomethasone (ACLOVATE) 0.05 % ointment Apply topically 2 times daily.     DULoxetine (CYMBALTA) 30 mg, Oral, DAILY    fluticasone-salmeterol (ADVAIR) 100-50 MCG/DOSE diskus inhaler 1 puff, Inhalation, EVERY 12 HOURS, Rinse mouth after use    Handicap Placard MISC Does not apply, Exp: 12/29/2023<BR>Dx: lumbar spondylosis. SI joint dysfunction. Chronic pain    ketoconazole (NIZORAL) 2 % shampoo No dose, route, or frequency recorded. meloxicam (MOBIC) 15 mg, Oral, DAILY    ondansetron (ZOFRAN) 4 mg, Oral, EVERY 8 HOURS PRN    Spacer/Aero-Holding Chambers (VORTEX VALVED HOLDING CHAMBER) SUJATHA 1 Device, Does not apply, DAILY, Use with inhalers    triamcinolone (KENALOG) 0.025 % cream No dose, route, or frequency recorded. VENTOLIN  (90 Base) MCG/ACT inhaler INHALE TWO (2) PUFFS INTO THE LUNGS FOUR (4) TIMES DAILY AS NEEDED FOR WHEEZING       Allergies   Allergen Reactions    Hydrocodone      \"makes me feel completely out of whack\"    Prozac [Fluoxetine Hcl] Other (See Comments)     \"I want to kill myself\"       Review of Systems:   Constitutional: negative for weight changes or fevers  Genitourinary: negative for bowel/bladder incontinence   Musculoskeletal: positive for low back pain, bilateral hip pain, bilateral buttock pain, bilateral leg pain. Neurological: positive for right leg weakness and numbness/tingling  Behavioral/Psych: negative for anxiety/depression   All other systems reviewed and are negative    Objective:     Vitals:    12/29/22 1242   BP: 108/72       Constitutional: Pleasant, no acute distress   Head: Normocephalic, atraumatic   Eyes: Conjunctivae normal   Neck: Supple, symmetrical   Lungs: Normal respiratory effort, non-labored breathing   Cardiovascular: Limbs warm and well perfused   Abdomen: Non-protruded   Musculoskeletal: Muscle bulk symmetric, no atrophy, no gross deformities   · Lower Extremities: ROM WNL. · Thorax: Left trapezius and paraspinal tenderness. No scoliosis or kyphosis. Decreased left arm abduction - 120 degrees, otherwise ROM WNL.   Negative Neers test.  Negative Cooper test.  Negative lift-off test.   · Lumbar Spine: ROM WNL. Lumbar paraspinals mild tenderness with palpation bilaterally. SLR positive bilaterally. MATEUSZ equivocal bilaterally. GAENSLEN positive bilaterally. Positive facet loading bilaterally. Bilateral SI joint tenderwith palpation, mild tenderness to left SI joint with palpation. Bilateral SI joint distraction positive. Bilateral greater trochanters tender to palpation. Neurological: Cranial nerves II-XII grossly intact. · Gait - Normal, non-antalgic gait. Ambulates without assistive device. · Motor: 5/5 muscle strength in bilateral hip flexion, knee flexion, knee extension, ankle dorsiflexion, and ankle plantar flexion   · Sensory: LT sensation intact in lower limbs   · Reflexes: 2+ symmetrical in bilateral achilles, 2+ bilateral patellar, negative ankle clonus, downgoing babinski   Skin: No rashes or lesions present   Psychological: Cooperative, no exaggerated pain behaviors     Assessment:    Diagnosis Orders   1. Chronic pain syndrome        2. Lumbar spondylosis        3. Sacroiliac joint dysfunction of both sides        4. Neuropathic pain        5. Facet arthropathy        6. Greater trochanteric bursitis of right hip            Loreta Vergara is a 43 y. o.female presenting to the pain clinic for evaluation of lumbar back pain. Patient's history and physical consistent with lumbar facet mediated pain due to lumbar spondylosis as evident on her imaging. She has also been dealing with bilateral SI joint dysfunction and is scheduled for a bilateral SI joint injection with Dr. Ada Raymond 01/03/2023. We reviewed her recent lumbar MRI with comparison to her MRI in 2019 without significant change. I have advised her to continue with her current plan of pursuing the bilateral SI joint injections. We will then evaluate the pain that remains after her injection. We did discuss potentially doing a repeat lumbar epidural steroid injection at L4-5.   We will contact her once I receive the functional capacity evaluation. I have wrote for a handicap placard. Plan: The following treatment recommendations and plan were discussed in detail with Loreta Vergara. Imaging:   Lumbar MRI reviewed and discussed with the patient today. Analgesics:   Patient is taking Toradol and Mobic. Patient is advised to take as prescribed and not take on an empty stomach. Adjuvants:   None    Interventions: With examination consistent with bilateral sacroiliac dysfunction/pain, we will proceed with a bilateral sacroiliac joint injection. The risks and benefits were discussed in detail with the patient. Patient wants to proceed with the injection. Anticoagulation/NPO Recommendations:   Patient does not need to hold any medication prior to the procedure. Patient does need to be NPO x8 hours prior to the procedure. We will start an IV for the procedure  Patient will need a     Multidisciplinary Pain Management:   In the presence of complex, chronic, and multi-factorial pain, the importance of a multidisciplinary approach to pain management in the patients management regimen was emphasized and discussed in great detail. PHYSICAL THERAPY: Patient is advised to see a physical therapist for gentle stretching exercises and conditioning exercises for management of pain.      Referrals:  None    Prescriptions Written This Visit:   None    Follow-up: B/L SI injection     Dileep Garcia, BINH - CNP

## 2022-12-30 ENCOUNTER — PREP FOR PROCEDURE (OUTPATIENT)
Dept: PHYSICAL MEDICINE AND REHAB | Age: 42
End: 2022-12-30

## 2023-01-02 NOTE — H&P
Today, patient presents for planned bilateral sacroiliac joint injection. This note is reflective of the patient's previous visit for evaluation. We will proceed with today's planned procedure. Since patient's last visit for evaluation, there have been no interval changes in medical history. Patient has no new numbness, weakness, or focal neurological deficit since evaluation. Patient has no contraindications to injection (no anticoagulation or recent antibiotic intake for active infections), and has a  present or is able to drive themselves (as discussed and cleared by physician). Allergies to latex, contrast dye, and steroid medications have been confirmed with the patient prior to the procedure. NPO necessity has been assessed and accepted based on procedure complexity. The risks and benefits of the procedure have been explained including but are not limited to infection, bleeding, paralysis, immediate post procedure weakness, and dizziness; the patient acknowledges understanding and desires to proceed with the procedure. Patient has signed consent for same procedure as discussed in previous clinic encounter. All other questions and concerns were addressed at bedside. See procedure note for full details. Post procedure Instructions: The patient was advised not to drive during the day of the procedure and not to engage in any significant decision making (unless otherwise states by physician). The patient was also advised to be cautious with walking/activity for 24 hours following today's visit and asked not to engage in over-exertion (unless otherwise states by physician). After this time, it is ok to resume pre-procedure level of activity. Patient advised to apply ice to site of injection in situations of pain and discomfort. Patient advised to not submerge site of injection during bath or pool activities for approximately 24 hours post-procedure.  Patient attested to understanding post procedure directions / restrictions. All other questions and concerns addressed before patient discharge in ambulatory fashion. Chronic Pain/PM&R Clinic Note     Encounter Date: 12/29/22    Subjective:   Chief Complaint:   No chief complaint on file. History of Present Illness: Gregory Hinojosa is a 43 y.o. female seen in the clinic initially on  01/06/2022 upon request from Jac Boateng MD for her history of lumbar pain. Patient states pain started after a lifting accident at work in 2008 where she had an injury to her thoracic spine. Patient states back pain has become gradually worse ever since. Patient states pain is worse when she is sitting, standing in 1 position, or doing activities such as cleaning around the house. Patient states she will occasionally get pain that radiates down bilateral legs, left more than right with strenuous activity. Patient states when pain gets severe she will lay down which seems to help. Patient does have trouble sleeping occasionally as she has some aching and twitching in bilateral legs. Patient states she has tried physical therapy and decompression several times which have been ineffective. It has been several years since she has done physical therapy. Patient states she does not currently work as she was unable to tolerate it. Patient denies weakness in bilateral legs but states she will occasionally feel like her knees want to give out. Patient denies falls. Patient denies saddle anesthesia or bowel or bladder incontinence. Patient also mentions other pain such as in her left shoulder, left wrist, and left knee. 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 She has had ongoing thoracic pain since then. Today, 12/29/2022, patient presents for planned follow up on chronic pain. She had her lumbar MRI completed 12/19/2022 and would like to review those results.  She is slated for a bilateral SI joint injection with Dr. Gwen Bradley 01/03/2023. She is had no change in her pain since her last visit. She has been continuing to do physical therapy for the last 3 weeks and feels like it continues to aggravate her pain. She has not had any benefit from the physical therapy at this point. She states she is in a lot of pain today due to having the functional capacity evaluation yesterday which seemed to flareup significantly. She states that to our evaluation was very rough on her body. She denies any new symptoms such as focal leg weakness, new leg paresthesias, saddle anesthesia, bowel/bladder incontinence. History of Interventions:   Surgery: No previous thoracic or lumbar surgeries  Injections: Thoracic epidural?  Several years ago  Lumbar MBB L4-5 and L5-S1 (02/01/2022) - >85% relief x 2 days  Lumbar MBB L4-5 and L5-S1 (03/15/2022) - >85% relief x 3 days  Right lumbar RFA (04/12/2022) - significant relief  Left lumbar RFA (05/24/2022) - significant relief  B/L GTB injection (06/21/2022) - effective  LESI L5-S1 (11/08/2022) - no relief     Current Treatment Medications: Toradol as needed- mild effectiveness  Mobic - rare, effective    Historical Treatment Medications:   Cymbalta - fatigue  Ibuprofen - ineffective  Tylenol - ineffective   OTC muscle rub -ineffective    Imaging:  EMG      (Lumbar Xray 02/02/2021)    Narrative   2 views lumbar spine       Comparison:  MR  - MRI LUMBAR SPINE WO CONTRAST  - 02/22/2019 11:08 AM EST    CR,SR  - XR LUMBAR SPINE (2-3 VIEWS)  - 12/14/2018 01:50 PM EST       Findings   Normal vertebral body alignment. No acute fractures or dislocation. No significant degenerative change           Impression   No acute findings       This document has been electronically signed by:  Eriberto Daniels MD on    02/02/2021 01:58 AM       Lumbar MRI (02/22/2019)  Narrative   PROCEDURE: MRI LUMBAR SPINE WO CONTRAST       CLINICAL INFORMATION: DDD (degenerative disc disease), lumbar. Additional history obtained from the electronic medical record indicates the patient has right-sided numbness when laying on the right side. Severe back pain. COMPARISON: None available. Correlation is made to radiographs of the lumbar spine dated December 14, 2018. TECHNIQUE: Sagittal and axial T1 and T2-weighted images were obtained through the lumbar spine. FINDINGS:       The lumbar spine is imaged from the inferior aspect of T10 to the superior aspect of S3. The conus medullaris terminates at the L1 level. No abnormal signal or expansion is present within the conus. The visualized nerve roots are evenly distributed    throughout the thecal sac. There is preservation of the expected lumbar lordosis. The vertebral body heights and alignment are maintained. No compression fracture deformity or suspicious marrow replacing lesion is identified. Degenerative facet arthropathy is present at every level. With regards to the disc spaces, at L1-L2, the spinal canal and neural foramina are patent. At L2-L3, the spinal canal and neural foramina are patent. At L3-L4, there is disc space narrowing, disc desiccation and a disc bulge. The spinal canal and neural foramina are patent. At L4-L5, there is disc desiccation and a posterior disc protrusion. This indents the ventral thecal sac and causes mild spinal canal narrowing. The neural foramina are patent. At L5-S1, the spinal canal and neural foramina are patent. No suspicious finding is identified within the visualized retroperitoneal and paraspinal soft tissues. Impression        Multilevel degenerative changes are present throughout the lumbar spine and are further discussed by level in the findings. These are most significant at L4-L5 where there is a posterior disc protrusion indenting the ventral thecal sac and causing mild    spinal canal narrowing.  Mild degenerative facet arthropathy is present without significant neural foraminal narrowing. **This report has been created using voice recognition software. It may contain minor errors which are inherent in voice recognition technology. **       Final report electronically signed by Dr. Yvrose Liu on 2/22/2019 1:06 PM       Past Medical History:   Diagnosis Date    Anxiety     Asthma     DDD (degenerative disc disease), cervical     DDD (degenerative disc disease), thoracolumbar     Depression     GERD (gastroesophageal reflux disease)        Past Surgical History:   Procedure Laterality Date    HIP SURGERY Bilateral 6/21/2022    Bilateral greater trochanteric bursa injection performed by Edwina Rome DO at [facility] Bilateral 2/1/2022    Lumbar medial branch block at bilateral L4-5 and L5-S1 performed by Edwina Rome DO at [facility] Bilateral 3/15/2022    medial branch blocks at bilateral L4-5 and L5-S1 performed by Edwina Rome DO at [facility] Right 4/12/2022    Lumbar radiofrequency ablation at  L4-5 and L5-S1, right performed by Edwina Rome DO at [facility] Left 5/24/2022    Lumbar RFA B/L L4-5 and L5-S1,LEFT performed by Edwina Rome DO at [facility] N/A 11/8/2022    epidural steroid injection  Lumbar 5-Sacral 1 performed by Edwina Rome DO at [facility]       Family History   Problem Relation Age of Onset    Depression Mother     COPD Father     Asthma Sister     Depression Sister     Lupus Paternal Aunt     No Known Problems Daughter     No Known Problems Daughter     Asthma Daughter     No Known Problems Brother        Medications & Allergies:   Current Outpatient Medications   Medication Instructions    alclomethasone (ACLOVATE) 0.05 % ointment Apply topically 2 times daily. DULoxetine (CYMBALTA) 30 mg, Oral, DAILY    fluticasone-salmeterol (ADVAIR) 100-50 MCG/DOSE diskus inhaler 1 puff, Inhalation, EVERY 12 HOURS, Rinse mouth after use    Handicap Placard MISC Does not apply, Exp: 12/29/2023<BR>Dx: lumbar spondylosis. SI joint dysfunction. Chronic pain    ketoconazole (NIZORAL) 2 % shampoo No dose, route, or frequency recorded. meloxicam (MOBIC) 15 mg, Oral, DAILY    ondansetron (ZOFRAN) 4 mg, Oral, EVERY 8 HOURS PRN    Spacer/Aero-Holding Chambers (VORTEX VALVED HOLDING CHAMBER) SUJATHA 1 Device, Does not apply, DAILY, Use with inhalers    triamcinolone (KENALOG) 0.025 % cream No dose, route, or frequency recorded. VENTOLIN  (90 Base) MCG/ACT inhaler INHALE TWO (2) PUFFS INTO THE LUNGS FOUR (4) TIMES DAILY AS NEEDED FOR WHEEZING       Allergies   Allergen Reactions    Hydrocodone      \"makes me feel completely out of whack\"    Prozac [Fluoxetine Hcl] Other (See Comments)     \"I want to kill myself\"       Review of Systems:   Constitutional: negative for weight changes or fevers  Genitourinary: negative for bowel/bladder incontinence   Musculoskeletal: positive for low back pain, bilateral hip pain, bilateral buttock pain, bilateral leg pain. Neurological: positive for right leg weakness and numbness/tingling  Behavioral/Psych: negative for anxiety/depression   All other systems reviewed and are negative    Objective: There were no vitals filed for this visit. Constitutional: Pleasant, no acute distress   Head: Normocephalic, atraumatic   Eyes: Conjunctivae normal   Neck: Supple, symmetrical   Lungs: Normal respiratory effort, non-labored breathing   Cardiovascular: Limbs warm and well perfused   Abdomen: Non-protruded   Musculoskeletal: Muscle bulk symmetric, no atrophy, no gross deformities   · Lower Extremities: ROM WNL. · Thorax: Left trapezius and paraspinal tenderness. No scoliosis or kyphosis.   Decreased left arm abduction - 120 degrees, otherwise ROM WNL. Negative Neers test.  Negative Cooper test.  Negative lift-off test.   · Lumbar Spine: ROM WNL. Lumbar paraspinals mild tenderness with palpation bilaterally. SLR positive bilaterally. MATEUSZ equivocal bilaterally. GAENSLEN positive bilaterally. Positive facet loading bilaterally. Bilateral SI joint tenderwith palpation, mild tenderness to left SI joint with palpation. Bilateral SI joint distraction positive. Bilateral greater trochanters tender to palpation. Neurological: Cranial nerves II-XII grossly intact. · Gait - Normal, non-antalgic gait. Ambulates without assistive device. · Motor: 5/5 muscle strength in bilateral hip flexion, knee flexion, knee extension, ankle dorsiflexion, and ankle plantar flexion   · Sensory: LT sensation intact in lower limbs   · Reflexes: 2+ symmetrical in bilateral achilles, 2+ bilateral patellar, negative ankle clonus, downgoing babinski   Skin: No rashes or lesions present   Psychological: Cooperative, no exaggerated pain behaviors     Assessment:    Diagnosis Orders   1. Chronic pain syndrome        2. Lumbar spondylosis        3. Sacroiliac joint dysfunction of both sides        4. Neuropathic pain        5. Facet arthropathy        6. Greater trochanteric bursitis of right hip            Gary Humphrey is a 43 y. o.female presenting to the pain clinic for evaluation of lumbar back pain. Patient's history and physical consistent with lumbar facet mediated pain due to lumbar spondylosis as evident on her imaging. She has also been dealing with bilateral SI joint dysfunction and is scheduled for a bilateral SI joint injection with Dr. Octavio Goodpasture 01/03/2023. We reviewed her recent lumbar MRI with comparison to her MRI in 2019 without significant change. I have advised her to continue with her current plan of pursuing the bilateral SI joint injections. We will then evaluate the pain that remains after her injection.   We did discuss potentially doing a repeat lumbar epidural steroid injection at L4-5. We will contact her once I receive the functional capacity evaluation. I have wrote for a handicap placard. Plan: The following treatment recommendations and plan were discussed in detail with Bertrand Irene. Imaging:   Lumbar MRI reviewed and discussed with the patient today. Analgesics:   Patient is taking Toradol and Mobic. Patient is advised to take as prescribed and not take on an empty stomach. Adjuvants:   None    Interventions: With examination consistent with bilateral sacroiliac dysfunction/pain, we will proceed with a bilateral sacroiliac joint injection. The risks and benefits were discussed in detail with the patient. Patient wants to proceed with the injection. Anticoagulation/NPO Recommendations:   Patient does not need to hold any medication prior to the procedure. Patient does need to be NPO x8 hours prior to the procedure. We will start an IV for the procedure  Patient will need a     Multidisciplinary Pain Management:   In the presence of complex, chronic, and multi-factorial pain, the importance of a multidisciplinary approach to pain management in the patients management regimen was emphasized and discussed in great detail. PHYSICAL THERAPY: Patient is advised to see a physical therapist for gentle stretching exercises and conditioning exercises for management of pain.      Referrals:  None    Prescriptions Written This Visit:   None    Follow-up: B/L SI injection     Liborio Del Rio DO

## 2023-01-03 ENCOUNTER — HOSPITAL ENCOUNTER (OUTPATIENT)
Age: 43
Setting detail: OUTPATIENT SURGERY
Discharge: HOME | End: 2023-01-03
Attending: ANESTHESIOLOGY | Admitting: PHYSICAL MEDICINE & REHABILITATION
Payer: MEDICAID

## 2023-01-03 ENCOUNTER — APPOINTMENT (OUTPATIENT)
Dept: GENERAL RADIOLOGY | Age: 43
End: 2023-01-03
Attending: ANESTHESIOLOGY
Payer: MEDICAID

## 2023-01-03 VITALS
WEIGHT: 208 LBS | SYSTOLIC BLOOD PRESSURE: 123 MMHG | HEART RATE: 86 BPM | HEIGHT: 64 IN | DIASTOLIC BLOOD PRESSURE: 82 MMHG | BODY MASS INDEX: 35.51 KG/M2 | OXYGEN SATURATION: 97 % | RESPIRATION RATE: 16 BRPM | TEMPERATURE: 98.2 F

## 2023-01-03 LAB — PREGNANCY, URINE: NEGATIVE

## 2023-01-03 PROCEDURE — 2709999900 HC NON-CHARGEABLE SUPPLY: Performed by: ANESTHESIOLOGY

## 2023-01-03 PROCEDURE — 3209999900 FLUORO FOR SURGICAL PROCEDURES

## 2023-01-03 PROCEDURE — 99152 MOD SED SAME PHYS/QHP 5/>YRS: CPT | Performed by: ANESTHESIOLOGY

## 2023-01-03 PROCEDURE — 2500000003 HC RX 250 WO HCPCS: Performed by: ANESTHESIOLOGY

## 2023-01-03 PROCEDURE — 6360000002 HC RX W HCPCS: Performed by: ANESTHESIOLOGY

## 2023-01-03 PROCEDURE — 7100000010 HC PHASE II RECOVERY - FIRST 15 MIN: Performed by: ANESTHESIOLOGY

## 2023-01-03 PROCEDURE — 7100000011 HC PHASE II RECOVERY - ADDTL 15 MIN: Performed by: ANESTHESIOLOGY

## 2023-01-03 PROCEDURE — 3600000054 HC PAIN LEVEL 3 BASE: Performed by: ANESTHESIOLOGY

## 2023-01-03 PROCEDURE — 81025 URINE PREGNANCY TEST: CPT

## 2023-01-03 PROCEDURE — 27096 INJECT SACROILIAC JOINT: CPT | Performed by: ANESTHESIOLOGY

## 2023-01-03 RX ORDER — METHYLPREDNISOLONE ACETATE 80 MG/ML
INJECTION, SUSPENSION INTRA-ARTICULAR; INTRALESIONAL; INTRAMUSCULAR; SOFT TISSUE PRN
Status: DISCONTINUED | OUTPATIENT
Start: 2023-01-03 | End: 2023-01-03 | Stop reason: ALTCHOICE

## 2023-01-03 RX ORDER — LIDOCAINE HYDROCHLORIDE 10 MG/ML
INJECTION, SOLUTION EPIDURAL; INFILTRATION; INTRACAUDAL; PERINEURAL PRN
Status: DISCONTINUED | OUTPATIENT
Start: 2023-01-03 | End: 2023-01-03 | Stop reason: ALTCHOICE

## 2023-01-03 RX ORDER — BUPIVACAINE HYDROCHLORIDE 2.5 MG/ML
INJECTION, SOLUTION EPIDURAL; INFILTRATION; INTRACAUDAL PRN
Status: DISCONTINUED | OUTPATIENT
Start: 2023-01-03 | End: 2023-01-03 | Stop reason: ALTCHOICE

## 2023-01-03 RX ORDER — MIDAZOLAM HYDROCHLORIDE 1 MG/ML
INJECTION INTRAMUSCULAR; INTRAVENOUS PRN
Status: DISCONTINUED | OUTPATIENT
Start: 2023-01-03 | End: 2023-01-03 | Stop reason: ALTCHOICE

## 2023-01-03 RX ORDER — FENTANYL CITRATE 50 UG/ML
INJECTION, SOLUTION INTRAMUSCULAR; INTRAVENOUS PRN
Status: DISCONTINUED | OUTPATIENT
Start: 2023-01-03 | End: 2023-01-03 | Stop reason: ALTCHOICE

## 2023-01-03 ASSESSMENT — PAIN - FUNCTIONAL ASSESSMENT: PAIN_FUNCTIONAL_ASSESSMENT: 0-10

## 2023-01-03 ASSESSMENT — PAIN DESCRIPTION - DESCRIPTORS: DESCRIPTORS: ACHING

## 2023-01-03 NOTE — PROCEDURES
Pre-operative Diagnosis:  SI joint pain     Post-operative Diagnosis:  SI joint pain     Procedure: Bilateral SI joint injection     Procedure Description:  After having signed the informed consent, the patient was placed in the prone position. The patient's back was prepped with chloraprep solution, and draped in a sterile fashion. A total of 2 ml of 1% lidocaine were used to anesthetize the skin and underlying tissues. Under fluoroscopic guidance a single 22-gauge, 3.5 inch spinal needle was advanced to lie within the inferior pole of the RIGHT sacroiliac joint. There were no paresthesias or heme aspiration. Needle placement was confirmed in the AP view. After negative aspiration, 0.5 ml of Omnipaque 300 contrast was injected with appropriate spread observed. A total of 2 ml of 0.25% bupivicaine mixed with 40 mg depo-medrol were injected into the sacroiliac joint. The needle was withdrawn without any complications. This exact procedure was repeated on the contralateral side. The patient tolerated the procedure well, was transported to the recovery room and observed for 15 minutes and discharged in an ambulatory fashion. No immediate reported complications.     Procedural Complications: None  Estimated Blood Loss: 0 mL    IV sedation was used during the procedure:  - Moderate intravenous conscious sedation was supervised by Dr. Yoselyn Garcia  - The patient was independently monitored by a Registered Nurse assigned to the procedure room  - Monitoring included automated blood pressure, continuous EKG, and continuous pulse oximetry  - The detailed conscious record is permanently stored in the [place] [id number]  - The following is the conscious sedation record:  Start Time: 08:22  End Time : 08:37  Duration: 15 minutes   Medications Administered: 2 mg Versed, 100 mcg Fentanyl      Liborio Blackmon DO  Interventional Pain Management/PM&R   [place]

## 2023-01-03 NOTE — PROGRESS NOTES
0833-Patient to Phase II via cart. Report received from [facility]. Patient drowsy but responsive. Vitals obtained and stable. Respirations even and unlabored on room air. Patient denies nausea, numbness and tingling. Patient able to move all extremities. Patient tearful and states she is having pain. Denies wanting to be repositioned or wanting a snack or drink. No drainage noted at injection sites. Patient instructed to stay in bed. Instructed on call light use. 0845-Patient assisted with sitting up. Patient provided with snack and drink. Denies needs. Call light in reach. 0900-IV removed with no complications. Patient getting dressed at bedside. Ride notified of pickup.  0905-Patient meets discharge criteria. Discharged in stable condition with responsible . All belongings given to patient. Patient ambulated to car with assistance from RN. Patient tolerated well.

## 2023-01-03 NOTE — PRE SEDATION
[facility]  Pre-Sedation/Analgesia History & Physical    Pt Name: Leonora Quiros  MRN: [id number]  YOB: 1980  Provider Performing Procedure: Fausto Manzano DO   Primary Care Physician: Anderson Rueda MD      MEDICAL HISTORY       has a past medical history of Anxiety, Asthma, DDD (degenerative disc disease), cervical, DDD (degenerative disc disease), thoracolumbar, Depression, and GERD (gastroesophageal reflux disease). SURGICAL HISTORY   has a past surgical history that includes Pain management procedure (Bilateral, 2/1/2022); Pain management procedure (Bilateral, 3/15/2022); Pain management procedure (Right, 4/12/2022); Pain management procedure (Left, 5/24/2022); hip surgery (Bilateral, 6/21/2022); and Pain management procedure (N/A, 11/8/2022). ALLERGIES   Allergies as of 11/29/2022 - Fully Reviewed 11/22/2022   Allergen Reaction Noted    Hydrocodone  02/27/2020    Prozac [fluoxetine hcl] Other (See Comments) 12/05/2018       MEDICATIONS   Prior to Admission medications    Medication Sig Start Date End Date Taking? Authorizing Provider   Handicap Placard MISC by Does not apply route Exp: 12/29/2023  Dx: lumbar spondylosis. SI joint dysfunction.  Chronic pain 12/29/22   BINH Yun CNP   VENTOLIN  (90 Base) MCG/ACT inhaler INHALE TWO (2) PUFFS INTO THE LUNGS FOUR (4) TIMES DAILY AS NEEDED FOR WHEEZING 12/9/22   BINH Kenyon CNP   meloxicam (MOBIC) 15 MG tablet Take 1 tablet by mouth daily 8/30/22 8/25/23  Anderson Rueda MD   DULoxetine (CYMBALTA) 30 MG extended release capsule Take 1 capsule by mouth daily 8/17/22   Liborio Blackmon DO   fluticasone-salmeterol (ADVAIR) 100-50 MCG/DOSE diskus inhaler Inhale 1 puff into the lungs every 12 hours Rinse mouth after use 3/28/22   BINH Kenyon CNP   ondansetron (ZOFRAN) 4 MG tablet Take 1 tablet by mouth every 8 hours as needed for Nausea 1/12/22   Anderson Rueda MD   ketoconazole (NIZORAL) 2 % shampoo  1/3/22   Historical Provider, MD   triamcinolone (KENALOG) 0.025 % cream  1/3/22   Historical Provider, MD   alclomethasone (ACLOVATE) 0.05 % ointment Apply topically 2 times daily. 9/21/21   Jessica Omer MD   Spacer/Aero-Holding Chambers (VORTEX VALVED HOLDING CHAMBER) SUJATHA 1 Device by Does not apply route daily Use with inhalers 9/30/19   Maureen Tejeda APRN - CNP     PHYSICAL:   Vitals:    01/03/23 0833   BP: 123/82   Pulse: 86   Resp: 16   Temp: 98.2 °F (36.8 °C)   SpO2: 97%     PLANNED PROCEDURE   See procedure note  SEDATION  Planned agent: Versed and Fentanyl  ASA Classification: 1  Class 1: A normal healthy patient  Class 2: Pt with mild to moderate systemic disease  Class 3: Severe systemic disease or disturbance  Class 4: Severe systemic disorders that are already life threatening. Class 5: Moribund pt with little chances of survival, for more than 24 hours. Mallampati I Airway Classification: 1    1. Pre-procedure diagnostic studies complete and results available. 2. Previous sedation/anesthesia experiences assessed. 3. The patient is an appropriate candidate to undergo the planned procedure sedation and anesthesia. (Refer to nursing sedation/analgesia documentation record)  4. Formulation and discussion of sedation/procedure plan, risks, and expectations with patient and/or responsible adult completed. 5. Patient examined immediately prior to the procedure.  (Refer to nursing sedation/analgesia documentation record)    Radha Corrigan DO  Electronically signed 1/3/2023 at 10:50 AM

## 2023-01-03 NOTE — POST SEDATION
[address] [address]  Sedation/Analgesia Post Sedation Record    Pt Name: Breann Frazier  MRN: [id number]  YOB: 1980  Procedure Performed By: Edwina Rome DO  Primary Care Physician: Noemi Meza MD    POST-PROCEDURE    Physicians/Assistants: Edwina Rome DO  Procedure Performed: See Procedure Note   Sedation/Anesthesia: Versed and Fentanyl (See procedure note for amount and duration)  Estimated Blood Loss:     0  ml  Specimens Removed: None        Complications: None           Liborio Lynn DO  Electronically signed 1/3/2023 at 10:50 AM

## 2023-01-17 ENCOUNTER — OFFICE VISIT (OUTPATIENT)
Dept: PHYSICAL MEDICINE AND REHAB | Age: 43
End: 2023-01-17

## 2023-01-17 VITALS
DIASTOLIC BLOOD PRESSURE: 76 MMHG | BODY MASS INDEX: 35.51 KG/M2 | WEIGHT: 208 LBS | SYSTOLIC BLOOD PRESSURE: 112 MMHG | HEIGHT: 64 IN

## 2023-01-17 DIAGNOSIS — M54.17 RADICULOPATHY, LUMBOSACRAL REGION: ICD-10-CM

## 2023-01-17 DIAGNOSIS — M47.816 LUMBAR SPONDYLOSIS: ICD-10-CM

## 2023-01-17 DIAGNOSIS — G89.4 CHRONIC PAIN SYNDROME: ICD-10-CM

## 2023-01-17 DIAGNOSIS — M53.3 SACROILIAC JOINT DYSFUNCTION OF BOTH SIDES: Primary | ICD-10-CM

## 2023-01-17 RX ORDER — CLINDAMYCIN HYDROCHLORIDE 300 MG/1
CAPSULE ORAL
COMMUNITY
Start: 2023-01-11

## 2023-01-17 RX ORDER — CHLORHEXIDINE GLUCONATE 0.12 MG/ML
RINSE ORAL
COMMUNITY
Start: 2023-01-11

## 2023-01-17 NOTE — PROGRESS NOTES
Chronic Pain/PM&R Clinic Note     Encounter Date: 1/17/23    Subjective:   Chief Complaint:   Chief Complaint   Patient presents with    Follow Up After Procedure     bilateral sacroiliac joint injection 1/3/13         History of Present Illness: Claudia Herring is a 43 y.o. female seen in the clinic initially on  01/06/2022 upon request from Le Graham MD for her history of lumbar pain. Patient states pain started after a lifting accident at work in 2008 where she had an injury to her thoracic spine. Patient states back pain has become gradually worse ever since. Patient states pain is worse when she is sitting, standing in 1 position, or doing activities such as cleaning around the house. Patient states she will occasionally get pain that radiates down bilateral legs, left more than right with strenuous activity. Patient states when pain gets severe she will lay down which seems to help. Patient does have trouble sleeping occasionally as she has some aching and twitching in bilateral legs. Patient states she has tried physical therapy and decompression several times which have been ineffective. It has been several years since she has done physical therapy. Patient states she does not currently work as she was unable to tolerate it. Patient denies weakness in bilateral legs but states she will occasionally feel like her knees want to give out. Patient denies falls. Patient denies saddle anesthesia or bowel or bladder incontinence. Patient also mentions other pain such as in her left shoulder, left wrist, and left knee. 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 She has had ongoing thoracic pain since then. Today, 12/29/2022, patient presents for planned follow up on chronic pain. She had her lumbar MRI completed 12/19/2022 and would like to review those results.  She is slated for a bilateral SI joint injection with  Neymar 01/03/2023. She is had no change in her pain since her last visit. She has been continuing to do physical therapy for the last 3 weeks and feels like it continues to aggravate her pain. She has not had any benefit from the physical therapy at this point. She states she is in a lot of pain today due to having the functional capacity evaluation yesterday which seemed to flareup significantly. She states that to our evaluation was very rough on her body. She denies any new symptoms such as focal leg weakness, new leg paresthesias, saddle anesthesia, bowel/bladder incontinence. Today, 01/17/2023, patient presents for planned follow up on chronic pain. She had a bilateral SI joint injection 01/03/2023 and reports significant relief x 1.5 weeks after the injection her pain started to return. She is wondering what else we can do to assist with her pain stemming from the SI joints. She denies any new symptoms such as new focal leg weakness, new leg paresthesias, saddle anesthesia, bowel/bladder incontinence. She did have a functional capacity evaluation on 12/28/2022 and would like to review those results. History of Interventions:   Surgery: No previous thoracic or lumbar surgeries  Injections: Thoracic epidural?  Several years ago  Lumbar MBB L4-5 and L5-S1 (02/01/2022) - >85% relief x 2 days  Lumbar MBB L4-5 and L5-S1 (03/15/2022) - >85% relief x 3 days  Right lumbar RFA (04/12/2022) - significant relief  Left lumbar RFA (05/24/2022) - significant relief  B/L GTB injection (06/21/2022) - effective  LESI L5-S1 (11/08/2022) - no relief   B/L SI injection (01/03/2023) - >85% relief x 1.5 weeks    Current Treatment Medications:    Toradol as needed- mild effectiveness  Mobic - rare, effective    Historical Treatment Medications:   Cymbalta - fatigue  Ibuprofen - ineffective  Tylenol - ineffective   OTC muscle rub -ineffective    Imaging:  EMG      (Lumbar MRI 12/19/2022)  Narrative   PROCEDURE: MRI LUMBAR SPINE WO CONTRAST       CLINICAL INFORMATION: Radiculopathy, lumbosacral region. COMPARISON: Plain radiographs dated 2/2/2021. TECHNIQUE: Sagittal and axial T1 and T2-weighted images were obtained through the lumbar spine. FINDINGS:           The lumbar vertebral bodies are normally aligned. There is normal marrow signal throughout. There is no bone marrow edema. There are no compression fractures. No pars defects are noted. .       The distal spinal cord, conus medullaris and cauda equina are normal.        There are no gross abnormalities in the visualized aspects of the distal thoracic spine. On the axial images, at T11-12, there is a 2.9 mm disc protrusion. There is no canal stenosis or compression upon underlying spinal cord. At T12-L1, there is no disc herniation, canal or foraminal stenosis. At L1-L2, there is no disc herniation, canal or foraminal stenosis. At L2-L3, there is no disc herniation, canal or foraminal stenosis. At L3-L4, there is a 2.7 mm bulging disc and facet hypertrophy. This causes mild canal and mild-to-moderate bilateral foraminal stenosis. At L4-L5, there is a 3.7 mm bulging disc and facet hypertrophy. This results in mild-to-moderate canal and bilateral foraminal stenosis. At L5-S1, there is no disc herniation, canal or foraminal stenosis. There are no suspicious findings in the visualized aspects of the retroperitoneum and paraspinal soft tissues. Impression       1. There is mild-to-moderate canal and bilateral foraminal stenosis at L4-5.   2. There is mild canal and mild-to-moderate bilateral foraminal stenosis at L3-4.   3. There is a disc protrusion with no significant compression upon underlying spinal cord at T11-12.   4. Otherwise negative MRI scan of the lumbar spine. **This report has been created using voice recognition software.  It may contain minor errors which are inherent in voice recognition technology. **       Final report electronically signed by DR Marli Sprague on 12/19/2022 10:56 AM           Past Medical History:   Diagnosis Date    Anxiety     Asthma     DDD (degenerative disc disease), cervical     DDD (degenerative disc disease), thoracolumbar     Depression     GERD (gastroesophageal reflux disease)        Past Surgical History:   Procedure Laterality Date    BACK INJECTION Bilateral 1/3/2023    bilateral sacroiliac joint injection performed by Brian Zee DO at [address] [facility] Bilateral 6/21/2022    Bilateral greater trochanteric bursa injection performed by Brian Zee DO at [facility] Bilateral 2/1/2022    Lumbar medial branch block at bilateral L4-5 and L5-S1 performed by Brian Zee DO at [facility] Bilateral 3/15/2022    medial branch blocks at bilateral L4-5 and L5-S1 performed by Brian Zee DO at [facility] Right 4/12/2022    Lumbar radiofrequency ablation at  L4-5 and L5-S1, right performed by Brian Zee DO at [facility] Left 5/24/2022    Lumbar RFA B/L L4-5 and L5-S1,LEFT performed by Brian Zee DO at [facility] N/A 11/8/2022    epidural steroid injection  Lumbar 5-Sacral 1 performed by Brian Zee DO at [address] [place]       Family History   Problem Relation Age of Onset    Depression Mother     COPD Father     Asthma Sister     Depression Sister     Lupus Paternal Aunt     No Known Problems Daughter     No Known Problems Daughter     Asthma Daughter     No Known Problems Brother        Medications & Allergies:   Current Outpatient Medications   Medication Instructions    alclomethasone (ACLOVATE) 0.05 % ointment Apply topically 2 times daily.     chlorhexidine (PERIDEX) 0.12 % solution No dose, route, or frequency recorded. clindamycin (CLEOCIN) 300 MG capsule No dose, route, or frequency recorded. fluticasone-salmeterol (ADVAIR) 100-50 MCG/DOSE diskus inhaler 1 puff, Inhalation, EVERY 12 HOURS, Rinse mouth after use    Handicap Placard MISC Does not apply, Exp: 12/29/2023<BR>Dx: lumbar spondylosis. SI joint dysfunction. Chronic pain    ketoconazole (NIZORAL) 2 % shampoo No dose, route, or frequency recorded. meloxicam (MOBIC) 15 mg, Oral, DAILY    ondansetron (ZOFRAN) 4 mg, Oral, EVERY 8 HOURS PRN    Spacer/Aero-Holding Chambers (VORTEX VALVED HOLDING CHAMBER) SUJATHA 1 Device, Does not apply, DAILY, Use with inhalers    triamcinolone (KENALOG) 0.025 % cream No dose, route, or frequency recorded. VENTOLIN  (90 Base) MCG/ACT inhaler INHALE TWO (2) PUFFS INTO THE LUNGS FOUR (4) TIMES DAILY AS NEEDED FOR WHEEZING       Allergies   Allergen Reactions    Hydrocodone      \"makes me feel completely out of whack\"    Prozac [Fluoxetine Hcl] Other (See Comments)     \"I want to kill myself\"       Review of Systems:   Constitutional: negative for weight changes or fevers  Genitourinary: negative for bowel/bladder incontinence   Musculoskeletal: positive for low back pain, bilateral hip pain, bilateral buttock pain, bilateral leg pain. Neurological: positive for right leg weakness and numbness/tingling  Behavioral/Psych: negative for anxiety/depression   All other systems reviewed and are negative    Objective:     Vitals:    01/17/23 0914   BP: 112/76     Constitutional: Pleasant, no acute distress   Head: Normocephalic, atraumatic   Eyes: Conjunctivae normal   Neck: Supple, symmetrical   Lungs: Normal respiratory effort, non-labored breathing   Cardiovascular: Limbs warm and well perfused   Abdomen: Non-protruded   Musculoskeletal: Muscle bulk symmetric, no atrophy, no gross deformities   · Lower Extremities: ROM WNL. · Thorax: Left trapezius and paraspinal tenderness.  No scoliosis or kyphosis. Decreased left arm abduction - 120 degrees, otherwise ROM WNL. Negative Neers test.  Negative Cooper test.  Negative lift-off test.   · Lumbar Spine: ROM WNL. Lumbar paraspinals mild tenderness with palpation bilaterally. SLR positive bilaterally. MATEUSZ equivocal bilaterally. GAENSLEN positive bilaterally. Positive facet loading bilaterally. Bilateral SI joint tenderwith palpation, mild tenderness to left SI joint with palpation. Bilateral SI joint distraction positive. Bilateral greater trochanters tender to palpation. Neurological: Cranial nerves II-XII grossly intact. · Gait - Normal, non-antalgic gait. Ambulates without assistive device. · Motor: 5/5 muscle strength in bilateral hip flexion, knee flexion, knee extension, ankle dorsiflexion, and ankle plantar flexion   · Sensory: LT sensation intact in lower limbs   · Reflexes: 2+ symmetrical in bilateral achilles, 2+ bilateral patellar, negative ankle clonus, downgoing babinski   Skin: No rashes or lesions present   Psychological: Cooperative, no exaggerated pain behaviors     Assessment:    Diagnosis Orders   1. Sacroiliac joint dysfunction of both sides  CHG FLUOR NEEDLE/CATH SPINE/PARASPINAL DX/THER ADDON    KS INJECT SI JOINT ARTHRGRPHY&/ANES/STEROID W/YOSELIN      2. Lumbar spondylosis        3. Radiculopathy, lumbosacral region        4. Chronic pain syndrome          Aminta Banegas is a 43 y. o.female presenting to the pain clinic for evaluation of lumbar back pain. Patient's history and physical consistent with lumbar facet mediated pain due to lumbar spondylosis as evident on her imaging. She has also been dealing with bilateral SI joint dysfunction and had a significant response, while only lasting 1.5 weeks. I have set her up for a bilateral SI joint block with Dr. Mitchel Gurrola. We discussed the potential of pursuing a bilateral SI joint radiofrequency ablation.   We did discuss potentially doing a repeat lumbar epidural steroid injection at L4-5 last visit. 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 We discussed potential employment, such as pursuing a job as a . She states she has done this in the past and had a significant amount of pain. I do feel would be difficult to get disability based on the functional capacity evaluation and changes on her imaging. Plan: The following treatment recommendations and plan were discussed in detail with Oli Fernandez. Imaging:   Lumbar MRI reviewed and discussed with the patient today. Analgesics:   Patient is taking Toradol and Mobic. Patient is advised to take as prescribed and not take on an empty stomach. Adjuvants:   None    Interventions: With examination consistent with bilateral sacroiliac dysfunction/pain, we will proceed with a bilateral sacroiliac joint BLOCK. The risks and benefits were discussed in detail with the patient. Patient wants to proceed with the injection. Anticoagulation/NPO Recommendations:   Patient does not need to hold any medication prior to the procedure. Patient does need to be NPO x8 hours prior to the procedure. We will start an IV for the procedure  Patient will need a     Multidisciplinary Pain Management:   In the presence of complex, chronic, and multi-factorial pain, the importance of a multidisciplinary approach to pain management in the patients management regimen was emphasized and discussed in great detail. PHYSICAL THERAPY: Patient is advised to see a physical therapist for gentle stretching exercises and conditioning exercises for management of pain.      Referrals:  None    Prescriptions Written This Visit:   None    Follow-up: B/L SI BLOCK, in office 1 week after    BINH Pittman CNP

## 2023-02-03 ENCOUNTER — PREP FOR PROCEDURE (OUTPATIENT)
Dept: PHYSICAL MEDICINE AND REHAB | Age: 43
End: 2023-02-03

## 2023-02-07 NOTE — DISCHARGE INSTRUCTIONS
Post procedure Instructions:    No driving or making significant decisions for the remainder of the day. Be cautions with walking and activity for 24 hours, do not over exert yourself. Ok to resume pre-procedure activity level today. Apply ice to site of injection site if you have pain or discomfort. Do not submerge sit of injection during bath or pool activities for 48 hours post-procedure. Resume blood thinning medications in 24 hours. Call office [phone] if you have:  Temperature greater than 100.4  Persistent nausea and vomiting  Severe uncontrolled pain  Redness, tenderness, or signs of infection (pain, swelling, redness, odor or green/yellow discharge around the site)  Difficulty breathing, headache or visual disturbances  Hives  Persistent dizziness or light-headedness  Extreme fatigue  Any other questions or concerns you may have after discharge    In an emergency, call 911 or go to an Emergency Department at a nearby hospital    Surgical Site Infections      How can we work together to prevent Surgical Site Infections? We would like to thank you for choosing [facility] for your Surgical Care. Below you will find helpful information on how we can work together to prevent Surgical Site Infections. What is a Surgical Site Infection (SSI)? A surgical site infection is an infection that occurs after surgery in the part of the body where the surgery took place. Most patients who have surgery do not develop an infection. However, infections develop in about 1 to 3 out of every 100 patients who have surgery. Some of the common symptoms of a surgical site infection are:  Redness and pain around the area where you had surgery  Drainage of cloudy fluid from your surgical wound  Fever    Can SSIs be treated? Yes. Most surgical site infections can be treated with antibiotics. The antibiotic given to you depends on the bacteria (germs) causing the infection.  Sometimes patients with SSIs also need another surgery to treat the infection. What are some of the things that hospitals are doing to prevent SSIs? To prevent SSIs, doctors, nurses, and other healthcare providers: May remove some of your hair immediately before your surgery using electric clippers if the hair is in the same area where the procedure will occur. They should not shave you with a razor. Give you antibiotics before your surgery starts. In most cases, you should get antibiotics within 60 minutes before the surgery starts and the antibiotics should be stopped within 24 hours after surgery. Clean the skin at the site of your surgery with a special soap that kills germs. Clean their hands and arms up to their elbows with an antiseptic agent just before the surgery. Wear special hair covers, masks, gowns, and gloves during surgery to  keep the surgery area clean. Clean their hands with soap and water or an alcohol-based hand rub before and after caring for each patient. If you do not see your providers clean their hands, please     ask  them to do so. What can I do to help prevent SSIs? Before your surgery:  Tell your doctor about other medical problems you may have. Health problems such as allergies, diabetes, and obesity could affect your surgery and your treatment. Quit smoking. Patients who smoke get more infections. Talk to your doctor about how you can quit before your surgery. Do not shave near where you will have surgery. Shaving with a razor can irritate your skin and make it easier to develop an infection. At the time of your surgery:  Speak up if someone tries to shave you with a razor before surgery. Ask why you need to be shaved and talk with your surgeon if you have any concerns. Ask if you will get antibiotics before surgery.   After your surgery:  Make sure that your healthcare providers clean their hands before examining you, either with soap and water or an alcohol-based hand rub.  Family and friends who visit you should not touch the surgical wound or dressings. Family and friends should clean their hands with soap and water or an alcohol-based hand rub before and after visiting you. If you do not see them clean their hands, ask them to clean their hands. What do I need to do when I go home from the hospital?  Before you go home, your doctor or nurse should explain everything you need to know about taking care of your wound. Make sure you understand how to care for your wound before you leave the hospital.  Always clean your hands before and after caring for your wound. Before you go home, make sure you know who to contact if you have questions or problems after you get home. If you have any symptoms of an infection, such as redness and pain at the surgery site, drainage, or fever, call your doctor immediately. If you have additional questions, please ask your doctor or nurse.

## 2023-02-08 NOTE — H&P
Today, patient presents for planned bilateral sacroiliac joint BLOCK. This note is reflective of the patient's previous visit for evaluation. We will proceed with today's planned procedure. Since patient's last visit for evaluation, there have been no interval changes in medical history. Patient has no new numbness, weakness, or focal neurological deficit since evaluation. Patient has no contraindications to injection (no anticoagulation or recent antibiotic intake for active infections), and has a  present or is able to drive themselves (as discussed and cleared by physician). Allergies to latex, contrast dye, and steroid medications have been confirmed with the patient prior to the procedure. NPO necessity has been assessed and accepted based on procedure complexity. The risks and benefits of the procedure have been explained including but are not limited to infection, bleeding, paralysis, immediate post procedure weakness, and dizziness; the patient acknowledges understanding and desires to proceed with the procedure. Patient has signed consent for same procedure as discussed in previous clinic encounter. All other questions and concerns were addressed at bedside. See procedure note for full details. Post procedure Instructions: The patient was advised not to drive during the day of the procedure and not to engage in any significant decision making (unless otherwise states by physician). The patient was also advised to be cautious with walking/activity for 24 hours following today's visit and asked not to engage in over-exertion (unless otherwise states by physician). After this time, it is ok to resume pre-procedure level of activity. Patient advised to apply ice to site of injection in situations of pain and discomfort. Patient advised to not submerge site of injection during bath or pool activities for approximately 24 hours post-procedure.  Patient attested to understanding post procedure directions / restrictions. All other questions and concerns addressed before patient discharge in ambulatory fashion.       Chronic Pain/PM&R Clinic Note     Encounter Date: 1/17/23    Subjective:   Chief Complaint:   No chief complaint on file.        History of Present Illness:   Eufemia Koo is a 42 y.o. female seen in the clinic initially on  01/06/2022 upon request from Otoniel Llamas MD for her history of lumbar pain.  Patient states pain started after a lifting accident at work in 2008 where she had an injury to her thoracic spine.  Patient states back pain has become gradually worse ever since.  Patient states pain is worse when she is sitting, standing in 1 position, or doing activities such as cleaning around the house.  Patient states she will occasionally get pain that radiates down bilateral legs, left more than right with strenuous activity.  Patient states when pain gets severe she will lay down which seems to help.  Patient does have trouble sleeping occasionally as she has some aching and twitching in bilateral legs.  Patient states she has tried physical therapy and decompression several times which have been ineffective.  It has been several years since she has done physical therapy.  Patient states she does not currently work as she was unable to tolerate it.  Patient denies weakness in bilateral legs but states she will occasionally feel like her knees want to give out.  Patient denies falls.  Patient denies saddle anesthesia or bowel or bladder incontinence.  Patient also mentions other pain such as in her left shoulder, left wrist, and left knee.  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  She has had ongoing thoracic pain since then.    Today, 12/29/2022, patient presents for planned follow up on chronic pain. She had her lumbar MRI completed 12/19/2022 and would like to review those results. She is slated for a  bilateral SI joint injection with Dr. Jordan Blevins 01/03/2023. She is had no change in her pain since her last visit. She has been continuing to do physical therapy for the last 3 weeks and feels like it continues to aggravate her pain. She has not had any benefit from the physical therapy at this point. She states she is in a lot of pain today due to having the functional capacity evaluation yesterday which seemed to flareup significantly. She states that to our evaluation was very rough on her body. She denies any new symptoms such as focal leg weakness, new leg paresthesias, saddle anesthesia, bowel/bladder incontinence. Today, 01/17/2023, patient presents for planned follow up on chronic pain. She had a bilateral SI joint injection 01/03/2023 and reports significant relief x 1.5 weeks after the injection her pain started to return. She is wondering what else we can do to assist with her pain stemming from the SI joints. She denies any new symptoms such as new focal leg weakness, new leg paresthesias, saddle anesthesia, bowel/bladder incontinence. She did have a functional capacity evaluation on 12/28/2022 and would like to review those results. History of Interventions:   Surgery: No previous thoracic or lumbar surgeries  Injections: Thoracic epidural?  Several years ago  Lumbar MBB L4-5 and L5-S1 (02/01/2022) - >85% relief x 2 days  Lumbar MBB L4-5 and L5-S1 (03/15/2022) - >85% relief x 3 days  Right lumbar RFA (04/12/2022) - significant relief  Left lumbar RFA (05/24/2022) - significant relief  B/L GTB injection (06/21/2022) - effective  LESI L5-S1 (11/08/2022) - no relief   B/L SI injection (01/03/2023) - >85% relief x 1.5 weeks    Current Treatment Medications:    Toradol as needed- mild effectiveness  Mobic - rare, effective    Historical Treatment Medications:   Cymbalta - fatigue  Ibuprofen - ineffective  Tylenol - ineffective   OTC muscle rub -ineffective    Imaging:  EMG      (Lumbar MRI 12/19/2022)  Narrative   PROCEDURE: MRI LUMBAR SPINE WO CONTRAST       CLINICAL INFORMATION: Radiculopathy, lumbosacral region. COMPARISON: Plain radiographs dated 2/2/2021. TECHNIQUE: Sagittal and axial T1 and T2-weighted images were obtained through the lumbar spine. FINDINGS:           The lumbar vertebral bodies are normally aligned. There is normal marrow signal throughout. There is no bone marrow edema. There are no compression fractures. No pars defects are noted. .       The distal spinal cord, conus medullaris and cauda equina are normal.        There are no gross abnormalities in the visualized aspects of the distal thoracic spine. On the axial images, at T11-12, there is a 2.9 mm disc protrusion. There is no canal stenosis or compression upon underlying spinal cord. At T12-L1, there is no disc herniation, canal or foraminal stenosis. At L1-L2, there is no disc herniation, canal or foraminal stenosis. At L2-L3, there is no disc herniation, canal or foraminal stenosis. At L3-L4, there is a 2.7 mm bulging disc and facet hypertrophy. This causes mild canal and mild-to-moderate bilateral foraminal stenosis. At L4-L5, there is a 3.7 mm bulging disc and facet hypertrophy. This results in mild-to-moderate canal and bilateral foraminal stenosis. At L5-S1, there is no disc herniation, canal or foraminal stenosis. There are no suspicious findings in the visualized aspects of the retroperitoneum and paraspinal soft tissues. Impression       1. There is mild-to-moderate canal and bilateral foraminal stenosis at L4-5.   2. There is mild canal and mild-to-moderate bilateral foraminal stenosis at L3-4.   3. There is a disc protrusion with no significant compression upon underlying spinal cord at T11-12.   4. Otherwise negative MRI scan of the lumbar spine. **This report has been created using voice recognition software.  It may contain minor errors which are inherent in voice recognition technology. **       Final report electronically signed by DR Cristhian Fisher on 12/19/2022 10:56 AM           Past Medical History:   Diagnosis Date    Anxiety     Asthma     DDD (degenerative disc disease), cervical     DDD (degenerative disc disease), thoracolumbar     Depression     GERD (gastroesophageal reflux disease)        Past Surgical History:   Procedure Laterality Date    BACK INJECTION Bilateral 1/3/2023    bilateral sacroiliac joint injection performed by Jules Avila DO at [address] [place] [address] Bilateral 6/21/2022    Bilateral greater trochanteric bursa injection performed by Jules Avila DO at [facility] Bilateral 2/1/2022    Lumbar medial branch block at bilateral L4-5 and L5-S1 performed by Jules Avila DO at [facility] Bilateral 3/15/2022    medial branch blocks at bilateral L4-5 and L5-S1 performed by Jules Avila DO at [facility] Right 4/12/2022    Lumbar radiofrequency ablation at  L4-5 and L5-S1, right performed by Jules Avila DO at [facility] Left 5/24/2022    Lumbar RFA B/L L4-5 and L5-S1,LEFT performed by Jules Avila DO at [facility] N/A 11/8/2022    epidural steroid injection  Lumbar 5-Sacral 1 performed by Jules Avila DO at [address] [place] [address]       Family History   Problem Relation Age of Onset    Depression Mother     COPD Father     Asthma Sister     Depression Sister     Lupus Paternal Aunt     No Known Problems Daughter     No Known Problems Daughter     Asthma Daughter     No Known Problems Brother        Medications & Allergies:   Current Outpatient Medications   Medication Instructions    alclomethasone (ACLOVATE) 0.05 % ointment Apply topically 2 times daily. chlorhexidine (PERIDEX) 0.12 % solution No dose, route, or frequency recorded. clindamycin (CLEOCIN) 300 MG capsule No dose, route, or frequency recorded. fluticasone-salmeterol (ADVAIR) 100-50 MCG/DOSE diskus inhaler 1 puff, Inhalation, EVERY 12 HOURS, Rinse mouth after use    Handicap Placard MISC Does not apply, Exp: 12/29/2023<BR>Dx: lumbar spondylosis. SI joint dysfunction. Chronic pain    ketoconazole (NIZORAL) 2 % shampoo No dose, route, or frequency recorded. meloxicam (MOBIC) 15 mg, Oral, DAILY    ondansetron (ZOFRAN) 4 mg, Oral, EVERY 8 HOURS PRN    Spacer/Aero-Holding Chambers (VORTEX VALVED HOLDING CHAMBER) SUJATHA 1 Device, Does not apply, DAILY, Use with inhalers    triamcinolone (KENALOG) 0.025 % cream No dose, route, or frequency recorded. VENTOLIN  (90 Base) MCG/ACT inhaler INHALE TWO (2) PUFFS INTO THE LUNGS FOUR (4) TIMES DAILY AS NEEDED FOR WHEEZING       Allergies   Allergen Reactions    Hydrocodone      \"makes me feel completely out of whack\"    Prozac [Fluoxetine Hcl] Other (See Comments)     \"I want to kill myself\"       Review of Systems:   Constitutional: negative for weight changes or fevers  Genitourinary: negative for bowel/bladder incontinence   Musculoskeletal: positive for low back pain, bilateral hip pain, bilateral buttock pain, bilateral leg pain. Neurological: positive for right leg weakness and numbness/tingling  Behavioral/Psych: negative for anxiety/depression   All other systems reviewed and are negative    Objective: There were no vitals filed for this visit. Constitutional: Pleasant, no acute distress   Head: Normocephalic, atraumatic   Eyes: Conjunctivae normal   Neck: Supple, symmetrical   Lungs: Normal respiratory effort, non-labored breathing   Cardiovascular: Limbs warm and well perfused   Abdomen: Non-protruded   Musculoskeletal: Muscle bulk symmetric, no atrophy, no gross deformities   · Lower Extremities: ROM WNL.    · Thorax: Left trapezius and paraspinal tenderness. No scoliosis or kyphosis. Decreased left arm abduction - 120 degrees, otherwise ROM WNL. Negative Neers test.  Negative Cooper test.  Negative lift-off test.   · Lumbar Spine: ROM WNL. Lumbar paraspinals mild tenderness with palpation bilaterally. SLR positive bilaterally. MATEUSZ equivocal bilaterally. GAENSLEN positive bilaterally. Positive facet loading bilaterally. Bilateral SI joint tenderwith palpation, mild tenderness to left SI joint with palpation. Bilateral SI joint distraction positive. Bilateral greater trochanters tender to palpation. Neurological: Cranial nerves II-XII grossly intact. · Gait - Normal, non-antalgic gait. Ambulates without assistive device. · Motor: 5/5 muscle strength in bilateral hip flexion, knee flexion, knee extension, ankle dorsiflexion, and ankle plantar flexion   · Sensory: LT sensation intact in lower limbs   · Reflexes: 2+ symmetrical in bilateral achilles, 2+ bilateral patellar, negative ankle clonus, downgoing babinski   Skin: No rashes or lesions present   Psychological: Cooperative, no exaggerated pain behaviors     Assessment:    Diagnosis Orders   1. Sacroiliac joint dysfunction of both sides  CHG FLUOR NEEDLE/CATH SPINE/PARASPINAL DX/THER ADDON    NM INJECT SI JOINT ARTHRGRPHY&/ANES/STEROID W/YOSELIN      2. Lumbar spondylosis        3. Radiculopathy, lumbosacral region        4. Chronic pain syndrome          Rhetta Phalen is a 43 y. o.female presenting to the pain clinic for evaluation of lumbar back pain. Patient's history and physical consistent with lumbar facet mediated pain due to lumbar spondylosis as evident on her imaging. She has also been dealing with bilateral SI joint dysfunction and had a significant response, while only lasting 1.5 weeks. I have set her up for a bilateral SI joint block with Dr. Delmy Dobbs. We discussed the potential of pursuing a bilateral SI joint radiofrequency ablation.   We did discuss potentially doing a repeat lumbar epidural steroid injection at L4-5 last visit. 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 We discussed potential employment, such as pursuing a job as a . She states she has done this in the past and had a significant amount of pain. I do feel would be difficult to get disability based on the functional capacity evaluation and changes on her imaging. Plan: The following treatment recommendations and plan were discussed in detail with Kenneth Felipe. Imaging:   Lumbar MRI reviewed and discussed with the patient today. Analgesics:   Patient is taking Toradol and Mobic. Patient is advised to take as prescribed and not take on an empty stomach. Adjuvants:   None    Interventions: With examination consistent with bilateral sacroiliac dysfunction/pain, we will proceed with a bilateral sacroiliac joint BLOCK. The risks and benefits were discussed in detail with the patient. Patient wants to proceed with the injection. Anticoagulation/NPO Recommendations:   Patient does not need to hold any medication prior to the procedure. Patient does need to be NPO x8 hours prior to the procedure. We will start an IV for the procedure  Patient will need a     Multidisciplinary Pain Management:   In the presence of complex, chronic, and multi-factorial pain, the importance of a multidisciplinary approach to pain management in the patients management regimen was emphasized and discussed in great detail. PHYSICAL THERAPY: Patient is advised to see a physical therapist for gentle stretching exercises and conditioning exercises for management of pain.      Referrals:  None    Prescriptions Written This Visit:   None    Follow-up: B/L SI BLOCK, in office 1 week after    Jules Avila DO

## 2023-02-09 ENCOUNTER — HOSPITAL ENCOUNTER (OUTPATIENT)
Age: 43
Setting detail: OUTPATIENT SURGERY
Discharge: HOME | End: 2023-02-09
Attending: ANESTHESIOLOGY | Admitting: ANESTHESIOLOGY
Payer: MEDICAID

## 2023-02-09 ENCOUNTER — APPOINTMENT (OUTPATIENT)
Dept: GENERAL RADIOLOGY | Age: 43
End: 2023-02-09
Attending: ANESTHESIOLOGY
Payer: MEDICAID

## 2023-02-09 VITALS
RESPIRATION RATE: 14 BRPM | WEIGHT: 208.2 LBS | HEART RATE: 75 BPM | DIASTOLIC BLOOD PRESSURE: 72 MMHG | BODY MASS INDEX: 35.55 KG/M2 | TEMPERATURE: 96.8 F | SYSTOLIC BLOOD PRESSURE: 109 MMHG | HEIGHT: 64 IN | OXYGEN SATURATION: 93 %

## 2023-02-09 LAB — PREGNANCY, URINE: NEGATIVE

## 2023-02-09 PROCEDURE — 6360000004 HC RX CONTRAST MEDICATION: Performed by: ANESTHESIOLOGY

## 2023-02-09 PROCEDURE — 7100000011 HC PHASE II RECOVERY - ADDTL 15 MIN: Performed by: ANESTHESIOLOGY

## 2023-02-09 PROCEDURE — 99152 MOD SED SAME PHYS/QHP 5/>YRS: CPT | Performed by: ANESTHESIOLOGY

## 2023-02-09 PROCEDURE — 7100000010 HC PHASE II RECOVERY - FIRST 15 MIN: Performed by: ANESTHESIOLOGY

## 2023-02-09 PROCEDURE — 3209999900 FLUORO FOR SURGICAL PROCEDURES

## 2023-02-09 PROCEDURE — 6360000002 HC RX W HCPCS: Performed by: ANESTHESIOLOGY

## 2023-02-09 PROCEDURE — 2709999900 HC NON-CHARGEABLE SUPPLY: Performed by: ANESTHESIOLOGY

## 2023-02-09 PROCEDURE — 81025 URINE PREGNANCY TEST: CPT

## 2023-02-09 PROCEDURE — 3600000054 HC PAIN LEVEL 3 BASE: Performed by: ANESTHESIOLOGY

## 2023-02-09 PROCEDURE — 2500000003 HC RX 250 WO HCPCS: Performed by: ANESTHESIOLOGY

## 2023-02-09 RX ORDER — FENTANYL CITRATE 50 UG/ML
INJECTION, SOLUTION INTRAMUSCULAR; INTRAVENOUS PRN
Status: DISCONTINUED | OUTPATIENT
Start: 2023-02-09 | End: 2023-02-09 | Stop reason: ALTCHOICE

## 2023-02-09 RX ORDER — BUPIVACAINE HYDROCHLORIDE 2.5 MG/ML
INJECTION, SOLUTION EPIDURAL; INFILTRATION; INTRACAUDAL PRN
Status: DISCONTINUED | OUTPATIENT
Start: 2023-02-09 | End: 2023-02-09 | Stop reason: ALTCHOICE

## 2023-02-09 RX ORDER — MIDAZOLAM HYDROCHLORIDE 1 MG/ML
INJECTION INTRAMUSCULAR; INTRAVENOUS PRN
Status: DISCONTINUED | OUTPATIENT
Start: 2023-02-09 | End: 2023-02-09 | Stop reason: ALTCHOICE

## 2023-02-09 RX ORDER — LIDOCAINE HYDROCHLORIDE 10 MG/ML
INJECTION, SOLUTION EPIDURAL; INFILTRATION; INTRACAUDAL; PERINEURAL PRN
Status: DISCONTINUED | OUTPATIENT
Start: 2023-02-09 | End: 2023-02-09 | Stop reason: ALTCHOICE

## 2023-02-09 RX ORDER — METHYLPREDNISOLONE ACETATE 80 MG/ML
INJECTION, SUSPENSION INTRA-ARTICULAR; INTRALESIONAL; INTRAMUSCULAR; SOFT TISSUE PRN
Status: DISCONTINUED | OUTPATIENT
Start: 2023-02-09 | End: 2023-02-09 | Stop reason: ALTCHOICE

## 2023-02-09 ASSESSMENT — PAIN SCALES - GENERAL: PAINLEVEL_OUTOF10: 4

## 2023-02-09 ASSESSMENT — PAIN - FUNCTIONAL ASSESSMENT: PAIN_FUNCTIONAL_ASSESSMENT: 0-10

## 2023-02-09 NOTE — PRE SEDATION
[facility]  Pre-Sedation/Analgesia History & Physical    Pt Name: Eufemia Koo  MRN: [id number]  YOB: 1980  Provider Performing Procedure: Liborio Blackmon DO   Primary Care Physician: Otoniel Llamas MD      MEDICAL HISTORY       has a past medical history of Anxiety, Asthma, DDD (degenerative disc disease), cervical, DDD (degenerative disc disease), thoracolumbar, Depression, and GERD (gastroesophageal reflux disease).  SURGICAL HISTORY   has a past surgical history that includes Pain management procedure (Bilateral, 2/1/2022); Pain management procedure (Bilateral, 3/15/2022); Pain management procedure (Right, 4/12/2022); Pain management procedure (Left, 5/24/2022); hip surgery (Bilateral, 6/21/2022); Pain management procedure (N/A, 11/8/2022); and Back Injection (Bilateral, 1/3/2023).    ALLERGIES   Allergies as of 01/17/2023 - Fully Reviewed 01/17/2023   Allergen Reaction Noted    Hydrocodone  02/27/2020    Prozac [fluoxetine hcl] Other (See Comments) 12/05/2018       MEDICATIONS   Prior to Admission medications    Medication Sig Start Date End Date Taking? Authorizing Provider   chlorhexidine (PERIDEX) 0.12 % solution  1/11/23   Historical Provider, MD   clindamycin (CLEOCIN) 300 MG capsule  1/11/23   Historical Provider, MD   Handicap Placard MISC by Does not apply route Exp: 12/29/2023  Dx: lumbar spondylosis. SI joint dysfunction. Chronic pain 12/29/22   BINH Mix CNP   VENTOLIN  (90 Base) MCG/ACT inhaler INHALE TWO (2) PUFFS INTO THE LUNGS FOUR (4) TIMES DAILY AS NEEDED FOR WHEEZING 12/9/22   BINH Waterman CNP   meloxicam (MOBIC) 15 MG tablet Take 1 tablet by mouth daily 8/30/22 8/25/23  Otoniel Llamas MD   fluticasone-salmeterol (ADVAIR) 100-50 MCG/DOSE diskus inhaler Inhale 1 puff into the lungs every 12 hours Rinse mouth after use 3/28/22   BINH Waterman CNP   ondansetron (ZOFRAN) 4 MG tablet Take 1 tablet by mouth every 8 hours  as needed for Nausea 1/12/22   Vaughn Pabon MD   ketoconazole (NIZORAL) 2 % shampoo  1/3/22   Historical Provider, MD   triamcinolone (KENALOG) 0.025 % cream  1/3/22   Historical Provider, MD   alclomethasone (ACLOVATE) 0.05 % ointment Apply topically 2 times daily. 9/21/21   Vaughn Pabon MD   Spacer/Aero-Holding Chambers (VORTEX VALVED HOLDING CHAMBER) SUJATHA 1 Device by Does not apply route daily Use with inhalers 9/30/19   BINH Maldonado - CNP     PHYSICAL:   Vitals:    02/09/23 0807   BP: 109/72   Pulse: 75   Resp: 14   Temp: 96.8 °F (36 °C)   SpO2: 93%     PLANNED PROCEDURE   See procedure note  SEDATION  Planned agent: Versed and Fentanyl  ASA Classification: 2  Class 1: A normal healthy patient  Class 2: Pt with mild to moderate systemic disease  Class 3: Severe systemic disease or disturbance  Class 4: Severe systemic disorders that are already life threatening. Class 5: Moribund pt with little chances of survival, for more than 24 hours. Mallampati I Airway Classification: 2    1. Pre-procedure diagnostic studies complete and results available. 2. Previous sedation/anesthesia experiences assessed. 3. The patient is an appropriate candidate to undergo the planned procedure sedation and anesthesia. (Refer to nursing sedation/analgesia documentation record)  4. Formulation and discussion of sedation/procedure plan, risks, and expectations with patient and/or responsible adult completed. 5. Patient examined immediately prior to the procedure.  (Refer to nursing sedation/analgesia documentation record)    Shin Johnson DO  Electronically signed 2/9/2023 at 9:42 AM

## 2023-02-09 NOTE — PROCEDURES
Pre-operative Diagnosis:  SI joint pain     Post-operative Diagnosis:  SI joint pain     Procedure: Bilateral SI joint injection     Procedure Description:  After having signed the informed consent, the patient was placed in the prone position. The patient's back was prepped with chloraprep solution, and draped in a sterile fashion. A total of 2 ml of 1% lidocaine were used to anesthetize the skin and underlying tissues. Under fluoroscopic guidance a single 22-gauge, 3.5 inch spinal needle was advanced to lie within the inferior pole of the RIGHT sacroiliac joint. There were no paresthesias or heme aspiration. Needle placement was confirmed in the AP view. After negative aspiration, 0.5 ml of Omnipaque 300 contrast was injected with appropriate spread observed. A total of 2 ml of 0.25% bupivicaine mixed with 40 mg depo-medrol were injected into the sacroiliac joint. The needle was withdrawn without any complications. This exact procedure was repeated on the contralateral side. The patient tolerated the procedure well, was transported to the recovery room and observed for 15 minutes and discharged in an ambulatory fashion. No immediate reported complications.     Procedural Complications: None  Estimated Blood Loss: 0 mL    IV sedation was used during the procedure:  - Moderate intravenous conscious sedation was supervised by Dr. Joe Ledesma  - The patient was independently monitored by a Registered Nurse assigned to the procedure room  - Monitoring included automated blood pressure, continuous EKG, and continuous pulse oximetry  - The detailed conscious record is permanently stored in the [place] [id number]  - The following is the conscious sedation record:  Start Time: 07:58  End Time : 08:13  Duration: 15 minutes   Medications Administered: 2 mg Versed, 50 mcg Fentanyl      Liboiro Blackmon DO  Interventional Pain Management/PM&R   [place]

## 2023-02-09 NOTE — PROGRESS NOTES
Discharge instructions provided to the patient, voiced understanding.  Call light given to the patient

## 2023-02-09 NOTE — POST SEDATION
[facility]  Sedation/Analgesia Post Sedation Record    Pt Name: Lucas Arguelles  MRN: [id number]  YOB: 1980  Procedure Performed By: Jeanine Dempsey DO  Primary Care Physician: Antonio Puentes MD    POST-PROCEDURE    Physicians/Assistants: Jeanine Dempsey DO  Procedure Performed: See Procedure Note   Sedation/Anesthesia: Versed and Fentanyl (See procedure note for amount and duration)  Estimated Blood Loss:     0  ml  Specimens Removed: None        Complications: None           Liborio Rm DO  Electronically signed 2/9/2023 at 9:42 AM

## 2023-02-20 SDOH — HEALTH STABILITY: PHYSICAL HEALTH: ON AVERAGE, HOW MANY DAYS PER WEEK DO YOU ENGAGE IN MODERATE TO STRENUOUS EXERCISE (LIKE A BRISK WALK)?: 0 DAYS

## 2023-02-20 ASSESSMENT — SOCIAL DETERMINANTS OF HEALTH (SDOH)
WITHIN THE LAST YEAR, HAVE TO BEEN RAPED OR FORCED TO HAVE ANY KIND OF SEXUAL ACTIVITY BY YOUR PARTNER OR EX-PARTNER?: NO
WITHIN THE LAST YEAR, HAVE YOU BEEN KICKED, HIT, SLAPPED, OR OTHERWISE PHYSICALLY HURT BY YOUR PARTNER OR EX-PARTNER?: NO
WITHIN THE LAST YEAR, HAVE YOU BEEN AFRAID OF YOUR PARTNER OR EX-PARTNER?: NO
WITHIN THE LAST YEAR, HAVE YOU BEEN HUMILIATED OR EMOTIONALLY ABUSED IN OTHER WAYS BY YOUR PARTNER OR EX-PARTNER?: NO

## 2023-02-21 ENCOUNTER — OFFICE VISIT (OUTPATIENT)
Dept: FAMILY MEDICINE CLINIC | Age: 43
End: 2023-02-21
Payer: MEDICAID

## 2023-02-21 VITALS
BODY MASS INDEX: 35.43 KG/M2 | RESPIRATION RATE: 16 BRPM | WEIGHT: 206.4 LBS | DIASTOLIC BLOOD PRESSURE: 72 MMHG | SYSTOLIC BLOOD PRESSURE: 124 MMHG | TEMPERATURE: 98.7 F | HEART RATE: 80 BPM

## 2023-02-21 DIAGNOSIS — Z12.39 ENCOUNTER FOR BREAST CANCER SCREENING USING NON-MAMMOGRAM MODALITY: Primary | ICD-10-CM

## 2023-02-21 DIAGNOSIS — Z11.4 SCREENING FOR HIV WITHOUT PRESENCE OF RISK FACTORS: ICD-10-CM

## 2023-02-21 DIAGNOSIS — J41.0 SIMPLE CHRONIC BRONCHITIS (HCC): ICD-10-CM

## 2023-02-21 DIAGNOSIS — Z78.9 VARICELLA VACCINATION STATUS UNKNOWN: ICD-10-CM

## 2023-02-21 DIAGNOSIS — F33.2 SEVERE EPISODE OF RECURRENT MAJOR DEPRESSIVE DISORDER, WITHOUT PSYCHOTIC FEATURES (HCC): ICD-10-CM

## 2023-02-21 DIAGNOSIS — M32.9 H/O SYSTEMIC LUPUS ERYTHEMATOSUS (SLE) (HCC): ICD-10-CM

## 2023-02-21 DIAGNOSIS — F33.1 MDD (MAJOR DEPRESSIVE DISORDER), RECURRENT EPISODE, MODERATE (HCC): ICD-10-CM

## 2023-02-21 DIAGNOSIS — F41.8 OTHER SPECIFIED ANXIETY DISORDERS: ICD-10-CM

## 2023-02-21 PROCEDURE — 3023F SPIROM DOC REV: CPT | Performed by: NURSE PRACTITIONER

## 2023-02-21 PROCEDURE — G8427 DOCREV CUR MEDS BY ELIG CLIN: HCPCS | Performed by: NURSE PRACTITIONER

## 2023-02-21 PROCEDURE — G8417 CALC BMI ABV UP PARAM F/U: HCPCS | Performed by: NURSE PRACTITIONER

## 2023-02-21 PROCEDURE — G8484 FLU IMMUNIZE NO ADMIN: HCPCS | Performed by: NURSE PRACTITIONER

## 2023-02-21 PROCEDURE — 99214 OFFICE O/P EST MOD 30 MIN: CPT | Performed by: NURSE PRACTITIONER

## 2023-02-21 PROCEDURE — 4004F PT TOBACCO SCREEN RCVD TLK: CPT | Performed by: NURSE PRACTITIONER

## 2023-02-21 ASSESSMENT — ENCOUNTER SYMPTOMS
SHORTNESS OF BREATH: 0
SORE THROAT: 0
DIARRHEA: 0
EYE DISCHARGE: 0
COLOR CHANGE: 0
ABDOMINAL PAIN: 0
NAUSEA: 0
ANAL BLEEDING: 0
COUGH: 0
ABDOMINAL DISTENTION: 0
CONSTIPATION: 0
EYE REDNESS: 0
BLOOD IN STOOL: 0
RHINORRHEA: 0

## 2023-02-21 ASSESSMENT — PATIENT HEALTH QUESTIONNAIRE - PHQ9
5. POOR APPETITE OR OVEREATING: 0
2. FEELING DOWN, DEPRESSED OR HOPELESS: 3
SUM OF ALL RESPONSES TO PHQ QUESTIONS 1-9: 12
3. TROUBLE FALLING OR STAYING ASLEEP: 3
SUM OF ALL RESPONSES TO PHQ QUESTIONS 1-9: 12
SUM OF ALL RESPONSES TO PHQ9 QUESTIONS 1 & 2: 6
SUM OF ALL RESPONSES TO PHQ QUESTIONS 1-9: 12
6. FEELING BAD ABOUT YOURSELF - OR THAT YOU ARE A FAILURE OR HAVE LET YOURSELF OR YOUR FAMILY DOWN: 0
7. TROUBLE CONCENTRATING ON THINGS, SUCH AS READING THE NEWSPAPER OR WATCHING TELEVISION: 0
1. LITTLE INTEREST OR PLEASURE IN DOING THINGS: 3
10. IF YOU CHECKED OFF ANY PROBLEMS, HOW DIFFICULT HAVE THESE PROBLEMS MADE IT FOR YOU TO DO YOUR WORK, TAKE CARE OF THINGS AT HOME, OR GET ALONG WITH OTHER PEOPLE: 0
SUM OF ALL RESPONSES TO PHQ QUESTIONS 1-9: 12
9. THOUGHTS THAT YOU WOULD BE BETTER OFF DEAD, OR OF HURTING YOURSELF: 0
8. MOVING OR SPEAKING SO SLOWLY THAT OTHER PEOPLE COULD HAVE NOTICED. OR THE OPPOSITE, BEING SO FIGETY OR RESTLESS THAT YOU HAVE BEEN MOVING AROUND A LOT MORE THAN USUAL: 0
4. FEELING TIRED OR HAVING LITTLE ENERGY: 3

## 2023-02-21 NOTE — PATIENT INSTRUCTIONS
[facility]:   Address: [address] [facility] [address] [address] [place]  Phone: [phone]    [address]:  Address: [address] [place] [address]  Phone: [phone]    [facility]:  Address: [address], [place], [address]  Phone: [phone]    Address: [address] [place] [address] [address] [facility], [place], [address]  Phone: [phone]    Will get some labs  Mammogram ordered  Back in 4 weeks

## 2023-02-21 NOTE — PROGRESS NOTES
[facility] FAMILY MEDICINE  [address] [place] [id number]  Dept: [phone]  Loc: [phone]      Visit Date: 2/21/2023    Jazzmine Avila is a 43 y.o. female who presents today for:  Chief Complaint   Patient presents with    New Patient     Here to get established, last PCP was Daysi Miles. HPI:     Here to establish care. Previous PCP was . Specialist: pain management - , Pulmonologist -  MENDY Wilde, CNP, Dermatologist - Karen Leigh - Dr. Gillian Palomo, [address] - having teeth extracted the end on May    Family history: Mother - depression, sister - asthma, maternal grandfather - heart, father COPD    Was on Cymbalta in the past - made depression worse - has been on multiple medications in the past:  Cymbalta 30 mg  Doxepin 25 mg  Trazodone 50 mg  Vilazodone 20 mg  Pristiq 50 mg  Zoloft 50 mg  Buspar 7.5 mg  Xanax 0.25 mg    Has been to counseling in the past - unsure where - quit going due to distance  Did see [facility] psych associates in the past - has tried and failed multiple medications. Her symptoms show as irritability and feels like she could cry at anytime. Smoker - pack per day - started smoking at age 13    Has had issues with a nasal drainage - has not taken anything.    HPI  Health Maintenance   Topic Date Due    Varicella vaccine (1 of 2 - 2-dose childhood series) Never done    HIV screen  Never done    Lipids  Never done    Cervical cancer screen  10/23/2022    Flu vaccine (1) 02/21/2024 (Originally 8/1/2022)    COVID-19 Vaccine (1) 02/21/2024 (Originally 1980)    Depression Monitoring  02/21/2024    DTaP/Tdap/Td vaccine (2 - Td or Tdap) 04/17/2029    Pneumococcal 0-64 years Vaccine  Completed    Hepatitis C screen  Completed    Hepatitis A vaccine  Aged Out    Hib vaccine  Aged Out    Meningococcal (ACWY) vaccine  Aged Out     Past Medical History:   Diagnosis Date    Anxiety Asthma     Bipolar disorder (HCC)     Chronic back pain     DDD (degenerative disc disease), cervical     DDD (degenerative disc disease), thoracolumbar     Depression     GERD (gastroesophageal reflux disease)     Headache       Past Surgical History:   Procedure Laterality Date    BACK INJECTION Bilateral 1/3/2023    bilateral sacroiliac joint injection performed by Cristina Szymanski DO at [facility] Bilateral 2/9/2023    bilateral sacroiliac joint injection performed by Cristina Szymanski DO at [address] [place] [address] Bilateral 6/21/2022    Bilateral greater trochanteric bursa injection performed by Cristina Szymanski DO at [facility] Bilateral 2/1/2022    Lumbar medial branch block at bilateral L4-5 and L5-S1 performed by Cristina Szymanski DO at [facility] Bilateral 3/15/2022    medial branch blocks at bilateral L4-5 and L5-S1 performed by Cristina Szymanski DO at [facility] Right 4/12/2022    Lumbar radiofrequency ablation at  L4-5 and L5-S1, right performed by Cristina Szymanski DO at [facility] Left 5/24/2022    Lumbar RFA B/L L4-5 and L5-S1,LEFT performed by Cristina Szymanski DO at [facility] N/A 11/8/2022    epidural steroid injection  Lumbar 5-Sacral 1 performed by Cristina Szymanski DO at [address] [place] [address]     Family History   Problem Relation Age of Onset    Depression Mother     COPD Father     Asthma Sister     Depression Sister     Lupus Paternal Aunt     No Known Problems Daughter     No Known Problems Daughter     Asthma Daughter     No Known Problems Brother      Social History     Tobacco Use    Smoking status: Every Day     Packs/day: 1.00     Years: 15.00     Pack years: 15.00     Types: Cigarettes     Start date: 1/1/1995    Smokeless tobacco: Never Tobacco comments:     currently a pack a day 3/28/22   Substance Use Topics    Alcohol use: No      Current Outpatient Medications   Medication Sig Dispense Refill    chlorhexidine (PERIDEX) 0.12 % solution       Handicap Placard MISC by Does not apply route Exp: 12/29/2023  Dx: lumbar spondylosis. SI joint dysfunction. Chronic pain 1 each 0    VENTOLIN  (90 Base) MCG/ACT inhaler INHALE TWO (2) PUFFS INTO THE LUNGS FOUR (4) TIMES DAILY AS NEEDED FOR WHEEZING 18 g 5    meloxicam (MOBIC) 15 MG tablet Take 1 tablet by mouth daily 90 tablet 3    fluticasone-salmeterol (ADVAIR) 100-50 MCG/DOSE diskus inhaler Inhale 1 puff into the lungs every 12 hours Rinse mouth after use 60 each 11    ondansetron (ZOFRAN) 4 MG tablet Take 1 tablet by mouth every 8 hours as needed for Nausea 30 tablet 0    ketoconazole (NIZORAL) 2 % shampoo       triamcinolone (KENALOG) 0.025 % cream As needed. alclomethasone (ACLOVATE) 0.05 % ointment Apply topically 2 times daily. 45 g 2    Spacer/Aero-Holding Chambers (VORTEX VALVED HOLDING CHAMBER) SUJATHA 1 Device by Does not apply route daily Use with inhalers 1 Device 0     No current facility-administered medications for this visit. Allergies   Allergen Reactions    Hydrocodone      \"makes me feel completely out of whack\"    Prozac [Fluoxetine Hcl] Other (See Comments)     \"I want to kill myself\"     Subjective:    Review of Systems   Constitutional:  Negative for chills, fatigue and fever. HENT:  Negative for congestion, ear pain, postnasal drip, rhinorrhea and sore throat. Eyes:  Negative for discharge and redness. Respiratory:  Negative for cough and shortness of breath. Cardiovascular:  Negative for chest pain and leg swelling. Gastrointestinal:  Negative for abdominal distention, abdominal pain, anal bleeding, blood in stool, constipation, diarrhea and nausea. Musculoskeletal:  Positive for arthralgias. Skin:  Negative for color change and rash. Neurological:  Negative for facial asymmetry, speech difficulty and weakness. Hematological:  Does not bruise/bleed easily. Psychiatric/Behavioral:  Positive for dysphoric mood. Negative for agitation and confusion. The patient is nervous/anxious. Objective:     Vitals:    02/21/23 1306   BP: 124/72   Site: Left Upper Arm   Pulse: 80   Resp: 16   Temp: 98.7 °F (37.1 °C)   TempSrc: Oral   Weight: 206 lb 6.4 oz (93.6 kg)       Body mass index is 35.43 kg/m². @IYXRVAG(4)@  BP Readings from Last 3 Encounters:   02/21/23 124/72   02/09/23 109/72   01/17/23 112/76     Physical Exam  Constitutional:       General: She is not in acute distress. Appearance: Normal appearance. She is well-developed. She is not ill-appearing or diaphoretic. HENT:      Head: Normocephalic and atraumatic. Right Ear: Hearing and external ear normal. No decreased hearing noted. Left Ear: Hearing and external ear normal. No decreased hearing noted. Nose: Nose normal. No nasal deformity. Eyes:      General:         Right eye: No discharge. Left eye: No discharge. Conjunctiva/sclera: Conjunctivae normal.   Cardiovascular:      Rate and Rhythm: Normal rate and regular rhythm. Pulmonary:      Effort: Pulmonary effort is normal. No respiratory distress. Breath sounds: Normal breath sounds. Abdominal:      General: There is no distension. Tenderness: There is no guarding. Musculoskeletal:         General: No tenderness or deformity. Normal range of motion. Cervical back: Normal range of motion and neck supple. Skin:     Coloration: Skin is not pale. Findings: No erythema or rash (On exposed areas). Neurological:      General: No focal deficit present. Mental Status: She is alert and oriented to person, place, and time.       Gait: Gait normal.   Psychiatric:         Mood and Affect: Mood normal.         Speech: Speech normal.         Behavior: Behavior normal. Thought Content: Thought content normal.         Judgment: Judgment normal.       Lab Results   Component Value Date    WBC 7.6 12/01/2021    HGB 15.8 12/01/2021    HCT 44.9 12/01/2021     12/01/2021    AST 11 12/01/2021     12/01/2021    K 4.0 12/01/2021     12/01/2021    CREATININE 0.8 12/01/2021    BUN 11 12/01/2021    CO2 21 (L) 12/01/2021    TSH 1.570 06/11/2019    LABGLOM 79 (A) 12/01/2021    MG 2.1 02/18/2020    CALCIUM 9.3 12/01/2021     Assessment:       Diagnosis Orders   1. Encounter for breast cancer screening using non-mammogram modality  CBC with Auto Differential    Comprehensive Metabolic Panel    Hepatic Function Panel    Lipid Panel    TSH with Reflex    Vitamin D 25 Hydroxy    HAILE DIGITAL SCREEN W OR WO CAD BILATERAL      2. Severe episode of recurrent major depressive disorder, without psychotic features ([facility] [address]  CBC with Auto Differential    Comprehensive Metabolic Panel    Hepatic Function Panel    Lipid Panel    TSH with Reflex    Vitamin D 25 Hydroxy    [address], Braenna Marie DO, Psychiatry, [facility]      3. H/O systemic lupus erythematosus (SLE) (HCC)  CBC with Auto Differential    Comprehensive Metabolic Panel    Hepatic Function Panel    Lipid Panel    TSH with Reflex    Vitamin D 25 Hydroxy      4. Simple chronic bronchitis (HCC)  CBC with Auto Differential    Comprehensive Metabolic Panel    Hepatic Function Panel    Lipid Panel    TSH with Reflex    Vitamin D 25 Hydroxy      5. MDD (major depressive disorder), recurrent episode, moderate (HCC)  CBC with Auto Differential    Comprehensive Metabolic Panel    Hepatic Function Panel    Lipid Panel    TSH with Reflex    Vitamin D 25 Hydroxy      6. Other specified anxiety disorders  CBC with Auto Differential    Comprehensive Metabolic Panel    Hepatic Function Panel    Lipid Panel    TSH with Reflex    Vitamin D 25 Hydroxy      7. Varicella vaccination status unknown  Varicella Zoster Antibody, IgG      8.  Screening for HIV without presence of risk factors  HIV Screen          Plan:   [facility]: Address: [address] [place] [address] [place]  Phone: [phone]    [address]:  Address: [address]  Phone: [phone]    [facility]:  Address: [address], [place], [address]  Phone: [phone]    Address: [address], [place], [address]  Phone: [phone]    Will get some labs  Mammogram ordered  Back in 4 weeks       Return in about 4 weeks (around 3/21/2023). Orders Placed:  Orders Placed This Encounter   Procedures    HAILE DIGITAL SCREEN W OR WO CAD BILATERAL    CBC with Auto Differential    Comprehensive Metabolic Panel    Hepatic Function Panel    Lipid Panel    TSH with Reflex    Vitamin D 25 Hydroxy    Varicella Zoster Antibody, IgG    HIV [facility], Psychiatry, [place]     Medications Prescribed:  No orders of the defined types were placed in this encounter. Future Appointments   Date Time Provider Srinath Mccarthy   2/22/2023  9:30 AM BINH Valenzuela CNP N [facility] - [place]   3/28/2023  9:00 AM BINH Shook CNP N [address] [place] [address]      Patient given educational materials - see patient instructions. Discussed use, benefit, and side effects of prescribedmedications. All patient questions answered. Pt voiced understanding. Reviewed health maintenance. Instructed to continue current medications, diet and exercise. Patient agreed with treatment plan. Follow up as directed.     Electronically signed by BINH Wallace CNP on 2/21/2023 at 1:51 PM

## 2023-02-22 ENCOUNTER — OFFICE VISIT (OUTPATIENT)
Dept: PHYSICAL MEDICINE AND REHAB | Age: 43
End: 2023-02-22
Payer: MEDICAID

## 2023-02-22 VITALS
HEIGHT: 64 IN | SYSTOLIC BLOOD PRESSURE: 110 MMHG | DIASTOLIC BLOOD PRESSURE: 84 MMHG | BODY MASS INDEX: 35.17 KG/M2 | WEIGHT: 206 LBS

## 2023-02-22 DIAGNOSIS — G89.29 CHRONIC LEFT SHOULDER PAIN: ICD-10-CM

## 2023-02-22 DIAGNOSIS — M70.62 GREATER TROCHANTERIC BURSITIS OF LEFT HIP: ICD-10-CM

## 2023-02-22 DIAGNOSIS — M53.3 SACROILIAC JOINT DYSFUNCTION OF BOTH SIDES: Primary | ICD-10-CM

## 2023-02-22 DIAGNOSIS — G89.4 CHRONIC PAIN SYNDROME: ICD-10-CM

## 2023-02-22 DIAGNOSIS — M47.816 LUMBAR SPONDYLOSIS: ICD-10-CM

## 2023-02-22 DIAGNOSIS — M25.512 CHRONIC LEFT SHOULDER PAIN: ICD-10-CM

## 2023-02-22 PROCEDURE — 4004F PT TOBACCO SCREEN RCVD TLK: CPT | Performed by: NURSE PRACTITIONER

## 2023-02-22 PROCEDURE — G8417 CALC BMI ABV UP PARAM F/U: HCPCS | Performed by: NURSE PRACTITIONER

## 2023-02-22 PROCEDURE — 99214 OFFICE O/P EST MOD 30 MIN: CPT | Performed by: NURSE PRACTITIONER

## 2023-02-22 PROCEDURE — G8484 FLU IMMUNIZE NO ADMIN: HCPCS | Performed by: NURSE PRACTITIONER

## 2023-02-22 PROCEDURE — G8427 DOCREV CUR MEDS BY ELIG CLIN: HCPCS | Performed by: NURSE PRACTITIONER
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
## 2023-03-01 ENCOUNTER — HOSPITAL ENCOUNTER (OUTPATIENT)
Age: 43
Discharge: HOME | End: 2023-03-01
Payer: MEDICAID

## 2023-03-01 ENCOUNTER — HOSPITAL ENCOUNTER (OUTPATIENT)
Dept: WOMENS IMAGING | Age: 43
Discharge: HOME | End: 2023-03-01
Payer: MEDICAID

## 2023-03-01 DIAGNOSIS — F41.8 OTHER SPECIFIED ANXIETY DISORDERS: ICD-10-CM

## 2023-03-01 DIAGNOSIS — J41.0 SIMPLE CHRONIC BRONCHITIS (HCC): ICD-10-CM

## 2023-03-01 DIAGNOSIS — F33.1 MDD (MAJOR DEPRESSIVE DISORDER), RECURRENT EPISODE, MODERATE (HCC): ICD-10-CM

## 2023-03-01 DIAGNOSIS — Z78.9 VARICELLA VACCINATION STATUS UNKNOWN: ICD-10-CM

## 2023-03-01 DIAGNOSIS — F33.2 SEVERE EPISODE OF RECURRENT MAJOR DEPRESSIVE DISORDER, WITHOUT PSYCHOTIC FEATURES (HCC): ICD-10-CM

## 2023-03-01 DIAGNOSIS — Z11.4 SCREENING FOR HIV WITHOUT PRESENCE OF RISK FACTORS: ICD-10-CM

## 2023-03-01 DIAGNOSIS — M32.9 H/O SYSTEMIC LUPUS ERYTHEMATOSUS (SLE) (HCC): ICD-10-CM

## 2023-03-01 DIAGNOSIS — Z12.39 ENCOUNTER FOR BREAST CANCER SCREENING USING NON-MAMMOGRAM MODALITY: ICD-10-CM

## 2023-03-01 DIAGNOSIS — Z12.31 ENCOUNTER FOR SCREENING MAMMOGRAM FOR MALIGNANT NEOPLASM OF BREAST: ICD-10-CM

## 2023-03-01 LAB
25(OH)D3 SERPL-MCNC: 11 NG/ML (ref 30–100)
ALBUMIN SERPL BCG-MCNC: 4.5 G/DL (ref 3.5–5.1)
ALP SERPL-CCNC: 107 U/L (ref 38–126)
ALT SERPL W/O P-5'-P-CCNC: 23 U/L (ref 11–66)
ANION GAP SERPL CALC-SCNC: 16 MEQ/L (ref 8–16)
AST SERPL-CCNC: 16 U/L (ref 5–40)
BASOPHILS ABSOLUTE: 0.1 THOU/MM3 (ref 0–0.1)
BASOPHILS NFR BLD AUTO: 0.6 %
BILIRUB CONJ SERPL-MCNC: < 0.2 MG/DL (ref 0–0.3)
BILIRUB SERPL-MCNC: 0.5 MG/DL (ref 0.3–1.2)
BUN SERPL-MCNC: 8 MG/DL (ref 7–22)
CALCIUM SERPL-MCNC: 9.5 MG/DL (ref 8.5–10.5)
CHLORIDE SERPL-SCNC: 104 MEQ/L (ref 98–111)
CHOLEST SERPL-MCNC: 114 MG/DL (ref 100–199)
CO2 SERPL-SCNC: 18 MEQ/L (ref 23–33)
CREAT SERPL-MCNC: 0.8 MG/DL (ref 0.4–1.2)
DEPRECATED RDW RBC AUTO: 40.3 FL (ref 35–45)
EOSINOPHIL NFR BLD AUTO: 2.1 %
EOSINOPHILS ABSOLUTE: 0.2 THOU/MM3 (ref 0–0.4)
ERYTHROCYTE [DISTWIDTH] IN BLOOD BY AUTOMATED COUNT: 12.4 % (ref 11.5–14.5)
GFR SERPL CREATININE-BSD FRML MDRD: > 60 ML/MIN/1.73M2
GLUCOSE SERPL-MCNC: 99 MG/DL (ref 70–108)
HCT VFR BLD AUTO: 45.1 % (ref 37–47)
HDLC SERPL-MCNC: 34 MG/DL
HGB BLD-MCNC: 16 GM/DL (ref 12–16)
HIV 1+2 AB+HIV1 P24 AG SERPL QL IA: NONREACTIVE
IMM GRANULOCYTES # BLD AUTO: 0.02 THOU/MM3 (ref 0–0.07)
IMM GRANULOCYTES NFR BLD AUTO: 0.2 %
LDLC SERPL CALC-MCNC: 53 MG/DL
LYMPHOCYTES ABSOLUTE: 2.4 THOU/MM3 (ref 1–4.8)
LYMPHOCYTES NFR BLD AUTO: 25.6 %
MCH RBC QN AUTO: 31.4 PG (ref 26–33)
MCHC RBC AUTO-ENTMCNC: 35.5 GM/DL (ref 32.2–35.5)
MCV RBC AUTO: 88.6 FL (ref 81–99)
MONOCYTES ABSOLUTE: 0.5 THOU/MM3 (ref 0.4–1.3)
MONOCYTES NFR BLD AUTO: 4.9 %
NEUTROPHILS NFR BLD AUTO: 66.6 %
NRBC BLD AUTO-RTO: 0 /100 WBC
PLATELET # BLD AUTO: 317 THOU/MM3 (ref 130–400)
PMV BLD AUTO: 11.2 FL (ref 9.4–12.4)
POTASSIUM SERPL-SCNC: 4.2 MEQ/L (ref 3.5–5.2)
PROT SERPL-MCNC: 7.3 G/DL (ref 6.1–8)
RBC # BLD AUTO: 5.09 MILL/MM3 (ref 4.2–5.4)
SEGMENTED NEUTROPHILS ABSOLUTE COUNT: 6.3 THOU/MM3 (ref 1.8–7.7)
SODIUM SERPL-SCNC: 138 MEQ/L (ref 135–145)
TRIGL SERPL-MCNC: 133 MG/DL (ref 0–199)
TSH SERPL DL<=0.005 MIU/L-ACNC: 2.14 UIU/ML (ref 0.4–4.2)
WBC # BLD AUTO: 9.4 THOU/MM3 (ref 4.8–10.8)

## 2023-03-01 PROCEDURE — 80053 COMPREHEN METABOLIC PANEL: CPT

## 2023-03-01 PROCEDURE — 86787 VARICELLA-ZOSTER ANTIBODY: CPT

## 2023-03-01 PROCEDURE — 82248 BILIRUBIN DIRECT: CPT

## 2023-03-01 PROCEDURE — 84443 ASSAY THYROID STIM HORMONE: CPT

## 2023-03-01 PROCEDURE — 82306 VITAMIN D 25 HYDROXY: CPT

## 2023-03-01 PROCEDURE — 77063 BREAST TOMOSYNTHESIS BI: CPT

## 2023-03-01 PROCEDURE — 80061 LIPID PANEL: CPT

## 2023-03-01 PROCEDURE — 36415 COLL VENOUS BLD VENIPUNCTURE: CPT

## 2023-03-01 PROCEDURE — 85025 COMPLETE CBC W/AUTO DIFF WBC: CPT

## 2023-03-01 PROCEDURE — 87389 HIV-1 AG W/HIV-1&-2 AB AG IA: CPT

## 2023-03-03 LAB — VZV IGG SER QL IA: 3.18

## 2023-03-09 ENCOUNTER — OFFICE VISIT (OUTPATIENT)
Dept: PSYCHIATRY | Age: 43
End: 2023-03-09

## 2023-03-09 DIAGNOSIS — F41.1 GAD (GENERALIZED ANXIETY DISORDER): ICD-10-CM

## 2023-03-09 DIAGNOSIS — F43.10 PTSD (POST-TRAUMATIC STRESS DISORDER): ICD-10-CM

## 2023-03-09 DIAGNOSIS — F39 UNSPECIFIED MOOD (AFFECTIVE) DISORDER (HCC): Primary | ICD-10-CM

## 2023-03-09 RX ORDER — QUETIAPINE FUMARATE 100 MG/1
100 TABLET, FILM COATED ORAL DAILY
Qty: 60 TABLET | Refills: 2 | Status: SHIPPED | OUTPATIENT
Start: 2023-03-09

## 2023-03-09 RX ORDER — HYDROXYZINE PAMOATE 25 MG/1
25 CAPSULE ORAL 3 TIMES DAILY PRN
Qty: 30 CAPSULE | Refills: 0 | Status: SHIPPED | OUTPATIENT
Start: 2023-03-09 | End: 2023-03-19

## 2023-03-09 ASSESSMENT — COLUMBIA-SUICIDE SEVERITY RATING SCALE - C-SSRS
2. HAVE YOU ACTUALLY HAD ANY THOUGHTS OF KILLING YOURSELF?: NO
7. DID THIS OCCUR IN THE LAST THREE MONTHS: NO
1. WITHIN THE PAST MONTH, HAVE YOU WISHED YOU WERE DEAD OR WISHED YOU COULD GO TO SLEEP AND NOT WAKE UP?: NO
6. HAVE YOU EVER DONE ANYTHING, STARTED TO DO ANYTHING, OR PREPARED TO DO ANYTHING TO END YOUR LIFE?: YES

## 2023-03-09 ASSESSMENT — PATIENT HEALTH QUESTIONNAIRE - PHQ9
9. THOUGHTS THAT YOU WOULD BE BETTER OFF DEAD, OR OF HURTING YOURSELF: 0
4. FEELING TIRED OR HAVING LITTLE ENERGY: 2
SUM OF ALL RESPONSES TO PHQ QUESTIONS 1-9: 12
8. MOVING OR SPEAKING SO SLOWLY THAT OTHER PEOPLE COULD HAVE NOTICED. OR THE OPPOSITE, BEING SO FIGETY OR RESTLESS THAT YOU HAVE BEEN MOVING AROUND A LOT MORE THAN USUAL: 0
6. FEELING BAD ABOUT YOURSELF - OR THAT YOU ARE A FAILURE OR HAVE LET YOURSELF OR YOUR FAMILY DOWN: 1
5. POOR APPETITE OR OVEREATING: 2
7. TROUBLE CONCENTRATING ON THINGS, SUCH AS READING THE NEWSPAPER OR WATCHING TELEVISION: 0
SUM OF ALL RESPONSES TO PHQ QUESTIONS 1-9: 12
2. FEELING DOWN, DEPRESSED OR HOPELESS: 2
1. LITTLE INTEREST OR PLEASURE IN DOING THINGS: 2
10. IF YOU CHECKED OFF ANY PROBLEMS, HOW DIFFICULT HAVE THESE PROBLEMS MADE IT FOR YOU TO DO YOUR WORK, TAKE CARE OF THINGS AT HOME, OR GET ALONG WITH OTHER PEOPLE: 2
SUM OF ALL RESPONSES TO PHQ9 QUESTIONS 1 & 2: 4
3. TROUBLE FALLING OR STAYING ASLEEP: 3

## 2023-03-09 NOTE — PROGRESS NOTES
[address] [facility] [place] PSYCHIATRY  [facility] [id number] [phone]    Initial Psychiatric Eval    Patient: Breann Frazier  YOB: 1980  PCP:  BINH Matias CNP    Visit Date:  3/9/2023      CC: \"depression\"    HPI:   Breann Frazier is a 43 y.o. female who is presenting today as a new patient at [address] [place] [address]. Referred by PCP for depression. Per PCP most recent note patient has tried multiple medications in the past for depression to include Cymbalta, doxepin, trazodone, Vilazodone, Pristiq, Zoloft, Prozac, BuSpar and Xanax. Further record review reveals that patient was seen by [facility] last in September 2020. She was being followed for depression and anxiety formal diagnosis of recurrent major depressive disorder, in full remission, PTSD and generalized anxiety disorder. Patient reports she is presenting today due to worsening irritability and depressive symptoms. Reports she was previously seen by psychiatry in this office approximately 3 years ago. Most recently had been on Cymbalta but when she tried to restart this a few weeks ago it made her symptoms worse. She described worsening anxiety and restlessness with it. I reviewed her historical diagnosis of major depressive disorder, YUDY and PTSD from the chart but also mention there is note of bipolar. Patient confirmed this diagnosis but cannot tell me when it was diagnosed. Patient highlighted her ongoing irritability as her most important issue at this time stating it is starting to impair her relationship with her fiancé and his sister, whom she lives with. In review of manic symptoms patient does endorse periods of time lasting over a week where she will have increased irritability, increased goal-directed activity, increased impulsivity as well as increased risk taking.   Does not endorse decreased need for sleep or grandiosity. As stated above, patient endorses ongoing depressive symptoms including low mood, anhedonia, poor concentration, feelings of helplessness, feelings of hopelessness, nonrestful sleep and increased appetite. Denies suicidal ideation, intent or plan. Is very future oriented. States \"I would never do that. I have grandkids. \"    Patient also struggles with ongoing anxiety which she notes has been heightened more recently. Denies recent panic attacks. Denies clinically significant symptoms of psychosis. Stressors: Patient admits to relationship stress with her significant other sister. She also notes her best friend is struggling with breast cancer and she has to travel out the [place] to help her over the next few months. Past Psychiatric History:  Inpatient: At least 1 previous psychiatric hospitalization in [place] \"years ago\"  Outpatient: Previously saw [facility]  Suicide attempts/self harm hx: denied.   Med trials/adverse reactions: As above      Past Medical History:  H/o seizure: None reported  H/o TBI: None reported    Allergies   Allergen Reactions    Hydrocodone      \"makes me feel completely out of whack\"    Prozac [Fluoxetine Hcl] Other (See Comments)     \"I want to kill myself\"       Past Medical History:   Diagnosis Date    Anxiety     Asthma     Bipolar disorder (HCC)     Chronic back pain     DDD (degenerative disc disease), cervical     DDD (degenerative disc disease), thoracolumbar     Depression     GERD (gastroesophageal reflux disease)     Headache        Past Surgical History:   Procedure Laterality Date    BACK INJECTION Bilateral 1/3/2023    bilateral sacroiliac joint injection performed by Cherise Alexander DO at [facility] Bilateral 2/9/2023    bilateral sacroiliac joint injection performed by Cherise Alexander DO at [address] [place] [address] Bilateral 6/21/2022    Bilateral greater trochanteric bursa injection performed by Aldo Ruiz DO at [facility] Bilateral 2/1/2022    Lumbar medial branch block at bilateral L4-5 and L5-S1 performed by Aldo Ruiz DO at [facility] Bilateral 3/15/2022    medial branch blocks at bilateral L4-5 and L5-S1 performed by Aldo Ruiz DO at [facility] Right 4/12/2022    Lumbar radiofrequency ablation at  L4-5 and L5-S1, right performed by Aldo Ruiz DO at [facility] Left 5/24/2022    Lumbar RFA B/L L4-5 and L5-S1,LEFT performed by Aldo Ruiz DO at [facility] N/A 11/8/2022    epidural steroid injection  Lumbar 5-Sacral 1 performed by Aldo Ruiz DO at [address] [place] [address]         Current Outpatient Medications:     QUEtiapine (SEROQUEL) 100 MG tablet, Take 1 tablet by mouth daily, Disp: 60 tablet, Rfl: 2    hydrOXYzine pamoate (VISTARIL) 25 MG capsule, Take 1 capsule by mouth 3 times daily as needed for Anxiety, Disp: 30 capsule, Rfl: 0    diclofenac sodium (VOLTAREN) 1 % GEL, Apply 4 g topically 4 times daily, Disp: 120 g, Rfl: 0    chlorhexidine (PERIDEX) 0.12 % solution, , Disp: , Rfl:     Handicap Placard MISC, by Does not apply route Exp: 12/29/2023 Dx: lumbar spondylosis. SI joint dysfunction.  Chronic pain, Disp: 1 each, Rfl: 0    VENTOLIN  (90 Base) MCG/ACT inhaler, INHALE TWO (2) PUFFS INTO THE LUNGS FOUR (4) TIMES DAILY AS NEEDED FOR WHEEZING, Disp: 18 g, Rfl: 5    meloxicam (MOBIC) 15 MG tablet, Take 1 tablet by mouth daily, Disp: 90 tablet, Rfl: 3    fluticasone-salmeterol (ADVAIR) 100-50 MCG/DOSE diskus inhaler, Inhale 1 puff into the lungs every 12 hours Rinse mouth after use, Disp: 60 each, Rfl: 11    ondansetron (ZOFRAN) 4 MG tablet, Take 1 tablet by mouth every 8 hours as needed for Nausea, Disp: 30 tablet, Rfl: 0 ketoconazole (NIZORAL) 2 % shampoo, , Disp: , Rfl:     triamcinolone (KENALOG) 0.025 % cream, As needed. , Disp: , Rfl:     alclomethasone (ACLOVATE) 0.05 % ointment, Apply topically 2 times daily. , Disp: 45 g, Rfl: 2    Spacer/Aero-Holding Chambers (VORTEX VALVED HOLDING CHAMBER) SUJATHA, 1 Device by Does not apply route daily Use with inhalers, Disp: 1 Device, Rfl: 0      Family Psychiatric History:    Family History   Problem Relation Age of Onset    Depression Mother     COPD Father     Asthma Sister     Depression Sister     Lupus Paternal Aunt     No Known Problems Daughter     No Known Problems Daughter     Asthma Daughter     No Known Problems Brother          Social History:  Born & raised: Born in [place], [place] and raised by her father. Parents  at a young age. Current location: Currently resides in [place] [facility] [place]  Education: She completed the 11th grade  Employment: not currently working. Last worked 2018. Marital Status: . Currently in a relationship. Children: 3 children  : Denies  Legal Hx: No reported current legal issues  Weapons: Gun at home. No access. Education provided. Substance use History:  Nicotine: 1ppd  Alcohol: denies  Marijuana: denies  Other: denies. Previous meth and marijuana. Hasn't used for 20 years  Treatment: denied      Controlled substances monitoring: OARRS report reviewed today- activity consistent with treatment plan. ROS    Constitutional: Negative for fever, chills, fatigue, and unexpected weight changes. HENT: Negative for ear pain, congestion, rhinorrhea, sore throat, neck pain  Eyes: Negative for pain and discharge. Respiratory: Negative for cough, SOB, and wheezing. Cardiovascular: Negative for chest pain, palpitations and leg swelling. Gastrointestinal: Negative for nausea, vomiting, diarrhea, abdominal pain, blood in stool. Genitourinary: Negative for dysuria, frequency and hematuria.    Musculoskeletal: back pain  Skin: Negative for rash and bruising. Neurological: Negative for dizziness, syncope, weakness and headaches. Psychiatric: see MSE        MENTAL STATUS EXAM:  Orientation: Alert, oriented, thought content appropriate   Mood:. \"Okay\"      Affect:  Constricted      Appearance:  Age Appropriate and Casually Dressed   Activity:  Seated Calmly   Gait/Posture: Normal   Speech:  Clear, Fluent, Normal Pitch and Volume, Age and Situation Appropriate   Thought Process: Within Normal Limits   Thought Content: Within Normal Limits   Cognition:  Grossly Intact   Memory: Intact   Insight:  Fair   Judgment: Age Appropriate   Suicidal Ideations: Denies Suicidal Ideation   Homicidal Ideations: Negative for homicidal ideation   Medication Side Effects: Absent       Attention Span Attention span and concentration were age appropriate           PHQ-9 score on 3/10/23:   PHQ-9  3/9/2023   Little interest or pleasure in doing things 2   Feeling down, depressed, or hopeless 2   Trouble falling or staying asleep, or sleeping too much 3   Feeling tired or having little energy 2   Poor appetite or overeating 2   Feeling bad about yourself - or that you are a failure or have let yourself or your family down 1   Trouble concentrating on things, such as reading the newspaper or watching television 0   Moving or speaking so slowly that other people could have noticed. Or the opposite - being so fidgety or restless that you have been moving around a lot more than usual 0   Thoughts that you would be better off dead, or of hurting yourself in some way 0   PHQ-2 Score 4   PHQ-9 Total Score 12   If you checked off any problems, how difficult have these problems made it for you to do your work, take care of things at home, or get along with other people? 2      YUDY-7 score on 3/10/23:       ASSESSMENT/PLAN:  Patient's presentation and history are a bit unclear at this time.   Previously, working diagnosis had been major depressive disorder but there have been multiple mentions of a bipolar disorder as well. At this time I am concerned about a bipolar component but given the uncertainty of the diagnosis will continue with an unspecified mood disorder. Unfortunately patient will be out of town caring for an ill friend for the next 2 to 3 months, limiting her ability to follow-up. This did impact our shared decision making in regards to medications. As patient highlighted irritability as a major concern with failed trials of antidepressants in the past I did elect to go with initiation of quetiapine to cover any underlying bipolar illness. Had a lengthy discussion with patient about the metabolic side effects as well as other risks and patient showed understanding and was agreeable with the plan to initiate Seroquel. 1. Unspecified mood (affective) disorder (HCC)  - QUEtiapine (SEROQUEL) take 1/2 tablet 50mg x 7 days then increase to 100mg daily. Monitor for side effects  - Hemoglobin A1C; Future  - Lipid Panel; Future  -counseling resources provided    2. PTSD (post-traumatic stress disorder)  -given concerns for possible bipolar and lack of efficacy with antidepressants in the past I elected to not initiate SSRI/SNRI at this time.  -Will continue to monitor   -counseling resources provided    3. YUDY (generalized anxiety disorder)  -given concerns for bipolar elected to not initiate SSRI/SNRI. Seroquel does have data for anxiety which was discussed with patient  -counseling resources provided      Risk: On my assessment today no imminent risk warranting higher level of psychiatric care was evident. Patient does have noted risk factors to include depression, chronic pain. However, she denies SI, future oriented, no access to guns, strong desire to be alive. Safety planning was completed with patient to include but not limited to providing safety members of [phone].   Patient was also educated they should go to the nearest emergency department if suicidal thoughts increased; patient expressed understanding of this.  Risks and benefits of medications discussed. I reviewed the Risks/SE's/benefits/alternate treatments of all medications above, including the risk of EPS and NMS with neuroleptics. NMS is an uncommon but serious adverse reaction that may occur with neuroleptic treatment.  Sx include autonomic instability, fever, muscle rigidity, and mental status change. Pt stated understanding of  instruction to seek emergency treatment immediately if these sx should occur.  Patient advised to call regarding any questions or difficulties with treatment.  Return in about 2 months (around 5/9/2023).  Records reviewed: [facility]           Total time spent for this encounter: 70    Electronically signed by Preston Rousseau MD on 3/10/2023 at 7:46 AM

## 2023-03-09 NOTE — PATIENT INSTRUCTIONS
The medication list included in this document is our record of what you are currently taking, including any changes that were made at today's visit. If you find any differences when compared to your medications at home, or have any questions that were not answered at your visit, please contact the office. Summary:  -start Seroquel 50mg nightly for 7 days then increase to 100mg nightly.  Monitor for side effects.  -Vistaril available as needed for anxiety    If safety concerns arise go to nearest ED or call [phone]

## 2023-03-28 RX ORDER — FLUTICASONE PROPIONATE AND SALMETEROL 50; 100 UG/1; UG/1
POWDER RESPIRATORY (INHALATION)
Qty: 3 EACH | Refills: 0 | Status: SHIPPED | OUTPATIENT
Start: 2023-03-28

## 2023-04-05 ENCOUNTER — TELEPHONE (OUTPATIENT)
Dept: PSYCHIATRY | Age: 43
End: 2023-04-05

## 2023-04-05 NOTE — TELEPHONE ENCOUNTER
Feng Morales wrote into the office via [employer] ([employer]):    I was wondering if it would be good for me to get a service animal?  I have been watching a service animal for a friend and this dog has been glued to me and I don't feel angry, or depressed    --Please advise; she is scheduled to return on 06/06/2023.

## 2023-04-05 NOTE — TELEPHONE ENCOUNTER
Josefina Barros,    This is not something I evaluate for so I can't speak on it fully. If you are talking about an emotional support animal you could look into places that would evaluate for this but I do not provide these evaluations as I am unable to comment on animal behaviors. Happy to discuss further at your next appointment.     Dr. Grace Milton

## 2023-06-01 NOTE — TELEPHONE ENCOUNTER
Message noted.
Pt notified, she states she doesn't want another antibiotic she just wants to feel better. I offered to schedule an appointment, Pt did not want an appointment or different antibiotic. She said maybe she needs to go to ED. Pt will contact the office if she wants to schedule.
Stop keflex ( ineffective). Will try alternative antibiotic.  ep'ed bactrim to canal.  Again the arm lesion may or may not improve with the antibiotic, if it does not recommend appt for excision of the lesion.
Yes

## 2023-06-08 NOTE — POST SEDATION
[address] [address]  Sedation/Analgesia Post Sedation Record    Pt Name: Nedra Patrick  MRN: [id number]  YOB: 1980  Procedure Performed By: Samantha Gonzales DO  Primary Care Physician: Treva Campbell MD    POST-PROCEDURE    Physicians/Assistants: Samantha Gonzales DO  Procedure Performed: See Procedure Note   Sedation/Anesthesia: Versed and Fentanyl (See procedure note for amount and duration)  Estimated Blood Loss:     0  ml  Specimens Removed: None        Complications: None           Liborio Cardenas DO  Electronically signed 5/24/2022 at 10:43 AM Length Of Therapy: 2 months

## 2023-07-17 ENCOUNTER — TELEMEDICINE (OUTPATIENT)
Dept: PSYCHIATRY | Age: 43
End: 2023-07-17
Payer: MEDICAID

## 2023-07-17 DIAGNOSIS — F43.10 PTSD (POST-TRAUMATIC STRESS DISORDER): ICD-10-CM

## 2023-07-17 DIAGNOSIS — F39 UNSPECIFIED MOOD (AFFECTIVE) DISORDER (HCC): Primary | ICD-10-CM

## 2023-07-17 DIAGNOSIS — F17.200 TOBACCO USE DISORDER: ICD-10-CM

## 2023-07-17 DIAGNOSIS — F41.1 GAD (GENERALIZED ANXIETY DISORDER): ICD-10-CM

## 2023-07-17 PROCEDURE — 99406 BEHAV CHNG SMOKING 3-10 MIN: CPT | Performed by: PSYCHIATRY & NEUROLOGY

## 2023-07-17 PROCEDURE — G8428 CUR MEDS NOT DOCUMENT: HCPCS | Performed by: PSYCHIATRY & NEUROLOGY

## 2023-07-17 PROCEDURE — G8417 CALC BMI ABV UP PARAM F/U: HCPCS | Performed by: PSYCHIATRY & NEUROLOGY

## 2023-07-17 PROCEDURE — 4004F PT TOBACCO SCREEN RCVD TLK: CPT | Performed by: PSYCHIATRY & NEUROLOGY

## 2023-07-17 PROCEDURE — 99214 OFFICE O/P EST MOD 30 MIN: CPT | Performed by: PSYCHIATRY & NEUROLOGY

## 2023-07-17 RX ORDER — NICOTINE 21 MG/24HR
1 PATCH, TRANSDERMAL 24 HOURS TRANSDERMAL DAILY
Qty: 14 PATCH | Refills: 3 | Status: SHIPPED | OUTPATIENT
Start: 2023-07-17 | End: 2023-09-11

## 2023-07-17 ASSESSMENT — ANXIETY QUESTIONNAIRES
IF YOU CHECKED OFF ANY PROBLEMS ON THIS QUESTIONNAIRE, HOW DIFFICULT HAVE THESE PROBLEMS MADE IT FOR YOU TO DO YOUR WORK, TAKE CARE OF THINGS AT HOME, OR GET ALONG WITH OTHER PEOPLE: NOT DIFFICULT AT ALL
GAD7 TOTAL SCORE: 8
1. FEELING NERVOUS, ANXIOUS, OR ON EDGE: 1
5. BEING SO RESTLESS THAT IT IS HARD TO SIT STILL: 1
4. TROUBLE RELAXING: 2
4. TROUBLE RELAXING: MORE THAN HALF THE DAYS
IF YOU CHECKED OFF ANY PROBLEMS ON THIS QUESTIONNAIRE, HOW DIFFICULT HAVE THESE PROBLEMS MADE IT FOR YOU TO DO YOUR WORK, TAKE CARE OF THINGS AT HOME, OR GET ALONG WITH OTHER PEOPLE: NOT DIFFICULT AT ALL
3. WORRYING TOO MUCH ABOUT DIFFERENT THINGS: 2
3. WORRYING TOO MUCH ABOUT DIFFERENT THINGS: MORE THAN HALF THE DAYS
6. BECOMING EASILY ANNOYED OR IRRITABLE: SEVERAL DAYS
7. FEELING AFRAID AS IF SOMETHING AWFUL MIGHT HAPPEN: NOT AT ALL
6. BECOMING EASILY ANNOYED OR IRRITABLE: 1
1. FEELING NERVOUS, ANXIOUS, OR ON EDGE: SEVERAL DAYS
2. NOT BEING ABLE TO STOP OR CONTROL WORRYING: 1
2. NOT BEING ABLE TO STOP OR CONTROL WORRYING: SEVERAL DAYS
5. BEING SO RESTLESS THAT IT IS HARD TO SIT STILL: SEVERAL DAYS
7. FEELING AFRAID AS IF SOMETHING AWFUL MIGHT HAPPEN: 0

## 2023-07-17 ASSESSMENT — PATIENT HEALTH QUESTIONNAIRE - PHQ9
SUM OF ALL RESPONSES TO PHQ9 QUESTIONS 1 & 2: 2
SUM OF ALL RESPONSES TO PHQ QUESTIONS 1-9: 2
2. FEELING DOWN, DEPRESSED OR HOPELESS: 1
SUM OF ALL RESPONSES TO PHQ QUESTIONS 1-9: 2
2. FEELING DOWN, DEPRESSED OR HOPELESS: SEVERAL DAYS
SUM OF ALL RESPONSES TO PHQ QUESTIONS 1-9: 2
1. LITTLE INTEREST OR PLEASURE IN DOING THINGS: 1
1. LITTLE INTEREST OR PLEASURE IN DOING THINGS: SEVERAL DAYS
SUM OF ALL RESPONSES TO PHQ9 QUESTIONS 1 & 2: 2
SUM OF ALL RESPONSES TO PHQ QUESTIONS 1-9: 2

## 2023-07-17 NOTE — PROGRESS NOTES
[address] PSYCHIATRY  [address] [facility] [address]  [facility] [address] [phone]    Progress Note    Patient: Bao Givens  YOB: 1980  PCP:  BINH Richter CNP  Visit Date:  7/17/2023    TELEHEALTH EVALUATION     CC: Follow up for medication management and psychotherapy      SUBJECTIVE:      Bao Givens, a 37 y.o. female, presents for a follow up visit. Patient reports she is not doing well. Patient is compliant with medication regimen. Seroquel was too sedating so she cut in half. Helps with sleep but mood issues persist. Sister is major stressor right now but patient feels a need to help her out given struggles she has had. We reviewed patients history and she again has failed multiple trials of SSRI/SNRI. Continues to endorse history concerning for hypomanic vs manic episodes. Feels depression still persists currently but does not feel at her lowest. Irritability remains an issue. Denies suicidal ideation, intent or plan. Tobacco intake has increased to 1ppd. 3 minutes of tobacco cessation counseling was provided. Patient agreeable to started NRT, which was discussed. Psychotherapy Note  Time spent in psychotherapy: 16 minutes  Modality: supportive and CBT  Goals: process stressor and improve coping  Focus: provided active listening. Discussed positive coping skills including mindfulness and encouraged continued practice of skills. OBJECTIVE:  Patient-Reported Vitals 7/15/2023   Patient-Reported Weight 207.2   Patient-Reported Height 5'4   Patient-Reported Systolic 764   Patient-Reported Diastolic 83   Patient-Reported Pulse 76   Patient-Reported Temperature 98.7   Patient-Reported SpO2 97          MENTAL STATUS EXAM:  Orientation: Alert, oriented, thought content appropriate   Mood:.  \"Okay\"      Affect:  Constricted      Appearance:  Age Appropriate, Clothing Appropriate for Age, and Clothing Appropriate for

## 2023-07-18 ENCOUNTER — TELEPHONE (OUTPATIENT)
Dept: PSYCHIATRY | Age: 43
End: 2023-07-18

## 2023-07-18 NOTE — TELEPHONE ENCOUNTER
Raoul Graham wrote into the office via [employer]:    I took the med last night and I didn't sleep very well. I was tossing and turning and my arms and legs did nothing but twitch all night. I kept my  awake    --Please advise; she labeled the message Kayla Falk. She is scheduled to return on 08/14/23. Last seen yesterday.

## 2023-07-24 ENCOUNTER — TELEPHONE (OUTPATIENT)
Dept: PSYCHIATRY | Age: 43
End: 2023-07-24

## 2023-07-24 DIAGNOSIS — J45.30 MILD PERSISTENT ASTHMA WITHOUT COMPLICATION: ICD-10-CM

## 2023-07-24 RX ORDER — HYDROXYZINE 50 MG/1
25-50 TABLET, FILM COATED ORAL NIGHTLY PRN
Qty: 30 TABLET | Refills: 2 | Status: SHIPPED | OUTPATIENT
Start: 2023-07-24 | End: 2023-10-22

## 2023-07-24 NOTE — TELEPHONE ENCOUNTER
Ashok Howell wrote into the office via [employer]:    Is there anything you can prescribe so I can sleep at night? I don't take naps any more and I'm still up all night because my mind don't shut down    --Please advise. She is not scheduled to return until 08/14/23. Last seen on 07/17/23 where she was d/cherri on her Seroquel and started on Vraylar.

## 2023-07-24 NOTE — TELEPHONE ENCOUNTER
I have sent in prescription for Hydroxyzine 50mg nightly as needed for sleep. If 50mg is too much (causing morning grogginess) she may take 1/2 tablet.     Dr. Earlene Dutta

## 2023-07-26 RX ORDER — ALBUTEROL SULFATE 90 UG/1
AEROSOL, METERED RESPIRATORY (INHALATION)
Qty: 18 G | Refills: 5 | OUTPATIENT
Start: 2023-07-26

## 2023-07-28 ENCOUNTER — HOSPITAL ENCOUNTER (OUTPATIENT)
Age: 43
Discharge: HOME | End: 2023-07-28
Payer: MEDICAID

## 2023-07-28 DIAGNOSIS — F39 UNSPECIFIED MOOD (AFFECTIVE) DISORDER (HCC): ICD-10-CM

## 2023-07-28 LAB
CHOLEST SERPL-MCNC: 98 MG/DL (ref 100–199)
DEPRECATED MEAN GLUCOSE BLD GHB EST-ACNC: 96 MG/DL (ref 70–126)
HBA1C MFR BLD HPLC: 5.2 % (ref 4.4–6.4)
HDLC SERPL-MCNC: 30 MG/DL
LDLC SERPL CALC-MCNC: 12 MG/DL
TRIGL SERPL-MCNC: 282 MG/DL (ref 0–199)

## 2023-07-28 PROCEDURE — 36415 COLL VENOUS BLD VENIPUNCTURE: CPT

## 2023-07-28 PROCEDURE — 83036 HEMOGLOBIN GLYCOSYLATED A1C: CPT

## 2023-07-28 PROCEDURE — 80061 LIPID PANEL: CPT

## 2023-08-01 ENCOUNTER — HOSPITAL ENCOUNTER (EMERGENCY)
Age: 43
Discharge: HOME | End: 2023-08-01
Attending: EMERGENCY MEDICINE
Payer: MEDICAID

## 2023-08-01 ENCOUNTER — APPOINTMENT (OUTPATIENT)
Dept: GENERAL RADIOLOGY | Age: 43
End: 2023-08-01
Payer: MEDICAID

## 2023-08-01 VITALS
RESPIRATION RATE: 16 BRPM | OXYGEN SATURATION: 99 % | DIASTOLIC BLOOD PRESSURE: 92 MMHG | HEART RATE: 86 BPM | TEMPERATURE: 98.1 F | SYSTOLIC BLOOD PRESSURE: 149 MMHG

## 2023-08-01 DIAGNOSIS — S93.402D SPRAIN OF LEFT ANKLE, UNSPECIFIED LIGAMENT, SUBSEQUENT ENCOUNTER: Primary | ICD-10-CM

## 2023-08-01 PROCEDURE — 73610 X-RAY EXAM OF ANKLE: CPT

## 2023-08-01 PROCEDURE — 99283 EMERGENCY DEPT VISIT LOW MDM: CPT

## 2023-08-01 NOTE — ED TRIAGE NOTES
Pt to ED via intake with c/o left ankle pain since April. Pt reports they have been at a \"bandaid station\" and doesn't feel like the pain is better. Pt has a ankle brace on a this time and ambulated into intake room.

## 2023-08-03 ENCOUNTER — OFFICE VISIT (OUTPATIENT)
Dept: PULMONOLOGY | Age: 43
End: 2023-08-03
Payer: MEDICAID

## 2023-08-03 VITALS
WEIGHT: 215 LBS | HEART RATE: 84 BPM | TEMPERATURE: 97.5 F | SYSTOLIC BLOOD PRESSURE: 118 MMHG | HEIGHT: 64 IN | BODY MASS INDEX: 36.7 KG/M2 | OXYGEN SATURATION: 98 % | DIASTOLIC BLOOD PRESSURE: 76 MMHG

## 2023-08-03 DIAGNOSIS — E66.9 OBESITY (BMI 30-39.9): ICD-10-CM

## 2023-08-03 DIAGNOSIS — Z72.0 TOBACCO ABUSE DISORDER: ICD-10-CM

## 2023-08-03 DIAGNOSIS — R09.81 SINUS CONGESTION: ICD-10-CM

## 2023-08-03 DIAGNOSIS — F17.200 SMOKER UNMOTIVATED TO QUIT: ICD-10-CM

## 2023-08-03 DIAGNOSIS — J45.30 MILD PERSISTENT ASTHMA WITHOUT COMPLICATION: Primary | ICD-10-CM

## 2023-08-03 PROCEDURE — G8427 DOCREV CUR MEDS BY ELIG CLIN: HCPCS | Performed by: NURSE PRACTITIONER

## 2023-08-03 PROCEDURE — 4004F PT TOBACCO SCREEN RCVD TLK: CPT | Performed by: NURSE PRACTITIONER

## 2023-08-03 PROCEDURE — 99214 OFFICE O/P EST MOD 30 MIN: CPT | Performed by: NURSE PRACTITIONER

## 2023-08-03 PROCEDURE — G8417 CALC BMI ABV UP PARAM F/U: HCPCS | Performed by: NURSE PRACTITIONER

## 2023-08-03 PROCEDURE — 99406 BEHAV CHNG SMOKING 3-10 MIN: CPT | Performed by: NURSE PRACTITIONER

## 2023-08-03 RX ORDER — MONTELUKAST SODIUM 10 MG/1
10 TABLET ORAL NIGHTLY
Qty: 90 TABLET | Refills: 3 | Status: SHIPPED | OUTPATIENT
Start: 2023-08-03

## 2023-08-03 RX ORDER — ALBUTEROL SULFATE 90 UG/1
2 AEROSOL, METERED RESPIRATORY (INHALATION) EVERY 4 HOURS PRN
Qty: 18 G | Refills: 3 | Status: SHIPPED | OUTPATIENT
Start: 2023-08-03

## 2023-08-03 RX ORDER — FLUTICASONE PROPIONATE AND SALMETEROL 100; 50 UG/1; UG/1
1 POWDER RESPIRATORY (INHALATION) 2 TIMES DAILY
Qty: 1 EACH | Refills: 11 | Status: SHIPPED | OUTPATIENT
Start: 2023-08-03

## 2023-08-03 RX ORDER — AMOXICILLIN AND CLAVULANATE POTASSIUM 875; 125 MG/1; MG/1
1 TABLET, FILM COATED ORAL 2 TIMES DAILY
Qty: 20 TABLET | Refills: 0 | Status: SHIPPED | OUTPATIENT
Start: 2023-08-03 | End: 2023-08-13

## 2023-08-03 RX ORDER — PREDNISONE 10 MG/1
TABLET ORAL
Qty: 30 TABLET | Refills: 0 | Status: SHIPPED | OUTPATIENT
Start: 2023-08-03 | End: 2023-08-13

## 2023-08-03 RX ORDER — ALBUTEROL SULFATE 2.5 MG/3ML
2.5 SOLUTION RESPIRATORY (INHALATION) EVERY 4 HOURS PRN
Qty: 120 EACH | Refills: 3 | Status: SHIPPED | OUTPATIENT
Start: 2023-08-03

## 2023-08-03 ASSESSMENT — ENCOUNTER SYMPTOMS
SINUS PRESSURE: 1
ALLERGIC/IMMUNOLOGIC NEGATIVE: 1
COUGH: 1
SHORTNESS OF BREATH: 1
GASTROINTESTINAL NEGATIVE: 1
WHEEZING: 0
EYES NEGATIVE: 1

## 2023-08-03 NOTE — PROGRESS NOTES
Immobilizer to left lower leg    Skin:     General: Skin is warm and dry. Capillary Refill: Capillary refill takes less than 2 seconds. Neurological:      Mental Status: She is alert and oriented to person, place, and time. Psychiatric:         Behavior: Behavior normal.         Thought Content: Thought content normal.         Judgment: Judgment normal.        Results   Lung Nodule Screening     [] Qualifies    [x] Does not qualify   [] Declined    [] Completed  -Patient still a every day smoker only 37years old   The USPSTF recommends annual screening for lung cancer with low-dose computed tomography (LDCT) in adults aged 48 to 80 years who have a 30 pack-year smoking history and currently smoke or have quit within the past 20 years. Screening should be discontinued once a person has not smoked for 20 years or develops a health problem that substantially limits life expectancy or the ability or willingness to have curative lung surgery. Assessment      Diagnosis Orders   1. Mild persistent asthma without complication  fluticasone-salmeterol (ADVAIR DISKUS) 100-50 MCG/ACT AEPB diskus inhaler    VENTOLIN  (90 Base) MCG/ACT inhaler    albuterol (PROVENTIL) (2.5 MG/3ML) 0.083% nebulizer solution    Full PFT Study With Bronchodilator    montelukast (SINGULAIR) 10 MG tablet      2. Tobacco abuse disorder  Full PFT Study With Bronchodilator      3. Obesity (BMI 30-39. 9)  Full PFT Study With Bronchodilator      4. Smoker unmotivated to quit        5. Sinus congestion  amoxicillin-clavulanate (AUGMENTIN) 875-125 MG per tablet    predniSONE (DELTASONE) 10 MG tablet            Plan   -Christine Blake Koo educated about environmental safety precautions she need to practice to prevent exacerbation ofher Asthma.  Eufemia Koo verbalizes understanding.    -Advised to maintain pneumonia vaccine with PCP and to take flu vaccine this coming season.  -Advised patient to call office with any changes, questions, or

## 2023-08-05 SDOH — ECONOMIC STABILITY: FOOD INSECURITY: WITHIN THE PAST 12 MONTHS, YOU WORRIED THAT YOUR FOOD WOULD RUN OUT BEFORE YOU GOT MONEY TO BUY MORE.: SOMETIMES TRUE

## 2023-08-05 SDOH — ECONOMIC STABILITY: INCOME INSECURITY: HOW HARD IS IT FOR YOU TO PAY FOR THE VERY BASICS LIKE FOOD, HOUSING, MEDICAL CARE, AND HEATING?: SOMEWHAT HARD

## 2023-08-05 SDOH — ECONOMIC STABILITY: HOUSING INSECURITY
IN THE LAST 12 MONTHS, WAS THERE A TIME WHEN YOU DID NOT HAVE A STEADY PLACE TO SLEEP OR SLEPT IN A SHELTER (INCLUDING NOW)?: NO

## 2023-08-05 SDOH — ECONOMIC STABILITY: FOOD INSECURITY: WITHIN THE PAST 12 MONTHS, THE FOOD YOU BOUGHT JUST DIDN'T LAST AND YOU DIDN'T HAVE MONEY TO GET MORE.: SOMETIMES TRUE

## 2023-08-08 ENCOUNTER — OFFICE VISIT (OUTPATIENT)
Dept: FAMILY MEDICINE CLINIC | Age: 43
End: 2023-08-08
Payer: MEDICAID

## 2023-08-08 VITALS
SYSTOLIC BLOOD PRESSURE: 118 MMHG | DIASTOLIC BLOOD PRESSURE: 72 MMHG | TEMPERATURE: 98.5 F | HEART RATE: 80 BPM | BODY MASS INDEX: 37.25 KG/M2 | WEIGHT: 217 LBS | RESPIRATION RATE: 16 BRPM

## 2023-08-08 DIAGNOSIS — E55.9 VITAMIN D DEFICIENCY: Primary | ICD-10-CM

## 2023-08-08 DIAGNOSIS — Z12.4 PAP SMEAR FOR CERVICAL CANCER SCREENING: ICD-10-CM

## 2023-08-08 PROCEDURE — G8417 CALC BMI ABV UP PARAM F/U: HCPCS | Performed by: NURSE PRACTITIONER

## 2023-08-08 PROCEDURE — 99213 OFFICE O/P EST LOW 20 MIN: CPT | Performed by: NURSE PRACTITIONER

## 2023-08-08 PROCEDURE — 4004F PT TOBACCO SCREEN RCVD TLK: CPT | Performed by: NURSE PRACTITIONER

## 2023-08-08 PROCEDURE — G8427 DOCREV CUR MEDS BY ELIG CLIN: HCPCS | Performed by: NURSE PRACTITIONER

## 2023-08-08 RX ORDER — ERGOCALCIFEROL 1.25 MG/1
50000 CAPSULE ORAL WEEKLY
Qty: 12 CAPSULE | Refills: 1 | Status: SHIPPED | OUTPATIENT
Start: 2023-08-08

## 2023-08-08 ASSESSMENT — ENCOUNTER SYMPTOMS
CONSTIPATION: 0
RHINORRHEA: 0
COUGH: 0
BLOOD IN STOOL: 0
EYE DISCHARGE: 0
SHORTNESS OF BREATH: 0
SORE THROAT: 0
COLOR CHANGE: 0
ABDOMINAL DISTENTION: 0
ABDOMINAL PAIN: 0
DIARRHEA: 0
ANAL BLEEDING: 0
EYE REDNESS: 0
NAUSEA: 0

## 2023-08-08 NOTE — PROGRESS NOTES
[facility]  [address]  [place] [address]  Dept: [phone]  Loc: [phone]      Visit Date: 8/8/2023    Emilia Canales is a 37 y.o. female who presents today for:  Chief Complaint   Patient presents with    Follow-up     Routine/medication check. HPI:     Just got back from [address] [place] [address] - there 3 months    Last PAP was within 3 years - last was with  - 10/23/2019 - normal    Saw  (podiatry) yesterday - doing 3 weeks of physical therapy and see him after.      Dr. Mercedes Rodas tomorrow about hip injections     Taking Augmentin from pulmonology - sinus infection - 10 day prescription     Seeing Psychiatrist - had to stop Ayde Medina - made her speech and thinking slow - seeing Dr. Meme Jason again 8/14/23    HPI  Health Maintenance   Topic Date Due    Cervical cancer screen  10/23/2022    Flu vaccine (1) 08/01/2023    COVID-19 Vaccine (1) 02/21/2024 (Originally 1980)    Depression Monitoring  07/17/2024    Lipids  07/28/2028    DTaP/Tdap/Td vaccine (2 - Td or Tdap) 04/17/2029    Pneumococcal 0-64 years Vaccine  Completed    Hepatitis C screen  Completed    HIV screen  Completed    Hepatitis A vaccine  Aged Out    Hib vaccine  Aged Out    Meningococcal (ACWY) vaccine  Aged Out    Varicella vaccine  Discontinued    Diabetes screen  Discontinued     Past Medical History:   Diagnosis Date    Anxiety     Asthma     Bipolar disorder ([address])     Chronic back pain     DDD (degenerative disc disease), cervical     DDD (degenerative disc disease), thoracolumbar     Depression     GERD (gastroesophageal reflux disease)     Headache     Substance abuse ([address])       Past Surgical History:   Procedure Laterality Date    BACK INJECTION Bilateral 01/03/2023    bilateral sacroiliac joint injection performed by Rachna Cespedes DO at [address] [facility] Bilateral 02/09/2023    bilateral sacroiliac joint injection performed by Dior Amaya

## 2023-08-09 ENCOUNTER — OFFICE VISIT (OUTPATIENT)
Dept: PHYSICAL MEDICINE AND REHAB | Age: 43
End: 2023-08-09

## 2023-08-09 VITALS
SYSTOLIC BLOOD PRESSURE: 104 MMHG | BODY MASS INDEX: 37.05 KG/M2 | WEIGHT: 217 LBS | HEIGHT: 64 IN | DIASTOLIC BLOOD PRESSURE: 84 MMHG

## 2023-08-09 DIAGNOSIS — G89.29 CHRONIC SI JOINT PAIN: Primary | ICD-10-CM

## 2023-08-09 DIAGNOSIS — M47.816 LUMBAR SPONDYLOSIS: ICD-10-CM

## 2023-08-09 DIAGNOSIS — M53.3 SACROILIAC JOINT DYSFUNCTION OF BOTH SIDES: ICD-10-CM

## 2023-08-09 DIAGNOSIS — M53.3 CHRONIC SI JOINT PAIN: Primary | ICD-10-CM

## 2023-08-09 DIAGNOSIS — M70.62 GREATER TROCHANTERIC BURSITIS OF LEFT HIP: ICD-10-CM

## 2023-08-14 ENCOUNTER — TELEMEDICINE (OUTPATIENT)
Dept: PSYCHIATRY | Age: 43
End: 2023-08-14
Payer: MEDICAID

## 2023-08-14 DIAGNOSIS — F43.10 PTSD (POST-TRAUMATIC STRESS DISORDER): ICD-10-CM

## 2023-08-14 DIAGNOSIS — F17.200 TOBACCO USE DISORDER: ICD-10-CM

## 2023-08-14 DIAGNOSIS — F39 UNSPECIFIED MOOD (AFFECTIVE) DISORDER (HCC): Primary | ICD-10-CM

## 2023-08-14 DIAGNOSIS — F41.1 GAD (GENERALIZED ANXIETY DISORDER): ICD-10-CM

## 2023-08-14 PROCEDURE — G8417 CALC BMI ABV UP PARAM F/U: HCPCS | Performed by: PSYCHIATRY & NEUROLOGY

## 2023-08-14 PROCEDURE — 90833 PSYTX W PT W E/M 30 MIN: CPT | Performed by: PSYCHIATRY & NEUROLOGY

## 2023-08-14 PROCEDURE — 99214 OFFICE O/P EST MOD 30 MIN: CPT | Performed by: PSYCHIATRY & NEUROLOGY

## 2023-08-14 PROCEDURE — 4004F PT TOBACCO SCREEN RCVD TLK: CPT | Performed by: PSYCHIATRY & NEUROLOGY

## 2023-08-14 PROCEDURE — G8427 DOCREV CUR MEDS BY ELIG CLIN: HCPCS | Performed by: PSYCHIATRY & NEUROLOGY

## 2023-08-14 RX ORDER — LAMOTRIGINE 25 MG/1
TABLET ORAL
Qty: 42 TABLET | Refills: 0 | Status: SHIPPED | OUTPATIENT
Start: 2023-08-14 | End: 2023-09-11

## 2023-08-14 ASSESSMENT — ANXIETY QUESTIONNAIRES
3. WORRYING TOO MUCH ABOUT DIFFERENT THINGS: 2
2. NOT BEING ABLE TO STOP OR CONTROL WORRYING: 3
6. BECOMING EASILY ANNOYED OR IRRITABLE: 3
3. WORRYING TOO MUCH ABOUT DIFFERENT THINGS: MORE THAN HALF THE DAYS
IF YOU CHECKED OFF ANY PROBLEMS ON THIS QUESTIONNAIRE, HOW DIFFICULT HAVE THESE PROBLEMS MADE IT FOR YOU TO DO YOUR WORK, TAKE CARE OF THINGS AT HOME, OR GET ALONG WITH OTHER PEOPLE: VERY DIFFICULT
IF YOU CHECKED OFF ANY PROBLEMS ON THIS QUESTIONNAIRE, HOW DIFFICULT HAVE THESE PROBLEMS MADE IT FOR YOU TO DO YOUR WORK, TAKE CARE OF THINGS AT HOME, OR GET ALONG WITH OTHER PEOPLE: VERY DIFFICULT
1. FEELING NERVOUS, ANXIOUS, OR ON EDGE: 1
6. BECOMING EASILY ANNOYED OR IRRITABLE: NEARLY EVERY DAY
1. FEELING NERVOUS, ANXIOUS, OR ON EDGE: SEVERAL DAYS
4. TROUBLE RELAXING: SEVERAL DAYS
GAD7 TOTAL SCORE: 11
5. BEING SO RESTLESS THAT IT IS HARD TO SIT STILL: 1
4. TROUBLE RELAXING: 1
5. BEING SO RESTLESS THAT IT IS HARD TO SIT STILL: SEVERAL DAYS
7. FEELING AFRAID AS IF SOMETHING AWFUL MIGHT HAPPEN: 0
2. NOT BEING ABLE TO STOP OR CONTROL WORRYING: NEARLY EVERY DAY
7. FEELING AFRAID AS IF SOMETHING AWFUL MIGHT HAPPEN: NOT AT ALL

## 2023-08-14 ASSESSMENT — PATIENT HEALTH QUESTIONNAIRE - PHQ9
1. LITTLE INTEREST OR PLEASURE IN DOING THINGS: 1
SUM OF ALL RESPONSES TO PHQ9 QUESTIONS 1 & 2: 2
SUM OF ALL RESPONSES TO PHQ QUESTIONS 1-9: 2
1. LITTLE INTEREST OR PLEASURE IN DOING THINGS: SEVERAL DAYS
SUM OF ALL RESPONSES TO PHQ QUESTIONS 1-9: 2
SUM OF ALL RESPONSES TO PHQ9 QUESTIONS 1 & 2: 2
SUM OF ALL RESPONSES TO PHQ QUESTIONS 1-9: 2
2. FEELING DOWN, DEPRESSED OR HOPELESS: 1
SUM OF ALL RESPONSES TO PHQ QUESTIONS 1-9: 2
2. FEELING DOWN, DEPRESSED OR HOPELESS: SEVERAL DAYS

## 2023-08-14 NOTE — PROGRESS NOTES
F39       2. PTSD (post-traumatic stress disorder)  F43.10       3. YUDY (generalized anxiety disorder)  F41.1       4. Tobacco use disorder  F17.200           ASSESSMENT:  Diagnostically patient remains a little unclear. Have tried Seroquel and Vraylar to work a more Bipolar angle but have failed due to side effects. Antidepressants have failed in past. Made plan to trial Lamictal.    PLAN:     Medications:   Start Lamictal titration. 25mg daily for 14 days then increaes to 50mg daily. Will follow up a that point  Hydroxyzine 50mg nightly PRN for sleep  Risks, benefits and alternatives discussed. Labs/Tests/Imaging: none  Records Reviewed: [facility]  Patient advised to call if patient has any difficulties with treatment. Patient informed to call [phone] or go to ER if experiencing crisis, SI, HI, or psychosis. Return in about 4 weeks (around 9/11/2023). Destiny Richard, was evaluated through a synchronous (real-time) audio-video encounter. The patient (or guardian if applicable) is aware that this is a billable service, which includes applicable co-pays. This Virtual Visit was conducted with patient's (and/or legal guardian's) consent. Patient identification was verified, and a caregiver was present when appropriate. The patient was located at Home: [address] [place] [address]  Provider was located at Home ([facility]): [place]         Total time spent for this encounter: Not billed by time    --Bess Conteh MD on 8/14/2023 at 3:03 PM    An electronic signature was used to authenticate this note.

## 2023-08-23 ENCOUNTER — NURSE ONLY (OUTPATIENT)
Dept: LAB | Age: 43
End: 2023-08-23

## 2023-08-29 LAB — CYTOLOGY THIN PREP PAP: NORMAL

## 2023-08-30 NOTE — DISCHARGE INSTRUCTIONS
Post procedure Instructions:    No driving or making significant decisions for the remainder of the day. Be cautions with walking and activity for 24 hours, do not over exert yourself. Ok to resume pre-procedure activity level today. Apply ice to site of injection site if you have pain or discomfort. Do not submerge sit of injection during bath or pool activities for 48 hours post-procedure. Resume blood thinning medications in 24 hours. Call office [phone] if you have:  Temperature greater than 100.4  Persistent nausea and vomiting  Severe uncontrolled pain  Redness, tenderness, or signs of infection (pain, swelling, redness, odor or green/yellow discharge around the site)  Difficulty breathing, headache or visual disturbances  Hives  Persistent dizziness or light-headedness  Extreme fatigue  Any other questions or concerns you may have after discharge    In an emergency, call 911 or go to an Emergency Department at a nearby hospital    How Do you Prevent Surgical Site Infections? *HAND WASHING     *STOP SHAVING   Wash your hands multiple times daily  Do not shave incisional area 48 hours before   with soap for 20 seconds. or until 2 weeks after surgery. This can   Before eating and wound care. cause tiny cuts in the skin which can lead to infection. After using the bathroom or touching pets. *BALANCE      Times of activity and rest.      Stay away from people who may be sick. *QUIT SMOKING      Cigarette smoking leads to slow wound      healing. [facility] YOUR BODY  Follow your doctor's instructions on washing the night before and the day of your surgery. You may use products like:  Dial antibacterial soap, Hibiclens, Beatris-hex. Do not share personal items such as towels, wash cloths, or toothbrush.      *BLOOD SUGAR CONTROL                  Lower blood sugars help to prevent                          infections, scarring, and

## 2023-09-04 NOTE — H&P
Today, patient presents for planned bilateral ADVANCED sacroiliac joint injection. This note is reflective of the patient's previous visit for evaluation. We will proceed with today's planned procedure. Since patient's last visit for evaluation, there have been no interval changes in medical history. Patient has no new numbness, weakness, or focal neurological deficit since evaluation. Patient has no contraindications to injection (no anticoagulation or recent antibiotic intake for active infections), and has a  present or is able to drive themselves (as discussed and cleared by physician). Allergies to latex, contrast dye, and steroid medications have been confirmed with the patient prior to the procedure. NPO necessity has been assessed and accepted based on procedure complexity. The risks and benefits of the procedure have been explained including but are not limited to infection, bleeding, paralysis, immediate post procedure weakness, and dizziness; the patient acknowledges understanding and desires to proceed with the procedure. Patient has signed consent for same procedure as discussed in previous clinic encounter. All other questions and concerns were addressed at bedside. See procedure note for full details. Post procedure Instructions: The patient was advised not to drive during the day of the procedure and not to engage in any significant decision making (unless otherwise states by physician). The patient was also advised to be cautious with walking/activity for 24 hours following today's visit and asked not to engage in over-exertion (unless otherwise states by physician). After this time, it is ok to resume pre-procedure level of activity. Patient advised to apply ice to site of injection in situations of pain and discomfort. Patient advised to not submerge site of injection during bath or pool activities for approximately 24 hours post-procedure.  Patient attested to understanding post

## 2023-09-05 ENCOUNTER — HOSPITAL ENCOUNTER (OUTPATIENT)
Age: 43
Setting detail: OUTPATIENT SURGERY
Discharge: HOME | End: 2023-09-05
Attending: ANESTHESIOLOGY | Admitting: ANESTHESIOLOGY
Payer: MEDICAID

## 2023-09-05 ENCOUNTER — APPOINTMENT (OUTPATIENT)
Dept: GENERAL RADIOLOGY | Age: 43
End: 2023-09-05
Attending: ANESTHESIOLOGY
Payer: MEDICAID

## 2023-09-05 VITALS
RESPIRATION RATE: 16 BRPM | OXYGEN SATURATION: 99 % | TEMPERATURE: 98 F | DIASTOLIC BLOOD PRESSURE: 65 MMHG | HEIGHT: 64 IN | HEART RATE: 77 BPM | SYSTOLIC BLOOD PRESSURE: 123 MMHG | BODY MASS INDEX: 36.6 KG/M2 | WEIGHT: 214.4 LBS

## 2023-09-05 LAB — PREGNANCY, URINE: NEGATIVE

## 2023-09-05 PROCEDURE — 99152 MOD SED SAME PHYS/QHP 5/>YRS: CPT | Performed by: ANESTHESIOLOGY

## 2023-09-05 PROCEDURE — 6360000002 HC RX W HCPCS: Performed by: ANESTHESIOLOGY

## 2023-09-05 PROCEDURE — 64451 NJX AA&/STRD NRV NRVTG SI JT: CPT | Performed by: ANESTHESIOLOGY

## 2023-09-05 PROCEDURE — 7100000010 HC PHASE II RECOVERY - FIRST 15 MIN: Performed by: ANESTHESIOLOGY

## 2023-09-05 PROCEDURE — 81025 URINE PREGNANCY TEST: CPT

## 2023-09-05 PROCEDURE — 2709999900 HC NON-CHARGEABLE SUPPLY: Performed by: ANESTHESIOLOGY

## 2023-09-05 PROCEDURE — 7100000011 HC PHASE II RECOVERY - ADDTL 15 MIN: Performed by: ANESTHESIOLOGY

## 2023-09-05 PROCEDURE — 2500000003 HC RX 250 WO HCPCS: Performed by: ANESTHESIOLOGY

## 2023-09-05 PROCEDURE — 3600000054 HC PAIN LEVEL 3 BASE: Performed by: ANESTHESIOLOGY

## 2023-09-05 RX ORDER — FENTANYL CITRATE 50 UG/ML
INJECTION, SOLUTION INTRAMUSCULAR; INTRAVENOUS PRN
Status: DISCONTINUED | OUTPATIENT
Start: 2023-09-05 | End: 2023-09-05 | Stop reason: ALTCHOICE

## 2023-09-05 RX ORDER — BUPIVACAINE HYDROCHLORIDE 5 MG/ML
INJECTION, SOLUTION PERINEURAL PRN
Status: DISCONTINUED | OUTPATIENT
Start: 2023-09-05 | End: 2023-09-05 | Stop reason: ALTCHOICE

## 2023-09-05 RX ORDER — LIDOCAINE HYDROCHLORIDE 10 MG/ML
INJECTION, SOLUTION EPIDURAL; INFILTRATION; INTRACAUDAL; PERINEURAL PRN
Status: DISCONTINUED | OUTPATIENT
Start: 2023-09-05 | End: 2023-09-05 | Stop reason: ALTCHOICE

## 2023-09-05 RX ORDER — METHYLPREDNISOLONE ACETATE 80 MG/ML
INJECTION, SUSPENSION INTRA-ARTICULAR; INTRALESIONAL; INTRAMUSCULAR; SOFT TISSUE PRN
Status: DISCONTINUED | OUTPATIENT
Start: 2023-09-05 | End: 2023-09-05 | Stop reason: ALTCHOICE

## 2023-09-05 RX ORDER — MIDAZOLAM HYDROCHLORIDE 1 MG/ML
INJECTION INTRAMUSCULAR; INTRAVENOUS PRN
Status: DISCONTINUED | OUTPATIENT
Start: 2023-09-05 | End: 2023-09-05 | Stop reason: ALTCHOICE

## 2023-09-05 ASSESSMENT — PAIN SCALES - GENERAL: PAINLEVEL_OUTOF10: 4

## 2023-09-05 ASSESSMENT — PAIN - FUNCTIONAL ASSESSMENT: PAIN_FUNCTIONAL_ASSESSMENT: 0-10

## 2023-09-05 NOTE — PRE SEDATION
[address]  Pre-Sedation/Analgesia History & Physical    Pt Name: Ted Corrales  MRN: [id number]  YOB: 1980  Provider Performing Procedure: Sandhya Wheeler DO   Primary Care Physician: BINH Proctor CNP      MEDICAL HISTORY       has a past medical history of Anxiety, Asthma, Bipolar disorder ([address]), Chronic back pain, DDD (degenerative disc disease), cervical, DDD (degenerative disc disease), thoracolumbar, Depression, GERD (gastroesophageal reflux disease), Headache, and Substance abuse ([address]). SURGICAL HISTORY   has a past surgical history that includes Pain management procedure (Bilateral, 02/01/2022); Pain management procedure (Bilateral, 03/15/2022); Pain management procedure (Right, 04/12/2022); Pain management procedure (Left, 05/24/2022); hip surgery (Bilateral, 06/21/2022); Pain management procedure (N/A, 11/08/2022); Back Injection (Bilateral, 01/03/2023); Back Injection (Bilateral, 02/09/2023); and Tubal ligation. ALLERGIES   Allergies as of 08/09/2023 - Fully Reviewed 08/09/2023   Allergen Reaction Noted    Hydrocodone  02/27/2020    Prozac [fluoxetine hcl] Other (See Comments) 12/05/2018       MEDICATIONS   Prior to Admission medications    Medication Sig Start Date End Date Taking? Authorizing Provider   lamoTRIgine (LAMICTAL) 25 MG tablet Take 1 tablet by mouth daily for 14 days, THEN 2 tablets daily for 14 days.  8/14/23 9/11/23  Leonidas Valdez MD   vitamin D (ERGOCALCIFEROL) 1.25 MG (12308 UT) CAPS capsule Take 1 capsule by mouth once a week 8/8/23   BINH Proctor CNP   fluticasone-salmeterol (ADVAIR DISKUS) 100-50 MCG/ACT AEPB diskus inhaler Inhale 1 puff into the lungs 2 times daily 8/3/23   BINH Hirsch CNP   VENTOLIN  (90 Base) MCG/ACT inhaler Inhale 2 puffs into the lungs every 4 hours as needed for Wheezing or Shortness of Breath 8/3/23   Sourav Jermain, APRN - CNP   albuterol (PROVENTIL) (2.5 MG/3ML) 0.083% nebulizer

## 2023-09-05 NOTE — PROGRESS NOTES
1047: Patient arrives to recovery room via cart. Spontaneous respirations, vss, report received from surgical RN. Patient denies pain, numbness, tingling , nausea. Injection site clean, dry, intact. HOB elevated. IV capped. Snack and drink given. Call light within reach. 1100: walked in room to check on patient, patient had put own side rail down. I took IV out. Patient getting dressed. 1120: patient wheeled to discharge lobby in stable condition. Patient discharged home with .

## 2023-09-05 NOTE — PROCEDURES
Pre-operative Diagnosis:  SI joint pain     Post-operative Diagnosis:  SI joint pain     Procedure: Bilateral SI joint block     Procedure Description:  After having signed the informed consent, the patient was placed in the prone position. The patient's low back and buttocks was prepped with chloraprep solution, and draped in a sterile fashion. A total of 1 ml of 1% lidocaine was used to anesthetize the skin and underlying tissues at each level. Next, 3.5 inch 22 g spinal needles were advanced to the anatomic location of the RIGHT L5 primary dorsal ramus at the junction of the superior articular process and sacral ala utilizing intermittent fluoroscopy, as well as the S1, S2, and S3 lateral branch nerves at the lateral border of the RIGHT S1, S2, and S3 posterior/dorsal foramen. Then, a 1 cc mixture of 40 mg depo-medrol and 0.25% bupivacaine was injected at each site. The needles were removed without any complications. This procedure was repeated on the contralateral side. The patient tolerated the procedure well, was transported to the recovery room and observed for 15 minutes and discharged in an ambulatory fashion. No immediate reported complications.      Procedural Complications: None  Estimated Blood Loss: 0 mL           IV sedation was used during the procedure:  - Moderate intravenous conscious sedation was supervised by Dr. Casey Orellana  - The patient was independently monitored by a Registered Nurse assigned to the procedure room  - Monitoring included automated blood pressure, continuous EKG, and continuous pulse oximetry  - The detailed conscious record is permanently stored in the [address]   - The following is the conscious sedation record:  Start Time: 10:40  End Time : 10:55  Duration: 15 minutes   Medications Administered: 2 mg Versed, 100 mcg Fentanyl        Liborio Blackmon DO  Interventional Pain Management/PM&R   [address]

## 2023-09-05 NOTE — POST SEDATION
[address]  Sedation/Analgesia Post Sedation Record    Pt Name: Preeti Harrington  MRN: [id number]  YOB: 1980  Procedure Performed By: Alex Burrell DO  Primary Care Physician: Ruchi Monday, APRN - CNP    POST-PROCEDURE    Physicians/Assistants: Alex Burrell DO  Procedure Performed: See Procedure Note   Sedation/Anesthesia: Versed and Fentanyl (See procedure note for amount and duration)  Estimated Blood Loss:     0  ml  Specimens Removed: None        Complications: None           Liborio De Guzman DO  Electronically signed 9/5/2023 at 7:35 PM

## 2023-09-12 ENCOUNTER — OFFICE VISIT (OUTPATIENT)
Dept: PSYCHIATRY | Age: 43
End: 2023-09-12
Payer: MEDICAID

## 2023-09-12 DIAGNOSIS — F43.10 PTSD (POST-TRAUMATIC STRESS DISORDER): ICD-10-CM

## 2023-09-12 DIAGNOSIS — F39 UNSPECIFIED MOOD (AFFECTIVE) DISORDER (HCC): Primary | ICD-10-CM

## 2023-09-12 DIAGNOSIS — F41.1 GAD (GENERALIZED ANXIETY DISORDER): ICD-10-CM

## 2023-09-12 PROCEDURE — G8428 CUR MEDS NOT DOCUMENT: HCPCS | Performed by: PSYCHIATRY & NEUROLOGY

## 2023-09-12 PROCEDURE — 90833 PSYTX W PT W E/M 30 MIN: CPT | Performed by: PSYCHIATRY & NEUROLOGY

## 2023-09-12 PROCEDURE — 4004F PT TOBACCO SCREEN RCVD TLK: CPT | Performed by: PSYCHIATRY & NEUROLOGY

## 2023-09-12 PROCEDURE — G8417 CALC BMI ABV UP PARAM F/U: HCPCS | Performed by: PSYCHIATRY & NEUROLOGY

## 2023-09-12 PROCEDURE — 99214 OFFICE O/P EST MOD 30 MIN: CPT | Performed by: PSYCHIATRY & NEUROLOGY

## 2023-09-12 RX ORDER — LAMOTRIGINE 100 MG/1
TABLET ORAL
Qty: 53 TABLET | Refills: 0 | Status: SHIPPED | OUTPATIENT
Start: 2023-09-12 | End: 2023-10-12

## 2023-09-12 RX ORDER — LAMOTRIGINE 100 MG/1
100 TABLET ORAL 2 TIMES DAILY
Qty: 60 TABLET | Refills: 2 | Status: SHIPPED | OUTPATIENT
Start: 2023-10-13

## 2023-09-12 ASSESSMENT — PATIENT HEALTH QUESTIONNAIRE - PHQ9
SUM OF ALL RESPONSES TO PHQ QUESTIONS 1-9: 4
5. POOR APPETITE OR OVEREATING: 0
6. FEELING BAD ABOUT YOURSELF - OR THAT YOU ARE A FAILURE OR HAVE LET YOURSELF OR YOUR FAMILY DOWN: 0
9. THOUGHTS THAT YOU WOULD BE BETTER OFF DEAD, OR OF HURTING YOURSELF: 0
SUM OF ALL RESPONSES TO PHQ QUESTIONS 1-9: 4
8. MOVING OR SPEAKING SO SLOWLY THAT OTHER PEOPLE COULD HAVE NOTICED. OR THE OPPOSITE, BEING SO FIGETY OR RESTLESS THAT YOU HAVE BEEN MOVING AROUND A LOT MORE THAN USUAL: 1
SUM OF ALL RESPONSES TO PHQ QUESTIONS 1-9: 4
2. FEELING DOWN, DEPRESSED OR HOPELESS: 1
1. LITTLE INTEREST OR PLEASURE IN DOING THINGS: 0
SUM OF ALL RESPONSES TO PHQ QUESTIONS 1-9: 4
4. FEELING TIRED OR HAVING LITTLE ENERGY: 2
3. TROUBLE FALLING OR STAYING ASLEEP: 0
7. TROUBLE CONCENTRATING ON THINGS, SUCH AS READING THE NEWSPAPER OR WATCHING TELEVISION: 0
SUM OF ALL RESPONSES TO PHQ9 QUESTIONS 1 & 2: 1

## 2023-09-12 ASSESSMENT — ANXIETY QUESTIONNAIRES
2. NOT BEING ABLE TO STOP OR CONTROL WORRYING: 0
5. BEING SO RESTLESS THAT IT IS HARD TO SIT STILL: 1
4. TROUBLE RELAXING: 0
7. FEELING AFRAID AS IF SOMETHING AWFUL MIGHT HAPPEN: 0
GAD7 TOTAL SCORE: 3
3. WORRYING TOO MUCH ABOUT DIFFERENT THINGS: 0
6. BECOMING EASILY ANNOYED OR IRRITABLE: 1
1. FEELING NERVOUS, ANXIOUS, OR ON EDGE: 1

## 2023-09-12 NOTE — PROGRESS NOTES
[address] PSYCHIATRY  [facility] [address] [phone]    Progress Note    Patient: Kalyani Sharma  YOB: 1980  PCP:  BINH Humphrey CNP  Visit Date:  9/12/2023      CC: Follow up for medication management and psychotherapy      SUBJECTIVE:      Kalyani Sharma, a 37 y.o. female, presents for a follow up visit. Patient reports she has tolerated initiation of Lamictal without issue. Has not noticed much benefit but is hopeful at work. Feels most things are going well but still having a lot of stress with her sister. Denies suicidal ideation, intent or plan. Denies symptoms of psychosis. 16 minutes was spent in psychotherapy. Supportive psychotherapy was provided. Focus was on stress surrounding interactions with her sister. OBJECTIVE:      9/7/2023    10:04 AM   Patient-Reported Vitals   Patient-Reported Weight 214   Patient-Reported Height 5'4   Patient-Reported Systolic 300 mmHg   Patient-Reported Diastolic 268 mmHg   Patient-Reported Pulse 93   Patient-Reported Temperature 97.6   Patient-Reported SpO2 96          MENTAL STATUS EXAM:  Orientation: Alert, oriented, thought content appropriate   Mood:. \"Not bad\"      Affect:  euthymic      Appearance:  Age Appropriate, Clothing Appropriate for Age, and Clothing Appropriate for Weather   Thought Process: Within Normal Limits   Thought Content:   Within Normal Limits   Insight:  Age Appropriate   Judgment: Age Appropriate   Suicidal Ideations: Denies Suicidal Ideation               9/12/2023     3:30 PM   PHQ-9    Little interest or pleasure in doing things 0   Feeling down, depressed, or hopeless 1   Trouble falling or staying asleep, or sleeping too much 0   Feeling tired or having little energy 2   Poor appetite or overeating 0   Feeling bad about yourself - or that you are a failure or have let yourself or your family down 0   Trouble

## 2023-10-04 ENCOUNTER — OFFICE VISIT (OUTPATIENT)
Dept: PHYSICAL MEDICINE AND REHAB | Age: 43
End: 2023-10-04
Payer: MEDICAID

## 2023-10-04 VITALS
BODY MASS INDEX: 36.54 KG/M2 | SYSTOLIC BLOOD PRESSURE: 134 MMHG | WEIGHT: 214 LBS | HEIGHT: 64 IN | DIASTOLIC BLOOD PRESSURE: 70 MMHG

## 2023-10-04 DIAGNOSIS — M54.17 RADICULOPATHY, LUMBOSACRAL REGION: ICD-10-CM

## 2023-10-04 DIAGNOSIS — G89.4 CHRONIC PAIN SYNDROME: ICD-10-CM

## 2023-10-04 DIAGNOSIS — M70.61 GREATER TROCHANTERIC BURSITIS OF RIGHT HIP: ICD-10-CM

## 2023-10-04 DIAGNOSIS — M79.2 NEUROPATHIC PAIN: ICD-10-CM

## 2023-10-04 DIAGNOSIS — M53.3 SACROILIAC JOINT DYSFUNCTION OF BOTH SIDES: Primary | ICD-10-CM

## 2023-10-04 PROCEDURE — 4004F PT TOBACCO SCREEN RCVD TLK: CPT | Performed by: NURSE PRACTITIONER

## 2023-10-04 PROCEDURE — 99214 OFFICE O/P EST MOD 30 MIN: CPT | Performed by: NURSE PRACTITIONER

## 2023-10-04 PROCEDURE — G8427 DOCREV CUR MEDS BY ELIG CLIN: HCPCS | Performed by: NURSE PRACTITIONER

## 2023-10-04 PROCEDURE — G8484 FLU IMMUNIZE NO ADMIN: HCPCS | Performed by: NURSE PRACTITIONER

## 2023-10-04 PROCEDURE — G8417 CALC BMI ABV UP PARAM F/U: HCPCS | Performed by: NURSE PRACTITIONER

## 2023-10-04 RX ORDER — CELECOXIB 100 MG/1
100 CAPSULE ORAL 2 TIMES DAILY
Qty: 60 CAPSULE | Refills: 1 | Status: SHIPPED | OUTPATIENT
Start: 2023-10-04

## 2023-10-04 NOTE — PROGRESS NOTES
Chronic Pain/PM&R Clinic Note     Encounter Date: 10/4/23    Subjective:   Chief Complaint:   Chief Complaint   Patient presents with    Follow-up       History of Present Illness: Chana Vallejo is a 37 y.o. female seen in the clinic initially on  01/06/2022 upon request from Blessing Shields MD for her history of lumbar pain. Patient states pain started after a lifting accident at work in 2008 where she had an injury to her thoracic spine. Patient states back pain has become gradually worse ever since. Patient states pain is worse when she is sitting, standing in 1 position, or doing activities such as cleaning around the house. Patient states she will occasionally get pain that radiates down bilateral legs, left more than right with strenuous activity. Patient states when pain gets severe she will lay down which seems to help. Patient does have trouble sleeping occasionally as she has some aching and twitching in bilateral legs. Patient states she has tried physical therapy and decompression several times which have been ineffective. It has been several years since she has done physical therapy. Patient states she does not currently work as she was unable to tolerate it. Patient denies weakness in bilateral legs but states she will occasionally feel like her knees want to give out. Patient denies falls. Patient denies saddle anesthesia or bowel or bladder incontinence. Patient also mentions other pain such as in her left shoulder, left wrist, and left knee. 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 She has had ongoing thoracic pain since then. Today, 10/04/2023, patient presents for planned follow up on chronic pain. Patient states she completed the bilateral advanced SI joint blocks on 9/5/2023. She noted significant improvement in her hip pain.   Unfortunately she is still having severe stabbing pain from her right

## 2023-10-04 NOTE — PROGRESS NOTES
[address]  [facility]  [facility]. [address] [place] [address]  Dept: [phone]  Dept Fax: [phone]: [phone]    Visit Date: 10/4/2023    Functionality Assessment/Goals Worksheet     On a scale of 0 (Does not Interfere) to 10 (Completely Interferes)     1. Which number describes how during the past week pain has interfered with       the following:  A. General Activity:  7  B. Mood: 8  C. Walking Ability:  9  D. Normal Work (Includes both work outside the home and housework):  9  E. Relations with Other People:   5  F. Sleep:   9  G. Enjoyment of Life:   5    2. Patient Prefers to Take their Pain Medications:     []  On a regular basis   [x]  Only when necessary    []  Does not take pain medications    3. What are the Patient's Goals/Expectations for Visiting Pain Management?      []  Learn about my pain    [x]  Receive Medication   []  Physical Therapy     []  Treat Depression   [x]  Receive Injections    []  Treat Sleep   []  Deal with Anxiety and Stress   []  Treat Opoid Dependence/Addiction   []  Other:

## 2023-10-13 ENCOUNTER — TELEPHONE (OUTPATIENT)
Dept: PSYCHIATRY | Age: 43
End: 2023-10-13

## 2023-10-13 NOTE — TELEPHONE ENCOUNTER
Rogers Zaragoza wrote into the office via [employer]:    She labeled the message-Hydroxyzine. The sleepy meds are not working any more. Am I able to take 100 mgs? Please advise; she does not return until 12/12/2023.

## 2023-11-01 DIAGNOSIS — J45.30 MILD PERSISTENT ASTHMA WITHOUT COMPLICATION: ICD-10-CM

## 2023-11-01 RX ORDER — ALBUTEROL SULFATE 90 UG/1
AEROSOL, METERED RESPIRATORY (INHALATION)
Qty: 18 G | Refills: 3 | OUTPATIENT
Start: 2023-11-01

## 2023-11-01 NOTE — TELEPHONE ENCOUNTER
Received refill request for Ventolin. Medication was last ordered by Umang Bartlett. Medication was last ordered on 8/3/23 with 3 refills. Patient was last seen in the office 8/3/23. Patient has a scheduled follow up 2/8/24. Medication needs to be sent to [facility].

## 2023-11-02 RX ORDER — ALBUTEROL SULFATE 90 UG/1
2 AEROSOL, METERED RESPIRATORY (INHALATION) EVERY 4 HOURS PRN
Qty: 18 G | Refills: 3 | Status: SHIPPED | OUTPATIENT
Start: 2023-11-02

## 2023-12-04 RX ORDER — HYDROXYZINE 50 MG/1
25-50 TABLET, FILM COATED ORAL NIGHTLY PRN
Qty: 30 TABLET | Refills: 2 | Status: SHIPPED | OUTPATIENT
Start: 2023-12-04 | End: 2024-03-03

## 2023-12-04 NOTE — TELEPHONE ENCOUNTER
Eufemia sent the following message via [employer]:    I'm needing my Hydroxyzine refilled please.  It has been helping me sleep       Requested Prescriptions     Pending Prescriptions Disp Refills    hydrOXYzine HCl (ATARAX) 50 MG tablet 30 tablet 2     Sig: Take 0.5-1 tablets by mouth nightly as needed (sleep)       Date of last visit: 9/12/2023  Date of next visit (if applicable):12/12/2023  Pharmacy Name: [facility]      Thanks,  Taniya Wilkins [place]

## 2023-12-05 RX ORDER — LAMOTRIGINE 100 MG/1
TABLET ORAL
Qty: 53 TABLET | Refills: 0 | OUTPATIENT
Start: 2023-12-05 | End: 2024-01-03

## 2023-12-12 ENCOUNTER — OFFICE VISIT (OUTPATIENT)
Dept: PSYCHIATRY | Age: 43
End: 2023-12-12

## 2023-12-12 DIAGNOSIS — F17.200 TOBACCO USE DISORDER: ICD-10-CM

## 2023-12-12 DIAGNOSIS — F41.1 GAD (GENERALIZED ANXIETY DISORDER): ICD-10-CM

## 2023-12-12 DIAGNOSIS — F39 UNSPECIFIED MOOD (AFFECTIVE) DISORDER (HCC): Primary | ICD-10-CM

## 2023-12-12 DIAGNOSIS — F43.10 PTSD (POST-TRAUMATIC STRESS DISORDER): ICD-10-CM

## 2023-12-12 RX ORDER — RISPERIDONE 0.5 MG/1
0.5 TABLET ORAL NIGHTLY
Qty: 30 TABLET | Refills: 2 | Status: SHIPPED | OUTPATIENT
Start: 2023-12-12

## 2023-12-12 ASSESSMENT — PATIENT HEALTH QUESTIONNAIRE - PHQ9
8. MOVING OR SPEAKING SO SLOWLY THAT OTHER PEOPLE COULD HAVE NOTICED. OR THE OPPOSITE, BEING SO FIGETY OR RESTLESS THAT YOU HAVE BEEN MOVING AROUND A LOT MORE THAN USUAL: 0
SUM OF ALL RESPONSES TO PHQ9 QUESTIONS 1 & 2: 3
9. THOUGHTS THAT YOU WOULD BE BETTER OFF DEAD, OR OF HURTING YOURSELF: 0
SUM OF ALL RESPONSES TO PHQ QUESTIONS 1-9: 10
6. FEELING BAD ABOUT YOURSELF - OR THAT YOU ARE A FAILURE OR HAVE LET YOURSELF OR YOUR FAMILY DOWN: 2
SUM OF ALL RESPONSES TO PHQ QUESTIONS 1-9: 10
7. TROUBLE CONCENTRATING ON THINGS, SUCH AS READING THE NEWSPAPER OR WATCHING TELEVISION: 0
3. TROUBLE FALLING OR STAYING ASLEEP: 2
5. POOR APPETITE OR OVEREATING: 2
1. LITTLE INTEREST OR PLEASURE IN DOING THINGS: 1
4. FEELING TIRED OR HAVING LITTLE ENERGY: 1
2. FEELING DOWN, DEPRESSED OR HOPELESS: 2
SUM OF ALL RESPONSES TO PHQ QUESTIONS 1-9: 10
SUM OF ALL RESPONSES TO PHQ QUESTIONS 1-9: 10

## 2023-12-12 ASSESSMENT — ANXIETY QUESTIONNAIRES
7. FEELING AFRAID AS IF SOMETHING AWFUL MIGHT HAPPEN: 0
4. TROUBLE RELAXING: 2
GAD7 TOTAL SCORE: 9
6. BECOMING EASILY ANNOYED OR IRRITABLE: 3
5. BEING SO RESTLESS THAT IT IS HARD TO SIT STILL: 2
3. WORRYING TOO MUCH ABOUT DIFFERENT THINGS: 0
1. FEELING NERVOUS, ANXIOUS, OR ON EDGE: 1
2. NOT BEING ABLE TO STOP OR CONTROL WORRYING: 1

## 2023-12-12 NOTE — PATIENT INSTRUCTIONS
-Please take medications as prescribed  -Refrain from alcohol or drug use  -Seek emergency help via the emergency and/or calling 911 should symptoms become severe, worsen, or with other concerning symptoms. Go immediately to the emergency room and/or call 911 with any suicidal or homicidal ideations or if audio/visual hallucinations develop.   -Contact office with any questions or concerns. Crisis phone numbers:  Sly Aden, and [facility] [phone]. Boone Kaye, and [address] [phone]. [facility] [phone]. [address] [place] [address]. Lazara Lilly [place], and [place]ardo [phone].

## 2023-12-12 NOTE — PROGRESS NOTES
falling or staying asleep, or sleeping too much 2   Feeling tired or having little energy 1   Poor appetite or overeating 2   Feeling bad about yourself - or that you are a failure or have let yourself or your family down 2   Trouble concentrating on things, such as reading the newspaper or watching television 0   Moving or speaking so slowly that other people could have noticed. Or the opposite - being so fidgety or restless that you have been moving around a lot more than usual 0   Thoughts that you would be better off dead, or of hurting yourself in some way 0   PHQ-2 Score 3   PHQ-9 Total Score 10         12/12/2023    11:28 AM 9/12/2023     3:30 PM 8/14/2023    10:53 AM   YUDY-7 SCREENING   Feeling nervous, anxious, or on edge Several days Several days Several days   Not being able to stop or control worrying Several days Not at all Nearly every day   Worrying too much about different things Not at all Not at all More than half the days   Trouble relaxing More than half the days Not at all Several days   Being so restless that it is hard to sit still More than half the days Several days Several days   Becoming easily annoyed or irritable Nearly every day Several days Nearly every day   Feeling afraid as if something awful might happen Not at all Not at all Not at all   YUDY-7 Total Score 9 3 11   How difficult have these problems made it for you to do your work, take care of things at home, or get along with other people? Very difficult           ICD-10-CM    1. Unspecified mood (affective) disorder (HCC)  F39       2. PTSD (post-traumatic stress disorder)  F43.10       3. YUDY (generalized anxiety disorder)  F41.1       4. Tobacco use disorder  F17.200           ASSESSMENT:  Irritability elevated in the context of ongoing relationship stress. Discussed ways to improve communication at home. We will go ahead and try low-dose risperidone to see if we can take the edge off of the irritability.   Diagnostically

## 2023-12-13 ENCOUNTER — OFFICE VISIT (OUTPATIENT)
Dept: PHYSICAL MEDICINE AND REHAB | Age: 43
End: 2023-12-13
Payer: MEDICAID

## 2023-12-13 VITALS
WEIGHT: 222 LBS | BODY MASS INDEX: 37.9 KG/M2 | SYSTOLIC BLOOD PRESSURE: 126 MMHG | HEIGHT: 64 IN | DIASTOLIC BLOOD PRESSURE: 80 MMHG

## 2023-12-13 DIAGNOSIS — M47.816 LUMBAR SPONDYLOSIS: ICD-10-CM

## 2023-12-13 DIAGNOSIS — M79.2 NEUROPATHIC PAIN: Primary | ICD-10-CM

## 2023-12-13 DIAGNOSIS — G89.4 CHRONIC PAIN SYNDROME: ICD-10-CM

## 2023-12-13 DIAGNOSIS — G89.29 CHRONIC MYOFASCIAL PAIN: ICD-10-CM

## 2023-12-13 DIAGNOSIS — M79.18 CHRONIC MYOFASCIAL PAIN: ICD-10-CM

## 2023-12-13 DIAGNOSIS — G57.11 MERALGIA PARESTHETICA OF RIGHT SIDE: ICD-10-CM

## 2023-12-13 PROCEDURE — 99214 OFFICE O/P EST MOD 30 MIN: CPT | Performed by: NURSE PRACTITIONER

## 2023-12-13 PROCEDURE — 4004F PT TOBACCO SCREEN RCVD TLK: CPT | Performed by: NURSE PRACTITIONER

## 2023-12-13 PROCEDURE — G8417 CALC BMI ABV UP PARAM F/U: HCPCS | Performed by: NURSE PRACTITIONER

## 2023-12-13 PROCEDURE — G8484 FLU IMMUNIZE NO ADMIN: HCPCS | Performed by: NURSE PRACTITIONER

## 2023-12-13 PROCEDURE — G8427 DOCREV CUR MEDS BY ELIG CLIN: HCPCS | Performed by: NURSE PRACTITIONER

## 2023-12-13 PROCEDURE — 20553 NJX 1/MLT TRIGGER POINTS 3/>: CPT | Performed by: NURSE PRACTITIONER

## 2023-12-13 RX ORDER — PREGABALIN 75 MG/1
75 CAPSULE ORAL NIGHTLY
Qty: 30 CAPSULE | Refills: 0 | Status: SHIPPED | OUTPATIENT
Start: 2023-12-13 | End: 2024-01-12

## 2023-12-13 RX ORDER — IBUPROFEN 800 MG/1
TABLET ORAL
COMMUNITY
Start: 2023-11-17

## 2023-12-13 RX ORDER — TRAMADOL HYDROCHLORIDE 50 MG/1
TABLET ORAL
COMMUNITY
Start: 2023-11-13

## 2023-12-13 RX ORDER — TRIAMCINOLONE ACETONIDE 40 MG/ML
40 INJECTION, SUSPENSION INTRA-ARTICULAR; INTRAMUSCULAR ONCE
Status: COMPLETED | OUTPATIENT
Start: 2023-12-13 | End: 2023-12-13

## 2023-12-13 RX ADMIN — TRIAMCINOLONE ACETONIDE 40 MG: 40 INJECTION, SUSPENSION INTRA-ARTICULAR; INTRAMUSCULAR at 16:06

## 2023-12-21 ENCOUNTER — HOSPITAL ENCOUNTER (EMERGENCY)
Age: 43
Discharge: HOME | End: 2023-12-22
Payer: MEDICAID

## 2023-12-21 VITALS
HEART RATE: 81 BPM | HEIGHT: 64 IN | TEMPERATURE: 98 F | RESPIRATION RATE: 16 BRPM | SYSTOLIC BLOOD PRESSURE: 155 MMHG | BODY MASS INDEX: 37.56 KG/M2 | WEIGHT: 220 LBS | OXYGEN SATURATION: 98 % | DIASTOLIC BLOOD PRESSURE: 105 MMHG

## 2023-12-21 DIAGNOSIS — F17.200 SMOKER: ICD-10-CM

## 2023-12-21 DIAGNOSIS — U07.1 COVID-19: Primary | ICD-10-CM

## 2023-12-21 PROCEDURE — 99282 EMERGENCY DEPT VISIT SF MDM: CPT

## 2023-12-22 NOTE — ED TRIAGE NOTES
Patient presents to ED with chief complaint of worsening covid symptoms. Patient states she tested positive on Sunday and she does not feel like she is getting any better. Patient resting in bed. Respirations easy and unlabored. No distress noted. Call light within reach.

## 2023-12-27 ENCOUNTER — OFFICE VISIT (OUTPATIENT)
Dept: PHYSICAL MEDICINE AND REHAB | Age: 43
End: 2023-12-27
Payer: MEDICAID

## 2023-12-27 VITALS
SYSTOLIC BLOOD PRESSURE: 108 MMHG | DIASTOLIC BLOOD PRESSURE: 78 MMHG | WEIGHT: 220 LBS | HEIGHT: 64 IN | BODY MASS INDEX: 37.56 KG/M2

## 2023-12-27 DIAGNOSIS — M53.3 SACROILIAC JOINT DYSFUNCTION OF BOTH SIDES: ICD-10-CM

## 2023-12-27 DIAGNOSIS — M47.816 LUMBAR SPONDYLOSIS: ICD-10-CM

## 2023-12-27 DIAGNOSIS — G89.4 CHRONIC PAIN SYNDROME: ICD-10-CM

## 2023-12-27 DIAGNOSIS — M79.2 NEUROPATHIC PAIN: ICD-10-CM

## 2023-12-27 DIAGNOSIS — G57.11 MERALGIA PARESTHETICA OF RIGHT SIDE: Primary | ICD-10-CM

## 2023-12-27 PROCEDURE — G8417 CALC BMI ABV UP PARAM F/U: HCPCS | Performed by: ANESTHESIOLOGY

## 2023-12-27 PROCEDURE — 64447 NJX AA&/STRD FEMORAL NRV IMG: CPT | Performed by: ANESTHESIOLOGY

## 2023-12-27 PROCEDURE — G8484 FLU IMMUNIZE NO ADMIN: HCPCS | Performed by: ANESTHESIOLOGY

## 2023-12-27 PROCEDURE — G8427 DOCREV CUR MEDS BY ELIG CLIN: HCPCS | Performed by: ANESTHESIOLOGY

## 2023-12-27 PROCEDURE — 4004F PT TOBACCO SCREEN RCVD TLK: CPT | Performed by: ANESTHESIOLOGY

## 2023-12-27 PROCEDURE — 99213 OFFICE O/P EST LOW 20 MIN: CPT | Performed by: ANESTHESIOLOGY

## 2023-12-27 RX ORDER — TRIAMCINOLONE ACETONIDE 40 MG/ML
40 INJECTION, SUSPENSION INTRA-ARTICULAR; INTRAMUSCULAR ONCE
Status: COMPLETED | OUTPATIENT
Start: 2023-12-27 | End: 2023-12-27

## 2023-12-27 RX ADMIN — TRIAMCINOLONE ACETONIDE 40 MG: 40 INJECTION, SUSPENSION INTRA-ARTICULAR; INTRAMUSCULAR at 16:56

## 2024-01-07 ENCOUNTER — HOSPITAL ENCOUNTER (EMERGENCY)
Age: 44
Discharge: HOME | End: 2024-01-08
Attending: EMERGENCY MEDICINE
Payer: MEDICAID

## 2024-01-07 VITALS
TEMPERATURE: 97.8 F | OXYGEN SATURATION: 96 % | SYSTOLIC BLOOD PRESSURE: 144 MMHG | WEIGHT: 220 LBS | HEART RATE: 90 BPM | DIASTOLIC BLOOD PRESSURE: 94 MMHG | BODY MASS INDEX: 37.56 KG/M2 | HEIGHT: 64 IN | RESPIRATION RATE: 18 BRPM

## 2024-01-07 DIAGNOSIS — L29.9 PRURITIC DISORDER: Primary | ICD-10-CM

## 2024-01-07 PROCEDURE — 96372 THER/PROPH/DIAG INJ SC/IM: CPT

## 2024-01-07 PROCEDURE — 99284 EMERGENCY DEPT VISIT MOD MDM: CPT

## 2024-01-07 ASSESSMENT — PAIN - FUNCTIONAL ASSESSMENT: PAIN_FUNCTIONAL_ASSESSMENT: NONE - DENIES PAIN

## 2024-01-08 LAB
ALBUMIN SERPL BCG-MCNC: 4.3 G/DL (ref 3.5–5.1)
ALP SERPL-CCNC: 98 U/L (ref 38–126)
ALT SERPL W/O P-5'-P-CCNC: 16 U/L (ref 11–66)
ANION GAP SERPL CALC-SCNC: 13 MEQ/L (ref 8–16)
AST SERPL-CCNC: 15 U/L (ref 5–40)
BASOPHILS ABSOLUTE: 0.1 THOU/MM3 (ref 0–0.1)
BASOPHILS NFR BLD AUTO: 0.5 %
BILIRUB SERPL-MCNC: 0.6 MG/DL (ref 0.3–1.2)
BUN SERPL-MCNC: 8 MG/DL (ref 7–22)
CALCIUM SERPL-MCNC: 9.2 MG/DL (ref 8.5–10.5)
CHLORIDE SERPL-SCNC: 104 MEQ/L (ref 98–111)
CO2 SERPL-SCNC: 20 MEQ/L (ref 23–33)
CREAT SERPL-MCNC: 0.8 MG/DL (ref 0.4–1.2)
DEPRECATED RDW RBC AUTO: 41.6 FL (ref 35–45)
EOSINOPHIL NFR BLD AUTO: 1.1 %
EOSINOPHILS ABSOLUTE: 0.1 THOU/MM3 (ref 0–0.4)
ERYTHROCYTE [DISTWIDTH] IN BLOOD BY AUTOMATED COUNT: 12.9 % (ref 11.5–14.5)
GFR SERPL CREATININE-BSD FRML MDRD: > 60 ML/MIN/1.73M2
GLUCOSE SERPL-MCNC: 99 MG/DL (ref 70–108)
HCT VFR BLD AUTO: 44.9 % (ref 37–47)
HGB BLD-MCNC: 15.8 GM/DL (ref 12–16)
IMM GRANULOCYTES # BLD AUTO: 0.03 THOU/MM3 (ref 0–0.07)
IMM GRANULOCYTES NFR BLD AUTO: 0.3 %
LYMPHOCYTES ABSOLUTE: 2.6 THOU/MM3 (ref 1–4.8)
LYMPHOCYTES NFR BLD AUTO: 24.6 %
MCH RBC QN AUTO: 31.6 PG (ref 26–33)
MCHC RBC AUTO-ENTMCNC: 35.2 GM/DL (ref 32.2–35.5)
MCV RBC AUTO: 89.8 FL (ref 81–99)
MONOCYTES ABSOLUTE: 0.6 THOU/MM3 (ref 0.4–1.3)
MONOCYTES NFR BLD AUTO: 5.4 %
NEUTROPHILS NFR BLD AUTO: 68.1 %
NRBC BLD AUTO-RTO: 0 /100 WBC
OSMOLALITY SERPL CALC.SUM OF ELEC: 272.2 MOSMOL/KG (ref 275–300)
PLATELET # BLD AUTO: 281 THOU/MM3 (ref 130–400)
PMV BLD AUTO: 11.1 FL (ref 9.4–12.4)
POTASSIUM SERPL-SCNC: 4 MEQ/L (ref 3.5–5.2)
PROT SERPL-MCNC: 7.3 G/DL (ref 6.1–8)
RBC # BLD AUTO: 5 MILL/MM3 (ref 4.2–5.4)
SEGMENTED NEUTROPHILS ABSOLUTE COUNT: 7.2 THOU/MM3 (ref 1.8–7.7)
SODIUM SERPL-SCNC: 137 MEQ/L (ref 135–145)
WBC # BLD AUTO: 10.6 THOU/MM3 (ref 4.8–10.8)

## 2024-01-08 PROCEDURE — 96372 THER/PROPH/DIAG INJ SC/IM: CPT

## 2024-01-08 PROCEDURE — 80053 COMPREHEN METABOLIC PANEL: CPT

## 2024-01-08 PROCEDURE — 36415 COLL VENOUS BLD VENIPUNCTURE: CPT

## 2024-01-08 PROCEDURE — 6360000002 HC RX W HCPCS: Performed by: EMERGENCY MEDICINE

## 2024-01-08 PROCEDURE — 85025 COMPLETE CBC W/AUTO DIFF WBC: CPT

## 2024-01-08 RX ORDER — HYDROXYZINE 50 MG/1
50 TABLET, FILM COATED ORAL EVERY 8 HOURS PRN
Qty: 30 TABLET | Refills: 0 | Status: SHIPPED | OUTPATIENT
Start: 2024-01-08 | End: 2024-01-18

## 2024-01-08 RX ORDER — DIPHENHYDRAMINE HYDROCHLORIDE 50 MG/ML
50 INJECTION INTRAMUSCULAR; INTRAVENOUS ONCE
Status: COMPLETED | OUTPATIENT
Start: 2024-01-08 | End: 2024-01-08

## 2024-01-08 RX ADMIN — DIPHENHYDRAMINE HYDROCHLORIDE 50 MG: 50 INJECTION INTRAMUSCULAR; INTRAVENOUS at 01:09

## 2024-01-08 NOTE — ED TRIAGE NOTES
Pt arrives ambulatory from lobby with c/o itching. Pt states this started a month ago. Pt states she has stopped taking all of her mediations to see if that would help. Pt states she has not noticed a rash. Pt denies any pain at this time.

## 2024-01-08 NOTE — ED PROVIDER NOTES
[facility]  EMERGENCY DEPARTMENT ENCOUNTER          Pt Name: Eufemia Koo  MRN: [id number]  Birthdate 1980  Date of evaluation: 1/7/2024    CHIEF COMPLAINT       Chief Complaint   Patient presents with    Pruritis       Nurses Notes reviewed and I agree except as noted in the HPI.    HISTORY OF PRESENT ILLNESS    Eufemia Koo is a 43 y.o. pleasant female who presents to the emergency department for evaluation of itching patient states that she sought evaluation tonight because she was awakened from her sleep due to needing to scratch her left upper arm.  She states after arriving in the emergency department the blood pressure cuff that was placed on her right upper arm made that arm itching as well.  She reports for the last month she has had areas throughout her body which will randomly start to feel itchy, she will scratch those areas.  This will migrate over the course of the day, sometimes it is on her abdomen, sometimes on her extremities, sometimes on her chest or her back.  She denies any new household  or detergents.  No new medications or exposures to anything out of the ordinary.  She reports she stopped all of her medications when the itching first began a month ago because she thought maybe it was her medicines.  However she states now she feels as though her itching has become worse since stopping her medicines.  She tried Benadryl cream, Benadryl tablets and tried Claritin without relief.  Denies anyone else at home is experiencing any itching or rash.  She reports she checked her home for bedbugs or other infestation and there are none.  Denies fever, chills, weight loss.  Denies nausea, vomiting, diarrhea.  Denies abdominal pain or changes in appetite.  No dysuria or urinary symptoms.  Normal bowel movements.  No cough or difficulty breathing.  Patient states that she has plans to call Alfredo Elizalde her PCP later today about her itching.  The patient

## 2024-01-10 ENCOUNTER — TELEMEDICINE (OUTPATIENT)
Dept: FAMILY MEDICINE CLINIC | Age: 44
End: 2024-01-10
Payer: MEDICAID

## 2024-01-10 DIAGNOSIS — L29.9 PRURITIC DISORDER: Primary | ICD-10-CM

## 2024-01-10 DIAGNOSIS — Z01.82 ENCOUNTER FOR ALLERGY TESTING: ICD-10-CM

## 2024-01-10 PROCEDURE — 99213 OFFICE O/P EST LOW 20 MIN: CPT | Performed by: NURSE PRACTITIONER

## 2024-01-10 PROCEDURE — G8427 DOCREV CUR MEDS BY ELIG CLIN: HCPCS | Performed by: NURSE PRACTITIONER

## 2024-01-10 RX ORDER — METHYLPREDNISOLONE 4 MG/1
TABLET ORAL
Qty: 1 KIT | Refills: 0 | Status: SHIPPED | OUTPATIENT
Start: 2024-01-10 | End: 2024-01-16

## 2024-01-10 NOTE — PROGRESS NOTES
experiencing the itch.  She did check her home for bedbugs or other infestation and there was none.    Denies any other symptoms of abdominal pain, urinary symptoms, no trouble with bowel movements, no cough.    Wonders about allergies. She is allergic to shrimp     Review of Systems   Skin:         Itching - using atarax with no help          Objective   Patient-Reported Vitals  No data recorded     Physical Exam  Constitutional:       Appearance: Normal appearance. She is well-developed. She is not toxic-appearing or diaphoretic.   HENT:      Head: Normocephalic and atraumatic.      Right Ear: Hearing normal.      Left Ear: Hearing normal.      Nose: No nasal deformity.   Eyes:      General:         Right eye: No discharge.         Left eye: No discharge.   Pulmonary:      Effort: Pulmonary effort is normal. No prolonged expiration or respiratory distress.   Musculoskeletal:      Cervical back: Normal range of motion.   Skin:     Findings: No rash (On exposed areas visible on camera).   Neurological:      Mental Status: She is alert and oriented to person, place, and time.   Psychiatric:         Attention and Perception: Attention normal.         Mood and Affect: Mood normal.         Speech: Speech normal.         Behavior: Behavior normal.         Thought Content: Thought content normal.         Cognition and Memory: Cognition and memory normal.         Judgment: Judgment normal.            On this date 1/10/2024 I have spent 20 minutes reviewing previous notes, test results and face to face (virtual) with the patient discussing the diagnosis and importance of compliance with the treatment plan as well as documenting on the day of the visit.    --Rebecca Elizalde, BINH - CNP

## 2024-01-19 DIAGNOSIS — J45.30 MILD PERSISTENT ASTHMA WITHOUT COMPLICATION: ICD-10-CM

## 2024-01-22 RX ORDER — ALBUTEROL SULFATE 90 UG/1
2 AEROSOL, METERED RESPIRATORY (INHALATION) EVERY 4 HOURS PRN
Qty: 18 G | Refills: 3 | Status: SHIPPED | OUTPATIENT
Start: 2024-01-22

## 2024-01-23 ENCOUNTER — TELEPHONE (OUTPATIENT)
Dept: FAMILY MEDICINE CLINIC | Age: 44
End: 2024-01-23

## 2024-01-23 NOTE — TELEPHONE ENCOUNTER
Please call patient regarding referral message below:    Hello,   We were unable to successfully reach our mutual patient to coordinate the requested referral to [facility] after 3 attempts. At this time the referral has been deferred. If no response from the patient within the 30- business day period; the referral will be closed.   Respectfully,   [facility] [employer]   [phone]

## 2024-01-31 ENCOUNTER — OFFICE VISIT (OUTPATIENT)
Dept: PSYCHIATRY | Age: 44
End: 2024-01-31
Payer: MEDICAID

## 2024-01-31 DIAGNOSIS — F41.1 GAD (GENERALIZED ANXIETY DISORDER): ICD-10-CM

## 2024-01-31 DIAGNOSIS — F39 UNSPECIFIED MOOD (AFFECTIVE) DISORDER (HCC): Primary | ICD-10-CM

## 2024-01-31 DIAGNOSIS — F43.10 PTSD (POST-TRAUMATIC STRESS DISORDER): ICD-10-CM

## 2024-01-31 DIAGNOSIS — F17.200 TOBACCO USE DISORDER: ICD-10-CM

## 2024-01-31 PROCEDURE — G8484 FLU IMMUNIZE NO ADMIN: HCPCS | Performed by: PSYCHIATRY & NEUROLOGY

## 2024-01-31 PROCEDURE — G8427 DOCREV CUR MEDS BY ELIG CLIN: HCPCS | Performed by: PSYCHIATRY & NEUROLOGY

## 2024-01-31 PROCEDURE — G8417 CALC BMI ABV UP PARAM F/U: HCPCS | Performed by: PSYCHIATRY & NEUROLOGY

## 2024-01-31 PROCEDURE — 90833 PSYTX W PT W E/M 30 MIN: CPT | Performed by: PSYCHIATRY & NEUROLOGY

## 2024-01-31 PROCEDURE — 4004F PT TOBACCO SCREEN RCVD TLK: CPT | Performed by: PSYCHIATRY & NEUROLOGY

## 2024-01-31 PROCEDURE — 99214 OFFICE O/P EST MOD 30 MIN: CPT | Performed by: PSYCHIATRY & NEUROLOGY

## 2024-01-31 RX ORDER — NICOTINE 21 MG/24HR
1 PATCH, TRANSDERMAL 24 HOURS TRANSDERMAL DAILY
Qty: 42 PATCH | Refills: 1 | Status: SHIPPED | OUTPATIENT
Start: 2024-01-31 | End: 2024-04-24

## 2024-01-31 RX ORDER — LAMOTRIGINE 100 MG/1
100 TABLET ORAL DAILY
Qty: 60 TABLET | Refills: 2
Start: 2024-01-31

## 2024-01-31 RX ORDER — POLYETHYLENE GLYCOL 3350 17 G
2 POWDER IN PACKET (EA) ORAL PRN
Qty: 108 EACH | Refills: 1 | Status: SHIPPED | OUTPATIENT
Start: 2024-01-31

## 2024-01-31 ASSESSMENT — ANXIETY QUESTIONNAIRES
5. BEING SO RESTLESS THAT IT IS HARD TO SIT STILL: 2
2. NOT BEING ABLE TO STOP OR CONTROL WORRYING: 0
6. BECOMING EASILY ANNOYED OR IRRITABLE: 1
4. TROUBLE RELAXING: 1
GAD7 TOTAL SCORE: 6
IF YOU CHECKED OFF ANY PROBLEMS ON THIS QUESTIONNAIRE, HOW DIFFICULT HAVE THESE PROBLEMS MADE IT FOR YOU TO DO YOUR WORK, TAKE CARE OF THINGS AT HOME, OR GET ALONG WITH OTHER PEOPLE: SOMEWHAT DIFFICULT
3. WORRYING TOO MUCH ABOUT DIFFERENT THINGS: 0
1. FEELING NERVOUS, ANXIOUS, OR ON EDGE: 2
7. FEELING AFRAID AS IF SOMETHING AWFUL MIGHT HAPPEN: 0

## 2024-01-31 ASSESSMENT — PATIENT HEALTH QUESTIONNAIRE - PHQ9
7. TROUBLE CONCENTRATING ON THINGS, SUCH AS READING THE NEWSPAPER OR WATCHING TELEVISION: 0
SUM OF ALL RESPONSES TO PHQ QUESTIONS 1-9: 5
10. IF YOU CHECKED OFF ANY PROBLEMS, HOW DIFFICULT HAVE THESE PROBLEMS MADE IT FOR YOU TO DO YOUR WORK, TAKE CARE OF THINGS AT HOME, OR GET ALONG WITH OTHER PEOPLE: 1
SUM OF ALL RESPONSES TO PHQ QUESTIONS 1-9: 5
2. FEELING DOWN, DEPRESSED OR HOPELESS: 1
1. LITTLE INTEREST OR PLEASURE IN DOING THINGS: 0
SUM OF ALL RESPONSES TO PHQ9 QUESTIONS 1 & 2: 1
5. POOR APPETITE OR OVEREATING: 0
6. FEELING BAD ABOUT YOURSELF - OR THAT YOU ARE A FAILURE OR HAVE LET YOURSELF OR YOUR FAMILY DOWN: 1
9. THOUGHTS THAT YOU WOULD BE BETTER OFF DEAD, OR OF HURTING YOURSELF: 0
4. FEELING TIRED OR HAVING LITTLE ENERGY: 2
SUM OF ALL RESPONSES TO PHQ QUESTIONS 1-9: 5
SUM OF ALL RESPONSES TO PHQ QUESTIONS 1-9: 5
3. TROUBLE FALLING OR STAYING ASLEEP: 1
8. MOVING OR SPEAKING SO SLOWLY THAT OTHER PEOPLE COULD HAVE NOTICED. OR THE OPPOSITE, BEING SO FIGETY OR RESTLESS THAT YOU HAVE BEEN MOVING AROUND A LOT MORE THAN USUAL: 0

## 2024-01-31 NOTE — PATIENT INSTRUCTIONS
-Please take medications as prescribed  -Refrain from alcohol or drug use  -Seek emergency help via the emergency and/or calling 911 should symptoms become severe, worsen, or with other concerning symptoms.Go immediately to the emergency room and/or call 911 with any suicidal or homicidal ideations or if audio/visual hallucinations develop.   -Contact office with any questions or concerns.      Crisis phone numbers:  [facility] [phone].  [facility] [phone]  [facility] [phone].  [facility] [phone].  [facility] [phone].  [facility] [phone].

## 2024-01-31 NOTE — PROGRESS NOTES
[facility] PHYSICIANS [place] [facility]TY  [facility] PSYCHIATRY  [address] [id number] [phone]    Progress Note    Patient:  Eufemia Koo  YOB: 1980  PCP:  Rebecca Elizalde APRN - CNP  Visit Date:  1/31/2024      CC: Follow up for medication management and psychotherapy      SUBJECTIVE:      Eufemia Koo, a 43 y.o. female, presents for a follow up visit.  Patient reports things are going okay.  A few weeks ago she did have a major argument with her significant other.  This caused an exacerbation of PTSD symptoms.  Since then things have been improving.  Continues to have financial stress.  Was denied disability, which is frustrating and we processed through this today.  Patient does feel anger and irritability have been controlled.  However, since starting risperidone appetite has significantly increased.  Patient did stop all medications for a few weeks due to what sounds like urticaria and possible rash.  She has since reintroduced all medications as symptoms did not improve with discontinuation.  She has not noticed worsening of symptoms since reinitiating.  Reports Lamictal dosing is only at 100 mg nightly.  Patient reports sleep is stable.  Denies suicidal or homicidal ideation, intent or plan.  Patient continues to smoke.  Felt like 14 mg nicotine patch with as needed gum was not helpful.  Will increase patch to 21 mg and use lozenge given dental issues.    16 minutes spent in supportive psychotherapy.  Focus was on recent stressors.          OBJECTIVE:      1/10/2024     7:30 AM   Patient-Reported Vitals   Patient-Reported Weight 220   Patient-Reported Height 5'4   Patient-Reported Systolic 180 mmHg   Patient-Reported Diastolic 89 mmHg   Patient-Reported Pulse 80   Patient-Reported Temperature 98   Patient-Reported SpO2 90          MENTAL STATUS EXAM:  Orientation: Alert, oriented, thought content appropriate   Mood:. \"Okay\"      Affect:  euthymic

## 2024-02-08 ENCOUNTER — OFFICE VISIT (OUTPATIENT)
Dept: PULMONOLOGY | Age: 44
End: 2024-02-08
Payer: MEDICAID

## 2024-02-08 VITALS
SYSTOLIC BLOOD PRESSURE: 132 MMHG | HEART RATE: 88 BPM | WEIGHT: 220.6 LBS | HEIGHT: 64 IN | DIASTOLIC BLOOD PRESSURE: 80 MMHG | OXYGEN SATURATION: 96 % | TEMPERATURE: 98.7 F | BODY MASS INDEX: 37.66 KG/M2

## 2024-02-08 DIAGNOSIS — Z72.0 TOBACCO ABUSE DISORDER: ICD-10-CM

## 2024-02-08 DIAGNOSIS — E66.9 OBESITY (BMI 30-39.9): ICD-10-CM

## 2024-02-08 DIAGNOSIS — J45.30 MILD PERSISTENT ASTHMA WITHOUT COMPLICATION: Primary | ICD-10-CM

## 2024-02-08 PROCEDURE — G8484 FLU IMMUNIZE NO ADMIN: HCPCS | Performed by: NURSE PRACTITIONER

## 2024-02-08 PROCEDURE — 99406 BEHAV CHNG SMOKING 3-10 MIN: CPT | Performed by: NURSE PRACTITIONER

## 2024-02-08 PROCEDURE — G8427 DOCREV CUR MEDS BY ELIG CLIN: HCPCS | Performed by: NURSE PRACTITIONER

## 2024-02-08 PROCEDURE — 99213 OFFICE O/P EST LOW 20 MIN: CPT | Performed by: NURSE PRACTITIONER

## 2024-02-08 PROCEDURE — 4004F PT TOBACCO SCREEN RCVD TLK: CPT | Performed by: NURSE PRACTITIONER

## 2024-02-08 PROCEDURE — G8417 CALC BMI ABV UP PARAM F/U: HCPCS | Performed by: NURSE PRACTITIONER

## 2024-02-08 NOTE — PROGRESS NOTES
[facility]    Patient: KAMARI LARA, 43 y.o.   : 1980      Subjective     Chief Complaint   Patient presents with    Follow-up     Asthma 6 month f/u no showed pft        HPI  Kamari is here for follow up for Asthma.    Last PFT done  with no obstruction seen at that time   (+) Covid 2023- no admission   Continues to smoke around 0.5 PPD  BMI 37    Asthma control (Severity) questionnaire:  Asthma symptoms:  > 2 times per week -> Yes   Night time awakenings: > 2 times per month -> No  Use of short acting beta agonist for symptoms control (Other than pre exercise use) :> 2times per week  -> Yes  Interference with normal activity  -> moderate  Lung function: Fev1 >60% Predicted  -> Yes  Number of exacerbations per year: > 2 -> No    Progress History:   Since last visit any new medical issues? No  New ER or hospital visits? No  Any new or changes in medicines? No  Using inhalers? Yes   Are they helpful? {EXAM; YES/NO:11678  Immunization History   Administered Date(s) Administered    Influenza Virus Vaccine 2018, 09/10/2019    Pneumococcal, PPSV23, PNEUMOVAX 23, (age 2y+), SC/IM, 0.5mL 2019    TDaP, ADACEL (age 10y-64y), BOOSTRIX (age 10y+), IM, 0.5mL 2019       Tobacco Use      Smoking status: Every Day        Packs/day: 0.50        Years: 0.5 packs/day for 29.1 years (14.6 ttl pk-yrs)        Types: Cigarettes        Start date: 1995      Smokeless tobacco: Never      Tobacco comments: currently a pack a day 3/28/22 pt down to 10 cigs a day 24       Past Medical hx   PMH:  Past Medical History:   Diagnosis Date    Anxiety     Asthma     Bipolar disorder (HCC)     Chronic back pain     DDD (degenerative disc disease), cervical     DDD (degenerative disc disease), thoracolumbar     Depression     GERD (gastroesophageal reflux disease)     Headache     Substance abuse (HCC)      SURGICAL HISTORY:  Past Surgical History:

## 2024-02-13 DIAGNOSIS — G57.11 MERALGIA PARESTHETICA OF RIGHT SIDE: ICD-10-CM

## 2024-02-13 RX ORDER — PREGABALIN 75 MG/1
CAPSULE ORAL
Qty: 30 CAPSULE | Refills: 0 | Status: SHIPPED | OUTPATIENT
Start: 2024-02-13 | End: 2024-04-07 | Stop reason: SDUPTHER

## 2024-02-15 ENCOUNTER — OFFICE VISIT (OUTPATIENT)
Dept: FAMILY MEDICINE CLINIC | Age: 44
End: 2024-02-15
Payer: MEDICAID

## 2024-02-15 VITALS
WEIGHT: 221.5 LBS | HEIGHT: 64 IN | HEART RATE: 88 BPM | SYSTOLIC BLOOD PRESSURE: 116 MMHG | OXYGEN SATURATION: 96 % | DIASTOLIC BLOOD PRESSURE: 82 MMHG | BODY MASS INDEX: 37.81 KG/M2

## 2024-02-15 DIAGNOSIS — Z71.89 ACP (ADVANCE CARE PLANNING): ICD-10-CM

## 2024-02-15 DIAGNOSIS — E55.9 VITAMIN D DEFICIENCY: ICD-10-CM

## 2024-02-15 DIAGNOSIS — Z00.00 ENCOUNTER FOR WELL ADULT EXAM WITHOUT ABNORMAL FINDINGS: Primary | ICD-10-CM

## 2024-02-15 DIAGNOSIS — Z23 NEED FOR PNEUMOCOCCAL VACCINE: ICD-10-CM

## 2024-02-15 PROCEDURE — 99396 PREV VISIT EST AGE 40-64: CPT | Performed by: NURSE PRACTITIONER

## 2024-02-15 PROCEDURE — 90677 PCV20 VACCINE IM: CPT | Performed by: NURSE PRACTITIONER

## 2024-02-15 PROCEDURE — 90471 IMMUNIZATION ADMIN: CPT | Performed by: NURSE PRACTITIONER

## 2024-02-15 PROCEDURE — G8484 FLU IMMUNIZE NO ADMIN: HCPCS | Performed by: NURSE PRACTITIONER

## 2024-02-15 RX ORDER — ERGOCALCIFEROL 1.25 MG/1
50000 CAPSULE ORAL WEEKLY
Qty: 12 CAPSULE | Refills: 3 | Status: SHIPPED | OUTPATIENT
Start: 2024-02-15

## 2024-02-15 NOTE — PATIENT INSTRUCTIONS
Advance Care Planning     Advance Care Planning opens a door to talk about and write down your wishes before a sudden accident or illness.  Make your goals, values, and preferences known.     This puts you in the ’s seat and helps others know what matters most to you so they won’t have to guess.      Where can you learn more?    Go to [email or web address]   to learn how to:    Name someone you trust to make healthcare decisions for you, only if you can’t. (Healthcare Power of )    Document your wishes for care if you were seriously ill and not expected to recover or are approaching end of life. (Advance Directive or Living Will)    The same page can be found using the QR code below.                Starting a Weight Loss Plan: Care Instructions  Overview     If you're thinking about losing weight, it can be hard to know where to start. Your doctor can help you set up a weight loss plan that best meets your needs. You may want to take a class on nutrition or exercise, or you could join a weight loss support group. If you have questions about how to make changes to your eating or exercise habits, ask your doctor about seeing a registered dietitian or an exercise specialist.  It can be a big challenge to lose weight. But you don't have to make huge changes at once. Make small changes, and stick with them. When those changes become habit, add a few more changes.  If you don't think you're ready to make changes right now, try to pick a date in the future. Make an appointment to see your doctor to discuss whether the time is right for you to start a plan.  Follow-up care is a key part of your treatment and safety. Be sure to make and go to all appointments, and call your doctor if you are having problems. It's also a good idea to know your test results and keep a list of the medicines you take.  How can you care for yourself at home?  Set realistic goals. Many people

## 2024-02-15 NOTE — PROGRESS NOTES
Immunization(s) given during visit:    Immunizations Administered       Name Date Dose Route    Pneumococcal, PCV20, PREVNAR 20, (age 6w+), IM, 0.5mL 2/15/2024 0.5 mL Intramuscular    Site: Deltoid- Left    Lot: [id number]    NDC: 1947-5656-33

## 2024-02-15 NOTE — PROGRESS NOTES
Well Adult Note  Name: Eufemia Koo Today’s Date: 2/15/2024   MRN: [id number] Sex: Female   Age: 43 y.o. Ethnicity: Non- / Non    : 1980 Race: White (non-)      Eufemia Koo is here for well adult exam.  History:    Health Habits:  Here for annual wellness exam. Diet: She eats 2 meals and 3 snacks per day.  She does not exercise regularly.   She does take over the counter vitamins.  History of blood transfusion - no. Does she have samara tattoos - yes.  2  She performs all of her ADL's without problem.  She is independent, she cooks, drives, bathes, and gets dressed without assistance. She is a  - passed 3 years ago. She has 3 children.     Her last pap smear was 2023 and it was normal. Done by - Nadia Christopher CNP. Last period: end of January. She is not sexually active, and has had 1 partners in the last year. Last mammogram was 3/2/23 - normal    Has been denies for SSI/disability - talked to an  - told she is not \"disabled enough\" to qualify - has tried multiple attorneys - can't work due to anxiety/ptsd - seeing Dr. Rousseau with psych.     She  reports that she has been smoking cigarettes. She started smoking about 29 years ago. She has a 14.6 pack-year smoking history. She has never used smokeless tobacco. She reports that she does not currently use alcohol. She reports that she does not currently use drugs after having used the following drugs: Marijuana (Weed) and Methamphetamines (Crystal Meth)..      Review of Systems   Constitutional:  Negative for chills, fatigue and fever.   HENT:  Negative for congestion, ear pain, postnasal drip, rhinorrhea and sore throat.    Eyes:  Negative for discharge and redness.   Respiratory:  Negative for cough and shortness of breath.    Cardiovascular:  Negative for chest pain and leg swelling.   Gastrointestinal:  Negative for abdominal distention, abdominal pain, anal bleeding, blood in stool, constipation, diarrhea and nausea.

## 2024-02-19 RX ORDER — KETOCONAZOLE 20 MG/ML
SHAMPOO TOPICAL
Qty: 600 ML | OUTPATIENT
Start: 2024-02-19

## 2024-02-26 ENCOUNTER — OFFICE VISIT (OUTPATIENT)
Dept: PHYSICAL MEDICINE AND REHAB | Age: 44
End: 2024-02-26
Payer: MEDICAID

## 2024-02-26 VITALS
HEIGHT: 64 IN | SYSTOLIC BLOOD PRESSURE: 104 MMHG | DIASTOLIC BLOOD PRESSURE: 72 MMHG | WEIGHT: 221 LBS | BODY MASS INDEX: 37.73 KG/M2

## 2024-02-26 DIAGNOSIS — M47.816 LUMBAR SPONDYLOSIS: Primary | ICD-10-CM

## 2024-02-26 DIAGNOSIS — G89.4 CHRONIC PAIN SYNDROME: ICD-10-CM

## 2024-02-26 DIAGNOSIS — G57.11 MERALGIA PARESTHETICA OF RIGHT SIDE: ICD-10-CM

## 2024-02-26 DIAGNOSIS — M79.2 NEUROPATHIC PAIN: ICD-10-CM

## 2024-02-26 PROCEDURE — 4004F PT TOBACCO SCREEN RCVD TLK: CPT | Performed by: ANESTHESIOLOGY

## 2024-02-26 PROCEDURE — 99214 OFFICE O/P EST MOD 30 MIN: CPT | Performed by: ANESTHESIOLOGY

## 2024-02-26 PROCEDURE — G8484 FLU IMMUNIZE NO ADMIN: HCPCS | Performed by: ANESTHESIOLOGY

## 2024-02-26 PROCEDURE — G8417 CALC BMI ABV UP PARAM F/U: HCPCS | Performed by: ANESTHESIOLOGY

## 2024-02-26 PROCEDURE — G8427 DOCREV CUR MEDS BY ELIG CLIN: HCPCS | Performed by: ANESTHESIOLOGY

## 2024-02-26 RX ORDER — HYDROXYZINE 50 MG/1
TABLET, FILM COATED ORAL
COMMUNITY
Start: 2024-02-17

## 2024-02-26 RX ORDER — KETOCONAZOLE 20 MG/ML
SHAMPOO TOPICAL
Qty: 600 ML | OUTPATIENT
Start: 2024-02-26

## 2024-02-26 NOTE — PROGRESS NOTES
Functionality Assessment/Goals Worksheet     On a scale of 0 (Does not Interfere) to 10 (Completely Interferes)     1.  Which number describes how during the past week pain has interfered with           the following:  A.  General Activity:  9  B.  Mood: 6  C.  Walking Ability:  10  D.  Normal Work (Includes both work outside the home and housework):  10  E.  Relations with Other People:   4  F.  Sleep:   10  G.  Enjoyment of Life:   3    2.  Patient Prefers to Take their Pain Medications:     []  On a regular basis   [x]  Only when necessary    []  Does not take pain medications    3.  What are the Patient's Goals/Expectations for Visiting Pain Management?     []  Learn about my pain    [x]  Receive Medication   []  Physical Therapy     []  Treat Depression   [x]  Receive Injections    []  Treat Sleep   []  Deal with Anxiety and Stress   []  Treat Opoid Dependence/Addiction   []  Other:                                
  Current Outpatient Medications   Medication Instructions    celecoxib (CELEBREX) 100 mg, Oral, 2 TIMES DAILY    chlorhexidine (PERIDEX) 0.12 % solution No dose, route, or frequency recorded.    diclofenac sodium (VOLTAREN) 4 g, Topical, 4 TIMES DAILY    fluticasone-salmeterol (ADVAIR DISKUS) 100-50 MCG/ACT AEPB diskus inhaler 1 puff, Inhalation, 2 TIMES DAILY    Handicap Placard MISC Does not apply, Exp: 12/29/2023<BR>Dx: lumbar spondylosis. SI joint dysfunction. Chronic pain    hydrOXYzine HCl (ATARAX) 50 MG tablet take 1/2 to 1 tablet by mouth at bedtime if needed    ibuprofen (ADVIL;MOTRIN) 800 MG tablet     ketoconazole (NIZORAL) 2 % shampoo No dose, route, or frequency recorded.    lamoTRIgine (LAMICTAL) 100 mg, Oral, DAILY    montelukast (SINGULAIR) 10 mg, Oral, NIGHTLY    nicotine (NICODERM CQ) 21 MG/24HR 1 patch, TransDERmal, DAILY    nicotine polacrilex (COMMIT) 2 mg, Oral, PRN, Can use every 2 hours for breakthrough cravings    pregabalin (LYRICA) 75 MG capsule take 1 capsule by mouth once daily at bedtime    Spacer/Aero-Holding Chambers (VORTEX VALVED HOLDING CHAMBER) SUJATHA 1 Device, Does not apply, DAILY, Use with inhalers    Spacer/Aero-Holding Chambers SUJATHA 1 Device, Does not apply, DAILY PRN    triamcinolone (KENALOG) 0.025 % cream As needed.    VENTOLIN  (90 Base) MCG/ACT inhaler 2 puffs, Inhalation, EVERY 4 HOURS PRN    vitamin D (ERGOCALCIFEROL) 50,000 Units, Oral, WEEKLY       Allergies   Allergen Reactions    Fluoxetine Other (See Comments)    Hydrocodone      \"makes me feel completely out of whack\"    Prozac [Fluoxetine Hcl] Other (See Comments)     \"I want to kill myself\"       Review of Systems:   Constitutional: negative for weight changes or fevers  Genitourinary: negative for bowel/bladder incontinence   Musculoskeletal: positive for low back pain, bilateral hip pain  Neurological: positive for right leg weakness and numbness/tingling  Behavioral/Psych: negative for

## 2024-03-19 RX ORDER — KETOCONAZOLE 20 MG/ML
SHAMPOO TOPICAL
Qty: 600 ML | OUTPATIENT
Start: 2024-03-19

## 2024-03-19 NOTE — DISCHARGE INSTRUCTIONS
Post procedure Instructions:    No driving or making significant decisions for the remainder of the day.   Be cautions with walking and activity for 24 hours, do not over exert yourself.  Ok to resume pre-procedure activity level today.  Apply ice to site of injection site if you have pain or discomfort.  Do not submerge sit of injection during bath or pool activities for 48 hours post-procedure.  Resume blood thinning medications in 24 hours.     Call office [phone] if you have:  Temperature greater than 100.4  Persistent nausea and vomiting  Severe uncontrolled pain  Redness, tenderness, or signs of infection (pain, swelling, redness, odor or green/yellow discharge around the site)  Difficulty breathing, headache or visual disturbances  Hives  Persistent dizziness or light-headedness  Extreme fatigue  Any other questions or concerns you may have after discharge    In an emergency, call 911 or go to an Emergency Department at a nearby hospital    “Surgical Site Infections”      How can we work together to prevent Surgical Site Infections?   We would like to thank you for choosing [facility] for your Surgical Care.  Below you will find helpful information on how we can work together to prevent Surgical Site Infections.    What is a Surgical Site Infection (SSI)?  A surgical site infection is an infection that occurs after surgery in the part of the body where the surgery took place. Most patients who have surgery do not develop an infection. However, infections develop in about 1 to 3 out of every 100 patients who have surgery.  Some of the common symptoms of a surgical site infection are:  Redness and pain around the area where you had surgery  Drainage of cloudy fluid from your surgical wound  Fever    Can SSIs be treated?  Yes. Most surgical site infections can be treated with antibiotics. The antibiotic given to you depends on the bacteria (germs) causing the infection. Sometimes patients with

## 2024-03-24 NOTE — H&P
hold off on any steroid injections at this time due to recent steroids in January and earlier this month. We discussed a potential left shoulder injection.    She has responded well to lumbar radiofrequency ablations and SI joint injections.  Her low back pain is returned as previously described I set her up for repeat lumbar Refixia ablation targeting bilateral/L5 and L5/S1.    In regards to disability patient does have a difficult time working with her low back condition and does have an extensive psychiatric history that would make it difficult for her to do encounters over the phone with clientele.    Plan:   The following treatment recommendations and plan were discussed in detail with Eufemia Koo.    Imaging:   Lumbar MRI reviewed and discussed with the patient today.     Analgesics:   Patient is taking Toradol and Mobic. Patient is advised to take as prescribed and not take on an empty stomach.     Adjuvants:   None    Interventions:   In presence of lumbar axial back pain and with physical exam consistent for facetal pain and significant response to previous lumbar radiofrequency ablation, repeat  lumbar radiofrequency ablation at bilateral L4-5 and L5-S1 was chosen. The risks and benefits were discussed in detail with the patient. Patient wants to proceed with the injection.     Anticoagulation/NPO Recommendations:   Patient does need to hold Ibuprofen x2 days prior to the procedure.  Patient will need to be NPO x 8 hours prior to the procedure.  We will start an IV prior to the procedure.  Patient will need a  for the procedure.    Multidisciplinary Pain Management:   In the presence of complex, chronic, and multi-factorial pain, the importance of a multidisciplinary approach to pain management in the patient’s management regimen was emphasized and discussed in great detail.   PHYSICAL THERAPY: Patient is advised to see a physical therapist for gentle stretching exercises and conditioning exercises

## 2024-03-25 RX ORDER — KETOCONAZOLE 20 MG/ML
SHAMPOO TOPICAL
Qty: 600 ML | OUTPATIENT
Start: 2024-03-25

## 2024-03-26 ENCOUNTER — HOSPITAL ENCOUNTER (OUTPATIENT)
Age: 44
Setting detail: OUTPATIENT SURGERY
Discharge: HOME | End: 2024-03-26
Attending: ANESTHESIOLOGY | Admitting: ANESTHESIOLOGY
Payer: MEDICAID

## 2024-03-26 ENCOUNTER — APPOINTMENT (OUTPATIENT)
Dept: GENERAL RADIOLOGY | Age: 44
End: 2024-03-26
Attending: ANESTHESIOLOGY
Payer: MEDICAID

## 2024-03-26 VITALS
OXYGEN SATURATION: 97 % | SYSTOLIC BLOOD PRESSURE: 132 MMHG | HEART RATE: 89 BPM | TEMPERATURE: 96.9 F | DIASTOLIC BLOOD PRESSURE: 78 MMHG | WEIGHT: 222.8 LBS | HEIGHT: 64 IN | BODY MASS INDEX: 38.04 KG/M2 | RESPIRATION RATE: 16 BRPM

## 2024-03-26 LAB — PREGNANCY, URINE: NEGATIVE

## 2024-03-26 PROCEDURE — 2709999900 HC NON-CHARGEABLE SUPPLY: Performed by: ANESTHESIOLOGY

## 2024-03-26 PROCEDURE — 7100000010 HC PHASE II RECOVERY - FIRST 15 MIN: Performed by: ANESTHESIOLOGY

## 2024-03-26 PROCEDURE — 99152 MOD SED SAME PHYS/QHP 5/>YRS: CPT | Performed by: ANESTHESIOLOGY

## 2024-03-26 PROCEDURE — 81025 URINE PREGNANCY TEST: CPT

## 2024-03-26 PROCEDURE — 64635 DESTROY LUMB/SAC FACET JNT: CPT | Performed by: ANESTHESIOLOGY

## 2024-03-26 PROCEDURE — 64636 DESTROY L/S FACET JNT ADDL: CPT | Performed by: ANESTHESIOLOGY

## 2024-03-26 PROCEDURE — 3600000056 HC PAIN LEVEL 4 BASE: Performed by: ANESTHESIOLOGY

## 2024-03-26 PROCEDURE — 2500000003 HC RX 250 WO HCPCS: Performed by: ANESTHESIOLOGY

## 2024-03-26 PROCEDURE — 3600000057 HC PAIN LEVEL 4 ADDL 15 MIN: Performed by: ANESTHESIOLOGY

## 2024-03-26 PROCEDURE — 6360000002 HC RX W HCPCS: Performed by: ANESTHESIOLOGY

## 2024-03-26 PROCEDURE — 7100000011 HC PHASE II RECOVERY - ADDTL 15 MIN: Performed by: ANESTHESIOLOGY

## 2024-03-26 RX ORDER — FENTANYL CITRATE 50 UG/ML
INJECTION, SOLUTION INTRAMUSCULAR; INTRAVENOUS PRN
Status: DISCONTINUED | OUTPATIENT
Start: 2024-03-26 | End: 2024-03-26 | Stop reason: ALTCHOICE

## 2024-03-26 RX ORDER — MIDAZOLAM HYDROCHLORIDE 1 MG/ML
INJECTION INTRAMUSCULAR; INTRAVENOUS PRN
Status: DISCONTINUED | OUTPATIENT
Start: 2024-03-26 | End: 2024-03-26 | Stop reason: ALTCHOICE

## 2024-03-26 RX ORDER — LIDOCAINE HYDROCHLORIDE 20 MG/ML
INJECTION, SOLUTION EPIDURAL; INFILTRATION; INTRACAUDAL; PERINEURAL PRN
Status: DISCONTINUED | OUTPATIENT
Start: 2024-03-26 | End: 2024-03-26 | Stop reason: ALTCHOICE

## 2024-03-26 RX ORDER — LIDOCAINE HYDROCHLORIDE 10 MG/ML
INJECTION, SOLUTION EPIDURAL; INFILTRATION; INTRACAUDAL; PERINEURAL PRN
Status: DISCONTINUED | OUTPATIENT
Start: 2024-03-26 | End: 2024-03-26 | Stop reason: ALTCHOICE

## 2024-03-26 ASSESSMENT — PAIN - FUNCTIONAL ASSESSMENT
PAIN_FUNCTIONAL_ASSESSMENT: 0-10
PAIN_FUNCTIONAL_ASSESSMENT: NONE - DENIES PAIN

## 2024-03-26 NOTE — PROCEDURES
Pre-operative Diagnosis: Lumbar facet pain     Post-operative Diagnosis: Lumbar facet pain     Procedure: Bilateral lumbar thermal radiofrequency ablation targeting facet joints L4/L5 and L5/S1    Procedure Description:  After consent was obtained, the patient was placed in the prone position with a pillow under the abdomen to reduce the lordotic curve of the lumbar spine. The lower back was prepped with chloraprep and draped in a sterile fashion.  Then, 0.5 cc of 1 % lidocaine was used for local anesthesia of the skin and superficial subcutaneous tissues.  Three 20-gauge 100mm SMK cannulas with 10-mm active tips were advanced under fluoroscopic guidance in an AP view to the junction of the superior articular process and the transverse process of the L4 and L5 vertebra and at the sacral ala. There were no paresthesias, heme or CSF obtained.  Needle placement was confirmed using AP and oblique views. This procedure was repeated on the contralateral side.      Sensory and motor stimulation at 50Hz and 2Hz and impedance measurements were carried out having reached threshold at:     RIGHT  L4: 0.2V/3V/150-300 Ohms  L5: 0.2V/3V/150-300 Ohms  SA: 0.2V/3V/150-300 Ohms     LEFT  L4: 0.2V/3V/150-300 Ohms  L5: 0.2V/3V/150-300 Ohms  SA: 0.2V/3V/150-300 Ohms        Then, 1cc of 2% Lidocaine was injected at the site. Temperature was then raised to 80 degrees centigrade for 90 seconds with a 15 second temperature ramp. No pain was reported during the lesioning. The needles were then withdrawn without complications. The patient tolerated the procedure well. The patient was transported to the recovery room and discharged in ambulatory fashion.    Procedural Complications: None  Estimated Blood Loss: 0 mL    IV sedation was used during the procedure:  - Moderate intravenous conscious sedation was supervised by Dr. Blackmon  - The patient was independently monitored by a Registered Nurse assigned to the procedure room  - Monitoring

## 2024-03-26 NOTE — PROGRESS NOTES
1051- Patient to Phase II via cart. Report received from Nani PARIS. Patient drowsy and tearful but responsive.Vitals obtained and stable. Respirations even and unlabored on room air. Patient denies pain, nausea, numbness and tingling. Patient able to move all extremities. No drainage noted at injection sites. Patient instructed to stay in bed. Instructed on call light use.     1056- Pt eating and drinking at this time    1110- Pt's IV removed at this time    1115- Pt getting changed at this time    1120- Patient meets discharge criteria.  Discharged in stable condition with responsible . All belongings given to patient. Patient ambulated to car with assistance from RN. Patient tolerated well.

## 2024-03-26 NOTE — PRE SEDATION
[facility]  Pre-Sedation/Analgesia History & Physical    Pt Name: Eufemia Koo  MRN: [id number]  YOB: 1980  Provider Performing Procedure: Liborio Blackmon DO   Primary Care Physician: Rebecac Elizalde APRN - CNP      MEDICAL HISTORY       has a past medical history of Anxiety, Asthma, Bipolar disorder (HCC), Chronic back pain, DDD (degenerative disc disease), cervical, DDD (degenerative disc disease), thoracolumbar, Depression, GERD (gastroesophageal reflux disease), Headache, and Substance abuse (HCC).  SURGICAL HISTORY   has a past surgical history that includes Pain management procedure (Bilateral, 02/01/2022); Pain management procedure (Bilateral, 03/15/2022); Pain management procedure (Right, 04/12/2022); Pain management procedure (Left, 05/24/2022); hip surgery (Bilateral, 06/21/2022); Pain management procedure (N/A, 11/08/2022); Back Injection (Bilateral, 01/03/2023); Back Injection (Bilateral, 02/09/2023); Tubal ligation; and Nerve Block (Bilateral, 9/5/2023).    ALLERGIES   Allergies as of 02/26/2024 - Fully Reviewed 02/26/2024   Allergen Reaction Noted    Fluoxetine Other (See Comments) 02/15/2024    Hydrocodone  02/27/2020    Prozac [fluoxetine hcl] Other (See Comments) 12/05/2018       MEDICATIONS   Prior to Admission medications    Medication Sig Start Date End Date Taking? Authorizing Provider   hydrOXYzine HCl (ATARAX) 50 MG tablet take 1/2 to 1 tablet by mouth at bedtime if needed 2/17/24   Provider, MD Kaylie   vitamin D (ERGOCALCIFEROL) 1.25 MG (01012 UT) CAPS capsule Take 1 capsule by mouth once a week 2/15/24   Rebecca Elizalde APRN - CNP   pregabalin (LYRICA) 75 MG capsule take 1 capsule by mouth once daily at bedtime 2/13/24 3/14/24  Oxana Jiang APRN - CNP   Spacer/Aero-Holding Chambers SUJATHA 1 Device by Does not apply route daily as needed (inhaler use) 2/8/24   Sukumar, Liliana B, APRN - CNP   lamoTRIgine (LAMICTAL) 100 MG tablet Take 1 tablet by

## 2024-03-26 NOTE — PROGRESS NOTES
Discharge instructions reviewed with patient   All questions were addressed and answered.  Patient verbalized understanding of discharge plan.

## 2024-03-26 NOTE — POST SEDATION
[facility]  Sedation/Analgesia Post Sedation Record    Pt Name: Eufemia Koo  MRN: [id number]  YOB: 1980  Procedure Performed By: Liborio Blackmon DO  Primary Care Physician: Rebecca Elizalde, APRN - CNP    POST-PROCEDURE    Physicians/Assistants: Liborio Blackmon DO  Procedure Performed: See Procedure Note   Sedation/Anesthesia: Versed and Fentanyl (See procedure note for amount and duration)  Estimated Blood Loss:     0  ml  Specimens Removed: None        Complications: None           Liborio Blackmon DO  Electronically signed 3/26/2024 at 12:50 PM

## 2024-04-04 ENCOUNTER — HOSPITAL ENCOUNTER (EMERGENCY)
Age: 44
Discharge: HOME | End: 2024-04-05
Attending: EMERGENCY MEDICINE
Payer: MEDICAID

## 2024-04-04 VITALS
WEIGHT: 220 LBS | OXYGEN SATURATION: 98 % | SYSTOLIC BLOOD PRESSURE: 139 MMHG | BODY MASS INDEX: 37.56 KG/M2 | DIASTOLIC BLOOD PRESSURE: 93 MMHG | HEART RATE: 85 BPM | RESPIRATION RATE: 16 BRPM | HEIGHT: 64 IN | TEMPERATURE: 98.3 F

## 2024-04-04 DIAGNOSIS — S39.012A STRAIN OF LUMBAR REGION, INITIAL ENCOUNTER: Primary | ICD-10-CM

## 2024-04-04 PROCEDURE — 6370000000 HC RX 637 (ALT 250 FOR IP): Performed by: STUDENT IN AN ORGANIZED HEALTH CARE EDUCATION/TRAINING PROGRAM

## 2024-04-04 PROCEDURE — 6360000002 HC RX W HCPCS: Performed by: STUDENT IN AN ORGANIZED HEALTH CARE EDUCATION/TRAINING PROGRAM

## 2024-04-04 PROCEDURE — 96372 THER/PROPH/DIAG INJ SC/IM: CPT

## 2024-04-04 PROCEDURE — 99284 EMERGENCY DEPT VISIT MOD MDM: CPT

## 2024-04-04 RX ORDER — OXYCODONE HYDROCHLORIDE AND ACETAMINOPHEN 5; 325 MG/1; MG/1
1 TABLET ORAL ONCE
Status: COMPLETED | OUTPATIENT
Start: 2024-04-04 | End: 2024-04-04

## 2024-04-04 RX ORDER — HYDROXYZINE PAMOATE 25 MG/1
CAPSULE ORAL
Qty: 30 CAPSULE | Refills: 0 | Status: SHIPPED | OUTPATIENT
Start: 2024-04-04

## 2024-04-04 RX ORDER — LIDOCAINE 4 G/G
1 PATCH TOPICAL ONCE
Status: DISCONTINUED | OUTPATIENT
Start: 2024-04-04 | End: 2024-04-05 | Stop reason: HOSPADM

## 2024-04-04 RX ORDER — KETOROLAC TROMETHAMINE 30 MG/ML
30 INJECTION, SOLUTION INTRAMUSCULAR; INTRAVENOUS ONCE
Status: COMPLETED | OUTPATIENT
Start: 2024-04-04 | End: 2024-04-04

## 2024-04-04 RX ADMIN — KETOROLAC TROMETHAMINE 30 MG: 30 INJECTION, SOLUTION INTRAMUSCULAR at 23:07

## 2024-04-04 RX ADMIN — OXYCODONE HYDROCHLORIDE AND ACETAMINOPHEN 1 TABLET: 5; 325 TABLET ORAL at 23:07

## 2024-04-04 ASSESSMENT — PAIN DESCRIPTION - LOCATION
LOCATION: BACK
LOCATION: BACK

## 2024-04-04 ASSESSMENT — PAIN SCALES - GENERAL
PAINLEVEL_OUTOF10: 7
PAINLEVEL_OUTOF10: 7

## 2024-04-04 ASSESSMENT — PAIN - FUNCTIONAL ASSESSMENT: PAIN_FUNCTIONAL_ASSESSMENT: 0-10

## 2024-04-04 ASSESSMENT — PAIN DESCRIPTION - ORIENTATION: ORIENTATION: LOWER;MID

## 2024-04-04 NOTE — TELEPHONE ENCOUNTER
Eufemia Koo pharmacy is requesting a refill on the following medications:  Requested Prescriptions     Pending Prescriptions Disp Refills    hydrOXYzine pamoate (VISTARIL) 25 MG capsule [Pharmacy Med Name: HYDROXYZINE JADIEL 25 MG CAP] 30 capsule 0     Sig: take 1 capsule by mouth three times a day if needed for anxiety       Date of last visit: 1/31/2024  Date of next visit (if applicable):4/24/2024  Pharmacy Name: [facility]      Thanks,  Mona Aviles MA

## 2024-04-05 RX ORDER — LIDOCAINE 50 MG/G
1 PATCH TOPICAL DAILY
Qty: 10 PATCH | Refills: 0 | Status: SHIPPED | OUTPATIENT
Start: 2024-04-05 | End: 2024-04-15

## 2024-04-05 RX ORDER — NAPROXEN 500 MG/1
500 TABLET ORAL 2 TIMES DAILY WITH MEALS
Qty: 20 TABLET | Refills: 0 | Status: SHIPPED | OUTPATIENT
Start: 2024-04-05 | End: 2024-04-15

## 2024-04-05 ASSESSMENT — PAIN SCALES - GENERAL: PAINLEVEL_OUTOF10: 2

## 2024-04-05 NOTE — ED TRIAGE NOTES
Pt presents to ED with chief complaint of back pain. Pt states she gets injections for back pain. Pt had injection on 3/27 and since then, her pain has been worse. Pt has been ice/heat and ibuprofen without relief.

## 2024-04-05 NOTE — DISCHARGE INSTRUCTIONS
Call today or tomorrow to follow up with Rebecca Elizalde APRN - CNP  in 1 days.    Use an ice pack or bag filled with ice and apply to the injured area 3 - 4 times a day for 15 - 20 minutes each time.  If the injury is older than 3 days, then use a heating pad to help relax the muscles in your back.    Use ibuprofen or Tylenol (unless prescribed medications that have Tylenol in it) for pain.  You can take over the counter Ibuprofen (advil) tablets (4 tablets every 8 hours or 3 tablets every 6 hours or 2 tablets every 4 hours)    Arnold' Flexion Exercises     1. Pelvic tilt. Lie on your back with knees bent, feet flat on floor. Flatten the small of your back against the floor, without pushing down with the legs. Hold for 5 to 10 seconds.     2. Single Knee to chest. Lie on your back with knees bent and feet flat on the floor. Slowly pull your right knee toward your shoulder and hold 5 to 10 seconds. Lower the knee and repeat with the other knee.     3. Double knee to chest. Begin as in the previous exercise. After pulling right knee to chest, pull left knee to chest and hold both knees for 5 to 10 seconds. Slowly lower one leg at a time.     4. Partial sit-up. Do the pelvic tilt (exercise 1) and, while holding this position, slowly curl your head and shoulders off the floor. Hold briefly. Return slowly to the starting position.     5. Hamstring stretch. Start in long sitting with toes directed toward the ceiling and knees fully extended. Slowly lower the trunk forward over the legs, keeping knees extended, arms outstretched over the legs, and eyes focus ahead.     6. Hip Flexor stretch. Place one foot in front of the other with the left (front) knee flexed and the right (back) knee held rigidly straight. Flex forward through the trunk until the left knee contacts the axillary fold (arm pit region). Repeat with right leg forward and left leg back.     7. Squat. Stand with both feet parallel, about shoulder's

## 2024-04-05 NOTE — ED PROVIDER NOTES
bipolar disease, lumbar stenosis          2)  Data Reviewed (none if left blank)          My Independent interpretations:     EKG:      None    Imaging: None    Labs:      None                 Decision Rules/Clinical Scores utilized:              External Documentation Reviewed:         Previous patient encounter documents & history available on EMR was reviewed              See Formal Diagnostic Results above for the lab and radiology tests and orders.    3)  Treatment and Disposition         ED Reassessment: See below         Case discussed with consulting clinician:           Shared Decision-Making was performed and disposition discussed with the        Patient/Family and questions answered          Social determinants of health impacting treatment or disposition:           Code Status: Full code      Summary of Patient Presentation:      MDM  Number of Diagnoses or Management Options  Strain of lumbar region, initial encounter  Diagnosis management comments: 43-year-old patient with.  Medical history of SLE, osteoarthritis, bipolar disease, lumbar stenosis, lower thoracic spine disc herniation that presents complaining of worsening in chronic lower back pain.  Patient denies any trauma, at physical examination no midline tenderness, no external signs of infection, normal neuroassessment.  Patient got parenteral topical and oral analgesia achieving improvement in initial symptoms, being able to ambulate in the ED with mild lumbar pain.  Will consider patient needs new MRI as an outpatient.  Patient to be discharged, information given to patient and family member present in the room, shared decision with them.    /  ED Course as of 04/05/24 0249   Fri Apr 05, 2024   0010 After symptomatic treatment, patient states improvement, able to ambulate without pain, patient to be DC with onsite recommendation follow-up by primary care physician and a spine surgeon.  Shared decision with patient [JR]      ED Course User

## 2024-04-06 RX ORDER — METHYLPREDNISOLONE 4 MG/1
TABLET ORAL
Qty: 1 KIT | Refills: 0 | Status: SHIPPED | OUTPATIENT
Start: 2024-04-06 | End: 2024-04-11

## 2024-04-07 DIAGNOSIS — G57.11 MERALGIA PARESTHETICA OF RIGHT SIDE: ICD-10-CM

## 2024-04-08 RX ORDER — PREGABALIN 75 MG/1
CAPSULE ORAL
Qty: 30 CAPSULE | OUTPATIENT
Start: 2024-04-08 | End: 2024-05-08

## 2024-04-08 RX ORDER — PREGABALIN 75 MG/1
75 CAPSULE ORAL DAILY
Qty: 30 CAPSULE | Refills: 0 | Status: SHIPPED | OUTPATIENT
Start: 2024-04-08 | End: 2024-05-06

## 2024-04-09 ENCOUNTER — OFFICE VISIT (OUTPATIENT)
Dept: PHYSICAL MEDICINE AND REHAB | Age: 44
End: 2024-04-09
Payer: MEDICAID

## 2024-04-09 VITALS
HEIGHT: 64 IN | DIASTOLIC BLOOD PRESSURE: 72 MMHG | WEIGHT: 220.02 LBS | BODY MASS INDEX: 37.56 KG/M2 | SYSTOLIC BLOOD PRESSURE: 134 MMHG

## 2024-04-09 DIAGNOSIS — J45.30 MILD PERSISTENT ASTHMA WITHOUT COMPLICATION: ICD-10-CM

## 2024-04-09 DIAGNOSIS — M79.2 NEURITIS: ICD-10-CM

## 2024-04-09 DIAGNOSIS — G89.29 CHRONIC MYOFASCIAL PAIN: ICD-10-CM

## 2024-04-09 DIAGNOSIS — M47.816 LUMBAR SPONDYLOSIS: Primary | ICD-10-CM

## 2024-04-09 DIAGNOSIS — G89.4 CHRONIC PAIN SYNDROME: ICD-10-CM

## 2024-04-09 DIAGNOSIS — M79.18 CHRONIC MYOFASCIAL PAIN: ICD-10-CM

## 2024-04-09 PROCEDURE — G8427 DOCREV CUR MEDS BY ELIG CLIN: HCPCS | Performed by: NURSE PRACTITIONER

## 2024-04-09 PROCEDURE — G8417 CALC BMI ABV UP PARAM F/U: HCPCS | Performed by: NURSE PRACTITIONER

## 2024-04-09 PROCEDURE — 4004F PT TOBACCO SCREEN RCVD TLK: CPT | Performed by: NURSE PRACTITIONER

## 2024-04-09 PROCEDURE — 99214 OFFICE O/P EST MOD 30 MIN: CPT | Performed by: NURSE PRACTITIONER

## 2024-04-09 RX ORDER — PREDNISONE 10 MG/1
TABLET ORAL
Qty: 30 TABLET | Refills: 0 | Status: SHIPPED | OUTPATIENT
Start: 2024-04-09

## 2024-04-09 RX ORDER — ALBUTEROL SULFATE 90 UG/1
2 AEROSOL, METERED RESPIRATORY (INHALATION) EVERY 4 HOURS PRN
Qty: 18 G | Refills: 3 | OUTPATIENT
Start: 2024-04-09

## 2024-04-09 NOTE — PROGRESS NOTES
Chronic Pain/PM&R Clinic Note     Encounter Date: 4/9/24    Subjective:   Chief Complaint:   Chief Complaint   Patient presents with    Follow-up     Pt would like to know why she is still in so mach pain after getting the ablation done 3 weeks  ago       History of Present Illness:   Eufemia Koo is a 43 y.o. female seen in the clinic initially on  01/06/2022 upon request from Otoniel Llamas MD for her history of lumbar pain.  Patient states pain started after a lifting accident at work in 2008 where she had an injury to her thoracic spine.  Patient states back pain has become gradually worse ever since.  Patient states pain is worse when she is sitting, standing in 1 position, or doing activities such as cleaning around the house.  Patient states she will occasionally get pain that radiates down bilateral legs, left more than right with strenuous activity.  Patient states when pain gets severe she will lay down which seems to help.  Patient does have trouble sleeping occasionally as she has some aching and twitching in bilateral legs.  Patient states she has tried physical therapy and decompression several times which have been ineffective.  It has been several years since she has done physical therapy.  Patient states she does not currently work as she was unable to tolerate it.  Patient denies weakness in bilateral legs but states she will occasionally feel like her knees want to give out.  Patient denies falls.  Patient denies saddle anesthesia or bowel or bladder incontinence.  Patient also mentions other pain such as in her left shoulder, left wrist, and left knee.  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  She has had ongoing thoracic pain since then.    Today, 2/26/2024, patient presents for planned follow-up for management of chronic low back and hip pain.  She states that she continues struggle with her low back pain and feels like

## 2024-04-09 NOTE — PROGRESS NOTES
[facility] PROFESSIONAL SERVS  [facility] NEUROSCIENCE AND REHABILITATION [address]  [facility] [address]  Dept: [phone]  Dept Fax: [phone]  Loc: [phone]    Visit Date: 4/9/2024    Functionality Assessment/Goals Worksheet     On a scale of 0 (Does not Interfere) to 10 (Completely Interferes)     1.  Which number describes how during the past week pain has interfered with       the following:  A.  General Activity:  9  B.  Mood: 9  C.  Walking Ability:  9  D.  Normal Work (Includes both work outside the home and housework):  9  E.  Relations with Other People:   7  F.  Sleep:   9  G.  Enjoyment of Life:   0    2.  Patient Prefers to Take their Pain Medications:     []  On a regular basis   [x]  Only when necessary    []  Does not take pain medications    3.  What are the Patient's Goals/Expectations for Visiting Pain Management?     []  Learn about my pain    [x]  Receive Medication   []  Physical Therapy     []  Treat Depression   [x]  Receive Injections    []  Treat Sleep   []  Deal with Anxiety and Stress   []  Treat Opoid Dependence/Addiction   []  Other:

## 2024-04-12 DIAGNOSIS — R06.02 SOB (SHORTNESS OF BREATH): ICD-10-CM

## 2024-04-12 DIAGNOSIS — R06.2 WHEEZING: ICD-10-CM

## 2024-04-12 DIAGNOSIS — J45.30 MILD PERSISTENT ASTHMA WITHOUT COMPLICATION: Primary | ICD-10-CM

## 2024-04-12 RX ORDER — ALBUTEROL SULFATE 2.5 MG/3ML
2.5 SOLUTION RESPIRATORY (INHALATION) EVERY 4 HOURS PRN
Qty: 120 EACH | Refills: 3 | Status: SHIPPED | OUTPATIENT
Start: 2024-04-12

## 2024-04-12 NOTE — PROGRESS NOTES
Eufemia Dwyerop was evaluated today and a DME order was entered for a nebulizer compressor in order to administer Albuterol due to the diagnosis of Asthma/SOB/wheezing.  The need for this equipment and treatment was discussed with the patient and she understands and is in agreement.       RX for Albuterol neb sent in as well     Electronically signed by BINH Rice CNP on 4/12/2024 at 11:36 AM

## 2024-04-18 DIAGNOSIS — J45.30 MILD PERSISTENT ASTHMA WITHOUT COMPLICATION: ICD-10-CM

## 2024-04-18 RX ORDER — ALBUTEROL SULFATE 90 UG/1
AEROSOL, METERED RESPIRATORY (INHALATION)
Qty: 18 G | Refills: 3 | Status: SHIPPED | OUTPATIENT
Start: 2024-04-18

## 2024-04-24 ENCOUNTER — OFFICE VISIT (OUTPATIENT)
Dept: PSYCHIATRY | Age: 44
End: 2024-04-24
Payer: MEDICAID

## 2024-04-24 DIAGNOSIS — F41.1 GAD (GENERALIZED ANXIETY DISORDER): ICD-10-CM

## 2024-04-24 DIAGNOSIS — F43.10 PTSD (POST-TRAUMATIC STRESS DISORDER): ICD-10-CM

## 2024-04-24 DIAGNOSIS — F51.01 PRIMARY INSOMNIA: Primary | ICD-10-CM

## 2024-04-24 DIAGNOSIS — F17.200 TOBACCO USE DISORDER: ICD-10-CM

## 2024-04-24 PROCEDURE — 99406 BEHAV CHNG SMOKING 3-10 MIN: CPT | Performed by: PSYCHIATRY & NEUROLOGY

## 2024-04-24 PROCEDURE — 99214 OFFICE O/P EST MOD 30 MIN: CPT | Performed by: PSYCHIATRY & NEUROLOGY

## 2024-04-24 PROCEDURE — 4004F PT TOBACCO SCREEN RCVD TLK: CPT | Performed by: PSYCHIATRY & NEUROLOGY

## 2024-04-24 PROCEDURE — G8417 CALC BMI ABV UP PARAM F/U: HCPCS | Performed by: PSYCHIATRY & NEUROLOGY

## 2024-04-24 PROCEDURE — G8427 DOCREV CUR MEDS BY ELIG CLIN: HCPCS | Performed by: PSYCHIATRY & NEUROLOGY

## 2024-04-24 RX ORDER — RAMELTEON 8 MG/1
8 TABLET ORAL NIGHTLY PRN
Qty: 30 TABLET | Refills: 2 | Status: SHIPPED | OUTPATIENT
Start: 2024-04-24 | End: 2025-04-24

## 2024-04-24 ASSESSMENT — PATIENT HEALTH QUESTIONNAIRE - PHQ9
1. LITTLE INTEREST OR PLEASURE IN DOING THINGS: NOT AT ALL
9. THOUGHTS THAT YOU WOULD BE BETTER OFF DEAD, OR OF HURTING YOURSELF: NOT AT ALL
5. POOR APPETITE OR OVEREATING: NOT AT ALL
SUM OF ALL RESPONSES TO PHQ QUESTIONS 1-9: 3
SUM OF ALL RESPONSES TO PHQ9 QUESTIONS 1 & 2: 0
SUM OF ALL RESPONSES TO PHQ QUESTIONS 1-9: 3
SUM OF ALL RESPONSES TO PHQ QUESTIONS 1-9: 3
7. TROUBLE CONCENTRATING ON THINGS, SUCH AS READING THE NEWSPAPER OR WATCHING TELEVISION: NOT AT ALL
4. FEELING TIRED OR HAVING LITTLE ENERGY: MORE THAN HALF THE DAYS
3. TROUBLE FALLING OR STAYING ASLEEP: SEVERAL DAYS
2. FEELING DOWN, DEPRESSED OR HOPELESS: NOT AT ALL
10. IF YOU CHECKED OFF ANY PROBLEMS, HOW DIFFICULT HAVE THESE PROBLEMS MADE IT FOR YOU TO DO YOUR WORK, TAKE CARE OF THINGS AT HOME, OR GET ALONG WITH OTHER PEOPLE: SOMEWHAT DIFFICULT
6. FEELING BAD ABOUT YOURSELF - OR THAT YOU ARE A FAILURE OR HAVE LET YOURSELF OR YOUR FAMILY DOWN: NOT AT ALL
8. MOVING OR SPEAKING SO SLOWLY THAT OTHER PEOPLE COULD HAVE NOTICED. OR THE OPPOSITE, BEING SO FIGETY OR RESTLESS THAT YOU HAVE BEEN MOVING AROUND A LOT MORE THAN USUAL: NOT AT ALL
SUM OF ALL RESPONSES TO PHQ QUESTIONS 1-9: 3

## 2024-04-24 ASSESSMENT — ANXIETY QUESTIONNAIRES
2. NOT BEING ABLE TO STOP OR CONTROL WORRYING: SEVERAL DAYS
3. WORRYING TOO MUCH ABOUT DIFFERENT THINGS: SEVERAL DAYS
IF YOU CHECKED OFF ANY PROBLEMS ON THIS QUESTIONNAIRE, HOW DIFFICULT HAVE THESE PROBLEMS MADE IT FOR YOU TO DO YOUR WORK, TAKE CARE OF THINGS AT HOME, OR GET ALONG WITH OTHER PEOPLE: SOMEWHAT DIFFICULT
5. BEING SO RESTLESS THAT IT IS HARD TO SIT STILL: NOT AT ALL
6. BECOMING EASILY ANNOYED OR IRRITABLE: SEVERAL DAYS
4. TROUBLE RELAXING: MORE THAN HALF THE DAYS
1. FEELING NERVOUS, ANXIOUS, OR ON EDGE: MORE THAN HALF THE DAYS
7. FEELING AFRAID AS IF SOMETHING AWFUL MIGHT HAPPEN: NOT AT ALL
GAD7 TOTAL SCORE: 7

## 2024-04-24 NOTE — PATIENT INSTRUCTIONS
Plan:  Eufemia Koo, it was nice seeing you today  Stop Hydroxyzine. Start Rozerem 8mg nightly within 30 minutes of bed. Monitor for side effects. If ineffective or too expensive continue with as needed hydroxyzine for sleep  Consider chantix for smoking cessation  Follow up in 3 months but reach out sooner if needed      -Please take medications as prescribed  -Refrain from alcohol or drug use  -Seek emergency help via the emergency and/or calling 911 should symptoms become severe, worsen, or with other concerning symptoms.Go immediately to the emergency room and/or call 911 with any suicidal or homicidal ideations or if audio/visual hallucinations develop.   -Contact office with any questions or concerns.      Crisis phone numbers:  988-national suicide hotline, call or text  [facility] [place], [facility] [phone].  [facility] [phone]  [facility] [phone].  [facility] [phone].  [facility] [phone].  [facility] [phone].

## 2024-04-24 NOTE — PROGRESS NOTES
[facility] PHYSICIANS [place] SPECIALTY  [facility] PSYCHIATRY  [address] [facility] [id number] [phone]    Progress Note    Patient:  Eufemia Koo  YOB: 1980  PCP:  Rebecca Elizalde APRN - CNP  Visit Date:  4/24/2024      CC: Follow up for medication management and psychotherapy      SUBJECTIVE:      Eufemia Koo, a 43 y.o. female, presents for a follow up visit.  Patient reports things are going well.  She stopped Lamictal a few months ago and reports she has remained stable.  Likes only being on an as needed medication for sleep.  Feels mood has been overall good.  Denies any recent anger outburst.  When she does get upset she is better able to control it.  We again reviewed patient's history and it is less consistent with bipolar mood swings and more consistent with trauma related mood instability and possible borderline personality traits.  Denied current symptoms of PTSD.  She is concerned hydroxyzine is increasing weight so we discussed trying Ros Breezy if insurance will cover.  Nicotine replacement has not been effective and patient continues to smoke.  We discussed other MAT options but patient declined wanting to start at this time.  She denied suicidal or homicidal ideation, intent or plan.          OBJECTIVE:      1/10/2024     7:30 AM   Patient-Reported Vitals   Patient-Reported Weight 220   Patient-Reported Height 5'4   Patient-Reported Systolic 180 mmHg   Patient-Reported Diastolic 89 mmHg   Patient-Reported Pulse 80   Patient-Reported Temperature 98   Patient-Reported SpO2 90          MENTAL STATUS EXAM:  Orientation: Alert, oriented, thought content appropriate   Mood:. \"Good\"      Affect:  Euthymic      Appearance:  Age Appropriate, Clothing Appropriate for Age, and Clothing Appropriate for Weather   Thought Process:  Within Normal Limits   Thought Content:  Within Normal Limits   Insight:  Age Appropriate   Judgment: Age Appropriate   Suicidal

## 2024-04-30 ENCOUNTER — TELEPHONE (OUTPATIENT)
Dept: PSYCHIATRY | Age: 44
End: 2024-04-30

## 2024-04-30 NOTE — TELEPHONE ENCOUNTER
PA completed via [employer] and submitted to [facility] for approval. Denial received. Per denial:     Coverage is provided when the member has a history of at least 10 days of therapy with two preferred (medication covered by the Plan) medications, which include but are not limited to: estazolam 1 mg and 2 mg tablet, temazepam 15 mg and 30 mg capsule, zaleplon, and zolpidem 5 mg and 10 mg tablet.     Please advise.       Last visit- 4/24/2024  Next visit- 7/23/2024

## 2024-05-06 ENCOUNTER — OFFICE VISIT (OUTPATIENT)
Dept: PHYSICAL MEDICINE AND REHAB | Age: 44
End: 2024-05-06
Payer: MEDICAID

## 2024-05-06 VITALS
BODY MASS INDEX: 37.56 KG/M2 | HEIGHT: 64 IN | DIASTOLIC BLOOD PRESSURE: 70 MMHG | SYSTOLIC BLOOD PRESSURE: 136 MMHG | WEIGHT: 220 LBS

## 2024-05-06 DIAGNOSIS — M47.816 LUMBAR SPONDYLOSIS: Primary | ICD-10-CM

## 2024-05-06 DIAGNOSIS — G89.4 CHRONIC PAIN SYNDROME: ICD-10-CM

## 2024-05-06 DIAGNOSIS — G89.29 CHRONIC MYOFASCIAL PAIN: ICD-10-CM

## 2024-05-06 DIAGNOSIS — M79.2 NEUROPATHIC PAIN: ICD-10-CM

## 2024-05-06 DIAGNOSIS — M79.2 NEURITIS: ICD-10-CM

## 2024-05-06 DIAGNOSIS — M79.18 CHRONIC MYOFASCIAL PAIN: ICD-10-CM

## 2024-05-06 PROCEDURE — 99214 OFFICE O/P EST MOD 30 MIN: CPT | Performed by: NURSE PRACTITIONER

## 2024-05-06 PROCEDURE — G8427 DOCREV CUR MEDS BY ELIG CLIN: HCPCS | Performed by: NURSE PRACTITIONER

## 2024-05-06 PROCEDURE — 4004F PT TOBACCO SCREEN RCVD TLK: CPT | Performed by: NURSE PRACTITIONER

## 2024-05-06 PROCEDURE — G8417 CALC BMI ABV UP PARAM F/U: HCPCS | Performed by: NURSE PRACTITIONER

## 2024-05-06 RX ORDER — GABAPENTIN 300 MG/1
300 CAPSULE ORAL 2 TIMES DAILY
Qty: 60 CAPSULE | Refills: 0 | Status: SHIPPED | OUTPATIENT
Start: 2024-05-06 | End: 2024-06-05

## 2024-05-06 RX ORDER — BACLOFEN 10 MG/1
5-10 TABLET ORAL 3 TIMES DAILY PRN
Qty: 45 TABLET | Refills: 0 | Status: SHIPPED | OUTPATIENT
Start: 2024-05-06

## 2024-05-06 NOTE — PROGRESS NOTES
Chronic Pain/PM&R Clinic Note     Encounter Date: 5/6/24    Subjective:   Chief Complaint:   Chief Complaint   Patient presents with    Procedure     Spasms after procedure and TPI, started immediately after       History of Present Illness:   Eufemia Koo is a 43 y.o. female seen in the clinic initially on  01/06/2022 upon request from Otoniel Llamas MD for her history of lumbar pain.  Patient states pain started after a lifting accident at work in 2008 where she had an injury to her thoracic spine.  Patient states back pain has become gradually worse ever since.  Patient states pain is worse when she is sitting, standing in 1 position, or doing activities such as cleaning around the house.  Patient states she will occasionally get pain that radiates down bilateral legs, left more than right with strenuous activity.  Patient states when pain gets severe she will lay down which seems to help.  Patient does have trouble sleeping occasionally as she has some aching and twitching in bilateral legs.  Patient states she has tried physical therapy and decompression several times which have been ineffective.  It has been several years since she has done physical therapy.  Patient states she does not currently work as she was unable to tolerate it.  Patient denies weakness in bilateral legs but states she will occasionally feel like her knees want to give out.  Patient denies falls.  Patient denies saddle anesthesia or bowel or bladder incontinence.  Patient also mentions other pain such as in her left shoulder, left wrist, and left knee.  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  She has had ongoing thoracic pain since then.    Today, 2/26/2024, patient presents for planned follow-up for management of chronic low back and hip pain.  She states that she continues struggle with her low back pain and feels like her lumbar radiofrequency ablations have

## 2024-05-06 NOTE — PROGRESS NOTES
Functionality Assessment/Goals Worksheet     On a scale of 0 (Does not Interfere) to 10 (Completely Interferes)     1.  Which number describes how during the past week pain has interfered with           the following:  A.  General Activity:  7  B.  Mood: 7  C.  Walking Ability:  8  D.  Normal Work (Includes both work outside the home and housework):  9  E.  Relations with Other People:   4  F.  Sleep:   10  G.  Enjoyment of Life:   4    2.  Patient Prefers to Take their Pain Medications:     []  On a regular basis   [x]  Only when necessary    []  Does not take pain medications    3.  What are the Patient's Goals/Expectations for Visiting Pain Management?     []  Learn about my pain    [x]  Receive Medication   []  Physical Therapy     []  Treat Depression   [x]  Receive Injections    []  Treat Sleep   []  Deal with Anxiety and Stress   []  Treat Opoid Dependence/Addiction   []  Other:

## 2024-06-10 ENCOUNTER — OFFICE VISIT (OUTPATIENT)
Dept: PHYSICAL MEDICINE AND REHAB | Age: 44
End: 2024-06-10
Payer: MEDICAID

## 2024-06-10 VITALS
DIASTOLIC BLOOD PRESSURE: 66 MMHG | SYSTOLIC BLOOD PRESSURE: 136 MMHG | HEIGHT: 64 IN | WEIGHT: 220 LBS | BODY MASS INDEX: 37.56 KG/M2

## 2024-06-10 DIAGNOSIS — M47.816 LUMBAR SPONDYLOSIS: ICD-10-CM

## 2024-06-10 DIAGNOSIS — M79.2 NEUROPATHIC PAIN: ICD-10-CM

## 2024-06-10 DIAGNOSIS — G89.4 CHRONIC PAIN SYNDROME: ICD-10-CM

## 2024-06-10 DIAGNOSIS — M54.17 RADICULOPATHY, LUMBOSACRAL REGION: Primary | ICD-10-CM

## 2024-06-10 PROCEDURE — 4004F PT TOBACCO SCREEN RCVD TLK: CPT | Performed by: ANESTHESIOLOGY

## 2024-06-10 PROCEDURE — G8427 DOCREV CUR MEDS BY ELIG CLIN: HCPCS | Performed by: ANESTHESIOLOGY

## 2024-06-10 PROCEDURE — 99214 OFFICE O/P EST MOD 30 MIN: CPT | Performed by: ANESTHESIOLOGY

## 2024-06-10 PROCEDURE — G8417 CALC BMI ABV UP PARAM F/U: HCPCS | Performed by: ANESTHESIOLOGY

## 2024-06-10 NOTE — PROGRESS NOTES
Functionality Assessment/Goals Worksheet     On a scale of 0 (Does not Interfere) to 10 (Completely Interferes)     1.  Which number describes how during the past week pain has interfered with           the following:  A.  General Activity:  8  B.  Mood: 7  C.  Walking Ability:  10  D.  Normal Work (Includes both work outside the home and housework):  9  E.  Relations with Other People:   5  F.  Sleep:   10  G.  Enjoyment of Life:   9    2.  Patient Prefers to Take their Pain Medications:     []  On a regular basis   [x]  Only when necessary    []  Does not take pain medications    3.  What are the Patient's Goals/Expectations for Visiting Pain Management?     []  Learn about my pain    [x]  Receive Medication   []  Physical Therapy     []  Treat Depression   [x]  Receive Injections    []  Treat Sleep   []  Deal with Anxiety and Stress   []  Treat Opoid Dependence/Addiction   []  Other:

## 2024-06-10 NOTE — PROGRESS NOTES
Chronic Pain/PM&R Clinic Note     Encounter Date: 6/10/24    Subjective:   Chief Complaint:   Chief Complaint   Patient presents with    Follow Up After Procedure     Lumbar 4-5, 5-sacral 1 radiofrequency ablation bilateral 3/26/24       History of Present Illness:   Eufemia Koo is a 44 y.o. female seen in the clinic initially on  01/06/2022 upon request from Otoniel Llamas MD for her history of lumbar pain.  Patient states pain started after a lifting accident at work in 2008 where she had an injury to her thoracic spine.  Patient states back pain has become gradually worse ever since.  Patient states pain is worse when she is sitting, standing in 1 position, or doing activities such as cleaning around the house.  Patient states she will occasionally get pain that radiates down bilateral legs, left more than right with strenuous activity.  Patient states when pain gets severe she will lay down which seems to help.  Patient does have trouble sleeping occasionally as she has some aching and twitching in bilateral legs.  Patient states she has tried physical therapy and decompression several times which have been ineffective.  It has been several years since she has done physical therapy.  Patient states she does not currently work as she was unable to tolerate it.  Patient denies weakness in bilateral legs but states she will occasionally feel like her knees want to give out.  Patient denies falls.  Patient denies saddle anesthesia or bowel or bladder incontinence.  Patient also mentions other pain such as in her left shoulder, left wrist, and left knee.  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  She has had ongoing thoracic pain since then.    Today, 6/10/2024, patient presents for planned follow-up for management of chronic low back pain and leg pain.  She underwent a repeat lumbar radiofrequency ablation in March 2024 with minimal relief

## 2024-06-17 RX ORDER — HYDROXYZINE PAMOATE 25 MG/1
25 CAPSULE ORAL DAILY PRN
Qty: 30 CAPSULE | Refills: 0 | Status: SHIPPED | OUTPATIENT
Start: 2024-06-17

## 2024-06-17 NOTE — TELEPHONE ENCOUNTER
[facility] is requesting a medication refill on Eufemia's behalf for Hydroxyzine 25mg;#30 with 0 refills;last with a start and refill date of 04/004/24. Per chart; she was switched to Ramelteon at her last appointment but insurance denied it in the PA process (see encounter dated 04/30/24); this provider recommended continuing Hydroxyzine for now.    Medication is pending your approval for a 30 day supply with 0 refills; she is scheduled to return on 07/23/24. She was last seen on 04/24/24. Please advise otherwise.

## 2024-06-23 DIAGNOSIS — J45.30 MILD PERSISTENT ASTHMA WITHOUT COMPLICATION: ICD-10-CM

## 2024-06-23 DIAGNOSIS — K21.9 GASTROESOPHAGEAL REFLUX DISEASE WITHOUT ESOPHAGITIS: Primary | ICD-10-CM

## 2024-06-23 RX ORDER — OMEPRAZOLE 20 MG/1
20 CAPSULE, DELAYED RELEASE ORAL
Qty: 90 CAPSULE | Refills: 1 | Status: SHIPPED | OUTPATIENT
Start: 2024-06-23

## 2024-06-24 ENCOUNTER — OFFICE VISIT (OUTPATIENT)
Dept: ALLERGY | Age: 44
End: 2024-06-24
Payer: MEDICAID

## 2024-06-24 VITALS
HEART RATE: 93 BPM | BODY MASS INDEX: 38.77 KG/M2 | SYSTOLIC BLOOD PRESSURE: 155 MMHG | WEIGHT: 226 LBS | OXYGEN SATURATION: 98 % | DIASTOLIC BLOOD PRESSURE: 97 MMHG | RESPIRATION RATE: 18 BRPM

## 2024-06-24 DIAGNOSIS — K90.49 MILK INTOLERANCE: ICD-10-CM

## 2024-06-24 DIAGNOSIS — Z91.018 FOOD ALLERGY: ICD-10-CM

## 2024-06-24 DIAGNOSIS — Z91.013 SEAFOOD ALLERGY: Primary | ICD-10-CM

## 2024-06-24 DIAGNOSIS — J30.1 NON-SEASONAL ALLERGIC RHINITIS DUE TO POLLEN: ICD-10-CM

## 2024-06-24 PROCEDURE — 4004F PT TOBACCO SCREEN RCVD TLK: CPT | Performed by: NURSE PRACTITIONER

## 2024-06-24 PROCEDURE — 99215 OFFICE O/P EST HI 40 MIN: CPT | Performed by: NURSE PRACTITIONER

## 2024-06-24 PROCEDURE — G8427 DOCREV CUR MEDS BY ELIG CLIN: HCPCS | Performed by: NURSE PRACTITIONER

## 2024-06-24 PROCEDURE — G8417 CALC BMI ABV UP PARAM F/U: HCPCS | Performed by: NURSE PRACTITIONER

## 2024-06-24 RX ORDER — EPINEPHRINE 0.3 MG/.3ML
INJECTION SUBCUTANEOUS
Qty: 0 ML | Refills: 0 | OUTPATIENT
Start: 2024-06-24

## 2024-06-24 RX ORDER — EPINEPHRINE 0.3 MG/.3ML
0.3 INJECTION SUBCUTANEOUS ONCE
Qty: 0.3 ML | Refills: 0 | Status: SHIPPED | OUTPATIENT
Start: 2024-06-24 | End: 2024-06-24

## 2024-06-24 RX ORDER — FEXOFENADINE HCL 180 MG/1
180 TABLET ORAL DAILY
COMMUNITY
End: 2024-06-24 | Stop reason: ALTCHOICE

## 2024-06-24 RX ORDER — LORATADINE 10 MG/1
10 TABLET ORAL DAILY
COMMUNITY
End: 2024-06-24

## 2024-06-24 RX ORDER — DIPHENHYDRAMINE HCL 25 MG
25 TABLET ORAL EVERY 6 HOURS PRN
COMMUNITY

## 2024-06-24 RX ORDER — CETIRIZINE HYDROCHLORIDE 10 MG/1
10 TABLET ORAL DAILY
Qty: 30 TABLET | Refills: 2 | Status: SHIPPED | OUTPATIENT
Start: 2024-06-24 | End: 2024-06-24

## 2024-06-24 RX ORDER — CETIRIZINE HYDROCHLORIDE 10 MG/1
10 TABLET ORAL DAILY
Qty: 30 TABLET | Refills: 2 | Status: SHIPPED | OUTPATIENT
Start: 2024-06-24

## 2024-06-24 ASSESSMENT — ENCOUNTER SYMPTOMS: RHINORRHEA: 1

## 2024-06-24 NOTE — PROGRESS NOTES
Future  -     Seafood panel IgE; Future  -     MISCELLANEOUS SENDOUT SOLE IGE 3366490; Future  -     MISCELLANEOUS SENDOUT SWORDFISH IGE [id number]; Future  -     Tryptase; Future    Milk intolerance    Non-seasonal allergic rhinitis due to pollen  -     Clams IgE; Future  -     MISCELLANEOUS SENDOUT ARUP [id number]; Future  -     Mussel IgE; Future  -     Oyster IgE; Future  -     Scallop IgE; Future  -     Napoleon IgE; Future  -     Seafood panel IgE; Future  -     MISCELLANEOUS SENDOUT SOLE IGE 2835401; Future  -     MISCELLANEOUS SENDOUT SWORDFISH IGE [id number]; Future  -     Tryptase; Future  -     CBC with Auto Differential; Future  -     IgA; Future  -     IgE; Future  -     IgG; Future  -     IgM; Future  -     Strongyloides Ab, IgG; Future  -     Discontinue: cetirizine (ZYRTEC) 10 MG tablet; Take 1 tablet by mouth daily  -     cetirizine (ZYRTEC) 10 MG tablet; Take 1 tablet by mouth daily    Food allergy  -     Allergen, Food, Comprehensive Profile 1; Future  -     Allergen Gluten IgE; Future  -     Histamine; Future  -     Histone Antibodies IgG; Future  -     Discontinue: EPINEPHrine (EPIPEN 2-LARRY) 0.3 MG/0.3ML SOAJ injection; Inject 0.3 mLs into the skin once for 1 dose Use as directed for allergic reaction  -     EPINEPHrine (EPIPEN 2-LARRY) 0.3 MG/0.3ML SOAJ injection; Inject 0.3 mLs into the muscle once for 1 dose Use as directed for allergic reaction        Return in about 6 weeks (around 8/5/2024) for Allergy Testing, Lab review.    Advised to call back directly if there are further questions, or if  symptoms fail to improve as anticipated or worsen.     Spent 45 minutes of face-to-face time with the patient with well more than half of the visit being dedicated to the discussion of the various symptom problems, provided education of medications and disease process, as well as discussion of a therapeutic plan for each    Office to get medical records from previous provider and/or PCP    Patient's case was

## 2024-06-24 NOTE — PATIENT INSTRUCTIONS
PATIENT TO RETURN FOR ALLERGY TESTING    STOP THE FOLLOWING MEDICATIONS (IF TAKING) ONE WEEK PRIOR TO ALLERGY TESTIN. ANTIHISTAMINES - ZYRTEC (CETIRIZINE), CLARITIN (LORATADINE), XYZAL (LEVOCETIRIZINE), BENADRYL (DIPHENHYDRAMINE), HYDROXYZINE, VISTARIL, ALLEGRA (FEXOFENADINE), DOXYALAMINE  2. STOP NASAL SPRAYS/RINSES - NASOCORT, FLONASE, NASONEX, ASTELIN, ANTIVERT (MECLIZINE),BUDESONIDE, XHANCE  3. STOP STOMACH MEDICATIONS - PEPCID, FAMOTIDINE, ZANTAC, RANITIDINE  4. STOP SINGULAIR OR MONTELUKAST  5. IF POSSIBLE HOLD INHALERS THE DAY OF TESTING BUT BRING WITH YOU TO USE AFTER THE ALLERGY TESTING  6. ELAVIL (AMITRIPTYLINE) - MUST DISCUSS WITH DILAN KEITH OR PCP PRIOR TO HOLDING    IF YOU ARE PLACED ON ANY NEW MEDICATIONS BETWEEN NOW AND THE TIME OF YOUR ALLERGY TESTING BY ANY OTHER PROVIDER CALL THE OFFICE TO VERIFY IF THIS WILL INTERFERE WITH ALLERGY TESTING    PLEASE SHOWER THE MORNING OF ALLERGY TESTING.  THIS IS VERY IMPORTANT FOR TESTING AND INFECTION CONTROL PRIOR TO SKIN ALLERGY TESTING      IF ANY LABS ARE DONE, YOU WILL BE NOTIFIED IN OF ABNORMALITIES THAT ARE CONCERNING TO THE PROVIDER.   OTHERWISE LABS WILL BE REVIEWED AT YOUR NEXT VISIT.    How To Prepare For Your Allergy Test    As you embark on your allergy testing, there are several important things to remember.  Your testing process may take 60-90 minutes, but will result in information on which allergens you react to and how severely you react to each one.  Be sure and inform your provider and staff if you are not feeling well or have started taking any new medications or have developed any new medical problems prior to the testing.    The testing will be a scratch test, usually done on the back.  Wear comfortable clothing that allows for easy exposure of this area.  It is best to avoid tight or restrictive clothing.  Additionally, avoid lotions or skin treatments that could interfere with the testing results.   No strenuous physical

## 2024-07-12 DIAGNOSIS — J45.30 MILD PERSISTENT ASTHMA WITHOUT COMPLICATION: ICD-10-CM

## 2024-07-15 RX ORDER — ALBUTEROL SULFATE 90 UG/1
AEROSOL, METERED RESPIRATORY (INHALATION)
Qty: 18 G | Refills: 3 | OUTPATIENT
Start: 2024-07-15

## 2024-07-15 RX ORDER — GABAPENTIN 300 MG/1
300 CAPSULE ORAL 2 TIMES DAILY
Qty: 60 CAPSULE | Refills: 2 | Status: SHIPPED | OUTPATIENT
Start: 2024-07-15 | End: 2024-10-13

## 2024-07-20 DIAGNOSIS — J45.30 MILD PERSISTENT ASTHMA WITHOUT COMPLICATION: ICD-10-CM

## 2024-07-22 ENCOUNTER — TELEPHONE (OUTPATIENT)
Dept: PULMONOLOGY | Age: 44
End: 2024-07-22

## 2024-07-22 DIAGNOSIS — J45.30 MILD PERSISTENT ASTHMA WITHOUT COMPLICATION: ICD-10-CM

## 2024-07-22 RX ORDER — ALBUTEROL SULFATE 90 UG/1
AEROSOL, METERED RESPIRATORY (INHALATION)
Qty: 18 G | Refills: 3 | OUTPATIENT
Start: 2024-07-22

## 2024-07-22 RX ORDER — ALBUTEROL SULFATE 90 UG/1
2 AEROSOL, METERED RESPIRATORY (INHALATION) EVERY 4 HOURS PRN
Qty: 18 G | Refills: 3 | Status: SHIPPED | OUTPATIENT
Start: 2024-07-22

## 2024-07-23 RX ORDER — ALBUTEROL SULFATE 2.5 MG/3ML
2.5 SOLUTION RESPIRATORY (INHALATION) EVERY 4 HOURS PRN
Qty: 120 EACH | Refills: 3 | Status: CANCELLED | OUTPATIENT
Start: 2024-07-23

## 2024-07-25 RX ORDER — HYDROXYZINE 50 MG/1
25-50 TABLET, FILM COATED ORAL NIGHTLY PRN
Qty: 30 TABLET | Refills: 0 | Status: SHIPPED | OUTPATIENT
Start: 2024-07-25

## 2024-07-25 NOTE — TELEPHONE ENCOUNTER
Eufemia wrote into the office via [employer]:    I'm sorry I'm missed the appointment I will be rescheduling but until I see you again I need a refill on thr hydroxyzine please.     *Per chart; the last Rx for Hydroxyzine was sent in by a PCP for Hydroxyzine 50mg (take 1/2 to 1 tablet by mouth at bedtime prn). Records indicate that she was switched to Ramelteon but it was denied by insurance and she was instructed to return to her Hydroxyzine.    Medication is pending your approval for a 30 day supply with 0 refills of Hydroxyzine 50mg (same instructions). Please advise otherwise. She was last seen in 04/2024 with no future appt at this time. Staff will write back to her to get her schedule and must attend.

## 2024-07-29 ENCOUNTER — OFFICE VISIT (OUTPATIENT)
Dept: RHEUMATOLOGY | Age: 44
End: 2024-07-29
Payer: MEDICAID

## 2024-07-29 ENCOUNTER — HOSPITAL ENCOUNTER (OUTPATIENT)
Age: 44
Discharge: HOME | End: 2024-07-29
Payer: MEDICAID

## 2024-07-29 VITALS
HEIGHT: 64 IN | WEIGHT: 223.6 LBS | SYSTOLIC BLOOD PRESSURE: 130 MMHG | OXYGEN SATURATION: 94 % | HEART RATE: 91 BPM | BODY MASS INDEX: 38.17 KG/M2 | DIASTOLIC BLOOD PRESSURE: 80 MMHG

## 2024-07-29 DIAGNOSIS — L40.9 PSORIASIS: ICD-10-CM

## 2024-07-29 DIAGNOSIS — M54.41 CHRONIC MIDLINE LOW BACK PAIN WITH BILATERAL SCIATICA: ICD-10-CM

## 2024-07-29 DIAGNOSIS — Z91.018 FOOD ALLERGY: ICD-10-CM

## 2024-07-29 DIAGNOSIS — M25.50 POLYARTHRALGIA: ICD-10-CM

## 2024-07-29 DIAGNOSIS — G89.29 CHRONIC MIDLINE LOW BACK PAIN WITH BILATERAL SCIATICA: ICD-10-CM

## 2024-07-29 DIAGNOSIS — M25.50 POLYARTHRALGIA: Primary | ICD-10-CM

## 2024-07-29 DIAGNOSIS — Z91.013 SEAFOOD ALLERGY: ICD-10-CM

## 2024-07-29 DIAGNOSIS — J30.1 NON-SEASONAL ALLERGIC RHINITIS DUE TO POLLEN: ICD-10-CM

## 2024-07-29 DIAGNOSIS — M54.42 CHRONIC MIDLINE LOW BACK PAIN WITH BILATERAL SCIATICA: ICD-10-CM

## 2024-07-29 LAB
BASOPHILS ABSOLUTE: 0.1 THOU/MM3 (ref 0–0.1)
BASOPHILS NFR BLD AUTO: 0.9 %
CRP SERPL-MCNC: 0.5 MG/DL (ref 0–1)
DEPRECATED RDW RBC AUTO: 40 FL (ref 35–45)
EOSINOPHIL NFR BLD AUTO: 2.4 %
EOSINOPHILS ABSOLUTE: 0.2 THOU/MM3 (ref 0–0.4)
ERYTHROCYTE [DISTWIDTH] IN BLOOD BY AUTOMATED COUNT: 12.5 % (ref 11.5–14.5)
ERYTHROCYTE [SEDIMENTATION RATE] IN BLOOD BY WESTERGREN METHOD: 12 MM/HR (ref 0–20)
HCT VFR BLD AUTO: 41.4 % (ref 37–47)
HGB BLD-MCNC: 14.6 GM/DL (ref 12–16)
IGA SERPL-MCNC: 219 MG/DL (ref 70–400)
IGG SERPL-MCNC: 1051 MG/DL (ref 700–1600)
IGM SERPL-MCNC: 211 MG/DL (ref 40–230)
IMM GRANULOCYTES # BLD AUTO: 0.04 THOU/MM3 (ref 0–0.07)
IMM GRANULOCYTES NFR BLD AUTO: 0.4 %
LYMPHOCYTES ABSOLUTE: 2.2 THOU/MM3 (ref 1–4.8)
LYMPHOCYTES NFR BLD AUTO: 24.2 %
MCH RBC QN AUTO: 31.5 PG (ref 26–33)
MCHC RBC AUTO-ENTMCNC: 35.3 GM/DL (ref 32.2–35.5)
MCV RBC AUTO: 89.2 FL (ref 81–99)
MONOCYTES ABSOLUTE: 0.4 THOU/MM3 (ref 0.4–1.3)
MONOCYTES NFR BLD AUTO: 4.9 %
NEUTROPHILS ABSOLUTE: 6.1 THOU/MM3 (ref 1.8–7.7)
NEUTROPHILS NFR BLD AUTO: 67.2 %
NRBC BLD AUTO-RTO: 0 /100 WBC
PLATELET # BLD AUTO: 288 THOU/MM3 (ref 130–400)
PMV BLD AUTO: 11.3 FL (ref 9.4–12.4)
RBC # BLD AUTO: 4.64 MILL/MM3 (ref 4.2–5.4)
WBC # BLD AUTO: 9.1 THOU/MM3 (ref 4.8–10.8)

## 2024-07-29 PROCEDURE — G8417 CALC BMI ABV UP PARAM F/U: HCPCS | Performed by: INTERNAL MEDICINE

## 2024-07-29 PROCEDURE — 4004F PT TOBACCO SCREEN RCVD TLK: CPT | Performed by: INTERNAL MEDICINE

## 2024-07-29 PROCEDURE — 85651 RBC SED RATE NONAUTOMATED: CPT

## 2024-07-29 PROCEDURE — 86003 ALLG SPEC IGE CRUDE XTRC EA: CPT

## 2024-07-29 PROCEDURE — 83088 ASSAY OF HISTAMINE: CPT

## 2024-07-29 PROCEDURE — 86140 C-REACTIVE PROTEIN: CPT

## 2024-07-29 PROCEDURE — 82785 ASSAY OF IGE: CPT

## 2024-07-29 PROCEDURE — 36415 COLL VENOUS BLD VENIPUNCTURE: CPT

## 2024-07-29 PROCEDURE — G8427 DOCREV CUR MEDS BY ELIG CLIN: HCPCS | Performed by: INTERNAL MEDICINE

## 2024-07-29 PROCEDURE — 85025 COMPLETE CBC W/AUTO DIFF WBC: CPT

## 2024-07-29 PROCEDURE — 99214 OFFICE O/P EST MOD 30 MIN: CPT | Performed by: INTERNAL MEDICINE

## 2024-07-29 PROCEDURE — 86682 HELMINTH ANTIBODY: CPT

## 2024-07-29 PROCEDURE — 82784 ASSAY IGA/IGD/IGG/IGM EACH: CPT

## 2024-07-29 PROCEDURE — 83516 IMMUNOASSAY NONANTIBODY: CPT

## 2024-07-29 ASSESSMENT — ENCOUNTER SYMPTOMS
EYES NEGATIVE: 1
GASTROINTESTINAL NEGATIVE: 1

## 2024-07-29 NOTE — PROGRESS NOTES
disease), cervical, DDD (degenerative disc disease), thoracolumbar, Depression, GERD (gastroesophageal reflux disease), Headache, and Substance abuse (HCC).     Social History:  reports that she has been smoking cigarettes. She started smoking about 29 years ago. She has a 14.8 pack-year smoking history. She has never used smokeless tobacco. She reports that she does not currently use alcohol. She reports that she does not currently use drugs after having used the following drugs: Marijuana (Weed) and Methamphetamines (Crystal Meth).    ALLERGIES     Allergies   Allergen Reactions    Fluoxetine Other (See Comments)    Hydrocodone      \"makes me feel completely out of whack\"    Prozac [Fluoxetine Hcl] Other (See Comments)     \"I want to kill myself\"       CURRENT MEDICATIONS      Current Outpatient Medications:     hydrOXYzine HCl (ATARAX) 50 MG tablet, Take 0.5-1 tablets by mouth nightly as needed for Anxiety, Disp: 30 tablet, Rfl: 0    VENTOLIN  (90 Base) MCG/ACT inhaler, Inhale 2 puffs into the lungs every 4 hours as needed for Wheezing, Disp: 18 g, Rfl: 3    gabapentin (NEURONTIN) 300 MG capsule, Take 1 capsule by mouth in the morning and at bedtime for 90 days., Disp: 60 capsule, Rfl: 2    diphenhydrAMINE (BENADRYL) 25 MG tablet, Take 1 tablet by mouth every 6 hours as needed for Itching Pt tried med did not help, Disp: , Rfl:     cetirizine (ZYRTEC) 10 MG tablet, Take 1 tablet by mouth daily, Disp: 30 tablet, Rfl: 2    omeprazole (PRILOSEC) 20 MG delayed release capsule, Take 1 capsule by mouth every morning (before breakfast), Disp: 90 capsule, Rfl: 1    baclofen (LIORESAL) 10 MG tablet, Take 0.5-1 tablets by mouth 3 times daily as needed (pain), Disp: 45 tablet, Rfl: 0    diclofenac sodium (VOLTAREN) 1 % GEL, Apply 4 g topically 4 times daily, Disp: 120 g, Rfl: 5    albuterol (PROVENTIL) (2.5 MG/3ML) 0.083% nebulizer solution, Take 3 mLs by nebulization every 4 hours as needed for Wheezing or Shortness

## 2024-07-31 ENCOUNTER — TELEPHONE (OUTPATIENT)
Dept: ALLERGY | Age: 44
End: 2024-07-31

## 2024-07-31 LAB
ALLERGEN CODFISH IGE: < 0.1 KU/L (ref 0–0.34)
ALLERGEN CRAB IGE: 2.43 KU/L (ref 0–0.34)
ALLERGEN GLUTEN IGE: < 0.1 KU/L (ref 0–0.34)
ALLERGEN LOBSTER IGE: 2.96 KU/L (ref 0–0.34)
ALLERGEN TUNA IGE: < 0.1 KU/L (ref 0–0.34)
DEPRECATED MISC ALLERGEN IGE RAST QL: NORMAL
HISTAMINE BLD-SCNC: NORMAL NMOL/L
MISC. #1 REFERENCE GROUP TEST: NORMAL
SHRIMP: 3.1 KU/L (ref 0–0.34)

## 2024-07-31 RX ORDER — EPINEPHRINE 0.3 MG/.3ML
0.3 INJECTION SUBCUTANEOUS ONCE
COMMUNITY
Start: 2024-07-10

## 2024-07-31 NOTE — TELEPHONE ENCOUNTER
----- Message from BINH Vega CNP sent at 7/31/2024  3:22 PM EDT -----  Please let patient know that we have confirmed that she does have a shellfish allergy.  She is allergic to crab shrimp and lobster.  I have added this to her medication allergies.  Ask her if she feels like she needs an EpiPen.  Ask her if she has ev  er had any anaphylactic reactions including throat swelling.

## 2024-07-31 NOTE — RESULT ENCOUNTER NOTE
Please let patient know that we have confirmed that she does have a shellfish allergy.  She is allergic to crab shrimp and lobster.  I have added this to her medication allergies.  Ask her if she feels like she needs an EpiPen.  Ask her if she has ever had any anaphylactic reactions including throat swelling.

## 2024-07-31 NOTE — TELEPHONE ENCOUNTER
Called pt and informed on lab results of shellfish allergy. Pt states you prescribed epi pen on initial visit and states she does have one.  Pt verbalized understanding and thanked me.

## 2024-08-01 LAB
HISTONE IGG SER IA-ACNC: NORMAL
STRONGYLOIDES IGG SER IA-ACNC: NORMAL

## 2024-08-02 LAB
ALLERGEN BARLEY IGE: < 0.1 KU/L (ref 0–0.34)
ALLERGEN BEEF: < 0.1 KU/L (ref 0–0.34)
ALLERGEN CABBAGE IGE: < 0.1 KU/L (ref 0–0.34)
ALLERGEN CARROT IGE: < 0.1 KU/L (ref 0–0.34)
ALLERGEN CHICKEN IGE: < 0.1 KU/L (ref 0–0.34)
ALLERGEN CODFISH IGE: < 0.1 KU/L (ref 0–0.34)
ALLERGEN CORN IGE: < 0.1 KU/L (ref 0–0.34)
ALLERGEN COW MILK IGE: < 0.1 KU/L (ref 0–0.34)
ALLERGEN CRAB IGE: 2.32 KU/L (ref 0–0.34)
ALLERGEN EGG WHITE IGE: < 0.1 KU/L (ref 0–0.34)
ALLERGEN GRAPE IGE: < 0.1 KU/L (ref 0–0.34)
ALLERGEN LETTUCE IGE: < 0.1 KU/L (ref 0–0.34)
ALLERGEN NAVY BEAN: < 0.1 KU/L (ref 0–0.34)
ALLERGEN OAT: < 0.1 KU/L (ref 0–0.34)
ALLERGEN ORANGE IGE: < 0.1 KU/L (ref 0–0.34)
ALLERGEN PEANUT (F13) IGE: < 0.1 KU/L (ref 0–0.34)
ALLERGEN PEPPER C. ANNUUM IGE: < 0.1 KU/L (ref 0–0.34)
ALLERGEN PORK: 0.88 KU/L (ref 0–0.34)
ALLERGEN RICE IGE: < 0.1 KU/L (ref 0–0.34)
ALLERGEN RYE IGE: < 0.1 KU/L (ref 0–0.34)
ALLERGEN SOYBEAN IGE: < 0.1 KU/L (ref 0–0.34)
ALLERGEN TOMATO IGE: < 0.1 KU/L (ref 0–0.34)
ALLERGEN TUNA IGE: < 0.1 KU/L (ref 0–0.34)
ALLERGEN WHEAT IGE: < 0.1 KU/L (ref 0–0.34)
IGE SERPL-ACNC: 221 IU/ML (ref 0–100)
POTATO, IGE: < 0.1 KU/L (ref 0–0.34)
SHRIMP: 3.32 KU/L (ref 0–0.34)

## 2024-08-14 ENCOUNTER — PROCEDURE VISIT (OUTPATIENT)
Dept: ALLERGY | Age: 44
End: 2024-08-14

## 2024-08-14 VITALS
RESPIRATION RATE: 20 BRPM | BODY MASS INDEX: 37.9 KG/M2 | SYSTOLIC BLOOD PRESSURE: 121 MMHG | HEART RATE: 79 BPM | OXYGEN SATURATION: 98 % | HEIGHT: 64 IN | DIASTOLIC BLOOD PRESSURE: 62 MMHG | WEIGHT: 222 LBS

## 2024-08-14 DIAGNOSIS — J30.1 ALLERGY TO TREES: ICD-10-CM

## 2024-08-14 DIAGNOSIS — Z91.048 ALLERGY TO MOLD: ICD-10-CM

## 2024-08-14 DIAGNOSIS — J30.1 NON-SEASONAL ALLERGIC RHINITIS DUE TO POLLEN: ICD-10-CM

## 2024-08-14 DIAGNOSIS — J30.81 CAT ALLERGIES: Primary | ICD-10-CM

## 2024-08-14 DIAGNOSIS — J30.9 ALLERGIC SINUSITIS: ICD-10-CM

## 2024-08-14 DIAGNOSIS — Z91.09 MITE ALLERGY: ICD-10-CM

## 2024-08-14 DIAGNOSIS — Z91.038 ALLERGY TO COCKROACHES: ICD-10-CM

## 2024-08-14 DIAGNOSIS — J30.89 NON-SEASONAL ALLERGIC RHINITIS, UNSPECIFIED TRIGGER: ICD-10-CM

## 2024-08-14 DIAGNOSIS — J30.81 ALLERGY TO DOG DANDER: ICD-10-CM

## 2024-08-14 RX ORDER — CETIRIZINE HYDROCHLORIDE 10 MG/1
10 TABLET ORAL DAILY
Qty: 90 TABLET | Refills: 3 | Status: SHIPPED | OUTPATIENT
Start: 2024-08-14

## 2024-08-14 ASSESSMENT — ENCOUNTER SYMPTOMS: SINUS PRESSURE: 1

## 2024-08-14 NOTE — PROGRESS NOTES
do yourself a disservice by not reporting these bumps.    PLEASE NOTIFY US IMMEDIATELY IF YOU ON THE FOLLOWING:  Beta Blocker Medications  Brand Generic  Betapace     Acebutolol  Betapace AF    Atenolol  Blocadren   Betaxolol  Brevibloc   Bisprolol   Cartrol   Carteolol  Corzide   Esmolol  Inderal    Lebetalol  Inderal LA   Metoprolol  InnoPran XL   Pindolol   Kerlone   Propranolol  Nadolol   Sotalol   Sectral    Timolol   Tenorectic  Tenormin  Timolide  Toprol-XL   Zebeta   Ziac    Opthalmic Preparations  Betaxon  Betimol  Betoptic  Cosopt  Timoptic  Timoptic XE    MAO Inhibitors  Nardil ............................................................Phenelzine sulfate  Parnate .........................................................Tranylcpromine sulfate    Patient has been instructed in allergy immunotherapy.  Patient has been explained that immunotherapy at twice a week injections can take 6 to 8 months.  Once that is obtained we will have patient maintenance file for approximately 6 months at once a week.  In 12 to 18 months patient will drop down to every other week injections.  I did explain to patient that if they miss injections, then we may have to reduce allergy injection dosage and they may end up prolonging the.  If they miss more than 60 days, they must start all over.    Patient has expressed understanding.    Patient was instructed on use of EpiPen/Auvi Q.  In case of severe allergic reaction the patient had any angioedema, scratchy throat, trouble breathing, chest tightness they're to use an Auvi Q/EpiPen.  Patient to use the EpiPen/Auvi Q by injecting the vial into the lateral thigh and through clothing.  To hold in place for 5-10 seconds.  Following use of the Epipen/Auvi Q the patient is to immediately go to the emergency room.  EpiPen may be repeated after 5-10 minutes if no improvement.  Patient emergency treatment and is stable they are to notify this office of the event.  If emergent or severe

## 2024-08-24 SDOH — ECONOMIC STABILITY: INCOME INSECURITY: HOW HARD IS IT FOR YOU TO PAY FOR THE VERY BASICS LIKE FOOD, HOUSING, MEDICAL CARE, AND HEATING?: SOMEWHAT HARD

## 2024-08-24 SDOH — ECONOMIC STABILITY: FOOD INSECURITY: WITHIN THE PAST 12 MONTHS, THE FOOD YOU BOUGHT JUST DIDN'T LAST AND YOU DIDN'T HAVE MONEY TO GET MORE.: OFTEN TRUE

## 2024-08-24 SDOH — ECONOMIC STABILITY: FOOD INSECURITY: WITHIN THE PAST 12 MONTHS, YOU WORRIED THAT YOUR FOOD WOULD RUN OUT BEFORE YOU GOT MONEY TO BUY MORE.: OFTEN TRUE

## 2024-08-27 ENCOUNTER — OFFICE VISIT (OUTPATIENT)
Dept: FAMILY MEDICINE CLINIC | Age: 44
End: 2024-08-27
Payer: MEDICAID

## 2024-08-27 VITALS
BODY MASS INDEX: 37.69 KG/M2 | SYSTOLIC BLOOD PRESSURE: 114 MMHG | HEART RATE: 80 BPM | RESPIRATION RATE: 16 BRPM | TEMPERATURE: 98.1 F | DIASTOLIC BLOOD PRESSURE: 76 MMHG | WEIGHT: 219.6 LBS

## 2024-08-27 DIAGNOSIS — Z12.31 BREAST CANCER SCREENING BY MAMMOGRAM: ICD-10-CM

## 2024-08-27 DIAGNOSIS — H66.91 RIGHT OTITIS MEDIA, UNSPECIFIED OTITIS MEDIA TYPE: Primary | ICD-10-CM

## 2024-08-27 PROCEDURE — G8427 DOCREV CUR MEDS BY ELIG CLIN: HCPCS | Performed by: NURSE PRACTITIONER

## 2024-08-27 PROCEDURE — 99213 OFFICE O/P EST LOW 20 MIN: CPT | Performed by: NURSE PRACTITIONER

## 2024-08-27 PROCEDURE — G8417 CALC BMI ABV UP PARAM F/U: HCPCS | Performed by: NURSE PRACTITIONER

## 2024-08-27 PROCEDURE — 4004F PT TOBACCO SCREEN RCVD TLK: CPT | Performed by: NURSE PRACTITIONER

## 2024-08-27 RX ORDER — CIPROFLOXACIN AND DEXAMETHASONE 3; 1 MG/ML; MG/ML
4 SUSPENSION/ DROPS AURICULAR (OTIC) 2 TIMES DAILY
Qty: 1 EACH | Refills: 0 | Status: SHIPPED | OUTPATIENT
Start: 2024-08-27 | End: 2024-09-03

## 2024-08-27 ASSESSMENT — ENCOUNTER SYMPTOMS
SHORTNESS OF BREATH: 0
COLOR CHANGE: 0
ABDOMINAL PAIN: 0
DIARRHEA: 0
SORE THROAT: 0
BLOOD IN STOOL: 0
EYE REDNESS: 0
RHINORRHEA: 0
NAUSEA: 0
ABDOMINAL DISTENTION: 0
COUGH: 0
CONSTIPATION: 0
ANAL BLEEDING: 0
EYE DISCHARGE: 0

## 2024-08-27 NOTE — PROGRESS NOTES
[facility] PROFESSIONAL SERVS  [facility] MEDICINE  [address] [facility] [address]  Dept: [phone]  Loc: [phone]      Visit Date: 8/27/2024    Eufemia Koo is a 44 y.o. female who presents today for:  Chief Complaint   Patient presents with    Other     Pt presents for pain right side of jaw and ear     HPI:     Right sided jaw/ear pain x Friday - when listening to ear buds - left is louder than the right.     Had all her teeth removed 11/2023 - [facility] dental     HPI  Health Maintenance   Topic Date Due    Hepatitis B vaccine (1 of 3 - 19+ 3-dose series) 02/15/2025 (Originally 6/4/1999)    Flu vaccine (1) 08/27/2025 (Originally 8/1/2024)    COVID-19 Vaccine (1 - 2023-24 season) 08/27/2025 (Originally 9/1/2023)    Breast cancer screen  03/01/2025    Depression Monitoring  04/24/2025    Cervical cancer screen  08/22/2026    Lipids  07/28/2028    DTaP/Tdap/Td vaccine (2 - Td or Tdap) 04/17/2029    Pneumococcal 0-64 years Vaccine  Completed    Hepatitis C screen  Completed    HIV screen  Completed    Hepatitis A vaccine  Aged Out    Hib vaccine  Aged Out    HPV vaccine  Aged Out    Polio vaccine  Aged Out    Meningococcal (ACWY) vaccine  Aged Out    Varicella vaccine  Discontinued    Diabetes screen  Discontinued     Past Medical History:   Diagnosis Date    Anxiety     Asthma     Bipolar disorder (HCC)     Chronic back pain     DDD (degenerative disc disease), cervical     DDD (degenerative disc disease), thoracolumbar     Depression     GERD (gastroesophageal reflux disease)     Headache     Substance abuse (HCC)       Past Surgical History:   Procedure Laterality Date    BACK INJECTION Bilateral 01/03/2023    bilateral sacroiliac joint injection performed by Liborio Blackmon DO at [facility] OR    BACK INJECTION Bilateral 02/09/2023    bilateral sacroiliac joint injection performed by Liborio Blackmon DO at [facility] OR    HIP SURGERY Bilateral 06/21/2022

## 2024-08-28 DIAGNOSIS — J30.9 CHRONIC ALLERGIC RHINITIS: Primary | ICD-10-CM

## 2024-08-28 DIAGNOSIS — Z29.89 IMMUNOTHERAPY: ICD-10-CM

## 2024-08-28 PROCEDURE — 95165 ANTIGEN THERAPY SERVICES: CPT | Performed by: NURSE PRACTITIONER

## 2024-08-28 NOTE — PROGRESS NOTES
ALLERGY EXTRACT MIXING.  PT HAS AGREED TO START ALLERGY INJECTIONS.   DATE OF MIXING= 8/28/25  TOTAL NUMBER OF SET OF VIALS= 3  TOTAL VIALS PREPARED = 15  TOTAL INJECTABLE DOSES =   50 INJECTIONS PER SET  TOTAL ANTICIPATED DOSES = 150 INJECTIONS   TOTAL NUMBER OF INJECTIONS BILLED TODAY = 150       An allergy extract was prepared according to written prescription by provider.  Provider onsite during allergy extract preparation. Patient vial(s) include the following:     RED VIAL - 1:1 CONCENTRATION - EXPIRES 12 MONTHS  YELLOW VIAL-1:10 CONCENTRATION - EXPIRES 12 MONTHS  BLUE VIAL-1:100 CONCENTRATION - EXPIRES 12 MONTHS  GREEN VIAL-1:1,000 CONCENTRATION - EXPIRES 6 MONTHS  SILVER VIAL-1:10,000 CONCENTRATION - EXPIRES 6 MONTHS    Allergy extract was prepared by the following method:   RED TO YELLOW:  Once the  one-to-one concentration was prepared in the red vial, 0.5 ML's was then extracted in place into the yellow vial to achieve a 1:10 concentration.    YELLOW TO BLUE:  Then 0.5 ML's was extracted from the yellow vial and placed into the blue file with dilutant to achieve a 1:100 concentration.    BLUE TO GREEN:  Then 0.5 ML's was extracted from the blue vial and placed into Green vial with dilute to achieve a 1:1,000 concentration.    GREEN TO SILVER:  Then 0.5 ML's was taken from the green vial and placed in the Silver vial with dilutant to achieve a 1:10,000 concentration.      Patient vials were labeled with name, date-of-birth, vial (A,B,or C), concentration, and expiration.    When injecting from a new  vial the first injections is 0.05 ml and are increased by 0.05 increments until 0.5ml from the vial is achieved or the patient reaches maximum tolerated dose.   Injections are given once ot three times weekly and at least 24 hours apart, according to provider orders.   If patient has complications or \"knots\" or maximum tolerance the dose building is reduced, stopped, or maintained according to patient reaction

## 2024-08-29 ENCOUNTER — NURSE ONLY (OUTPATIENT)
Dept: ALLERGY | Age: 44
End: 2024-08-29
Payer: MEDICAID

## 2024-08-29 VITALS
RESPIRATION RATE: 18 BRPM | DIASTOLIC BLOOD PRESSURE: 86 MMHG | OXYGEN SATURATION: 97 % | HEART RATE: 102 BPM | SYSTOLIC BLOOD PRESSURE: 146 MMHG

## 2024-08-29 DIAGNOSIS — J30.9 CHRONIC ALLERGIC RHINITIS: Primary | ICD-10-CM

## 2024-08-29 DIAGNOSIS — J30.1 NON-SEASONAL ALLERGIC RHINITIS DUE TO POLLEN: ICD-10-CM

## 2024-08-29 PROCEDURE — 95117 IMMUNOTHERAPY INJECTIONS: CPT | Performed by: NURSE PRACTITIONER

## 2024-08-29 NOTE — PROGRESS NOTES
PATIENT HAS THE FOLLOWING DIAGNOSIS SUPPORTING ADMINISTRATION OF ALLERGY INJECTIONS: J30.1Allergic rhinitis due to pollen  AND 39800 MULTIPLE ALLERGY INJECTIONS FOR ALLERGY INJECTION ADMINISTRATION      Patient here for allergy injection today.  Patient was presented with the opportunity to speak with provider and ask questions regarding allergy injections.  Patient was also instructed they need to wait 30 minutes after receiving allergy injections. All risks associated with potential adverse effects have been explained to patient and patient handout was provided following allergy testing.    After consent obtained/verified, allergy injection subcutaneously given in back of arm(s).  Please see below for specific details of site injections, concentration of serum, and volume injected.    VIAL COLOR OF ALL VIALS TODAY IS silver 1:10,000. Vial allergy w/v concentration today is: 1:10,000     ALLERGY INJECTION FROM VIAL A GIVEN right  UPPER ARM IN THE AMOUNT OF 0.05 ML    ALLERGY INJECTION FROM VIAL B GIVEN left upper ARM IN THE AMOUNT OF 0.05  ML    ALLERGY INJECTION FROM VIAL C GIVEN leftlower ARM IN THE AMOUNT OF 0.05 ML      Documentation of vial injection specific to arm(s) noted on Allergy Immunotherapy Administration Form.       Patient waited 30 minutes for observation.Yes  Patient has received information and verbalizes understanding of the potential  health risks associated with allergy injections . Patient understands risks including anaphylaxis and chooses not to wait 30 minutes after administration of allergy injection(s).    Patient tolerated well without adverse reaction while the patient was in the office.    SHOT REACTION TREATMENT INSTRUCTIONS    During the 30 minute wait after an allergy injection the following symptoms should be reported:    Itching other than at the injection site  Hives or swelling other than at the injection site  Redness other than at the injection site  Difficulty

## 2024-09-03 ENCOUNTER — NURSE ONLY (OUTPATIENT)
Dept: ALLERGY | Age: 44
End: 2024-09-03
Payer: MEDICAID

## 2024-09-03 VITALS
RESPIRATION RATE: 18 BRPM | SYSTOLIC BLOOD PRESSURE: 116 MMHG | OXYGEN SATURATION: 98 % | DIASTOLIC BLOOD PRESSURE: 91 MMHG | HEART RATE: 78 BPM

## 2024-09-03 DIAGNOSIS — J30.1 NON-SEASONAL ALLERGIC RHINITIS DUE TO POLLEN: ICD-10-CM

## 2024-09-03 DIAGNOSIS — J30.81 CAT ALLERGIES: ICD-10-CM

## 2024-09-03 DIAGNOSIS — J30.9 CHRONIC ALLERGIC RHINITIS: Primary | ICD-10-CM

## 2024-09-03 PROCEDURE — 95117 IMMUNOTHERAPY INJECTIONS: CPT | Performed by: NURSE PRACTITIONER

## 2024-09-03 NOTE — PROGRESS NOTES
PATIENT HAS THE FOLLOWING DIAGNOSIS SUPPORTING ADMINISTRATION OF ALLERGY INJECTIONS: J30.1Allergic rhinitis due to pollen  AND 25409 MULTIPLE ALLERGY INJECTIONS FOR ALLERGY INJECTION ADMINISTRATION      Patient here for allergy injection today.  Patient was presented with the opportunity to speak with provider and ask questions regarding allergy injections.  Patient was also instructed they need to wait 30 minutes after receiving allergy injections. All risks associated with potential adverse effects have been explained to patient and patient handout was provided following allergy testing.    After consent obtained/verified, allergy injection subcutaneously given in back of arm(s).  Please see below for specific details of site injections, concentration of serum, and volume injected.    VIAL COLOR OF ALL VIALS TODAY IS silver 1:10,000. Vial allergy w/v concentration today is: 1:10,000     ALLERGY INJECTION FROM VIAL A GIVEN left  UPPER ARM IN THE AMOUNT OF 0.10 ML    ALLERGY INJECTION FROM VIAL B GIVEN right upper ARM IN THE AMOUNT OF 0.10  ML    ALLERGY INJECTION FROM VIAL C GIVEN rightlower ARM IN THE AMOUNT OF 0.10 ML      Documentation of vial injection specific to arm(s) noted on Allergy Immunotherapy Administration Form.       Patient waited 30 minutes for observation.No  Patient has received information and verbalizes understanding of the potential  health risks associated with allergy injections . Patient understands risks including anaphylaxis and chooses not to wait 30 minutes after administration of allergy injection(s).    Patient tolerated well without adverse reaction while the patient was in the office.    SHOT REACTION TREATMENT INSTRUCTIONS    During the 30 minute wait after an allergy injection the following symptoms should be reported:    Itching other than at the injection site  Hives or swelling other than at the injection site  Redness other than at the injection site  Difficulty

## 2024-09-09 RX ORDER — HYDROXYZINE HYDROCHLORIDE 50 MG/1
TABLET, FILM COATED ORAL
Qty: 30 TABLET | Refills: 0 | OUTPATIENT
Start: 2024-09-09

## 2024-09-10 ENCOUNTER — NURSE ONLY (OUTPATIENT)
Dept: ALLERGY | Age: 44
End: 2024-09-10
Payer: MEDICAID

## 2024-09-10 VITALS
SYSTOLIC BLOOD PRESSURE: 142 MMHG | OXYGEN SATURATION: 96 % | RESPIRATION RATE: 18 BRPM | DIASTOLIC BLOOD PRESSURE: 82 MMHG | HEART RATE: 80 BPM

## 2024-09-10 DIAGNOSIS — J30.9 CHRONIC ALLERGIC RHINITIS: Primary | ICD-10-CM

## 2024-09-10 DIAGNOSIS — J30.1 NON-SEASONAL ALLERGIC RHINITIS DUE TO POLLEN: ICD-10-CM

## 2024-09-10 DIAGNOSIS — J30.81 CAT ALLERGIES: ICD-10-CM

## 2024-09-10 PROCEDURE — 95117 IMMUNOTHERAPY INJECTIONS: CPT | Performed by: NURSE PRACTITIONER

## 2024-09-10 RX ORDER — HYDROXYZINE HYDROCHLORIDE 50 MG/1
25-50 TABLET, FILM COATED ORAL NIGHTLY PRN
Qty: 30 TABLET | Refills: 5 | Status: SHIPPED | OUTPATIENT
Start: 2024-09-10

## 2024-09-12 ENCOUNTER — NURSE ONLY (OUTPATIENT)
Dept: ALLERGY | Age: 44
End: 2024-09-12
Payer: MEDICAID

## 2024-09-12 VITALS
OXYGEN SATURATION: 96 % | DIASTOLIC BLOOD PRESSURE: 68 MMHG | SYSTOLIC BLOOD PRESSURE: 115 MMHG | HEART RATE: 85 BPM | RESPIRATION RATE: 18 BRPM

## 2024-09-12 DIAGNOSIS — J30.9 CHRONIC ALLERGIC RHINITIS: Primary | ICD-10-CM

## 2024-09-12 DIAGNOSIS — J30.81 CAT ALLERGIES: ICD-10-CM

## 2024-09-12 DIAGNOSIS — J30.1 NON-SEASONAL ALLERGIC RHINITIS DUE TO POLLEN: ICD-10-CM

## 2024-09-12 PROCEDURE — 95117 IMMUNOTHERAPY INJECTIONS: CPT | Performed by: NURSE PRACTITIONER

## 2024-09-17 ENCOUNTER — NURSE ONLY (OUTPATIENT)
Dept: ALLERGY | Age: 44
End: 2024-09-17
Payer: MEDICAID

## 2024-09-17 VITALS
SYSTOLIC BLOOD PRESSURE: 121 MMHG | DIASTOLIC BLOOD PRESSURE: 81 MMHG | HEART RATE: 73 BPM | RESPIRATION RATE: 18 BRPM | OXYGEN SATURATION: 97 %

## 2024-09-17 DIAGNOSIS — J30.1 NON-SEASONAL ALLERGIC RHINITIS DUE TO POLLEN: ICD-10-CM

## 2024-09-17 DIAGNOSIS — J30.9 CHRONIC ALLERGIC RHINITIS: Primary | ICD-10-CM

## 2024-09-17 PROCEDURE — 95117 IMMUNOTHERAPY INJECTIONS: CPT | Performed by: NURSE PRACTITIONER

## 2024-09-19 ENCOUNTER — NURSE ONLY (OUTPATIENT)
Dept: ALLERGY | Age: 44
End: 2024-09-19
Payer: MEDICAID

## 2024-09-19 VITALS
HEART RATE: 80 BPM | DIASTOLIC BLOOD PRESSURE: 94 MMHG | SYSTOLIC BLOOD PRESSURE: 130 MMHG | RESPIRATION RATE: 18 BRPM | OXYGEN SATURATION: 98 %

## 2024-09-19 DIAGNOSIS — J30.9 CHRONIC ALLERGIC RHINITIS: Primary | ICD-10-CM

## 2024-09-19 DIAGNOSIS — J30.1 NON-SEASONAL ALLERGIC RHINITIS DUE TO POLLEN: ICD-10-CM

## 2024-09-19 DIAGNOSIS — J30.81 CAT ALLERGIES: ICD-10-CM

## 2024-09-19 PROCEDURE — 95117 IMMUNOTHERAPY INJECTIONS: CPT | Performed by: NURSE PRACTITIONER

## 2024-09-24 ENCOUNTER — NURSE ONLY (OUTPATIENT)
Dept: ALLERGY | Age: 44
End: 2024-09-24
Payer: MEDICAID

## 2024-09-24 VITALS
SYSTOLIC BLOOD PRESSURE: 112 MMHG | HEART RATE: 91 BPM | RESPIRATION RATE: 18 BRPM | OXYGEN SATURATION: 96 % | DIASTOLIC BLOOD PRESSURE: 78 MMHG

## 2024-09-24 DIAGNOSIS — J30.9 CHRONIC ALLERGIC RHINITIS: Primary | ICD-10-CM

## 2024-09-24 DIAGNOSIS — J30.81 CAT ALLERGIES: ICD-10-CM

## 2024-09-24 DIAGNOSIS — J30.1 NON-SEASONAL ALLERGIC RHINITIS DUE TO POLLEN: ICD-10-CM

## 2024-09-24 PROCEDURE — 95117 IMMUNOTHERAPY INJECTIONS: CPT | Performed by: NURSE PRACTITIONER

## 2024-09-26 ENCOUNTER — NURSE ONLY (OUTPATIENT)
Dept: ALLERGY | Age: 44
End: 2024-09-26
Payer: MEDICAID

## 2024-09-26 VITALS
OXYGEN SATURATION: 98 % | SYSTOLIC BLOOD PRESSURE: 120 MMHG | HEART RATE: 77 BPM | DIASTOLIC BLOOD PRESSURE: 92 MMHG | RESPIRATION RATE: 18 BRPM

## 2024-09-26 DIAGNOSIS — J30.9 CHRONIC ALLERGIC RHINITIS: Primary | ICD-10-CM

## 2024-09-26 DIAGNOSIS — J30.81 CAT ALLERGIES: ICD-10-CM

## 2024-09-26 DIAGNOSIS — J30.1 NON-SEASONAL ALLERGIC RHINITIS DUE TO POLLEN: ICD-10-CM

## 2024-09-26 PROCEDURE — 95117 IMMUNOTHERAPY INJECTIONS: CPT | Performed by: NURSE PRACTITIONER

## 2024-10-01 ENCOUNTER — NURSE ONLY (OUTPATIENT)
Dept: ALLERGY | Age: 44
End: 2024-10-01
Payer: MEDICAID

## 2024-10-01 VITALS
RESPIRATION RATE: 16 BRPM | HEART RATE: 86 BPM | SYSTOLIC BLOOD PRESSURE: 115 MMHG | DIASTOLIC BLOOD PRESSURE: 75 MMHG | OXYGEN SATURATION: 97 %

## 2024-10-01 DIAGNOSIS — J30.9 CHRONIC ALLERGIC RHINITIS: Primary | ICD-10-CM

## 2024-10-01 DIAGNOSIS — J30.1 NON-SEASONAL ALLERGIC RHINITIS DUE TO POLLEN: ICD-10-CM

## 2024-10-01 PROCEDURE — 95117 IMMUNOTHERAPY INJECTIONS: CPT | Performed by: NURSE PRACTITIONER

## 2024-10-01 NOTE — PROGRESS NOTES
PATIENT HAS THE FOLLOWING DIAGNOSIS SUPPORTING ADMINISTRATION OF ALLERGY INJECTIONS: J30.1Allergic rhinitis due to pollen  AND 00652 MULTIPLE ALLERGY INJECTIONS FOR ALLERGY INJECTION ADMINISTRATION      Patient here for allergy injection today.  Patient was presented with the opportunity to speak with provider and ask questions regarding allergy injections.  Patient was also instructed they need to wait 30 minutes after receiving allergy injections. All risks associated with potential adverse effects have been explained to patient and patient handout was provided following allergy testing.    After consent obtained/verified, allergy injection subcutaneously given in back of arm(s).  Please see below for specific details of site injections, concentration of serum, and volume injected.    VIAL COLOR OF ALL VIALS TODAY IS silver 1:10,000. Vial allergy w/v concentration today is: 1:10,000     ALLERGY INJECTION FROM VIAL A GIVEN right  UPPER ARM IN THE AMOUNT OF 0.40 ML    ALLERGY INJECTION FROM VIAL B GIVEN left upper ARM IN THE AMOUNT OF 0.40  ML    ALLERGY INJECTION FROM VIAL C GIVEN leftlower ARM IN THE AMOUNT OF 0.40 ML      Documentation of vial injection specific to arm(s) noted on Allergy Immunotherapy Administration Form.       Patient waited 30 minutes for observation.No  Patient has received information and verbalizes understanding of the potential  health risks associated with allergy injections . Patient understands risks including anaphylaxis and chooses not to wait 30 minutes after administration of allergy injection(s).    Patient tolerated well without adverse reaction while the patient was in the office.    SHOT REACTION TREATMENT INSTRUCTIONS    During the 30 minute wait after an allergy injection the following symptoms should be reported:    Itching other than at the injection site  Hives or swelling other than at the injection site  Redness other than at the injection site  Difficulty

## 2024-10-03 ENCOUNTER — PATIENT MESSAGE (OUTPATIENT)
Dept: ALLERGY | Age: 44
End: 2024-10-03

## 2024-10-03 ENCOUNTER — NURSE ONLY (OUTPATIENT)
Dept: ALLERGY | Age: 44
End: 2024-10-03
Payer: MEDICAID

## 2024-10-03 VITALS
SYSTOLIC BLOOD PRESSURE: 118 MMHG | RESPIRATION RATE: 18 BRPM | HEART RATE: 88 BPM | OXYGEN SATURATION: 98 % | DIASTOLIC BLOOD PRESSURE: 80 MMHG

## 2024-10-03 DIAGNOSIS — J30.9 CHRONIC ALLERGIC RHINITIS: Primary | ICD-10-CM

## 2024-10-03 DIAGNOSIS — J30.1 NON-SEASONAL ALLERGIC RHINITIS DUE TO POLLEN: ICD-10-CM

## 2024-10-03 PROCEDURE — 95117 IMMUNOTHERAPY INJECTIONS: CPT | Performed by: NURSE PRACTITIONER

## 2024-10-03 RX ORDER — DICLOFENAC SODIUM 10 MG/G
GEL TOPICAL
Qty: 200 G | Refills: 0 | Status: SHIPPED | OUTPATIENT
Start: 2024-10-03

## 2024-10-03 NOTE — TELEPHONE ENCOUNTER
Called pt and informed within 24 hours of allergy injection, this is expected to happen with redness. Informed pt to ice injection site.  Pt verbalized understanding and thanked me.

## 2024-10-03 NOTE — PROGRESS NOTES
PATIENT HAS THE FOLLOWING DIAGNOSIS SUPPORTING ADMINISTRATION OF ALLERGY INJECTIONS: J30.1Allergic rhinitis due to pollen  AND 68518 MULTIPLE ALLERGY INJECTIONS FOR ALLERGY INJECTION ADMINISTRATION      Patient here for allergy injection today.  Patient was presented with the opportunity to speak with provider and ask questions regarding allergy injections.  Patient was also instructed they need to wait 30 minutes after receiving allergy injections. All risks associated with potential adverse effects have been explained to patient and patient handout was provided following allergy testing.    After consent obtained/verified, allergy injection subcutaneously given in back of arm(s).  Please see below for specific details of site injections, concentration of serum, and volume injected.    VIAL COLOR OF ALL VIALS TODAY IS silver 1:10,000. Vial allergy w/v concentration today is: 1:10,000     ALLERGY INJECTION FROM VIAL A GIVEN left  UPPER ARM IN THE AMOUNT OF 0.45 ML    ALLERGY INJECTION FROM VIAL B GIVEN right upper ARM IN THE AMOUNT OF 0.45  ML    ALLERGY INJECTION FROM VIAL C GIVEN rightlower ARM IN THE AMOUNT OF 0.45 ML      Documentation of vial injection specific to arm(s) noted on Allergy Immunotherapy Administration Form.       Patient waited 30 minutes for observation.No  Patient has received information and verbalizes understanding of the potential  health risks associated with allergy injections . Patient understands risks including anaphylaxis and chooses not to wait 30 minutes after administration of allergy injection(s).    Patient tolerated well without adverse reaction while the patient was in the office.    SHOT REACTION TREATMENT INSTRUCTIONS    During the 30 minute wait after an allergy injection the following symptoms should be reported:    Itching other than at the injection site  Hives or swelling other than at the injection site  Redness other than at the injection site  Difficulty

## 2024-10-08 ENCOUNTER — NURSE ONLY (OUTPATIENT)
Dept: ALLERGY | Age: 44
End: 2024-10-08
Payer: MEDICAID

## 2024-10-08 VITALS
SYSTOLIC BLOOD PRESSURE: 121 MMHG | DIASTOLIC BLOOD PRESSURE: 81 MMHG | HEART RATE: 82 BPM | RESPIRATION RATE: 18 BRPM | OXYGEN SATURATION: 98 %

## 2024-10-08 DIAGNOSIS — J30.1 NON-SEASONAL ALLERGIC RHINITIS DUE TO POLLEN: ICD-10-CM

## 2024-10-08 DIAGNOSIS — J30.81 CAT ALLERGIES: ICD-10-CM

## 2024-10-08 DIAGNOSIS — J30.9 CHRONIC ALLERGIC RHINITIS: Primary | ICD-10-CM

## 2024-10-08 PROCEDURE — 95117 IMMUNOTHERAPY INJECTIONS: CPT | Performed by: NURSE PRACTITIONER

## 2024-10-08 NOTE — PROGRESS NOTES
PATIENT HAS THE FOLLOWING DIAGNOSIS SUPPORTING ADMINISTRATION OF ALLERGY INJECTIONS: J30.1Allergic rhinitis due to pollen  AND 17674 MULTIPLE ALLERGY INJECTIONS FOR ALLERGY INJECTION ADMINISTRATION      Patient here for allergy injection today.  Patient was presented with the opportunity to speak with provider and ask questions regarding allergy injections.  Patient was also instructed they need to wait 30 minutes after receiving allergy injections. All risks associated with potential adverse effects have been explained to patient and patient handout was provided following allergy testing.    After consent obtained/verified, allergy injection subcutaneously given in back of arm(s).  Please see below for specific details of site injections, concentration of serum, and volume injected.    VIAL COLOR OF ALL VIALS TODAY IS silver 1:10,000. Vial allergy w/v concentration today is: 1:10,000     ALLERGY INJECTION FROM VIAL A GIVEN right  UPPER ARM IN THE AMOUNT OF 0.50 ML    ALLERGY INJECTION FROM VIAL B GIVEN left lower ARM IN THE AMOUNT OF 0.50  ML    ALLERGY INJECTION FROM VIAL C GIVEN leftupper ARM IN THE AMOUNT OF 0.50 ML      Documentation of vial injection specific to arm(s) noted on Allergy Immunotherapy Administration Form.       Patient waited 30 minutes for observation.No  Patient has received information and verbalizes understanding of the potential  health risks associated with allergy injections . Patient understands risks including anaphylaxis and chooses not to wait 30 minutes after administration of allergy injection(s).    Patient tolerated well without adverse reaction while the patient was in the office.    SHOT REACTION TREATMENT INSTRUCTIONS    During the 30 minute wait after an allergy injection the following symptoms should be reported:    Itching other than at the injection site  Hives or swelling other than at the injection site  Redness other than at the injection site  Difficulty 
English

## 2024-10-10 ENCOUNTER — NURSE ONLY (OUTPATIENT)
Dept: ALLERGY | Age: 44
End: 2024-10-10
Payer: MEDICAID

## 2024-10-10 VITALS
SYSTOLIC BLOOD PRESSURE: 117 MMHG | HEART RATE: 80 BPM | DIASTOLIC BLOOD PRESSURE: 78 MMHG | RESPIRATION RATE: 18 BRPM | OXYGEN SATURATION: 97 %

## 2024-10-10 DIAGNOSIS — J30.9 CHRONIC ALLERGIC RHINITIS: Primary | ICD-10-CM

## 2024-10-10 DIAGNOSIS — J30.1 NON-SEASONAL ALLERGIC RHINITIS DUE TO POLLEN: ICD-10-CM

## 2024-10-10 PROCEDURE — 95117 IMMUNOTHERAPY INJECTIONS: CPT | Performed by: NURSE PRACTITIONER

## 2024-10-10 NOTE — PROGRESS NOTES
PATIENT HAS THE FOLLOWING DIAGNOSIS SUPPORTING ADMINISTRATION OF ALLERGY INJECTIONS: J30.1Allergic rhinitis due to pollen  AND 37325 MULTIPLE ALLERGY INJECTIONS FOR ALLERGY INJECTION ADMINISTRATION      Patient here for allergy injection today.  Patient was presented with the opportunity to speak with provider and ask questions regarding allergy injections.  Patient was also instructed they need to wait 30 minutes after receiving allergy injections. All risks associated with potential adverse effects have been explained to patient and patient handout was provided following allergy testing.    After consent obtained/verified, allergy injection subcutaneously given in back of arm(s).  Please see below for specific details of site injections, concentration of serum, and volume injected.    VIAL COLOR OF ALL VIALS TODAY IS green 1:1,000. Vial allergy w/v concentration today is: 1:1,000     ALLERGY INJECTION FROM VIAL A GIVEN left  UPPER ARM IN THE AMOUNT OF 0.05 ML    ALLERGY INJECTION FROM VIAL B GIVEN right lower ARM IN THE AMOUNT OF 0.05  ML    ALLERGY INJECTION FROM VIAL C GIVEN rightupper ARM IN THE AMOUNT OF 0.05 ML      Documentation of vial injection specific to arm(s) noted on Allergy Immunotherapy Administration Form.       Patient waited 30 minutes for observation.No  Patient has received information and verbalizes understanding of the potential  health risks associated with allergy injections . Patient understands risks including anaphylaxis and chooses not to wait 30 minutes after administration of allergy injection(s).    Patient tolerated well without adverse reaction while the patient was in the office.    SHOT REACTION TREATMENT INSTRUCTIONS    During the 30 minute wait after an allergy injection the following symptoms should be reported:    Itching other than at the injection site  Hives or swelling other than at the injection site  Redness other than at the injection site  Difficulty breathing  Chest

## 2024-10-11 ENCOUNTER — TELEMEDICINE (OUTPATIENT)
Dept: PSYCHIATRY | Age: 44
End: 2024-10-11

## 2024-10-11 ENCOUNTER — TELEPHONE (OUTPATIENT)
Dept: PSYCHIATRY | Age: 44
End: 2024-10-11

## 2024-10-11 DIAGNOSIS — F51.01 PRIMARY INSOMNIA: Primary | ICD-10-CM

## 2024-10-11 DIAGNOSIS — F43.10 PTSD (POST-TRAUMATIC STRESS DISORDER): ICD-10-CM

## 2024-10-11 DIAGNOSIS — F17.200 TOBACCO USE DISORDER: ICD-10-CM

## 2024-10-11 DIAGNOSIS — F41.1 GAD (GENERALIZED ANXIETY DISORDER): ICD-10-CM

## 2024-10-11 DIAGNOSIS — F17.200 TOBACCO USE DISORDER: Primary | ICD-10-CM

## 2024-10-11 RX ORDER — VARENICLINE TARTRATE 0.5 MG/1
.5-1 TABLET, FILM COATED ORAL SEE ADMIN INSTRUCTIONS
Qty: 57 TABLET | Refills: 0 | Status: SHIPPED | OUTPATIENT
Start: 2024-10-11 | End: 2024-10-11

## 2024-10-11 RX ORDER — VARENICLINE TARTRATE 0.5 (11)-1
KIT ORAL
Qty: 30 EACH | Refills: 0 | Status: SHIPPED | OUTPATIENT
Start: 2024-10-11

## 2024-10-11 RX ORDER — TRAZODONE HYDROCHLORIDE 50 MG/1
25-50 TABLET, FILM COATED ORAL NIGHTLY PRN
Qty: 30 TABLET | Refills: 2 | Status: SHIPPED | OUTPATIENT
Start: 2024-10-11

## 2024-10-11 ASSESSMENT — PATIENT HEALTH QUESTIONNAIRE - PHQ9
SUM OF ALL RESPONSES TO PHQ QUESTIONS 1-9: 5
3. TROUBLE FALLING OR STAYING ASLEEP: NEARLY EVERY DAY
2. FEELING DOWN, DEPRESSED OR HOPELESS: SEVERAL DAYS
SUM OF ALL RESPONSES TO PHQ QUESTIONS 1-9: 5
6. FEELING BAD ABOUT YOURSELF - OR THAT YOU ARE A FAILURE OR HAVE LET YOURSELF OR YOUR FAMILY DOWN: NOT AT ALL
SUM OF ALL RESPONSES TO PHQ9 QUESTIONS 1 & 2: 1
1. LITTLE INTEREST OR PLEASURE IN DOING THINGS: NOT AT ALL
1. LITTLE INTEREST OR PLEASURE IN DOING THINGS: NOT AT ALL
SUM OF ALL RESPONSES TO PHQ QUESTIONS 1-9: 5
5. POOR APPETITE OR OVEREATING: NOT AT ALL
6. FEELING BAD ABOUT YOURSELF - OR THAT YOU ARE A FAILURE OR HAVE LET YOURSELF OR YOUR FAMILY DOWN: NOT AT ALL
4. FEELING TIRED OR HAVING LITTLE ENERGY: SEVERAL DAYS
10. IF YOU CHECKED OFF ANY PROBLEMS, HOW DIFFICULT HAVE THESE PROBLEMS MADE IT FOR YOU TO DO YOUR WORK, TAKE CARE OF THINGS AT HOME, OR GET ALONG WITH OTHER PEOPLE: SOMEWHAT DIFFICULT
10. IF YOU CHECKED OFF ANY PROBLEMS, HOW DIFFICULT HAVE THESE PROBLEMS MADE IT FOR YOU TO DO YOUR WORK, TAKE CARE OF THINGS AT HOME, OR GET ALONG WITH OTHER PEOPLE: SOMEWHAT DIFFICULT
9. THOUGHTS THAT YOU WOULD BE BETTER OFF DEAD, OR OF HURTING YOURSELF: NOT AT ALL
2. FEELING DOWN, DEPRESSED OR HOPELESS: SEVERAL DAYS
7. TROUBLE CONCENTRATING ON THINGS, SUCH AS READING THE NEWSPAPER OR WATCHING TELEVISION: NOT AT ALL
SUM OF ALL RESPONSES TO PHQ QUESTIONS 1-9: 5
4. FEELING TIRED OR HAVING LITTLE ENERGY: SEVERAL DAYS
8. MOVING OR SPEAKING SO SLOWLY THAT OTHER PEOPLE COULD HAVE NOTICED. OR THE OPPOSITE - BEING SO FIDGETY OR RESTLESS THAT YOU HAVE BEEN MOVING AROUND A LOT MORE THAN USUAL: NOT AT ALL
3. TROUBLE FALLING OR STAYING ASLEEP: NEARLY EVERY DAY
7. TROUBLE CONCENTRATING ON THINGS, SUCH AS READING THE NEWSPAPER OR WATCHING TELEVISION: NOT AT ALL
SUM OF ALL RESPONSES TO PHQ QUESTIONS 1-9: 5
9. THOUGHTS THAT YOU WOULD BE BETTER OFF DEAD, OR OF HURTING YOURSELF: NOT AT ALL
8. MOVING OR SPEAKING SO SLOWLY THAT OTHER PEOPLE COULD HAVE NOTICED. OR THE OPPOSITE, BEING SO FIGETY OR RESTLESS THAT YOU HAVE BEEN MOVING AROUND A LOT MORE THAN USUAL: NOT AT ALL
5. POOR APPETITE OR OVEREATING: NOT AT ALL

## 2024-10-11 ASSESSMENT — ANXIETY QUESTIONNAIRES
2. NOT BEING ABLE TO STOP OR CONTROL WORRYING: NOT AT ALL
IF YOU CHECKED OFF ANY PROBLEMS ON THIS QUESTIONNAIRE, HOW DIFFICULT HAVE THESE PROBLEMS MADE IT FOR YOU TO DO YOUR WORK, TAKE CARE OF THINGS AT HOME, OR GET ALONG WITH OTHER PEOPLE: SOMEWHAT DIFFICULT
2. NOT BEING ABLE TO STOP OR CONTROL WORRYING: NOT AT ALL
6. BECOMING EASILY ANNOYED OR IRRITABLE: SEVERAL DAYS
1. FEELING NERVOUS, ANXIOUS, OR ON EDGE: SEVERAL DAYS
6. BECOMING EASILY ANNOYED OR IRRITABLE: SEVERAL DAYS
1. FEELING NERVOUS, ANXIOUS, OR ON EDGE: SEVERAL DAYS
7. FEELING AFRAID AS IF SOMETHING AWFUL MIGHT HAPPEN: NOT AT ALL
IF YOU CHECKED OFF ANY PROBLEMS ON THIS QUESTIONNAIRE, HOW DIFFICULT HAVE THESE PROBLEMS MADE IT FOR YOU TO DO YOUR WORK, TAKE CARE OF THINGS AT HOME, OR GET ALONG WITH OTHER PEOPLE: SOMEWHAT DIFFICULT
7. FEELING AFRAID AS IF SOMETHING AWFUL MIGHT HAPPEN: NOT AT ALL
5. BEING SO RESTLESS THAT IT IS HARD TO SIT STILL: NOT AT ALL
5. BEING SO RESTLESS THAT IT IS HARD TO SIT STILL: NOT AT ALL
4. TROUBLE RELAXING: SEVERAL DAYS
4. TROUBLE RELAXING: SEVERAL DAYS

## 2024-10-11 NOTE — PATIENT INSTRUCTIONS
Plan:  Eufemia Koo, it was nice seeing you today  Chantix sent. Follow instructions below  I have sent prescription for Chantix. Please follow directions below:  Days 1 to 3: 0.5 mg once daily.  Days 4 to 7: 0.5 mg twice daily.  Days 8-28: 1mg twice daily  Monitor closely for side effects including but not limited too:  Sleep walking  Chest pain or pressure, palpitations or elevated HR  Nausea  Nightmares  Worsening depression or suicidal thoughts  I recommend selecting your quit date as day 8 of Chantix. I.e you start Chantix and then on day 8 you stop smoking.  Try Trazodone 25-50mg nightly as needed for sleep. Monitor for side effects  Follow up in 3 months but reach out sooner if needed        -Please take medications as prescribed  -Refrain from alcohol or drug use  -Seek emergency help via the emergency and/or calling 911 should symptoms become severe, worsen, or with other concerning symptoms.Go immediately to the emergency room and/or call 911 with any suicidal or homicidal ideations or if audio/visual hallucinations develop.   -Contact office with any questions or concerns.      Crisis phone numbers:  988-national suicide hotline, call or text  [facility], [facility] [phone].  [facility], [facility] [phone]  [facility] [phone].  [facility] [phone].  [facility] [phone].  [facility] [phone].

## 2024-10-11 NOTE — PROGRESS NOTES
patient is doing well with the exception of sleep.  Made a plan to try low-dose trazodone and monitor for any oversedation, which patient has had in the past.  We will also initiate Chantix for smoking cessation.  Risk, benefits and alternatives were discussed with patient who expressed understanding.    There was no indication of currently increased or imminent danger to self or others based on today's exam; outpatient care is appropriate. Modifiable risk factors are being addressed. Safety plan is in place.  Patient/Family aware of how to reach me/covering clinician and members of treatment team in case of emergency and that [employer] message is for non-urgent logistical matters only such as scheduling or non urgent questions. Advised to call 911 or go to the nearest emergency room should any acute safety concerns arise while awaiting a call back. Patient is aware of the 988 Crisis Line as well if needing additional support.      PLAN:     Medications:   Trazodone 25-50 mg nightly PRN for sleep  Chantix starter pack prescribed  Risks, benefits and alternatives discussed.   Consider in the future:   Collateral: obtain collateral as needed  Referral: none  Labs/Tests/Imaging: none  Records Reviewed: [facility]  Patient advised to call if patient has any difficulties with treatment. Patient informed to call [phone] or go to ER if experiencing crisis, SI, HI, or psychosis.  Return in about 3 months (around 1/11/2025).      Eufemia Koo, was evaluated through a synchronous (real-time) audio-video encounter. The patient (or guardian if applicable) is aware that this is a billable service, which includes applicable co-pays. This Virtual Visit was conducted with patient's (and/or legal guardian's) consent. Patient identification was verified, and a caregiver was present when appropriate.   The patient was located at Home: [address]  [place] [address]  Provider was located at Home (Appt Dept State): [place]  Confirm you are

## 2024-10-11 NOTE — TELEPHONE ENCOUNTER
[facility] pharmacist called stating insurance is not wanting to cover Chantix as written and wonders if provider would be willing to change to the starting kit instead?

## 2024-10-15 ENCOUNTER — NURSE ONLY (OUTPATIENT)
Dept: ALLERGY | Age: 44
End: 2024-10-15
Payer: MEDICAID

## 2024-10-15 VITALS
OXYGEN SATURATION: 98 % | HEART RATE: 74 BPM | SYSTOLIC BLOOD PRESSURE: 139 MMHG | RESPIRATION RATE: 18 BRPM | DIASTOLIC BLOOD PRESSURE: 93 MMHG

## 2024-10-15 DIAGNOSIS — J30.9 CHRONIC ALLERGIC RHINITIS: Primary | ICD-10-CM

## 2024-10-15 DIAGNOSIS — J30.81 CAT ALLERGIES: ICD-10-CM

## 2024-10-15 DIAGNOSIS — J30.1 NON-SEASONAL ALLERGIC RHINITIS DUE TO POLLEN: ICD-10-CM

## 2024-10-15 PROCEDURE — 95117 IMMUNOTHERAPY INJECTIONS: CPT | Performed by: NURSE PRACTITIONER

## 2024-10-15 NOTE — PROGRESS NOTES
PATIENT HAS THE FOLLOWING DIAGNOSIS SUPPORTING ADMINISTRATION OF ALLERGY INJECTIONS: J30.1Allergic rhinitis due to pollen  AND 59392 MULTIPLE ALLERGY INJECTIONS FOR ALLERGY INJECTION ADMINISTRATION      Patient here for allergy injection today.  Patient was presented with the opportunity to speak with provider and ask questions regarding allergy injections.  Patient was also instructed they need to wait 30 minutes after receiving allergy injections. All risks associated with potential adverse effects have been explained to patient and patient handout was provided following allergy testing.    After consent obtained/verified, allergy injection subcutaneously given in back of arm(s).  Please see below for specific details of site injections, concentration of serum, and volume injected.    VIAL COLOR OF ALL VIALS TODAY IS green 1:1,000. Vial allergy w/v concentration today is: 1:1,000     ALLERGY INJECTION FROM VIAL A GIVEN right  UPPER ARM IN THE AMOUNT OF 0.10 ML    ALLERGY INJECTION FROM VIAL B GIVEN left upper ARM IN THE AMOUNT OF 0.10  ML    ALLERGY INJECTION FROM VIAL C GIVEN leftlower ARM IN THE AMOUNT OF 0.10 ML      Documentation of vial injection specific to arm(s) noted on Allergy Immunotherapy Administration Form.       Patient waited 30 minutes for observation.No  Patient has received information and verbalizes understanding of the potential  health risks associated with allergy injections . Patient understands risks including anaphylaxis and chooses not to wait 30 minutes after administration of allergy injection(s).    Patient tolerated well without adverse reaction while the patient was in the office.    SHOT REACTION TREATMENT INSTRUCTIONS    During the 30 minute wait after an allergy injection the following symptoms should be reported:    Itching other than at the injection site  Hives or swelling other than at the injection site  Redness other than at the injection site  Difficulty breathing  Chest

## 2024-10-17 ENCOUNTER — NURSE ONLY (OUTPATIENT)
Dept: ALLERGY | Age: 44
End: 2024-10-17
Payer: MEDICAID

## 2024-10-17 VITALS
OXYGEN SATURATION: 98 % | RESPIRATION RATE: 18 BRPM | DIASTOLIC BLOOD PRESSURE: 77 MMHG | SYSTOLIC BLOOD PRESSURE: 123 MMHG | HEART RATE: 78 BPM

## 2024-10-17 DIAGNOSIS — J30.9 CHRONIC ALLERGIC RHINITIS: Primary | ICD-10-CM

## 2024-10-17 DIAGNOSIS — J30.81 CAT ALLERGIES: ICD-10-CM

## 2024-10-17 DIAGNOSIS — J30.1 NON-SEASONAL ALLERGIC RHINITIS DUE TO POLLEN: ICD-10-CM

## 2024-10-17 PROCEDURE — 95117 IMMUNOTHERAPY INJECTIONS: CPT | Performed by: NURSE PRACTITIONER

## 2024-10-17 NOTE — PROGRESS NOTES
PATIENT HAS THE FOLLOWING DIAGNOSIS SUPPORTING ADMINISTRATION OF ALLERGY INJECTIONS: J30.1Allergic rhinitis due to pollen  AND [id number] MULTIPLE ALLERGY INJECTIONS FOR ALLERGY INJECTION ADMINISTRATION      Patient here for allergy injection today.  Patient was presented with the opportunity to speak with provider and ask questions regarding allergy injections.  Patient was also instructed they need to wait 30 minutes after receiving allergy injections. All risks associated with potential adverse effects have been explained to patient and patient handout was provided following allergy testing.    After consent obtained/verified, allergy injection subcutaneously given in back of arm(s).  Please see below for specific details of site injections, concentration of serum, and volume injected.    VIAL COLOR OF ALL VIALS TODAY IS green 1:1,000. Vial allergy w/v concentration today is: 1:1,000     ALLERGY INJECTION FROM VIAL A GIVEN left  UPPER ARM IN THE AMOUNT OF 0.15 ML    ALLERGY INJECTION FROM VIAL B GIVEN right upper ARM IN THE AMOUNT OF 0.15  ML    ALLERGY INJECTION FROM VIAL C GIVEN rightlower ARM IN THE AMOUNT OF 0.15 ML      Documentation of vial injection specific to arm(s) noted on Allergy Immunotherapy Administration Form.       Patient waited 30 minutes for observation.No  Patient has received information and verbalizes understanding of the potential  health risks associated with allergy injections . Patient understands risks including anaphylaxis and chooses not to wait 30 minutes after administration of allergy injection(s).    Patient tolerated well without adverse reaction while the patient was in the office.    SHOT REACTION TREATMENT INSTRUCTIONS    During the 30 minute wait after an allergy injection the following symptoms should be reported:    Itching other than at the injection site  Hives or swelling other than at the injection site  Redness other than at the injection site  Difficulty breathing  Chest

## 2024-10-22 ENCOUNTER — NURSE ONLY (OUTPATIENT)
Dept: ALLERGY | Age: 44
End: 2024-10-22
Payer: MEDICAID

## 2024-10-22 VITALS
DIASTOLIC BLOOD PRESSURE: 76 MMHG | HEART RATE: 89 BPM | OXYGEN SATURATION: 98 % | HEIGHT: 64 IN | BODY MASS INDEX: 37.69 KG/M2 | SYSTOLIC BLOOD PRESSURE: 132 MMHG

## 2024-10-22 DIAGNOSIS — J30.9 CHRONIC ALLERGIC RHINITIS: Primary | ICD-10-CM

## 2024-10-22 DIAGNOSIS — J30.1 NON-SEASONAL ALLERGIC RHINITIS DUE TO POLLEN: ICD-10-CM

## 2024-10-22 PROCEDURE — 95117 IMMUNOTHERAPY INJECTIONS: CPT | Performed by: NURSE PRACTITIONER

## 2024-10-22 NOTE — PROGRESS NOTES
PATIENT HAS THE FOLLOWING DIAGNOSIS SUPPORTING ADMINISTRATION OF ALLERGY INJECTIONS: J30.1Allergic rhinitis due to pollen  AND [id number] MULTIPLE ALLERGY INJECTIONS FOR ALLERGY INJECTION ADMINISTRATION      Patient here for allergy injection today.  Patient was presented with the opportunity to speak with provider and ask questions regarding allergy injections.  Patient was also instructed they need to wait 30 minutes after receiving allergy injections. All risks associated with potential adverse effects have been explained to patient and patient handout was provided following allergy testing.    After consent obtained/verified, allergy injection subcutaneously given in back of arm(s).  Please see below for specific details of site injections, concentration of serum, and volume injected.    VIAL COLOR OF ALL VIALS TODAY IS green 1:1,000. Vial allergy w/v concentration today is: 1:1,000     ALLERGY INJECTION FROM VIAL A GIVEN right  UPPER ARM IN THE AMOUNT OF 0.20 ML    ALLERGY INJECTION FROM VIAL B GIVEN left lower ARM IN THE AMOUNT OF 0.20  ML    ALLERGY INJECTION FROM VIAL C GIVEN leftupper ARM IN THE AMOUNT OF 0.20 ML      Documentation of vial injection specific to arm(s) noted on Allergy Immunotherapy Administration Form.       Patient waited 30 minutes for observation.No  Patient has received information and verbalizes understanding of the potential  health risks associated with allergy injections . Patient understands risks including anaphylaxis and chooses not to wait 30 minutes after administration of allergy injection(s).    Patient tolerated well without adverse reaction while the patient was in the office.    SHOT REACTION TREATMENT INSTRUCTIONS    During the 30 minute wait after an allergy injection the following symptoms should be reported:    Itching other than at the injection site  Hives or swelling other than at the injection site  Redness other than at the injection site  Difficulty breathing  Chest

## 2024-10-24 ENCOUNTER — NURSE ONLY (OUTPATIENT)
Dept: ALLERGY | Age: 44
End: 2024-10-24
Payer: MEDICAID

## 2024-10-24 VITALS
DIASTOLIC BLOOD PRESSURE: 83 MMHG | SYSTOLIC BLOOD PRESSURE: 114 MMHG | BODY MASS INDEX: 37.69 KG/M2 | HEIGHT: 64 IN | HEART RATE: 84 BPM | OXYGEN SATURATION: 98 %

## 2024-10-24 DIAGNOSIS — J30.1 NON-SEASONAL ALLERGIC RHINITIS DUE TO POLLEN: ICD-10-CM

## 2024-10-24 DIAGNOSIS — J30.9 CHRONIC ALLERGIC RHINITIS: Primary | ICD-10-CM

## 2024-10-24 PROCEDURE — 95117 IMMUNOTHERAPY INJECTIONS: CPT | Performed by: NURSE PRACTITIONER

## 2024-10-24 NOTE — PROGRESS NOTES
PATIENT HAS THE FOLLOWING DIAGNOSIS SUPPORTING ADMINISTRATION OF ALLERGY INJECTIONS: J30.1Allergic rhinitis due to pollen  AND [id number] MULTIPLE ALLERGY INJECTIONS FOR ALLERGY INJECTION ADMINISTRATION      Patient here for allergy injection today.  Patient was presented with the opportunity to speak with provider and ask questions regarding allergy injections.  Patient was also instructed they need to wait 30 minutes after receiving allergy injections. All risks associated with potential adverse effects have been explained to patient and patient handout was provided following allergy testing.    After consent obtained/verified, allergy injection subcutaneously given in back of arm(s).  Please see below for specific details of site injections, concentration of serum, and volume injected.    VIAL COLOR OF ALL VIALS TODAY IS green 1:1,000. Vial allergy w/v concentration today is: 1:1,000     ALLERGY INJECTION FROM VIAL A GIVEN left  UPPER ARM IN THE AMOUNT OF 0.25 ML    ALLERGY INJECTION FROM VIAL B GIVEN right lower ARM IN THE AMOUNT OF 0.25  ML    ALLERGY INJECTION FROM VIAL C GIVEN rightupper ARM IN THE AMOUNT OF 0.25 ML      Documentation of vial injection specific to arm(s) noted on Allergy Immunotherapy Administration Form.       Patient waited 30 minutes for observation.No  Patient has received information and verbalizes understanding of the potential  health risks associated with allergy injections . Patient understands risks including anaphylaxis and chooses not to wait 30 minutes after administration of allergy injection(s).    Patient tolerated well without adverse reaction while the patient was in the office.    SHOT REACTION TREATMENT INSTRUCTIONS    During the 30 minute wait after an allergy injection the following symptoms should be reported:    Itching other than at the injection site  Hives or swelling other than at the injection site  Redness other than at the injection site  Difficulty breathing  Chest

## 2024-10-28 ENCOUNTER — OFFICE VISIT (OUTPATIENT)
Dept: PHYSICAL MEDICINE AND REHAB | Age: 44
End: 2024-10-28
Payer: MEDICAID

## 2024-10-28 VITALS
HEIGHT: 64 IN | SYSTOLIC BLOOD PRESSURE: 104 MMHG | BODY MASS INDEX: 37.39 KG/M2 | WEIGHT: 219 LBS | DIASTOLIC BLOOD PRESSURE: 72 MMHG

## 2024-10-28 DIAGNOSIS — G89.4 CHRONIC PAIN SYNDROME: ICD-10-CM

## 2024-10-28 DIAGNOSIS — M79.18 CHRONIC MYOFASCIAL PAIN: ICD-10-CM

## 2024-10-28 DIAGNOSIS — M79.2 NEUROPATHIC PAIN: ICD-10-CM

## 2024-10-28 DIAGNOSIS — M79.18 CHRONIC GLUTEAL PAIN: Primary | ICD-10-CM

## 2024-10-28 DIAGNOSIS — G89.29 CHRONIC MYOFASCIAL PAIN: ICD-10-CM

## 2024-10-28 DIAGNOSIS — G89.29 CHRONIC GLUTEAL PAIN: Primary | ICD-10-CM

## 2024-10-28 PROCEDURE — G8484 FLU IMMUNIZE NO ADMIN: HCPCS | Performed by: ANESTHESIOLOGY

## 2024-10-28 PROCEDURE — G8417 CALC BMI ABV UP PARAM F/U: HCPCS | Performed by: ANESTHESIOLOGY

## 2024-10-28 PROCEDURE — 99213 OFFICE O/P EST LOW 20 MIN: CPT | Performed by: ANESTHESIOLOGY

## 2024-10-28 PROCEDURE — G8427 DOCREV CUR MEDS BY ELIG CLIN: HCPCS | Performed by: ANESTHESIOLOGY

## 2024-10-28 PROCEDURE — 4004F PT TOBACCO SCREEN RCVD TLK: CPT | Performed by: ANESTHESIOLOGY

## 2024-10-28 NOTE — PROGRESS NOTES
Chronic Pain/PM&R Clinic Note     Encounter Date: 10/28/24    Subjective:   Chief Complaint:   Chief Complaint   Patient presents with    Follow-up       History of Present Illness:   Eufemia Koo is a 44 y.o. female seen in the clinic initially on  01/06/2022 upon request from Otoniel Llamas MD for her history of lumbar pain.  Patient states pain started after a lifting accident at work in 2008 where she had an injury to her thoracic spine.  Patient states back pain has become gradually worse ever since.  Patient states pain is worse when she is sitting, standing in 1 position, or doing activities such as cleaning around the house.  Patient states she will occasionally get pain that radiates down bilateral legs, left more than right with strenuous activity.  Patient states when pain gets severe she will lay down which seems to help.  Patient does have trouble sleeping occasionally as she has some aching and twitching in bilateral legs.  Patient states she has tried physical therapy and decompression several times which have been ineffective.  It has been several years since she has done physical therapy.  Patient states she does not currently work as she was unable to tolerate it.  Patient denies weakness in bilateral legs but states she will occasionally feel like her knees want to give out.  Patient denies falls.  Patient denies saddle anesthesia or bowel or bladder incontinence.  Patient also mentions other pain such as in her left shoulder, left wrist, and left knee.  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  She has had ongoing thoracic pain since then.    Today, 10/28/2024, patient presents for planned follow-up for management of ongoing chronic low back and leg pain.  She did meet with the surgery team, Dr. Celaya, who does not recommend surgery at this point and thinks that she would be a better candidate for injection therapy.  She

## 2024-10-28 NOTE — PROGRESS NOTES
Functionality Assessment/Goals Worksheet     On a scale of 0 (Does not Interfere) to 10 (Completely Interferes)     1.  Which number describes how during the past week pain has interfered with           the following:  A.  General Activity:  10  B.  Mood: 8  C.  Walking Ability:  10  D.  Normal Work (Includes both work outside the home and housework):  9  E.  Relations with Other People:   5  F.  Sleep:   10  G.  Enjoyment of Life:   10    2.  Patient Prefers to Take their Pain Medications:     []  On a regular basis   [x]  Only when necessary    []  Does not take pain medications    3.  What are the Patient's Goals/Expectations for Visiting Pain Management?     []  Learn about my pain    [x]  Receive Medication   []  Physical Therapy     []  Treat Depression   []  Receive Injections    []  Treat Sleep   []  Deal with Anxiety and Stress   []  Treat Opoid Dependence/Addiction   []  Other:

## 2024-10-29 ENCOUNTER — NURSE ONLY (OUTPATIENT)
Dept: ALLERGY | Age: 44
End: 2024-10-29
Payer: MEDICAID

## 2024-10-29 VITALS
OXYGEN SATURATION: 98 % | DIASTOLIC BLOOD PRESSURE: 94 MMHG | RESPIRATION RATE: 18 BRPM | HEART RATE: 86 BPM | SYSTOLIC BLOOD PRESSURE: 125 MMHG

## 2024-10-29 DIAGNOSIS — J30.9 CHRONIC ALLERGIC RHINITIS: Primary | ICD-10-CM

## 2024-10-29 DIAGNOSIS — J30.1 NON-SEASONAL ALLERGIC RHINITIS DUE TO POLLEN: ICD-10-CM

## 2024-10-29 DIAGNOSIS — J30.81 CAT ALLERGIES: ICD-10-CM

## 2024-10-29 PROCEDURE — 95117 IMMUNOTHERAPY INJECTIONS: CPT | Performed by: NURSE PRACTITIONER

## 2024-10-29 NOTE — PROGRESS NOTES
PATIENT HAS THE FOLLOWING DIAGNOSIS SUPPORTING ADMINISTRATION OF ALLERGY INJECTIONS: J30.1Allergic rhinitis due to pollen  AND [id number] MULTIPLE ALLERGY INJECTIONS FOR ALLERGY INJECTION ADMINISTRATION      Patient here for allergy injection today.  Patient was presented with the opportunity to speak with provider and ask questions regarding allergy injections.  Patient was also instructed they need to wait 30 minutes after receiving allergy injections. All risks associated with potential adverse effects have been explained to patient and patient handout was provided following allergy testing.    After consent obtained/verified, allergy injection subcutaneously given in back of arm(s).  Please see below for specific details of site injections, concentration of serum, and volume injected.    VIAL COLOR OF ALL VIALS TODAY IS green 1:1,000. Vial allergy w/v concentration today is: 1:1,000     ALLERGY INJECTION FROM VIAL A GIVEN right  UPPER ARM IN THE AMOUNT OF 0.30 ML    ALLERGY INJECTION FROM VIAL B GIVEN left upper ARM IN THE AMOUNT OF 0.30  ML    ALLERGY INJECTION FROM VIAL C GIVEN leftlower ARM IN THE AMOUNT OF 0.30 ML      Documentation of vial injection specific to arm(s) noted on Allergy Immunotherapy Administration Form.       Patient waited 30 minutes for observation.No  Patient has received information and verbalizes understanding of the potential  health risks associated with allergy injections . Patient understands risks including anaphylaxis and chooses not to wait 30 minutes after administration of allergy injection(s).    Patient tolerated well without adverse reaction while the patient was in the office.    SHOT REACTION TREATMENT INSTRUCTIONS    During the 30 minute wait after an allergy injection the following symptoms should be reported:    Itching other than at the injection site  Hives or swelling other than at the injection site  Redness other than at the injection site  Difficulty breathing  Chest

## 2024-10-31 ENCOUNTER — NURSE ONLY (OUTPATIENT)
Dept: ALLERGY | Age: 44
End: 2024-10-31
Payer: MEDICAID

## 2024-10-31 VITALS
HEART RATE: 84 BPM | DIASTOLIC BLOOD PRESSURE: 63 MMHG | RESPIRATION RATE: 18 BRPM | OXYGEN SATURATION: 97 % | SYSTOLIC BLOOD PRESSURE: 101 MMHG

## 2024-10-31 DIAGNOSIS — J30.1 NON-SEASONAL ALLERGIC RHINITIS DUE TO POLLEN: ICD-10-CM

## 2024-10-31 DIAGNOSIS — J30.9 CHRONIC ALLERGIC RHINITIS: Primary | ICD-10-CM

## 2024-10-31 PROCEDURE — 95117 IMMUNOTHERAPY INJECTIONS: CPT | Performed by: NURSE PRACTITIONER

## 2024-10-31 NOTE — PROGRESS NOTES
tightness  Difficulty swallowing  Throat tightness    If these symptoms occur, NOTIFY PROVIDER and the following treatment should be administered:    1.  Epinephrine/Auvi Q 1:1000 IM - 0.3 ml if > 66 lbs or more, 0.15 ml if 33 - 63 lbs, or 0.1 ml if <33 lbs     2.  Diphenhydramine - give all intramuscular:     2 to <6 years (off-label use): 6.25 mg,    6 to <12 years: 12.5 to 25 mg;    >=12 years: 25-50 mg.    3.  Famotidine:  Adults 40 mg oral    Adolescents age 16 years and >88 lbs: 40 mg    Children and Adolescents <=16 years of age: Initial: 0.25 mg/kg/dose  every 12 hours (maximum daily dose: 40 mg/day)    Epi/Auvi Q dose may me repeated in 5-15 minutes if adequate resolution of symptoms does not occur    Patient should be observed for at least one hour after final Epi/Auvi Q dose and must be seen by provider.  Patients cannot drive themselves if they have received diphenhydramine.

## 2024-11-05 ENCOUNTER — NURSE ONLY (OUTPATIENT)
Dept: ALLERGY | Age: 44
End: 2024-11-05
Payer: MEDICAID

## 2024-11-05 VITALS — OXYGEN SATURATION: 97 % | HEART RATE: 89 BPM

## 2024-11-05 DIAGNOSIS — J30.9 CHRONIC ALLERGIC RHINITIS: Primary | ICD-10-CM

## 2024-11-05 DIAGNOSIS — J30.1 NON-SEASONAL ALLERGIC RHINITIS DUE TO POLLEN: ICD-10-CM

## 2024-11-05 DIAGNOSIS — J30.81 CAT ALLERGIES: ICD-10-CM

## 2024-11-05 PROCEDURE — 95117 IMMUNOTHERAPY INJECTIONS: CPT | Performed by: NURSE PRACTITIONER

## 2024-11-05 RX ORDER — DICLOFENAC SODIUM 10 MG/G
GEL TOPICAL
Qty: 200 G | Refills: 0 | Status: SHIPPED | OUTPATIENT
Start: 2024-11-05

## 2024-11-05 NOTE — PROGRESS NOTES
PATIENT HAS THE FOLLOWING DIAGNOSIS SUPPORTING ADMINISTRATION OF ALLERGY INJECTIONS: J30.1Allergic rhinitis due to pollen  AND [id number] MULTIPLE ALLERGY INJECTIONS FOR ALLERGY INJECTION ADMINISTRATION      Patient here for allergy injection today.  Patient was presented with the opportunity to speak with provider and ask questions regarding allergy injections.  Patient was also instructed they need to wait 30 minutes after receiving allergy injections. All risks associated with potential adverse effects have been explained to patient and patient handout was provided following allergy testing.    After consent obtained/verified, allergy injection subcutaneously given in back of arm(s).  Please see below for specific details of site injections, concentration of serum, and volume injected.    VIAL COLOR OF ALL VIALS TODAY IS green 1:1,000. Vial allergy w/v concentration today is: 1:1,000     ALLERGY INJECTION FROM VIAL A GIVEN right  UPPER ARM IN THE AMOUNT OF 0.40 ML    ALLERGY INJECTION FROM VIAL B GIVEN left lower ARM IN THE AMOUNT OF 0.40  ML    ALLERGY INJECTION FROM VIAL C GIVEN leftupper ARM IN THE AMOUNT OF 0.40 ML      Documentation of vial injection specific to arm(s) noted on Allergy Immunotherapy Administration Form.       Patient waited 30 minutes for observation.No  Patient has received information and verbalizes understanding of the potential  health risks associated with allergy injections . Patient understands risks including anaphylaxis and chooses not to wait 30 minutes after administration of allergy injection(s).    Patient tolerated well without adverse reaction while the patient was in the office.    SHOT REACTION TREATMENT INSTRUCTIONS    During the 30 minute wait after an allergy injection the following symptoms should be reported:    Itching other than at the injection site  Hives or swelling other than at the injection site  Redness other than at the injection site  Difficulty breathing  Chest

## 2024-11-07 ENCOUNTER — NURSE ONLY (OUTPATIENT)
Dept: ALLERGY | Age: 44
End: 2024-11-07
Payer: MEDICAID

## 2024-11-07 VITALS — OXYGEN SATURATION: 98 % | HEART RATE: 75 BPM | DIASTOLIC BLOOD PRESSURE: 88 MMHG | SYSTOLIC BLOOD PRESSURE: 132 MMHG

## 2024-11-07 DIAGNOSIS — J30.1 NON-SEASONAL ALLERGIC RHINITIS DUE TO POLLEN: ICD-10-CM

## 2024-11-07 DIAGNOSIS — J30.9 CHRONIC ALLERGIC RHINITIS: Primary | ICD-10-CM

## 2024-11-07 DIAGNOSIS — J30.81 CAT ALLERGIES: ICD-10-CM

## 2024-11-07 PROCEDURE — 95117 IMMUNOTHERAPY INJECTIONS: CPT | Performed by: NURSE PRACTITIONER

## 2024-11-07 NOTE — PROGRESS NOTES
PATIENT HAS THE FOLLOWING DIAGNOSIS SUPPORTING ADMINISTRATION OF ALLERGY INJECTIONS: J30.1Allergic rhinitis due to pollen  AND [id number] MULTIPLE ALLERGY INJECTIONS FOR ALLERGY INJECTION ADMINISTRATION      Patient here for allergy injection today.  Patient was presented with the opportunity to speak with provider and ask questions regarding allergy injections.  Patient was also instructed they need to wait 30 minutes after receiving allergy injections. All risks associated with potential adverse effects have been explained to patient and patient handout was provided following allergy testing.    After consent obtained/verified, allergy injection subcutaneously given in back of arm(s).  Please see below for specific details of site injections, concentration of serum, and volume injected.    VIAL COLOR OF ALL VIALS TODAY IS green 1:1,000. Vial allergy w/v concentration today is: 1:1,000     ALLERGY INJECTION FROM VIAL A GIVEN left  UPPER ARM IN THE AMOUNT OF 0.45 ML    ALLERGY INJECTION FROM VIAL B GIVEN right lower ARM IN THE AMOUNT OF 0.45  ML    ALLERGY INJECTION FROM VIAL C GIVEN rightlower ARM IN THE AMOUNT OF 0.45 ML      Documentation of vial injection specific to arm(s) noted on Allergy Immunotherapy Administration Form.       Patient waited 30 minutes for observation.No  Patient has received information and verbalizes understanding of the potential  health risks associated with allergy injections . Patient understands risks including anaphylaxis and chooses not to wait 30 minutes after administration of allergy injection(s).    Patient tolerated well without adverse reaction while the patient was in the office.    SHOT REACTION TREATMENT INSTRUCTIONS    During the 30 minute wait after an allergy injection the following symptoms should be reported:    Itching other than at the injection site  Hives or swelling other than at the injection site  Redness other than at the injection site  Difficulty breathing  Chest

## 2024-11-12 ENCOUNTER — NURSE ONLY (OUTPATIENT)
Dept: ALLERGY | Age: 44
End: 2024-11-12
Payer: MEDICAID

## 2024-11-12 VITALS
RESPIRATION RATE: 18 BRPM | OXYGEN SATURATION: 98 % | SYSTOLIC BLOOD PRESSURE: 131 MMHG | HEART RATE: 88 BPM | DIASTOLIC BLOOD PRESSURE: 86 MMHG

## 2024-11-12 DIAGNOSIS — J30.1 NON-SEASONAL ALLERGIC RHINITIS DUE TO POLLEN: ICD-10-CM

## 2024-11-12 DIAGNOSIS — J30.9 CHRONIC ALLERGIC RHINITIS: Primary | ICD-10-CM

## 2024-11-12 DIAGNOSIS — J30.81 CAT ALLERGIES: ICD-10-CM

## 2024-11-12 PROCEDURE — 95117 IMMUNOTHERAPY INJECTIONS: CPT | Performed by: NURSE PRACTITIONER

## 2024-11-12 NOTE — PROGRESS NOTES
PATIENT HAS THE FOLLOWING DIAGNOSIS SUPPORTING ADMINISTRATION OF ALLERGY INJECTIONS: J30.1Allergic rhinitis due to pollen  AND [id number] MULTIPLE ALLERGY INJECTIONS FOR ALLERGY INJECTION ADMINISTRATION      Patient here for allergy injection today.  Patient was presented with the opportunity to speak with provider and ask questions regarding allergy injections.  Patient was also instructed they need to wait 30 minutes after receiving allergy injections. All risks associated with potential adverse effects have been explained to patient and patient handout was provided following allergy testing.    After consent obtained/verified, allergy injection subcutaneously given in back of arm(s).  Please see below for specific details of site injections, concentration of serum, and volume injected.    VIAL COLOR OF ALL VIALS TODAY IS green 1:1,000. Vial allergy w/v concentration today is: 1:1,000     ALLERGY INJECTION FROM VIAL A GIVEN right  UPPER ARM IN THE AMOUNT OF 0.50 ML    ALLERGY INJECTION FROM VIAL B GIVEN left upper ARM IN THE AMOUNT OF 0.50  ML    ALLERGY INJECTION FROM VIAL C GIVEN leftlower ARM IN THE AMOUNT OF 0.50 ML      Documentation of vial injection specific to arm(s) noted on Allergy Immunotherapy Administration Form.       Patient waited 30 minutes for observation.No  Patient has received information and verbalizes understanding of the potential  health risks associated with allergy injections . Patient understands risks including anaphylaxis and chooses not to wait 30 minutes after administration of allergy injection(s).    Patient tolerated well without adverse reaction while the patient was in the office.    SHOT REACTION TREATMENT INSTRUCTIONS    During the 30 minute wait after an allergy injection the following symptoms should be reported:    Itching other than at the injection site  Hives or swelling other than at the injection site  Redness other than at the injection site  Difficulty breathing  Chest

## 2024-11-14 ENCOUNTER — NURSE ONLY (OUTPATIENT)
Dept: ALLERGY | Age: 44
End: 2024-11-14
Payer: MEDICAID

## 2024-11-14 VITALS
RESPIRATION RATE: 18 BRPM | OXYGEN SATURATION: 98 % | SYSTOLIC BLOOD PRESSURE: 141 MMHG | DIASTOLIC BLOOD PRESSURE: 92 MMHG | HEART RATE: 77 BPM

## 2024-11-14 DIAGNOSIS — J30.81 CAT ALLERGIES: ICD-10-CM

## 2024-11-14 DIAGNOSIS — J30.9 CHRONIC ALLERGIC RHINITIS: Primary | ICD-10-CM

## 2024-11-14 DIAGNOSIS — J30.1 NON-SEASONAL ALLERGIC RHINITIS DUE TO POLLEN: ICD-10-CM

## 2024-11-14 PROCEDURE — 95117 IMMUNOTHERAPY INJECTIONS: CPT | Performed by: NURSE PRACTITIONER

## 2024-11-14 NOTE — PROGRESS NOTES
PATIENT HAS THE FOLLOWING DIAGNOSIS SUPPORTING ADMINISTRATION OF ALLERGY INJECTIONS: J30.1Allergic rhinitis due to pollen  AND [id number] MULTIPLE ALLERGY INJECTIONS FOR ALLERGY INJECTION ADMINISTRATION      Patient here for allergy injection today.  Patient was presented with the opportunity to speak with provider and ask questions regarding allergy injections.  Patient was also instructed they need to wait 30 minutes after receiving allergy injections. All risks associated with potential adverse effects have been explained to patient and patient handout was provided following allergy testing.    After consent obtained/verified, allergy injection subcutaneously given in back of arm(s).  Please see below for specific details of site injections, concentration of serum, and volume injected.    VIAL COLOR OF ALL VIALS TODAY IS blue 1:100. Vial allergy w/v concentration today is: 1:100     ALLERGY INJECTION FROM VIAL A GIVEN right  UPPER ARM IN THE AMOUNT OF 0.05 ML    ALLERGY INJECTION FROM VIAL B GIVEN left upper ARM IN THE AMOUNT OF 0.05  ML    ALLERGY INJECTION FROM VIAL C GIVEN leftupper ARM IN THE AMOUNT OF 0.05 ML      Documentation of vial injection specific to arm(s) noted on Allergy Immunotherapy Administration Form.       Patient waited 30 minutes for observation.No  Patient has received information and verbalizes understanding of the potential  health risks associated with allergy injections . Patient understands risks including anaphylaxis and chooses not to wait 30 minutes after administration of allergy injection(s).    Patient tolerated well without adverse reaction while the patient was in the office.    SHOT REACTION TREATMENT INSTRUCTIONS    During the 30 minute wait after an allergy injection the following symptoms should be reported:    Itching other than at the injection site  Hives or swelling other than at the injection site  Redness other than at the injection site  Difficulty breathing  Chest

## 2024-11-18 ENCOUNTER — TELEPHONE (OUTPATIENT)
Dept: PHYSICAL MEDICINE AND REHAB | Age: 44
End: 2024-11-18

## 2024-11-18 DIAGNOSIS — M79.18 CHRONIC GLUTEAL PAIN: Primary | ICD-10-CM

## 2024-11-18 DIAGNOSIS — G89.29 CHRONIC GLUTEAL PAIN: Primary | ICD-10-CM

## 2024-11-18 NOTE — TELEPHONE ENCOUNTER
According to Dr. Blackmon's note, they discussed pursuing potentially peripheral nerve stimulator and we will have to see if the patient's insurance would consider this injection/procedure to help patient with her ongoing chronic pain. I text Zeynep Galarza, and she said, \"We have had Medicaid plans get approved in the past,  It's a hit or miss.  I don't want to tell yo not to submit and miss out on the opportunity to potentially help this patient.\"  Can you order this procedure, so we can schedule it and let it go to the authorization group?

## 2024-11-19 ENCOUNTER — NURSE ONLY (OUTPATIENT)
Dept: ALLERGY | Age: 44
End: 2024-11-19
Payer: MEDICAID

## 2024-11-19 VITALS
HEART RATE: 97 BPM | DIASTOLIC BLOOD PRESSURE: 93 MMHG | SYSTOLIC BLOOD PRESSURE: 115 MMHG | RESPIRATION RATE: 18 BRPM | OXYGEN SATURATION: 98 %

## 2024-11-19 DIAGNOSIS — J30.9 CHRONIC ALLERGIC RHINITIS: Primary | ICD-10-CM

## 2024-11-19 DIAGNOSIS — J30.1 NON-SEASONAL ALLERGIC RHINITIS DUE TO POLLEN: ICD-10-CM

## 2024-11-19 DIAGNOSIS — J30.81 CAT ALLERGIES: ICD-10-CM

## 2024-11-19 PROCEDURE — 95117 IMMUNOTHERAPY INJECTIONS: CPT | Performed by: NURSE PRACTITIONER

## 2024-11-19 NOTE — TELEPHONE ENCOUNTER
Orders placed for bilateral Sprint peripheral nerve stimulator targeting the superior cluneal nerve. Patient will need to pick which side to start with then schedule the other side 2 weeks after.     LOCAL injection  She does not need to hold any medication

## 2024-11-19 NOTE — PROGRESS NOTES
PATIENT HAS THE FOLLOWING DIAGNOSIS SUPPORTING ADMINISTRATION OF ALLERGY INJECTIONS: J30.1Allergic rhinitis due to pollen  AND [id number] MULTIPLE ALLERGY INJECTIONS FOR ALLERGY INJECTION ADMINISTRATION      Patient here for allergy injection today.  Patient was presented with the opportunity to speak with provider and ask questions regarding allergy injections.  Patient was also instructed they need to wait 30 minutes after receiving allergy injections. All risks associated with potential adverse effects have been explained to patient and patient handout was provided following allergy testing.    After consent obtained/verified, allergy injection subcutaneously given in back of arm(s).  Please see below for specific details of site injections, concentration of serum, and volume injected.    VIAL COLOR OF ALL VIALS TODAY IS blue 1:100. Vial allergy w/v concentration today is: 1:100     ALLERGY INJECTION FROM VIAL A GIVEN right  UPPER ARM IN THE AMOUNT OF 0.10 ML    ALLERGY INJECTION FROM VIAL B GIVEN left lower ARM IN THE AMOUNT OF 0.10  ML    ALLERGY INJECTION FROM VIAL C GIVEN leftupper ARM IN THE AMOUNT OF 0.10 ML      Documentation of vial injection specific to arm(s) noted on Allergy Immunotherapy Administration Form.       Patient waited 30 minutes for observation.No  Patient has received information and verbalizes understanding of the potential  health risks associated with allergy injections . Patient understands risks including anaphylaxis and chooses not to wait 30 minutes after administration of allergy injection(s).    Patient tolerated well without adverse reaction while the patient was in the office.    SHOT REACTION TREATMENT INSTRUCTIONS    During the 30 minute wait after an allergy injection the following symptoms should be reported:    Itching other than at the injection site  Hives or swelling other than at the injection site  Redness other than at the injection site  Difficulty breathing  Chest

## 2024-11-21 ENCOUNTER — NURSE ONLY (OUTPATIENT)
Dept: ALLERGY | Age: 44
End: 2024-11-21
Payer: MEDICAID

## 2024-11-21 VITALS
SYSTOLIC BLOOD PRESSURE: 134 MMHG | RESPIRATION RATE: 18 BRPM | DIASTOLIC BLOOD PRESSURE: 94 MMHG | HEART RATE: 96 BPM | OXYGEN SATURATION: 96 %

## 2024-11-21 DIAGNOSIS — J45.30 MILD PERSISTENT ASTHMA WITHOUT COMPLICATION: ICD-10-CM

## 2024-11-21 DIAGNOSIS — J30.1 NON-SEASONAL ALLERGIC RHINITIS DUE TO POLLEN: ICD-10-CM

## 2024-11-21 DIAGNOSIS — J30.81 CAT ALLERGIES: ICD-10-CM

## 2024-11-21 DIAGNOSIS — J30.9 CHRONIC ALLERGIC RHINITIS: Primary | ICD-10-CM

## 2024-11-21 PROCEDURE — 95117 IMMUNOTHERAPY INJECTIONS: CPT | Performed by: NURSE PRACTITIONER

## 2024-11-21 NOTE — PROGRESS NOTES
PATIENT HAS THE FOLLOWING DIAGNOSIS SUPPORTING ADMINISTRATION OF ALLERGY INJECTIONS: J30.1Allergic rhinitis due to pollen  AND [id number] MULTIPLE ALLERGY INJECTIONS FOR ALLERGY INJECTION ADMINISTRATION      Patient here for allergy injection today.  Patient was presented with the opportunity to speak with provider and ask questions regarding allergy injections.  Patient was also instructed they need to wait 30 minutes after receiving allergy injections. All risks associated with potential adverse effects have been explained to patient and patient handout was provided following allergy testing.    After consent obtained/verified, allergy injection subcutaneously given in back of arm(s).  Please see below for specific details of site injections, concentration of serum, and volume injected.    VIAL COLOR OF ALL VIALS TODAY IS blue 1:100. Vial allergy w/v concentration today is: 1:100     ALLERGY INJECTION FROM VIAL A GIVEN right  UPPER ARM IN THE AMOUNT OF 0.15 ML    ALLERGY INJECTION FROM VIAL B GIVEN left lower ARM IN THE AMOUNT OF 0.15  ML    ALLERGY INJECTION FROM VIAL C GIVEN leftupper ARM IN THE AMOUNT OF 0.15 ML      Documentation of vial injection specific to arm(s) noted on Allergy Immunotherapy Administration Form.       Patient waited 30 minutes for observation.No  Patient has received information and verbalizes understanding of the potential  health risks associated with allergy injections . Patient understands risks including anaphylaxis and chooses not to wait 30 minutes after administration of allergy injection(s).    Patient tolerated well without adverse reaction while the patient was in the office.    SHOT REACTION TREATMENT INSTRUCTIONS    During the 30 minute wait after an allergy injection the following symptoms should be reported:    Itching other than at the injection site  Hives or swelling other than at the injection site  Redness other than at the injection site  Difficulty breathing  Chest

## 2024-11-23 RX ORDER — ALBUTEROL SULFATE 90 UG/1
INHALANT RESPIRATORY (INHALATION)
Qty: 18 G | Refills: 11 | Status: SHIPPED | OUTPATIENT
Start: 2024-11-23

## 2024-11-25 ENCOUNTER — NURSE ONLY (OUTPATIENT)
Dept: ALLERGY | Age: 44
End: 2024-11-25
Payer: MEDICAID

## 2024-11-25 VITALS
OXYGEN SATURATION: 98 % | SYSTOLIC BLOOD PRESSURE: 153 MMHG | RESPIRATION RATE: 18 BRPM | DIASTOLIC BLOOD PRESSURE: 97 MMHG

## 2024-11-25 DIAGNOSIS — J30.1 NON-SEASONAL ALLERGIC RHINITIS DUE TO POLLEN: ICD-10-CM

## 2024-11-25 DIAGNOSIS — J30.81 CAT ALLERGIES: ICD-10-CM

## 2024-11-25 DIAGNOSIS — J30.9 CHRONIC ALLERGIC RHINITIS: Primary | ICD-10-CM

## 2024-11-25 PROCEDURE — 95117 IMMUNOTHERAPY INJECTIONS: CPT | Performed by: NURSE PRACTITIONER

## 2024-11-25 NOTE — PROGRESS NOTES
Pt in for allergy injections. Pt reports having reaction to last allergy injection in left arm. Pt states reaction started 1 hour after allergy injection on 11/21/2024. Pt reports that reaction site got red and welted to the size of a quarter. Pt states redness and welt  lasted for 3 days after reaction started. Notified provider.  No signs or symptoms of anaphylaxis noted at this time. Repeated last dose of 0.15 ML per provider.

## 2024-11-25 NOTE — PROGRESS NOTES
PATIENT HAS THE FOLLOWING DIAGNOSIS SUPPORTING ADMINISTRATION OF ALLERGY INJECTIONS: J30.1Allergic rhinitis due to pollen  AND 81738 MULTIPLE ALLERGY INJECTIONS FOR ALLERGY INJECTION ADMINISTRATION      Patient here for allergy injection today.  Patient was presented with the opportunity to speak with provider and ask questions regarding allergy injections.  Patient was also instructed they need to wait 30 minutes after receiving allergy injections. All risks associated with potential adverse effects have been explained to patient and patient handout was provided following allergy testing.    After consent obtained/verified, allergy injection subcutaneously given in back of arm(s).  Please see below for specific details of site injections, concentration of serum, and volume injected.    VIAL COLOR OF ALL VIALS TODAY IS blue 1:100. Vial allergy w/v concentration today is: 1:100     ALLERGY INJECTION FROM VIAL A GIVEN right  UPPER ARM IN THE AMOUNT OF 0.15 ML    ALLERGY INJECTION FROM VIAL B GIVEN left upper ARM IN THE AMOUNT OF 0.15  ML    ALLERGY INJECTION FROM VIAL C GIVEN leftlower ARM IN THE AMOUNT OF 0.15 ML      Documentation of vial injection specific to arm(s) noted on Allergy Immunotherapy Administration Form.       Patient waited 30 minutes for observation.No  Patient has received information and verbalizes understanding of the potential  health risks associated with allergy injections . Patient understands risks including anaphylaxis and chooses not to wait 30 minutes after administration of allergy injection(s).    Patient tolerated well without adverse reaction while the patient was in the office.    SHOT REACTION TREATMENT INSTRUCTIONS    During the 30 minute wait after an allergy injection the following symptoms should be reported:    Itching other than at the injection site  Hives or swelling other than at the injection site  Redness other than at the injection site  Difficulty breathing  Chest

## 2024-12-03 ENCOUNTER — NURSE ONLY (OUTPATIENT)
Dept: ALLERGY | Age: 44
End: 2024-12-03
Payer: MEDICAID

## 2024-12-03 VITALS
HEART RATE: 89 BPM | SYSTOLIC BLOOD PRESSURE: 137 MMHG | RESPIRATION RATE: 18 BRPM | DIASTOLIC BLOOD PRESSURE: 94 MMHG | OXYGEN SATURATION: 98 %

## 2024-12-03 DIAGNOSIS — J30.1 NON-SEASONAL ALLERGIC RHINITIS DUE TO POLLEN: ICD-10-CM

## 2024-12-03 DIAGNOSIS — J30.9 CHRONIC ALLERGIC RHINITIS: Primary | ICD-10-CM

## 2024-12-03 PROCEDURE — 95117 IMMUNOTHERAPY INJECTIONS: CPT | Performed by: NURSE PRACTITIONER

## 2024-12-04 ENCOUNTER — OFFICE VISIT (OUTPATIENT)
Dept: RHEUMATOLOGY | Age: 44
End: 2024-12-04
Payer: MEDICAID

## 2024-12-04 VITALS
WEIGHT: 222 LBS | SYSTOLIC BLOOD PRESSURE: 112 MMHG | BODY MASS INDEX: 37.9 KG/M2 | HEIGHT: 64 IN | DIASTOLIC BLOOD PRESSURE: 74 MMHG | HEART RATE: 85 BPM | OXYGEN SATURATION: 97 %

## 2024-12-04 DIAGNOSIS — M46.1 DEGENERATIVE JOINT DISEASE OF SACROILIAC JOINT (HCC): ICD-10-CM

## 2024-12-04 DIAGNOSIS — G89.29 CHRONIC MIDLINE LOW BACK PAIN WITH BILATERAL SCIATICA: ICD-10-CM

## 2024-12-04 DIAGNOSIS — M54.42 CHRONIC MIDLINE LOW BACK PAIN WITH BILATERAL SCIATICA: ICD-10-CM

## 2024-12-04 DIAGNOSIS — M54.41 CHRONIC MIDLINE LOW BACK PAIN WITH BILATERAL SCIATICA: ICD-10-CM

## 2024-12-04 DIAGNOSIS — L40.9 PSORIASIS: Primary | ICD-10-CM

## 2024-12-04 PROCEDURE — G8484 FLU IMMUNIZE NO ADMIN: HCPCS | Performed by: INTERNAL MEDICINE

## 2024-12-04 PROCEDURE — G8417 CALC BMI ABV UP PARAM F/U: HCPCS | Performed by: INTERNAL MEDICINE

## 2024-12-04 PROCEDURE — G8427 DOCREV CUR MEDS BY ELIG CLIN: HCPCS | Performed by: INTERNAL MEDICINE

## 2024-12-04 PROCEDURE — 4004F PT TOBACCO SCREEN RCVD TLK: CPT | Performed by: INTERNAL MEDICINE

## 2024-12-04 PROCEDURE — 99213 OFFICE O/P EST LOW 20 MIN: CPT | Performed by: INTERNAL MEDICINE

## 2024-12-04 ASSESSMENT — ENCOUNTER SYMPTOMS
EYES NEGATIVE: 1
GASTROINTESTINAL NEGATIVE: 1

## 2024-12-04 NOTE — PROGRESS NOTES
[facility] RHEUMATOLOGY FOLLOW UP NOTE     Date Of Service: 12/4/2024  PCP: Rebecca Elizalde, APRN - CNP   Name: Eufemia Koo   MRN: [id number]      Subjective     Cc: Follow-up (4 month f/u polyarthralgia/She is having pain in her right shoulder, back down in to her hips. Her pain is a 5.)     Eufemia Koo  is a(n)44 y.o. female with a hx of  has a past medical history of Anxiety, Asthma, Bipolar disorder ([facility]), Chronic back pain, DDD (degenerative disc disease), cervical, DDD (degenerative disc disease), thoracolumbar, Depression, GERD (gastroesophageal reflux disease), Headache, and Substance abuse ([facility]).  here for the f/u evaluation     Interval hx:     -- psoriasis  - no active plaques - treated by dermatology with kenalog and nizoral shampoo    Pain currently in the lower back , bilateral thighs and right shoulder. Pain up to 10/10 over the past wee- in the hips and lower back. Aggravating:  standing, walking, laying.    Alleviating- celebrex, gabpaneint, baclofen (some relief).   - leg weakness with any activity- no relief with rest.   - morning stiffness in the hips and back lasting at least 30 mintues   - radicular pain into both thighs - typically not past the knees.     -- tobacco dependence - smoking 3/4 ppd.     REVIEW OF SYSTEMS: (ROS)    Review of Systems   Constitutional: Negative.    HENT: Negative.     Eyes: Negative.    Cardiovascular: Negative.    Gastrointestinal: Negative.    Endocrine: Negative.    Genitourinary: Negative.    Skin: Negative.    Neurological: Negative.    Hematological: Negative.        PmHx:  has a past medical history of Anxiety, Asthma, Bipolar disorder ([facility]), Chronic back pain, DDD (degenerative disc disease), cervical, DDD (degenerative disc disease), thoracolumbar, Depression, GERD (gastroesophageal reflux disease), Headache, and Substance abuse ([facility]).     Social History:  reports that she has been smoking cigarettes. She started smoking about 29 years ago. She has a

## 2024-12-10 ENCOUNTER — NURSE ONLY (OUTPATIENT)
Dept: ALLERGY | Age: 44
End: 2024-12-10
Payer: MEDICAID

## 2024-12-10 VITALS
OXYGEN SATURATION: 98 % | DIASTOLIC BLOOD PRESSURE: 99 MMHG | HEART RATE: 91 BPM | RESPIRATION RATE: 18 BRPM | SYSTOLIC BLOOD PRESSURE: 123 MMHG

## 2024-12-10 DIAGNOSIS — J30.9 CHRONIC ALLERGIC RHINITIS: Primary | ICD-10-CM

## 2024-12-10 DIAGNOSIS — J30.1 NON-SEASONAL ALLERGIC RHINITIS DUE TO POLLEN: ICD-10-CM

## 2024-12-10 PROCEDURE — 95117 IMMUNOTHERAPY INJECTIONS: CPT | Performed by: NURSE PRACTITIONER

## 2024-12-10 NOTE — PROGRESS NOTES
PATIENT HAS THE FOLLOWING DIAGNOSIS SUPPORTING ADMINISTRATION OF ALLERGY INJECTIONS: J30.1Allergic rhinitis due to pollen  AND [id number] MULTIPLE ALLERGY INJECTIONS FOR ALLERGY INJECTION ADMINISTRATION      Patient here for allergy injection today.  Patient was presented with the opportunity to speak with provider and ask questions regarding allergy injections.  Patient was also instructed they need to wait 30 minutes after receiving allergy injections. All risks associated with potential adverse effects have been explained to patient and patient handout was provided following allergy testing.    After consent obtained/verified, allergy injection subcutaneously given in back of arm(s).  Please see below for specific details of site injections, concentration of serum, and volume injected.    VIAL COLOR OF ALL VIALS TODAY IS blue 1:100. Vial allergy w/v concentration today is: 1:100     ALLERGY INJECTION FROM VIAL A GIVEN right  UPPER ARM IN THE AMOUNT OF 0.25 ML    ALLERGY INJECTION FROM VIAL B GIVEN left lower ARM IN THE AMOUNT OF 0.25  ML    ALLERGY INJECTION FROM VIAL C GIVEN leftupper ARM IN THE AMOUNT OF 0.25 ML      Documentation of vial injection specific to arm(s) noted on Allergy Immunotherapy Administration Form.       Patient waited 30 minutes for observation.No  Patient has received information and verbalizes understanding of the potential  health risks associated with allergy injections . Patient understands risks including anaphylaxis and chooses not to wait 30 minutes after administration of allergy injection(s).    Patient tolerated well without adverse reaction while the patient was in the office.    SHOT REACTION TREATMENT INSTRUCTIONS    During the 30 minute wait after an allergy injection the following symptoms should be reported:    Itching other than at the injection site  Hives or swelling other than at the injection site  Redness other than at the injection site  Difficulty breathing  Chest

## 2024-12-12 ENCOUNTER — NURSE ONLY (OUTPATIENT)
Dept: ALLERGY | Age: 44
End: 2024-12-12
Payer: MEDICAID

## 2024-12-12 VITALS
HEART RATE: 78 BPM | DIASTOLIC BLOOD PRESSURE: 80 MMHG | SYSTOLIC BLOOD PRESSURE: 119 MMHG | RESPIRATION RATE: 18 BRPM | OXYGEN SATURATION: 96 %

## 2024-12-12 DIAGNOSIS — J30.1 NON-SEASONAL ALLERGIC RHINITIS DUE TO POLLEN: ICD-10-CM

## 2024-12-12 DIAGNOSIS — J30.9 CHRONIC ALLERGIC RHINITIS: Primary | ICD-10-CM

## 2024-12-12 PROCEDURE — 95117 IMMUNOTHERAPY INJECTIONS: CPT | Performed by: NURSE PRACTITIONER

## 2024-12-17 ENCOUNTER — NURSE ONLY (OUTPATIENT)
Dept: ALLERGY | Age: 44
End: 2024-12-17
Payer: MEDICAID

## 2024-12-17 VITALS
OXYGEN SATURATION: 98 % | RESPIRATION RATE: 18 BRPM | DIASTOLIC BLOOD PRESSURE: 81 MMHG | SYSTOLIC BLOOD PRESSURE: 133 MMHG | HEART RATE: 79 BPM

## 2024-12-17 DIAGNOSIS — J30.9 CHRONIC ALLERGIC RHINITIS: Primary | ICD-10-CM

## 2024-12-17 DIAGNOSIS — J30.1 NON-SEASONAL ALLERGIC RHINITIS DUE TO POLLEN: ICD-10-CM

## 2024-12-17 PROCEDURE — 95117 IMMUNOTHERAPY INJECTIONS: CPT | Performed by: NURSE PRACTITIONER

## 2024-12-17 NOTE — PROGRESS NOTES
PATIENT HAS THE FOLLOWING DIAGNOSIS SUPPORTING ADMINISTRATION OF ALLERGY INJECTIONS: J30.1Allergic rhinitis due to pollen  AND 09635 MULTIPLE ALLERGY INJECTIONS FOR ALLERGY INJECTION ADMINISTRATION      Patient here for allergy injection today.  Patient was presented with the opportunity to speak with provider and ask questions regarding allergy injections.  Patient was also instructed they need to wait 30 minutes after receiving allergy injections. All risks associated with potential adverse effects have been explained to patient and patient handout was provided following allergy testing.    After consent obtained/verified, allergy injection subcutaneously given in back of arm(s).  Please see below for specific details of site injections, concentration of serum, and volume injected.    VIAL COLOR OF ALL VIALS TODAY IS blue 1:100. Vial allergy w/v concentration today is: 1:100     ALLERGY INJECTION FROM VIAL A GIVEN right  UPPER ARM IN THE AMOUNT OF 0.35 ML    ALLERGY INJECTION FROM VIAL B GIVEN left upper ARM IN THE AMOUNT OF 0.35  ML    ALLERGY INJECTION FROM VIAL C GIVEN left lower ARM IN THE AMOUNT OF 0.35 ML      Documentation of vial injection specific to arm(s) noted on Allergy Immunotherapy Administration Form.       Patient waited 30 minutes for observation.No  Patient has received information and verbalizes understanding of the potential  health risks associated with allergy injections . Patient understands risks including anaphylaxis and chooses not to wait 30 minutes after administration of allergy injection(s).    Patient tolerated well without adverse reaction while the patient was in the office.    SHOT REACTION TREATMENT INSTRUCTIONS    During the 30 minute wait after an allergy injection the following symptoms should be reported:    Itching other than at the injection site  Hives or swelling other than at the injection site  Redness other than at the injection site  Difficulty breathing  Chest

## 2024-12-19 ENCOUNTER — NURSE ONLY (OUTPATIENT)
Dept: ALLERGY | Age: 44
End: 2024-12-19
Payer: MEDICAID

## 2024-12-19 VITALS
DIASTOLIC BLOOD PRESSURE: 93 MMHG | OXYGEN SATURATION: 98 % | SYSTOLIC BLOOD PRESSURE: 137 MMHG | RESPIRATION RATE: 18 BRPM | HEART RATE: 105 BPM

## 2024-12-19 DIAGNOSIS — J30.1 NON-SEASONAL ALLERGIC RHINITIS DUE TO POLLEN: ICD-10-CM

## 2024-12-19 DIAGNOSIS — J30.9 CHRONIC ALLERGIC RHINITIS: Primary | ICD-10-CM

## 2024-12-19 PROCEDURE — 95117 IMMUNOTHERAPY INJECTIONS: CPT | Performed by: NURSE PRACTITIONER

## 2024-12-23 ENCOUNTER — NURSE ONLY (OUTPATIENT)
Dept: ALLERGY | Age: 44
End: 2024-12-23
Payer: MEDICAID

## 2024-12-23 VITALS
DIASTOLIC BLOOD PRESSURE: 98 MMHG | SYSTOLIC BLOOD PRESSURE: 159 MMHG | OXYGEN SATURATION: 98 % | HEART RATE: 90 BPM | RESPIRATION RATE: 18 BRPM

## 2024-12-23 DIAGNOSIS — J30.9 CHRONIC ALLERGIC RHINITIS: Primary | ICD-10-CM

## 2024-12-23 DIAGNOSIS — J30.1 NON-SEASONAL ALLERGIC RHINITIS DUE TO POLLEN: ICD-10-CM

## 2024-12-23 PROCEDURE — 95117 IMMUNOTHERAPY INJECTIONS: CPT | Performed by: NURSE PRACTITIONER

## 2024-12-23 NOTE — PROGRESS NOTES
PATIENT HAS THE FOLLOWING DIAGNOSIS SUPPORTING ADMINISTRATION OF ALLERGY INJECTIONS: J30.1Allergic rhinitis due to pollen  AND 67084 MULTIPLE ALLERGY INJECTIONS FOR ALLERGY INJECTION ADMINISTRATION      Patient here for allergy injection today.  Patient was presented with the opportunity to speak with provider and ask questions regarding allergy injections.  Patient was also instructed they need to wait 30 minutes after receiving allergy injections. All risks associated with potential adverse effects have been explained to patient and patient handout was provided following allergy testing.    After consent obtained/verified, allergy injection subcutaneously given in back of arm(s).  Please see below for specific details of site injections, concentration of serum, and volume injected.    VIAL COLOR OF ALL VIALS TODAY IS blue 1:100. Vial allergy w/v concentration today is: 1:100     ALLERGY INJECTION FROM VIAL A GIVEN right  UPPER ARM IN THE AMOUNT OF 0.45 ML    ALLERGY INJECTION FROM VIAL B GIVEN left lower ARM IN THE AMOUNT OF 0.45  ML    ALLERGY INJECTION FROM VIAL C GIVEN leftupper ARM IN THE AMOUNT OF 0.45 ML      Documentation of vial injection specific to arm(s) noted on Allergy Immunotherapy Administration Form.       Patient waited 30 minutes for observation.No  Patient has received information and verbalizes understanding of the potential  health risks associated with allergy injections . Patient understands risks including anaphylaxis and chooses not to wait 30 minutes after administration of allergy injection(s).    Patient tolerated well without adverse reaction while the patient was in the office.    SHOT REACTION TREATMENT INSTRUCTIONS    During the 30 minute wait after an allergy injection the following symptoms should be reported:    Itching other than at the injection site  Hives or swelling other than at the injection site  Redness other than at the injection site  Difficulty breathing  Chest

## 2025-01-07 ENCOUNTER — NURSE ONLY (OUTPATIENT)
Dept: ALLERGY | Age: 45
End: 2025-01-07
Payer: MEDICAID

## 2025-01-07 VITALS
RESPIRATION RATE: 16 BRPM | OXYGEN SATURATION: 96 % | DIASTOLIC BLOOD PRESSURE: 97 MMHG | HEART RATE: 84 BPM | SYSTOLIC BLOOD PRESSURE: 139 MMHG

## 2025-01-07 DIAGNOSIS — J30.9 CHRONIC ALLERGIC RHINITIS: Primary | ICD-10-CM

## 2025-01-07 DIAGNOSIS — J30.1 NON-SEASONAL ALLERGIC RHINITIS DUE TO POLLEN: Primary | ICD-10-CM

## 2025-01-07 DIAGNOSIS — J45.30 MILD PERSISTENT ASTHMA WITHOUT COMPLICATION: ICD-10-CM

## 2025-01-07 DIAGNOSIS — J30.1 NON-SEASONAL ALLERGIC RHINITIS DUE TO POLLEN: ICD-10-CM

## 2025-01-07 PROCEDURE — 95117 IMMUNOTHERAPY INJECTIONS: CPT | Performed by: NURSE PRACTITIONER

## 2025-01-07 NOTE — PROGRESS NOTES
PATIENT HAS THE FOLLOWING DIAGNOSIS SUPPORTING ADMINISTRATION OF ALLERGY INJECTIONS: J30.1Allergic rhinitis due to pollen  AND 08022 MULTIPLE ALLERGY INJECTIONS FOR ALLERGY INJECTION ADMINISTRATION      Patient here for allergy injection today.  Patient was presented with the opportunity to speak with provider and ask questions regarding allergy injections.  Patient was also instructed they need to wait 30 minutes after receiving allergy injections. All risks associated with potential adverse effects have been explained to patient and patient handout was provided following allergy testing.    After consent obtained/verified, allergy injection subcutaneously given in back of arm(s).  Please see below for specific details of site injections, concentration of serum, and volume injected.    VIAL COLOR OF ALL VIALS TODAY IS blue 1:100. Vial allergy w/v concentration today is: 1:100     ALLERGY INJECTION FROM VIAL A GIVEN left  UPPER ARM IN THE AMOUNT OF 0.45 ML    ALLERGY INJECTION FROM VIAL B GIVEN right lower ARM IN THE AMOUNT OF 0.45  ML    ALLERGY INJECTION FROM VIAL C GIVEN rightupper ARM IN THE AMOUNT OF 0.45 ML      Documentation of vial injection specific to arm(s) noted on Allergy Immunotherapy Administration Form.       Patient waited 30 minutes for observation.No  Patient has received information and verbalizes understanding of the potential  health risks associated with allergy injections . Patient understands risks including anaphylaxis and chooses not to wait 30 minutes after administration of allergy injection(s).    Patient tolerated well without adverse reaction while the patient was in the office.    SHOT REACTION TREATMENT INSTRUCTIONS    During the 30 minute wait after an allergy injection the following symptoms should be reported:    Itching other than at the injection site  Hives or swelling other than at the injection site  Redness other than at the injection site  Difficulty breathing  Chest

## 2025-01-08 RX ORDER — MONTELUKAST SODIUM 10 MG/1
10 TABLET ORAL NIGHTLY
Qty: 90 TABLET | Refills: 3 | Status: SHIPPED | OUTPATIENT
Start: 2025-01-08 | End: 2025-03-04 | Stop reason: SDUPTHER

## 2025-01-08 NOTE — TELEPHONE ENCOUNTER
Covering due to previous provider being unavailable reviewed last office note. Refill placed will have staff reschedule patient follow-up at same approximate time frame. Please advise patient to call office with any questions or issues related to her breathing.

## 2025-01-09 ENCOUNTER — TELEPHONE (OUTPATIENT)
Dept: PHYSICAL MEDICINE AND REHAB | Age: 45
End: 2025-01-09

## 2025-01-09 ENCOUNTER — NURSE ONLY (OUTPATIENT)
Dept: ALLERGY | Age: 45
End: 2025-01-09

## 2025-01-09 VITALS
SYSTOLIC BLOOD PRESSURE: 134 MMHG | RESPIRATION RATE: 18 BRPM | DIASTOLIC BLOOD PRESSURE: 95 MMHG | HEART RATE: 98 BPM | OXYGEN SATURATION: 98 %

## 2025-01-09 DIAGNOSIS — J30.1 NON-SEASONAL ALLERGIC RHINITIS DUE TO POLLEN: ICD-10-CM

## 2025-01-09 DIAGNOSIS — J30.9 CHRONIC ALLERGIC RHINITIS: Primary | ICD-10-CM

## 2025-01-09 NOTE — TELEPHONE ENCOUNTER
01/23/25 and 02/05/25 [employer] peripheral nerve stimulator left superior cluneal nerve denied.    Reason: Not a covered service    Patient notified please cancel [employer] reps.

## 2025-01-09 NOTE — PROGRESS NOTES
PATIENT HAS THE FOLLOWING DIAGNOSIS SUPPORTING ADMINISTRATION OF ALLERGY INJECTIONS: J30.1Allergic rhinitis due to pollen  AND [id number] MULTIPLE ALLERGY INJECTIONS FOR ALLERGY INJECTION ADMINISTRATION      Patient here for allergy injection today.  Patient was presented with the opportunity to speak with provider and ask questions regarding allergy injections.  Patient was also instructed they need to wait 30 minutes after receiving allergy injections. All risks associated with potential adverse effects have been explained to patient and patient handout was provided following allergy testing.    After consent obtained/verified, allergy injection subcutaneously given in back of arm(s).  Please see below for specific details of site injections, concentration of serum, and volume injected.    VIAL COLOR OF ALL VIALS TODAY IS blue 1:100. Vial allergy w/v concentration today is: 1:100     ALLERGY INJECTION FROM VIAL A GIVEN right  UPPER ARM IN THE AMOUNT OF 0.50 ML    ALLERGY INJECTION FROM VIAL B GIVEN left upper ARM IN THE AMOUNT OF 0.50  ML    ALLERGY INJECTION FROM VIAL C GIVEN leftlower ARM IN THE AMOUNT OF 0.50 ML      Documentation of vial injection specific to arm(s) noted on Allergy Immunotherapy Administration Form.       Patient waited 30 minutes for observation.No  Patient has received information and verbalizes understanding of the potential  health risks associated with allergy injections . Patient understands risks including anaphylaxis and chooses not to wait 30 minutes after administration of allergy injection(s).    Patient tolerated well without adverse reaction while the patient was in the office.    SHOT REACTION TREATMENT INSTRUCTIONS    During the 30 minute wait after an allergy injection the following symptoms should be reported:    Itching other than at the injection site  Hives or swelling other than at the injection site  Redness other than at the injection site  Difficulty breathing  Chest

## 2025-01-09 NOTE — TELEPHONE ENCOUNTER
01/23/25 and 2/5/25 [employer] peripheral nerve stimulator right and left superior cluneal nerve denied.    Reason: Not a covered service. Appeal will not change decision for a service that is not covered.    Patient wanting to know if there is an alternative procedure to schedule or if we need to make a f/u appointment to discuss options.

## 2025-01-10 ENCOUNTER — TELEMEDICINE (OUTPATIENT)
Dept: PSYCHIATRY | Age: 45
End: 2025-01-10

## 2025-01-10 DIAGNOSIS — F39 UNSPECIFIED MOOD (AFFECTIVE) DISORDER (HCC): ICD-10-CM

## 2025-01-10 DIAGNOSIS — F43.22 ADJUSTMENT DISORDER WITH ANXIETY: Primary | ICD-10-CM

## 2025-01-10 DIAGNOSIS — F17.200 TOBACCO USE DISORDER: ICD-10-CM

## 2025-01-10 RX ORDER — VARENICLINE TARTRATE 1 MG/1
1 TABLET, FILM COATED ORAL 2 TIMES DAILY
Qty: 60 TABLET | Refills: 2 | Status: SHIPPED | OUTPATIENT
Start: 2025-02-13

## 2025-01-10 RX ORDER — VARENICLINE TARTRATE 0.5 (11)-1
KIT ORAL
Qty: 53 EACH | Refills: 0 | Status: SHIPPED | OUTPATIENT
Start: 2025-01-10

## 2025-01-10 ASSESSMENT — PATIENT HEALTH QUESTIONNAIRE - PHQ9
4. FEELING TIRED OR HAVING LITTLE ENERGY: SEVERAL DAYS
10. IF YOU CHECKED OFF ANY PROBLEMS, HOW DIFFICULT HAVE THESE PROBLEMS MADE IT FOR YOU TO DO YOUR WORK, TAKE CARE OF THINGS AT HOME, OR GET ALONG WITH OTHER PEOPLE: SOMEWHAT DIFFICULT
SUM OF ALL RESPONSES TO PHQ QUESTIONS 1-9: 5
8. MOVING OR SPEAKING SO SLOWLY THAT OTHER PEOPLE COULD HAVE NOTICED. OR THE OPPOSITE - BEING SO FIDGETY OR RESTLESS THAT YOU HAVE BEEN MOVING AROUND A LOT MORE THAN USUAL: NOT AT ALL
9. THOUGHTS THAT YOU WOULD BE BETTER OFF DEAD, OR OF HURTING YOURSELF: NOT AT ALL
3. TROUBLE FALLING OR STAYING ASLEEP: SEVERAL DAYS
1. LITTLE INTEREST OR PLEASURE IN DOING THINGS: SEVERAL DAYS
5. POOR APPETITE OR OVEREATING: SEVERAL DAYS
SUM OF ALL RESPONSES TO PHQ QUESTIONS 1-9: 5
10. IF YOU CHECKED OFF ANY PROBLEMS, HOW DIFFICULT HAVE THESE PROBLEMS MADE IT FOR YOU TO DO YOUR WORK, TAKE CARE OF THINGS AT HOME, OR GET ALONG WITH OTHER PEOPLE: SOMEWHAT DIFFICULT
7. TROUBLE CONCENTRATING ON THINGS, SUCH AS READING THE NEWSPAPER OR WATCHING TELEVISION: NOT AT ALL
3. TROUBLE FALLING OR STAYING ASLEEP: SEVERAL DAYS
2. FEELING DOWN, DEPRESSED OR HOPELESS: SEVERAL DAYS
6. FEELING BAD ABOUT YOURSELF - OR THAT YOU ARE A FAILURE OR HAVE LET YOURSELF OR YOUR FAMILY DOWN: NOT AT ALL
5. POOR APPETITE OR OVEREATING: SEVERAL DAYS
SUM OF ALL RESPONSES TO PHQ QUESTIONS 1-9: 5
2. FEELING DOWN, DEPRESSED OR HOPELESS: SEVERAL DAYS
7. TROUBLE CONCENTRATING ON THINGS, SUCH AS READING THE NEWSPAPER OR WATCHING TELEVISION: NOT AT ALL
SUM OF ALL RESPONSES TO PHQ9 QUESTIONS 1 & 2: 2
SUM OF ALL RESPONSES TO PHQ QUESTIONS 1-9: 5
SUM OF ALL RESPONSES TO PHQ QUESTIONS 1-9: 5
1. LITTLE INTEREST OR PLEASURE IN DOING THINGS: SEVERAL DAYS
6. FEELING BAD ABOUT YOURSELF - OR THAT YOU ARE A FAILURE OR HAVE LET YOURSELF OR YOUR FAMILY DOWN: NOT AT ALL
4. FEELING TIRED OR HAVING LITTLE ENERGY: SEVERAL DAYS
9. THOUGHTS THAT YOU WOULD BE BETTER OFF DEAD, OR OF HURTING YOURSELF: NOT AT ALL
8. MOVING OR SPEAKING SO SLOWLY THAT OTHER PEOPLE COULD HAVE NOTICED. OR THE OPPOSITE, BEING SO FIGETY OR RESTLESS THAT YOU HAVE BEEN MOVING AROUND A LOT MORE THAN USUAL: NOT AT ALL

## 2025-01-10 ASSESSMENT — ANXIETY QUESTIONNAIRES
4. TROUBLE RELAXING: NOT AT ALL
3. WORRYING TOO MUCH ABOUT DIFFERENT THINGS: SEVERAL DAYS
2. NOT BEING ABLE TO STOP OR CONTROL WORRYING: NOT AT ALL
7. FEELING AFRAID AS IF SOMETHING AWFUL MIGHT HAPPEN: NOT AT ALL
1. FEELING NERVOUS, ANXIOUS, OR ON EDGE: NOT AT ALL
4. TROUBLE RELAXING: NOT AT ALL
7. FEELING AFRAID AS IF SOMETHING AWFUL MIGHT HAPPEN: NOT AT ALL
5. BEING SO RESTLESS THAT IT IS HARD TO SIT STILL: SEVERAL DAYS
GAD7 TOTAL SCORE: 3
IF YOU CHECKED OFF ANY PROBLEMS ON THIS QUESTIONNAIRE, HOW DIFFICULT HAVE THESE PROBLEMS MADE IT FOR YOU TO DO YOUR WORK, TAKE CARE OF THINGS AT HOME, OR GET ALONG WITH OTHER PEOPLE: SOMEWHAT DIFFICULT
2. NOT BEING ABLE TO STOP OR CONTROL WORRYING: NOT AT ALL
IF YOU CHECKED OFF ANY PROBLEMS ON THIS QUESTIONNAIRE, HOW DIFFICULT HAVE THESE PROBLEMS MADE IT FOR YOU TO DO YOUR WORK, TAKE CARE OF THINGS AT HOME, OR GET ALONG WITH OTHER PEOPLE: SOMEWHAT DIFFICULT
1. FEELING NERVOUS, ANXIOUS, OR ON EDGE: NOT AT ALL
5. BEING SO RESTLESS THAT IT IS HARD TO SIT STILL: SEVERAL DAYS
6. BECOMING EASILY ANNOYED OR IRRITABLE: SEVERAL DAYS
3. WORRYING TOO MUCH ABOUT DIFFERENT THINGS: SEVERAL DAYS
6. BECOMING EASILY ANNOYED OR IRRITABLE: SEVERAL DAYS

## 2025-01-10 NOTE — PROGRESS NOTES
Total Score 3  7   How difficult have these problems made it for you to do your work, take care of things at home, or get along with other people? Somewhat difficult Somewhat difficult Somewhat difficult           ICD-10-CM    1. Adjustment disorder with anxiety  F43.22       2. Tobacco use disorder  F17.200 Varenicline Tartrate, Starter, 0.5 MG X 11 & 1 MG X 42 TBPK     varenicline (CHANTIX) 1 MG tablet      3. Unspecified mood (affective) disorder ([facility])  F39                   ASSESSMENT:  Patient having some anxiety around ongoing back pain and potential treatment modalities which we processed through today.  Depression is stable.  We will go ahead and resume Chantix to target tobacco use disorder.  Encouraged her to continue with this and discussed the maintenance dosing.  She is no longer taking trazodone so we will discontinue this.  She is taking hydroxyzine nightly and we discussed trying to cut this in half or holding all together to see if this helps with fatigue. Patient agreeable to plan.    There was no indication of currently increased or imminent danger to self or others based on today's exam; outpatient care is appropriate. Modifiable risk factors are being addressed. Safety plan is in place.  Patient/Family aware of how to reach me/covering clinician and members of treatment team in case of emergency and that mychart message is for non-urgent logistical matters only such as scheduling or non urgent questions. Advised to call 911 or go to the nearest emergency room should any acute safety concerns arise while awaiting a call back. Patient is aware of the 988 Crisis Line as well if needing additional support.      PLAN:     Medications:   Hydroxyzine 25-50mg nightly prn for sleep. Patient is going to try to hold medication  Chantix prescribed  Risks, benefits and alternatives discussed.   Consider in the future:   Collateral: obtain collateral as needed  Referral: none  Labs/Tests/Imaging:

## 2025-01-10 NOTE — PATIENT INSTRUCTIONS
Plan:  Eufemia Koo, it was nice seeing you today  Resume Chantix. Read label on how to dose. Make sure to continue maintenance dosing. Monitor for side effects  Try holding hydroxyzine or cutting back to half tablet  Follow up in 3 months      -Please take medications as prescribed  -Refrain from alcohol or drug use  -Seek emergency help via the emergency and/or calling 911 should symptoms become severe, worsen, or with other concerning symptoms.Go immediately to the emergency room and/or call 911 with any suicidal or homicidal ideations or if audio/visual hallucinations develop.   -Contact office with any questions or concerns.      Crisis phone numbers:  988-national suicide hotline, call or text  [facility], [facility] [phone].  [facility] [phone]  [facility] [phone].  [facility] [phone].  [facility] [phone].  [facility] [phone].

## 2025-01-16 ENCOUNTER — NURSE ONLY (OUTPATIENT)
Dept: ALLERGY | Age: 45
End: 2025-01-16

## 2025-01-16 VITALS
SYSTOLIC BLOOD PRESSURE: 131 MMHG | HEART RATE: 93 BPM | DIASTOLIC BLOOD PRESSURE: 98 MMHG | RESPIRATION RATE: 18 BRPM | OXYGEN SATURATION: 98 %

## 2025-01-16 DIAGNOSIS — J30.9 CHRONIC ALLERGIC RHINITIS: Primary | ICD-10-CM

## 2025-01-16 DIAGNOSIS — J30.1 NON-SEASONAL ALLERGIC RHINITIS DUE TO POLLEN: ICD-10-CM

## 2025-01-20 ENCOUNTER — NURSE ONLY (OUTPATIENT)
Dept: ALLERGY | Age: 45
End: 2025-01-20
Payer: MEDICAID

## 2025-01-20 VITALS — HEART RATE: 83 BPM | SYSTOLIC BLOOD PRESSURE: 139 MMHG | DIASTOLIC BLOOD PRESSURE: 95 MMHG | OXYGEN SATURATION: 97 %

## 2025-01-20 DIAGNOSIS — J30.1 NON-SEASONAL ALLERGIC RHINITIS DUE TO POLLEN: ICD-10-CM

## 2025-01-20 DIAGNOSIS — J30.9 CHRONIC ALLERGIC RHINITIS: Primary | ICD-10-CM

## 2025-01-20 PROCEDURE — 95117 IMMUNOTHERAPY INJECTIONS: CPT | Performed by: NURSE PRACTITIONER

## 2025-01-20 NOTE — PROGRESS NOTES
PATIENT HAS THE FOLLOWING DIAGNOSIS SUPPORTING ADMINISTRATION OF ALLERGY INJECTIONS: J30.1Allergic rhinitis due to pollen  AND 14893 MULTIPLE ALLERGY INJECTIONS FOR ALLERGY INJECTION ADMINISTRATION      Patient here for allergy injection today.  Patient was presented with the opportunity to speak with provider and ask questions regarding allergy injections.  Patient was also instructed they need to wait 30 minutes after receiving allergy injections. All risks associated with potential adverse effects have been explained to patient and patient handout was provided following allergy testing.    After consent obtained/verified, allergy injection subcutaneously given in back of arm(s).  Please see below for specific details of site injections, concentration of serum, and volume injected.    VIAL COLOR OF ALL VIALS TODAY IS yellow 1:10. Vial allergy w/v concentration today is: 1:10     ALLERGY INJECTION FROM VIAL A GIVEN right  UPPER ARM IN THE AMOUNT OF 0.10 ML    ALLERGY INJECTION FROM VIAL B GIVEN left lower ARM IN THE AMOUNT OF 0.10  ML    ALLERGY INJECTION FROM VIAL C GIVEN left upper ARM IN THE AMOUNT OF 0.10 ML      Documentation of vial injection specific to arm(s) noted on Allergy Immunotherapy Administration Form.       Patient waited 30 minutes for observation.No  Patient has received information and verbalizes understanding of the potential  health risks associated with allergy injections . Patient understands risks including anaphylaxis and chooses not to wait 30 minutes after administration of allergy injection(s).    Patient tolerated well without adverse reaction while the patient was in the office.    SHOT REACTION TREATMENT INSTRUCTIONS    During the 30 minute wait after an allergy injection the following symptoms should be reported:    Itching other than at the injection site  Hives or swelling other than at the injection site  Redness other than at the injection site  Difficulty breathing  Chest  Manual Repair Warning Statement: We plan on removing the manually selected variable below in favor of our much easier automatic structured text blocks found in the previous tab. We decided to do this to help make the flow better and give you the full power of structured data. Manual selection is never going to be ideal in our platform and I would encourage you to avoid using manual selection from this point on, especially since I will be sunsetting this feature. It is important that you do one of two things with the customized text below. First, you can save all of the text in a word file so you can have it for future reference. Second, transfer the text to the appropriate area in the Library tab. Lastly, if there is a flap or graft type which we do not have you need to let us know right away so I can add it in before the variable is hidden. No need to panic, we plan to give you roughly 6 months to make the change.

## 2025-01-23 ENCOUNTER — OFFICE VISIT (OUTPATIENT)
Dept: PHYSICAL MEDICINE AND REHAB | Age: 45
End: 2025-01-23
Payer: MEDICAID

## 2025-01-23 VITALS
WEIGHT: 222 LBS | BODY MASS INDEX: 37.9 KG/M2 | HEIGHT: 64 IN | SYSTOLIC BLOOD PRESSURE: 114 MMHG | DIASTOLIC BLOOD PRESSURE: 82 MMHG

## 2025-01-23 DIAGNOSIS — K21.9 GASTROESOPHAGEAL REFLUX DISEASE WITHOUT ESOPHAGITIS: ICD-10-CM

## 2025-01-23 DIAGNOSIS — M54.17 RADICULOPATHY, LUMBOSACRAL REGION: Primary | ICD-10-CM

## 2025-01-23 DIAGNOSIS — M79.2 NEUROPATHIC PAIN: ICD-10-CM

## 2025-01-23 DIAGNOSIS — G89.4 CHRONIC PAIN SYNDROME: ICD-10-CM

## 2025-01-23 PROCEDURE — G8427 DOCREV CUR MEDS BY ELIG CLIN: HCPCS | Performed by: NURSE PRACTITIONER

## 2025-01-23 PROCEDURE — G8417 CALC BMI ABV UP PARAM F/U: HCPCS | Performed by: NURSE PRACTITIONER

## 2025-01-23 PROCEDURE — 4004F PT TOBACCO SCREEN RCVD TLK: CPT | Performed by: NURSE PRACTITIONER

## 2025-01-23 PROCEDURE — 99214 OFFICE O/P EST MOD 30 MIN: CPT | Performed by: NURSE PRACTITIONER

## 2025-01-23 NOTE — PROGRESS NOTES
Functionality Assessment/Goals Worksheet     On a scale of 0 (Does not Interfere) to 10 (Completely Interferes)     1.  Which number describes how during the past week pain has interfered with           the following:  A.  General Activity:  6  B.  Mood: 8  C.  Walking Ability:  8  D.  Normal Work (Includes both work outside the home and housework):  8  E.  Relations with Other People:   5  F.  Sleep:   9  G.  Enjoyment of Life:   10    2.  Patient Prefers to Take their Pain Medications:     []  On a regular basis   [x]  Only when necessary    []  Does not take pain medications    3.  What are the Patient's Goals/Expectations for Visiting Pain Management?     []  Learn about my pain    [x]  Receive Medication   []  Physical Therapy     []  Treat Depression   []  Receive Injections    []  Treat Sleep   []  Deal with Anxiety and Stress   []  Treat Opoid Dependence/Addiction   []  Other:                                
[facility] OR    PAIN MANAGEMENT PROCEDURE Left 05/24/2022    Lumbar RFA B/L L4-5 and L5-S1,LEFT performed by Liborio Blackmon DO at [facility] OR    PAIN MANAGEMENT PROCEDURE N/A 11/08/2022    epidural steroid injection  Lumbar 5-Sacral 1 performed by Liborio Blackmon DO at [facility] OR    PAIN MANAGEMENT PROCEDURE Bilateral 3/26/2024    Lumbar 4-5, 5-sacral 1 radiofrequency ablation bilateral performed by Liborio Blackmon DO at [facility] OR    TUBAL LIGATION         Family History   Problem Relation Age of Onset    Depression Mother     Asthma Mother     Mental Illness Mother     COPD Father     Alcohol Abuse Father     Asthma Sister     Depression Sister     Miscarriages / Stillbirths Sister     Lupus Paternal Aunt     No Known Problems Daughter     No Known Problems Daughter     Asthma Daughter     No Known Problems Brother     Heart Attack Paternal Grandfather     Heart Disease Paternal Grandfather        Medications & Allergies:   Current Outpatient Medications   Medication Instructions    albuterol (PROVENTIL) 2.5 mg, Nebulization, EVERY 4 HOURS PRN    albuterol sulfate HFA (PROVENTIL;VENTOLIN;PROAIR) 108 (90 Base) MCG/ACT inhaler INHALE 2 PUFFS BY MOUTH EVERY 4 HOURS AS NEEDED FOR WHEEZING OR  SHORTNESS  OF  BREATH    ALLERGEN EXTRACT TWICE WEEKLY    celecoxib (CELEBREX) 100 mg, Oral, 2 TIMES DAILY    cetirizine (ZYRTEC) 10 mg, Oral, DAILY    diphenhydrAMINE (BENADRYL) 25 mg, Oral, EVERY 6 HOURS PRN, Pt tried med did not help    EPINEPHrine (EPIPEN) 0.3 mg, IntraMUSCular, ONCE    EQ ARTHRITIS PAIN RELIEVER 1 % GEL APPLY 4GM  TOPICALLY TO AFFECTED AREA 4 TIMES DAILY AS NEEDED FOR PAIN    fluticasone-salmeterol (ADVAIR DISKUS) 100-50 MCG/ACT AEPB diskus inhaler 1 puff, Inhalation, 2 TIMES DAILY    gabapentin (NEURONTIN) 300 mg, Oral, 2 times daily    Handicap Placard MISC Does not apply, Exp: 04/09/2029<BR>Dx: lumbar spondylosis, lumbar radiculopathy    hydrOXYzine HCl

## 2025-01-23 NOTE — TELEPHONE ENCOUNTER
This medication refill is regarding a electronic request. Refill requested by patient.    Requested Prescriptions     Pending Prescriptions Disp Refills    omeprazole (PRILOSEC) 20 MG delayed release capsule [Pharmacy Med Name: OMEPRAZOLE 20MG CAP] 90 capsule 0     Sig: TAKE 1 CAPSULE BY MOUTH ONCE DAILY IN THE MORNING BEFORE BREAKFAST     Date of last visit: 8/27/2024   Date of next visit: 2/17/2025  Date of last refill: 6/23/24 #90/0  Pharmacy Name:  [address] - P [phone] - F [phone]     Last Lipid Panel:    Lab Results   Component Value Date/Time    CHOL 98 07/28/2023 11:34 AM    TRIG 282 07/28/2023 11:34 AM    HDL 30 07/28/2023 11:34 AM     Last CMP:   Lab Results   Component Value Date     01/08/2024    K 4.0 01/08/2024     01/08/2024    CO2 20 (L) 01/08/2024    BUN 8 01/08/2024    CREATININE 0.8 01/08/2024    GLUCOSE 99 01/08/2024    CALCIUM 9.2 01/08/2024    BILITOT 0.6 01/08/2024    ALKPHOS 98 01/08/2024    AST 15 01/08/2024    ALT 16 01/08/2024    LABGLOM >60 01/08/2024       Last Thyroid:    Lab Results   Component Value Date    TSH 2.140 03/01/2023     Last Hemoglobin A1C:    Lab Results   Component Value Date/Time    LABA1C 5.2 07/28/2023 11:34 AM       Rx verified, ordered and set to EP.

## 2025-01-24 RX ORDER — GABAPENTIN 300 MG/1
600 CAPSULE ORAL NIGHTLY
Qty: 60 CAPSULE | Refills: 2 | Status: SHIPPED | OUTPATIENT
Start: 2025-01-24 | End: 2025-04-24

## 2025-01-27 ENCOUNTER — NURSE ONLY (OUTPATIENT)
Dept: ALLERGY | Age: 45
End: 2025-01-27
Payer: MEDICAID

## 2025-01-27 VITALS — SYSTOLIC BLOOD PRESSURE: 142 MMHG | DIASTOLIC BLOOD PRESSURE: 93 MMHG

## 2025-01-27 DIAGNOSIS — J30.9 CHRONIC ALLERGIC RHINITIS: Primary | ICD-10-CM

## 2025-01-27 DIAGNOSIS — J30.1 NON-SEASONAL ALLERGIC RHINITIS DUE TO POLLEN: ICD-10-CM

## 2025-01-27 PROCEDURE — 95117 IMMUNOTHERAPY INJECTIONS: CPT | Performed by: NURSE PRACTITIONER

## 2025-01-27 NOTE — PROGRESS NOTES
PATIENT HAS THE FOLLOWING DIAGNOSIS SUPPORTING ADMINISTRATION OF ALLERGY INJECTIONS: J30.1Allergic rhinitis due to pollen  AND 12917 MULTIPLE ALLERGY INJECTIONS FOR ALLERGY INJECTION ADMINISTRATION      Patient here for allergy injection today.  Patient was presented with the opportunity to speak with provider and ask questions regarding allergy injections.  Patient was also instructed they need to wait 30 minutes after receiving allergy injections. All risks associated with potential adverse effects have been explained to patient and patient handout was provided following allergy testing.    After consent obtained/verified, allergy injection subcutaneously given in back of arm(s).  Please see below for specific details of site injections, concentration of serum, and volume injected.    VIAL COLOR OF ALL VIALS TODAY IS yellow 1:10. Vial allergy w/v concentration today is: 1:10     ALLERGY INJECTION FROM VIAL A GIVEN left  UPPER ARM IN THE AMOUNT OF 0.15 ML    ALLERGY INJECTION FROM VIAL B GIVEN right lower ARM IN THE AMOUNT OF 0.15  ML    ALLERGY INJECTION FROM VIAL C GIVEN rightupper ARM IN THE AMOUNT OF 0.15 ML      Documentation of vial injection specific to arm(s) noted on Allergy Immunotherapy Administration Form.       Patient waited 30 minutes for observation.No  Patient has received information and verbalizes understanding of the potential  health risks associated with allergy injections . Patient understands risks including anaphylaxis and chooses not to wait 30 minutes after administration of allergy injection(s).    Patient tolerated well without adverse reaction while the patient was in the office.    SHOT REACTION TREATMENT INSTRUCTIONS    During the 30 minute wait after an allergy injection the following symptoms should be reported:    Itching other than at the injection site  Hives or swelling other than at the injection site  Redness other than at the injection site  Difficulty breathing  Chest

## 2025-01-29 ENCOUNTER — NURSE ONLY (OUTPATIENT)
Dept: ALLERGY | Age: 45
End: 2025-01-29
Payer: MEDICAID

## 2025-01-29 VITALS
OXYGEN SATURATION: 98 % | DIASTOLIC BLOOD PRESSURE: 81 MMHG | HEART RATE: 80 BPM | RESPIRATION RATE: 18 BRPM | SYSTOLIC BLOOD PRESSURE: 126 MMHG

## 2025-01-29 DIAGNOSIS — J30.9 CHRONIC ALLERGIC RHINITIS: Primary | ICD-10-CM

## 2025-01-29 DIAGNOSIS — J30.1 NON-SEASONAL ALLERGIC RHINITIS DUE TO POLLEN: ICD-10-CM

## 2025-01-29 PROCEDURE — 95117 IMMUNOTHERAPY INJECTIONS: CPT | Performed by: NURSE PRACTITIONER

## 2025-01-29 NOTE — PROGRESS NOTES
PATIENT HAS THE FOLLOWING DIAGNOSIS SUPPORTING ADMINISTRATION OF ALLERGY INJECTIONS: J30.1Allergic rhinitis due to pollen  AND 94206 MULTIPLE ALLERGY INJECTIONS FOR ALLERGY INJECTION ADMINISTRATION      Patient here for allergy injection today.  Patient was presented with the opportunity to speak with provider and ask questions regarding allergy injections.  Patient was also instructed they need to wait 30 minutes after receiving allergy injections. All risks associated with potential adverse effects have been explained to patient and patient handout was provided following allergy testing.    After consent obtained/verified, allergy injection subcutaneously given in back of arm(s).  Please see below for specific details of site injections, concentration of serum, and volume injected.    VIAL COLOR OF ALL VIALS TODAY IS yellow 1:10. Vial allergy w/v concentration today is: 1:10     ALLERGY INJECTION FROM VIAL A GIVEN right  UPPER ARM IN THE AMOUNT OF 0.20 ML    ALLERGY INJECTION FROM VIAL B GIVEN left upper ARM IN THE AMOUNT OF 0.20  ML    ALLERGY INJECTION FROM VIAL C GIVEN leftlower ARM IN THE AMOUNT OF 0.20 ML      Documentation of vial injection specific to arm(s) noted on Allergy Immunotherapy Administration Form.       Patient waited 30 minutes for observation.No  Patient has received information and verbalizes understanding of the potential  health risks associated with allergy injections . Patient understands risks including anaphylaxis and chooses not to wait 30 minutes after administration of allergy injection(s).    Patient tolerated well without adverse reaction while the patient was in the office.    SHOT REACTION TREATMENT INSTRUCTIONS    During the 30 minute wait after an allergy injection the following symptoms should be reported:    Itching other than at the injection site  Hives or swelling other than at the injection site  Redness other than at the injection site  Difficulty breathing  Chest

## 2025-02-04 ENCOUNTER — NURSE ONLY (OUTPATIENT)
Dept: ALLERGY | Age: 45
End: 2025-02-04
Payer: MEDICAID

## 2025-02-04 VITALS
OXYGEN SATURATION: 98 % | RESPIRATION RATE: 18 BRPM | SYSTOLIC BLOOD PRESSURE: 141 MMHG | DIASTOLIC BLOOD PRESSURE: 87 MMHG | HEART RATE: 103 BPM

## 2025-02-04 DIAGNOSIS — J30.9 CHRONIC ALLERGIC RHINITIS: Primary | ICD-10-CM

## 2025-02-04 DIAGNOSIS — J30.1 NON-SEASONAL ALLERGIC RHINITIS DUE TO POLLEN: ICD-10-CM

## 2025-02-04 PROCEDURE — 95117 IMMUNOTHERAPY INJECTIONS: CPT | Performed by: NURSE PRACTITIONER

## 2025-02-11 ENCOUNTER — NURSE ONLY (OUTPATIENT)
Dept: ALLERGY | Age: 45
End: 2025-02-11
Payer: MEDICAID

## 2025-02-11 VITALS
SYSTOLIC BLOOD PRESSURE: 135 MMHG | RESPIRATION RATE: 18 BRPM | OXYGEN SATURATION: 98 % | HEART RATE: 91 BPM | DIASTOLIC BLOOD PRESSURE: 92 MMHG

## 2025-02-11 DIAGNOSIS — J30.9 CHRONIC ALLERGIC RHINITIS: Primary | ICD-10-CM

## 2025-02-11 DIAGNOSIS — J30.1 NON-SEASONAL ALLERGIC RHINITIS DUE TO POLLEN: ICD-10-CM

## 2025-02-11 PROCEDURE — 95117 IMMUNOTHERAPY INJECTIONS: CPT | Performed by: NURSE PRACTITIONER

## 2025-02-11 NOTE — PROGRESS NOTES
PATIENT HAS THE FOLLOWING DIAGNOSIS SUPPORTING ADMINISTRATION OF ALLERGY INJECTIONS: J30.1Allergic rhinitis due to pollen  AND 27117 MULTIPLE ALLERGY INJECTIONS FOR ALLERGY INJECTION ADMINISTRATION      Patient here for allergy injection today.  Patient was presented with the opportunity to speak with provider and ask questions regarding allergy injections.  Patient was also instructed they need to wait 30 minutes after receiving allergy injections. All risks associated with potential adverse effects have been explained to patient and patient handout was provided following allergy testing.    After consent obtained/verified, allergy injection subcutaneously given in back of arm(s).  Please see below for specific details of site injections, concentration of serum, and volume injected.    VIAL COLOR OF ALL VIALS TODAY IS yellow 1:10. Vial allergy w/v concentration today is: 1:10     ALLERGY INJECTION FROM VIAL A GIVEN right  UPPER ARM IN THE AMOUNT OF 0.30 ML    ALLERGY INJECTION FROM VIAL B GIVEN left lower ARM IN THE AMOUNT OF 0.30  ML    ALLERGY INJECTION FROM VIAL C GIVEN leftupper ARM IN THE AMOUNT OF 0.30 ML      Documentation of vial injection specific to arm(s) noted on Allergy Immunotherapy Administration Form.       Patient waited 30 minutes for observation.No  Patient has received information and verbalizes understanding of the potential  health risks associated with allergy injections . Patient understands risks including anaphylaxis and chooses not to wait 30 minutes after administration of allergy injection(s).    Patient tolerated well without adverse reaction while the patient was in the office.    SHOT REACTION TREATMENT INSTRUCTIONS    During the 30 minute wait after an allergy injection the following symptoms should be reported:    Itching other than at the injection site  Hives or swelling other than at the injection site  Redness other than at the injection site  Difficulty breathing  Chest

## 2025-02-13 ENCOUNTER — NURSE ONLY (OUTPATIENT)
Dept: ALLERGY | Age: 45
End: 2025-02-13

## 2025-02-13 VITALS
RESPIRATION RATE: 18 BRPM | OXYGEN SATURATION: 98 % | SYSTOLIC BLOOD PRESSURE: 147 MMHG | HEART RATE: 93 BPM | DIASTOLIC BLOOD PRESSURE: 95 MMHG

## 2025-02-13 DIAGNOSIS — J30.9 CHRONIC ALLERGIC RHINITIS: Primary | ICD-10-CM

## 2025-02-13 DIAGNOSIS — J30.1 NON-SEASONAL ALLERGIC RHINITIS DUE TO POLLEN: ICD-10-CM

## 2025-02-13 NOTE — PROGRESS NOTES
PATIENT HAS THE FOLLOWING DIAGNOSIS SUPPORTING ADMINISTRATION OF ALLERGY INJECTIONS: J30.1Allergic rhinitis due to pollen  AND 83691 MULTIPLE ALLERGY INJECTIONS FOR ALLERGY INJECTION ADMINISTRATION      Patient here for allergy injection today.  Patient was presented with the opportunity to speak with provider and ask questions regarding allergy injections.  Patient was also instructed they need to wait 30 minutes after receiving allergy injections. All risks associated with potential adverse effects have been explained to patient and patient handout was provided following allergy testing.    After consent obtained/verified, allergy injection subcutaneously given in back of arm(s).  Please see below for specific details of site injections, concentration of serum, and volume injected.    VIAL COLOR OF ALL VIALS TODAY IS yellow 1:10. Vial allergy w/v concentration today is: 1:10     ALLERGY INJECTION FROM VIAL A GIVEN left  UPPER ARM IN THE AMOUNT OF 0.35 ML    ALLERGY INJECTION FROM VIAL B GIVEN right lower ARM IN THE AMOUNT OF 0.35  ML    ALLERGY INJECTION FROM VIAL C GIVEN rightupper ARM IN THE AMOUNT OF 0.35 ML      Documentation of vial injection specific to arm(s) noted on Allergy Immunotherapy Administration Form.       Patient waited 30 minutes for observation.No  Patient has received information and verbalizes understanding of the potential  health risks associated with allergy injections . Patient understands risks including anaphylaxis and chooses not to wait 30 minutes after administration of allergy injection(s).    Patient tolerated well without adverse reaction while the patient was in the office.    SHOT REACTION TREATMENT INSTRUCTIONS    During the 30 minute wait after an allergy injection the following symptoms should be reported:    Itching other than at the injection site  Hives or swelling other than at the injection site  Redness other than at the injection site  Difficulty breathing  Chest

## 2025-02-17 ENCOUNTER — OFFICE VISIT (OUTPATIENT)
Dept: FAMILY MEDICINE CLINIC | Age: 45
End: 2025-02-17
Payer: MEDICAID

## 2025-02-17 VITALS
WEIGHT: 223.8 LBS | HEIGHT: 64 IN | BODY MASS INDEX: 38.21 KG/M2 | SYSTOLIC BLOOD PRESSURE: 120 MMHG | HEART RATE: 88 BPM | DIASTOLIC BLOOD PRESSURE: 70 MMHG | TEMPERATURE: 98.4 F | RESPIRATION RATE: 16 BRPM

## 2025-02-17 DIAGNOSIS — E55.9 VITAMIN D DEFICIENCY: ICD-10-CM

## 2025-02-17 DIAGNOSIS — K21.9 GASTROESOPHAGEAL REFLUX DISEASE WITHOUT ESOPHAGITIS: ICD-10-CM

## 2025-02-17 DIAGNOSIS — Z00.00 ENCOUNTER FOR WELL ADULT EXAM WITHOUT ABNORMAL FINDINGS: ICD-10-CM

## 2025-02-17 DIAGNOSIS — Z12.11 COLON CANCER SCREENING: Primary | ICD-10-CM

## 2025-02-17 DIAGNOSIS — R40.0 DAYTIME SOMNOLENCE: ICD-10-CM

## 2025-02-17 DIAGNOSIS — Z13.1 SCREENING FOR DIABETES MELLITUS: ICD-10-CM

## 2025-02-17 DIAGNOSIS — Z13.220 SCREENING FOR HYPERLIPIDEMIA: ICD-10-CM

## 2025-02-17 PROCEDURE — 99396 PREV VISIT EST AGE 40-64: CPT | Performed by: NURSE PRACTITIONER

## 2025-02-17 RX ORDER — OMEPRAZOLE 20 MG/1
20 CAPSULE, DELAYED RELEASE ORAL DAILY
Qty: 90 CAPSULE | Refills: 3 | Status: SHIPPED | OUTPATIENT
Start: 2025-02-17

## 2025-02-17 SDOH — ECONOMIC STABILITY: FOOD INSECURITY: WITHIN THE PAST 12 MONTHS, YOU WORRIED THAT YOUR FOOD WOULD RUN OUT BEFORE YOU GOT MONEY TO BUY MORE.: PATIENT DECLINED

## 2025-02-17 SDOH — ECONOMIC STABILITY: FOOD INSECURITY: WITHIN THE PAST 12 MONTHS, THE FOOD YOU BOUGHT JUST DIDN'T LAST AND YOU DIDN'T HAVE MONEY TO GET MORE.: PATIENT DECLINED

## 2025-02-17 ASSESSMENT — ENCOUNTER SYMPTOMS
DIARRHEA: 0
COUGH: 0
RHINORRHEA: 0
BLOOD IN STOOL: 0
ABDOMINAL PAIN: 0
ANAL BLEEDING: 0
COLOR CHANGE: 0
SHORTNESS OF BREATH: 0
CONSTIPATION: 0
EYE REDNESS: 0
ABDOMINAL DISTENTION: 0
SORE THROAT: 0
NAUSEA: 0
EYE DISCHARGE: 0

## 2025-02-17 NOTE — PROGRESS NOTES
Well Adult Note  Name: Eufemia Koo Today’s Date: 2025   MRN: [id number] Sex: Female   Age: 44 y.o. Ethnicity: Non- / Non    : 1980 Race: White (non-)      Eufemia Koo is here for a well adult exam.       Assessment & Plan   Colon cancer screening  -     [facility] - Param Castro MD, Gastroenterology, [place]  Encounter for well adult exam without abnormal findings  Gastroesophageal reflux disease without esophagitis  -     TSH reflex to FT4; Future  -     Glucose, Fasting; Future  -     Lipid Panel; Future  -     Comprehensive Metabolic Panel; Future  -     omeprazole (PRILOSEC) 20 MG delayed release capsule; Take 1 capsule by mouth Daily, Disp-90 capsule, R-3Normal  Vitamin D deficiency  Screening for hyperlipidemia  -     Lipid Panel; Future  Screening for diabetes mellitus  -     Glucose, Fasting; Future  Daytime somnolence  -     [facility]    Offered to schedule mammogram - pt declined due to not knowing schedule       Return in 1 year (on 2026) for CPE (Physical Exam), Annual Physical.       Subjective   History:    Health Habits:  Here for annual wellness exam. Diet: She eats 2 meals and 4 snacks per day.  She does not exercise regularly:   Physical activities include: None.   She does not take over the counter vitamins.  History of blood transfusion - No. Does she have samara tattoos - Yes.    She performs all of her ADL's without problem.  She is independent, she cooks, drives, bathes, and gets dressed without assistance. She is a .  She has 3 children.  She does not work.     Her last pap smear was 2023 and it was Normal - Descriptive Diagnosis:       NEGATIVE FOR INTRAEPITHELIAL LESION OR MALIGNANCY. . Done by Tyron Christopher - [facility].    Last period: 2025. She is not sexually active, and has had 1 partners in the last year.     Last Mammogram was 3/2023:  IMPRESSION:  No mammographic evidence of malignancy. A 1 year

## 2025-02-17 NOTE — PATIENT INSTRUCTIONS
hands, brush your teeth twice a day, and wear a seat belt in the car.   Where can you learn more?  Go to [email or web address]/patientEd and enter P072 to learn more about \"Well Visit, Ages 18 to 65: Care Instructions.\"  Current as of: April 30, 2024  Content Version: 14.3  © 2024 [employer].   Care instructions adapted under license by [employer]. If you have questions about a medical condition or this instruction, always ask your healthcare professional. [employer] disclaims any warranty or liability for your use of this information.

## 2025-02-18 ENCOUNTER — OFFICE VISIT (OUTPATIENT)
Dept: ALLERGY | Age: 45
End: 2025-02-18
Payer: MEDICAID

## 2025-02-18 ENCOUNTER — NURSE ONLY (OUTPATIENT)
Dept: ALLERGY | Age: 45
End: 2025-02-18
Payer: MEDICAID

## 2025-02-18 VITALS
HEIGHT: 64 IN | OXYGEN SATURATION: 99 % | HEART RATE: 93 BPM | WEIGHT: 226 LBS | DIASTOLIC BLOOD PRESSURE: 90 MMHG | RESPIRATION RATE: 18 BRPM | SYSTOLIC BLOOD PRESSURE: 127 MMHG | BODY MASS INDEX: 38.58 KG/M2

## 2025-02-18 VITALS
OXYGEN SATURATION: 99 % | DIASTOLIC BLOOD PRESSURE: 90 MMHG | HEART RATE: 93 BPM | SYSTOLIC BLOOD PRESSURE: 127 MMHG | RESPIRATION RATE: 18 BRPM

## 2025-02-18 DIAGNOSIS — J30.9 CHRONIC ALLERGIC RHINITIS: ICD-10-CM

## 2025-02-18 DIAGNOSIS — J30.89 NON-SEASONAL ALLERGIC RHINITIS, UNSPECIFIED TRIGGER: ICD-10-CM

## 2025-02-18 DIAGNOSIS — Z91.048 ALLERGY TO MOLD: ICD-10-CM

## 2025-02-18 DIAGNOSIS — J30.9 ALLERGIC SINUSITIS: ICD-10-CM

## 2025-02-18 DIAGNOSIS — J45.40 MODERATE PERSISTENT ASTHMA WITHOUT COMPLICATION: Primary | ICD-10-CM

## 2025-02-18 DIAGNOSIS — Z91.09 MITE ALLERGY: ICD-10-CM

## 2025-02-18 DIAGNOSIS — J30.1 NON-SEASONAL ALLERGIC RHINITIS DUE TO POLLEN: ICD-10-CM

## 2025-02-18 DIAGNOSIS — Z91.038 ALLERGY TO COCKROACHES: ICD-10-CM

## 2025-02-18 DIAGNOSIS — J30.1 NON-SEASONAL ALLERGIC RHINITIS DUE TO POLLEN: Primary | ICD-10-CM

## 2025-02-18 DIAGNOSIS — J30.81 ALLERGY TO DOG DANDER: ICD-10-CM

## 2025-02-18 DIAGNOSIS — J30.81 CAT ALLERGIES: ICD-10-CM

## 2025-02-18 DIAGNOSIS — J30.1 ALLERGY TO TREES: ICD-10-CM

## 2025-02-18 PROCEDURE — G8427 DOCREV CUR MEDS BY ELIG CLIN: HCPCS | Performed by: NURSE PRACTITIONER

## 2025-02-18 PROCEDURE — 4004F PT TOBACCO SCREEN RCVD TLK: CPT | Performed by: NURSE PRACTITIONER

## 2025-02-18 PROCEDURE — A4617 MOUTH PIECE: HCPCS | Performed by: NURSE PRACTITIONER

## 2025-02-18 PROCEDURE — 95117 IMMUNOTHERAPY INJECTIONS: CPT | Performed by: NURSE PRACTITIONER

## 2025-02-18 PROCEDURE — 99214 OFFICE O/P EST MOD 30 MIN: CPT | Performed by: NURSE PRACTITIONER

## 2025-02-18 PROCEDURE — 94664 DEMO&/EVAL PT USE INHALER: CPT | Performed by: NURSE PRACTITIONER

## 2025-02-18 PROCEDURE — 95012 NITRIC OXIDE EXP GAS DETER: CPT | Performed by: NURSE PRACTITIONER

## 2025-02-18 PROCEDURE — G8417 CALC BMI ABV UP PARAM F/U: HCPCS | Performed by: NURSE PRACTITIONER

## 2025-02-18 RX ORDER — TIOTROPIUM BROMIDE INHALATION SPRAY 1.56 UG/1
2 SPRAY, METERED RESPIRATORY (INHALATION) DAILY
Qty: 1 EACH | Refills: 5 | Status: SHIPPED | OUTPATIENT
Start: 2025-02-18

## 2025-02-18 RX ORDER — CETIRIZINE HYDROCHLORIDE 10 MG/1
10 TABLET ORAL NIGHTLY
Qty: 90 TABLET | Refills: 3 | Status: SHIPPED | OUTPATIENT
Start: 2025-02-18

## 2025-02-18 ASSESSMENT — ENCOUNTER SYMPTOMS: RHINORRHEA: 0

## 2025-02-18 NOTE — PROGRESS NOTES
PATIENT HAS THE FOLLOWING DIAGNOSIS SUPPORTING ADMINISTRATION OF ALLERGY INJECTIONS: J30.1Allergic rhinitis due to pollen  AND 14483 MULTIPLE ALLERGY INJECTIONS FOR ALLERGY INJECTION ADMINISTRATION      Patient here for allergy injection today.  Patient was presented with the opportunity to speak with provider and ask questions regarding allergy injections.  Patient was also instructed they need to wait 30 minutes after receiving allergy injections. All risks associated with potential adverse effects have been explained to patient and patient handout was provided following allergy testing.    After consent obtained/verified, allergy injection subcutaneously given in back of arm(s).  Please see below for specific details of site injections, concentration of serum, and volume injected.    VIAL COLOR OF ALL VIALS TODAY IS yellow 1:10. Vial allergy w/v concentration today is: 1:10     ALLERGY INJECTION FROM VIAL A GIVEN right  UPPER ARM IN THE AMOUNT OF 0.40 ML    ALLERGY INJECTION FROM VIAL B GIVEN left upper ARM IN THE AMOUNT OF 0.40  ML    ALLERGY INJECTION FROM VIAL C GIVEN leftlower ARM IN THE AMOUNT OF 0.40 ML      Documentation of vial injection specific to arm(s) noted on Allergy Immunotherapy Administration Form.       Patient waited 30 minutes for observation.No  Patient has received information and verbalizes understanding of the potential  health risks associated with allergy injections . Patient understands risks including anaphylaxis and chooses not to wait 30 minutes after administration of allergy injection(s).    Patient tolerated well without adverse reaction while the patient was in the office.    SHOT REACTION TREATMENT INSTRUCTIONS    During the 30 minute wait after an allergy injection the following symptoms should be reported:    Itching other than at the injection site  Hives or swelling other than at the injection site  Redness other than at the injection site  Difficulty breathing  Chest

## 2025-02-18 NOTE — PROGRESS NOTES
@[facility]@    Allergy & Asthma   [address] [place] [address]  Ph:   [phone]  Fax:[phone]     Provider:  Dr. Nani Hill    Follow Up          Diagnosis Orders   1. Moderate persistent asthma without complication  Full PFT Study With Bronchodilator    tiotropium (SPIRIVA RESPIMAT) 1.25 MCG/ACT AERS inhaler      2. Allergic sinusitis        3. Allergy to cockroaches        4. Mite allergy        5. Allergy to trees        6. Allergy to mold        7. Non-seasonal allergic rhinitis, unspecified trigger        8. Allergy to dog dander        9. Cat allergies        10. Non-seasonal allergic rhinitis due to pollen            CHIEF COMPLAINT:   Chief Complaint   Patient presents with    Follow-up     6 month f/u allergy injections/allergy management    Medication Refill    Asthma     Wants to discuss provider manager her asthma       HISTORY OF PRESENT ILLNESS: ESTABLISHED PATIENT HERE FOR EVALUATION     Eufemia Koo is a 44 y.o. female is here for immunotherapy follow-up. Patient has been in yellow color vial  twice EVERY 1 WEEK.  Immunotherapy treatment has not been changed since starting date..  There have been  no local reactions to immunotherapy injections. There have been no systemic reactions to IT. On immunotherapy, patient has had a 80 reduction in allergy symptoms. Since beginning immunotherapy no new allergy symptoms have developed.  She {does not suspect any new allergen sensitization.  During the last year patient denies starting on a beta-blocker medication. However, patient understands to report on any medication and beta-blockers if they should be placed on this type of medication.  They understand it does interfere with allergy injections. She would like to continue immunotherapy.  Patient has found immunotherapy to be very beneficial in controlling allergic rhinitis, symptoms.  It is medically necessary for patient to continue allergy immunotherapy. Patient will continue

## 2025-02-19 NOTE — PROGRESS NOTES
Referral to GI faxed on 2/19/25 with OV note and insurance card attached. They will call the pt to schedule.    GI Physicians:  Erinn Stein [place] [address]  Phone number: [phone]  Fax number: [phone]

## 2025-02-20 ENCOUNTER — NURSE ONLY (OUTPATIENT)
Dept: ALLERGY | Age: 45
End: 2025-02-20

## 2025-02-20 VITALS
DIASTOLIC BLOOD PRESSURE: 98 MMHG | SYSTOLIC BLOOD PRESSURE: 131 MMHG | RESPIRATION RATE: 18 BRPM | HEART RATE: 80 BPM | OXYGEN SATURATION: 98 %

## 2025-02-20 DIAGNOSIS — J30.9 CHRONIC ALLERGIC RHINITIS: ICD-10-CM

## 2025-02-20 DIAGNOSIS — J30.1 NON-SEASONAL ALLERGIC RHINITIS DUE TO POLLEN: Primary | ICD-10-CM

## 2025-02-20 NOTE — PROGRESS NOTES
PATIENT HAS THE FOLLOWING DIAGNOSIS SUPPORTING ADMINISTRATION OF ALLERGY INJECTIONS: J30.1Allergic rhinitis due to pollen  AND 03489 MULTIPLE ALLERGY INJECTIONS FOR ALLERGY INJECTION ADMINISTRATION      Patient here for allergy injection today.  Patient was presented with the opportunity to speak with provider and ask questions regarding allergy injections.  Patient was also instructed they need to wait 30 minutes after receiving allergy injections. All risks associated with potential adverse effects have been explained to patient and patient handout was provided following allergy testing.    After consent obtained/verified, allergy injection subcutaneously given in back of arm(s).  Please see below for specific details of site injections, concentration of serum, and volume injected.    VIAL COLOR OF ALL VIALS TODAY IS yellow 1:10. Vial allergy w/v concentration today is: 1:10     ALLERGY INJECTION FROM VIAL A GIVEN left  UPPER ARM IN THE AMOUNT OF 0.45 ML    ALLERGY INJECTION FROM VIAL B GIVEN right upper ARM IN THE AMOUNT OF 0.45  ML    ALLERGY INJECTION FROM VIAL C GIVEN rightlower ARM IN THE AMOUNT OF 0.45 ML      Documentation of vial injection specific to arm(s) noted on Allergy Immunotherapy Administration Form.       Patient waited 30 minutes for observation.No  Patient has received information and verbalizes understanding of the potential  health risks associated with allergy injections . Patient understands risks including anaphylaxis and chooses not to wait 30 minutes after administration of allergy injection(s).    Patient tolerated well without adverse reaction while the patient was in the office.    SHOT REACTION TREATMENT INSTRUCTIONS    During the 30 minute wait after an allergy injection the following symptoms should be reported:    Itching other than at the injection site  Hives or swelling other than at the injection site  Redness other than at the injection site  Difficulty breathing  Chest

## 2025-02-25 ENCOUNTER — NURSE ONLY (OUTPATIENT)
Dept: ALLERGY | Age: 45
End: 2025-02-25
Payer: MEDICAID

## 2025-02-25 VITALS
RESPIRATION RATE: 18 BRPM | OXYGEN SATURATION: 98 % | SYSTOLIC BLOOD PRESSURE: 137 MMHG | HEART RATE: 84 BPM | DIASTOLIC BLOOD PRESSURE: 92 MMHG

## 2025-02-25 DIAGNOSIS — J30.1 NON-SEASONAL ALLERGIC RHINITIS DUE TO POLLEN: Primary | ICD-10-CM

## 2025-02-25 DIAGNOSIS — J30.9 CHRONIC ALLERGIC RHINITIS: ICD-10-CM

## 2025-02-25 PROCEDURE — 95117 IMMUNOTHERAPY INJECTIONS: CPT | Performed by: NURSE PRACTITIONER

## 2025-02-25 NOTE — PROGRESS NOTES
PATIENT HAS THE FOLLOWING DIAGNOSIS SUPPORTING ADMINISTRATION OF ALLERGY INJECTIONS: J30.1Allergic rhinitis due to pollen  AND 35975 MULTIPLE ALLERGY INJECTIONS FOR ALLERGY INJECTION ADMINISTRATION      Patient here for allergy injection today.  Patient was presented with the opportunity to speak with provider and ask questions regarding allergy injections.  Patient was also instructed they need to wait 30 minutes after receiving allergy injections. All risks associated with potential adverse effects have been explained to patient and patient handout was provided following allergy testing.    After consent obtained/verified, allergy injection subcutaneously given in back of arm(s).  Please see below for specific details of site injections, concentration of serum, and volume injected.    VIAL COLOR OF ALL VIALS TODAY IS yellow 1:10. Vial allergy w/v concentration today is: 1:10     ALLERGY INJECTION FROM VIAL A GIVEN right  UPPER ARM IN THE AMOUNT OF 0.50 ML    ALLERGY INJECTION FROM VIAL B GIVEN left lower ARM IN THE AMOUNT OF 0.50  ML    ALLERGY INJECTION FROM VIAL C GIVEN leftupper ARM IN THE AMOUNT OF 0.50 ML      Documentation of vial injection specific to arm(s) noted on Allergy Immunotherapy Administration Form.       Patient waited 30 minutes for observation.No  Patient has received information and verbalizes understanding of the potential  health risks associated with allergy injections . Patient understands risks including anaphylaxis and chooses not to wait 30 minutes after administration of allergy injection(s).    Patient tolerated well without adverse reaction while the patient was in the office.    SHOT REACTION TREATMENT INSTRUCTIONS    During the 30 minute wait after an allergy injection the following symptoms should be reported:    Itching other than at the injection site  Hives or swelling other than at the injection site  Redness other than at the injection site  Difficulty breathing  Chest

## 2025-02-26 ENCOUNTER — HOSPITAL ENCOUNTER (OUTPATIENT)
Dept: PULMONOLOGY | Age: 45
Discharge: HOME | End: 2025-02-26
Payer: MEDICAID

## 2025-02-26 DIAGNOSIS — J45.40 MODERATE PERSISTENT ASTHMA WITHOUT COMPLICATION: ICD-10-CM

## 2025-02-26 PROCEDURE — 94729 DIFFUSING CAPACITY: CPT

## 2025-02-26 PROCEDURE — 94060 EVALUATION OF WHEEZING: CPT

## 2025-02-26 PROCEDURE — 94726 PLETHYSMOGRAPHY LUNG VOLUMES: CPT

## 2025-02-27 ENCOUNTER — NURSE ONLY (OUTPATIENT)
Dept: ALLERGY | Age: 45
End: 2025-02-27
Payer: MEDICAID

## 2025-02-27 VITALS
SYSTOLIC BLOOD PRESSURE: 121 MMHG | OXYGEN SATURATION: 97 % | HEART RATE: 77 BPM | DIASTOLIC BLOOD PRESSURE: 83 MMHG | RESPIRATION RATE: 18 BRPM

## 2025-02-27 DIAGNOSIS — J30.1 NON-SEASONAL ALLERGIC RHINITIS DUE TO POLLEN: Primary | ICD-10-CM

## 2025-02-27 DIAGNOSIS — J30.9 CHRONIC ALLERGIC RHINITIS: ICD-10-CM

## 2025-02-27 PROCEDURE — 95117 IMMUNOTHERAPY INJECTIONS: CPT | Performed by: NURSE PRACTITIONER

## 2025-02-27 NOTE — PROGRESS NOTES
PATIENT HAS THE FOLLOWING DIAGNOSIS SUPPORTING ADMINISTRATION OF ALLERGY INJECTIONS: J30.1Allergic rhinitis due to pollen  AND 91813 MULTIPLE ALLERGY INJECTIONS FOR ALLERGY INJECTION ADMINISTRATION      Patient here for allergy injection today.  Patient was presented with the opportunity to speak with provider and ask questions regarding allergy injections.  Patient was also instructed they need to wait 30 minutes after receiving allergy injections. All risks associated with potential adverse effects have been explained to patient and patient handout was provided following allergy testing.    After consent obtained/verified, allergy injection subcutaneously given in back of arm(s).  Please see below for specific details of site injections, concentration of serum, and volume injected.    VIAL COLOR OF ALL VIALS TODAY IS red 1:1. Vial allergy w/v concentration today is: 1:1     ALLERGY INJECTION FROM VIAL A GIVEN left  UPPER ARM IN THE AMOUNT OF 0.05 ML    ALLERGY INJECTION FROM VIAL B GIVEN right lower ARM IN THE AMOUNT OF 0.05  ML    ALLERGY INJECTION FROM VIAL C GIVEN rightupper ARM IN THE AMOUNT OF 0.05 ML      Documentation of vial injection specific to arm(s) noted on Allergy Immunotherapy Administration Form.       Patient waited 30 minutes for observation.No  Patient has received information and verbalizes understanding of the potential  health risks associated with allergy injections . Patient understands risks including anaphylaxis and chooses not to wait 30 minutes after administration of allergy injection(s).    Patient tolerated well without adverse reaction while the patient was in the office.    SHOT REACTION TREATMENT INSTRUCTIONS    During the 30 minute wait after an allergy injection the following symptoms should be reported:    Itching other than at the injection site  Hives or swelling other than at the injection site  Redness other than at the injection site  Difficulty breathing  Chest

## 2025-03-04 ENCOUNTER — NURSE ONLY (OUTPATIENT)
Dept: ALLERGY | Age: 45
End: 2025-03-04
Payer: MEDICAID

## 2025-03-04 ENCOUNTER — OFFICE VISIT (OUTPATIENT)
Dept: ALLERGY | Age: 45
End: 2025-03-04
Payer: MEDICAID

## 2025-03-04 VITALS
DIASTOLIC BLOOD PRESSURE: 81 MMHG | OXYGEN SATURATION: 98 % | RESPIRATION RATE: 18 BRPM | SYSTOLIC BLOOD PRESSURE: 128 MMHG | HEART RATE: 91 BPM

## 2025-03-04 VITALS
RESPIRATION RATE: 18 BRPM | SYSTOLIC BLOOD PRESSURE: 128 MMHG | WEIGHT: 215 LBS | BODY MASS INDEX: 36.7 KG/M2 | HEART RATE: 91 BPM | HEIGHT: 64 IN | DIASTOLIC BLOOD PRESSURE: 81 MMHG | OXYGEN SATURATION: 98 %

## 2025-03-04 DIAGNOSIS — J30.9 ALLERGIC SINUSITIS: ICD-10-CM

## 2025-03-04 DIAGNOSIS — Z91.09 MITE ALLERGY: ICD-10-CM

## 2025-03-04 DIAGNOSIS — J30.9 CHRONIC ALLERGIC RHINITIS: ICD-10-CM

## 2025-03-04 DIAGNOSIS — J30.1 ALLERGY TO TREES: ICD-10-CM

## 2025-03-04 DIAGNOSIS — J30.81 ALLERGY TO DOG DANDER: ICD-10-CM

## 2025-03-04 DIAGNOSIS — Z91.048 ALLERGY TO MOLD: ICD-10-CM

## 2025-03-04 DIAGNOSIS — J30.1 NON-SEASONAL ALLERGIC RHINITIS DUE TO POLLEN: Primary | ICD-10-CM

## 2025-03-04 DIAGNOSIS — J45.30 MILD PERSISTENT ASTHMA WITHOUT COMPLICATION: ICD-10-CM

## 2025-03-04 DIAGNOSIS — J45.40 MODERATE PERSISTENT ASTHMA WITHOUT COMPLICATION: ICD-10-CM

## 2025-03-04 DIAGNOSIS — J30.89 NON-SEASONAL ALLERGIC RHINITIS, UNSPECIFIED TRIGGER: ICD-10-CM

## 2025-03-04 DIAGNOSIS — J30.81 CAT ALLERGIES: ICD-10-CM

## 2025-03-04 DIAGNOSIS — Z91.038 ALLERGY TO COCKROACHES: ICD-10-CM

## 2025-03-04 PROCEDURE — 95117 IMMUNOTHERAPY INJECTIONS: CPT | Performed by: NURSE PRACTITIONER

## 2025-03-04 PROCEDURE — 4004F PT TOBACCO SCREEN RCVD TLK: CPT | Performed by: NURSE PRACTITIONER

## 2025-03-04 PROCEDURE — A4617 MOUTH PIECE: HCPCS | Performed by: NURSE PRACTITIONER

## 2025-03-04 PROCEDURE — 99214 OFFICE O/P EST MOD 30 MIN: CPT | Performed by: NURSE PRACTITIONER

## 2025-03-04 PROCEDURE — G8417 CALC BMI ABV UP PARAM F/U: HCPCS | Performed by: NURSE PRACTITIONER

## 2025-03-04 PROCEDURE — G8427 DOCREV CUR MEDS BY ELIG CLIN: HCPCS | Performed by: NURSE PRACTITIONER

## 2025-03-04 RX ORDER — CETIRIZINE HYDROCHLORIDE 10 MG/1
10 TABLET ORAL NIGHTLY
Qty: 90 TABLET | Refills: 3 | Status: SHIPPED | OUTPATIENT
Start: 2025-03-04

## 2025-03-04 RX ORDER — TIOTROPIUM BROMIDE INHALATION SPRAY 1.56 UG/1
2 SPRAY, METERED RESPIRATORY (INHALATION) DAILY
Qty: 1 EACH | Refills: 11 | Status: SHIPPED | OUTPATIENT
Start: 2025-03-04

## 2025-03-04 RX ORDER — MONTELUKAST SODIUM 10 MG/1
10 TABLET ORAL NIGHTLY
Qty: 90 TABLET | Refills: 3 | Status: SHIPPED | OUTPATIENT
Start: 2025-03-04

## 2025-03-04 NOTE — ADDENDUM NOTE
Addended by: DILAN KEITH on: 3/4/2025 09:08 AM     Modules accepted: Orders    
[facility]

## 2025-03-04 NOTE — PROGRESS NOTES
inhaler  11/23/24  --     INHALE 2 PUFFS BY MOUTH EVERY 4 HOURS AS NEEDED FOR WHEEZING OR  SHORTNESS  OF  BREATH     Associated Diagnoses:  Mild persistent asthma without complication     ALLERGEN EXTRACT  --  --     Associated Diagnoses:  --     celecoxib (CELEBREX) 100 MG capsule  10/04/23  --     Take 1 capsule by mouth 2 times daily     Associated Diagnoses:  --     cetirizine (ZYRTEC) 10 MG tablet  02/18/25  --     Take 1 tablet by mouth nightly     Associated Diagnoses:  --     diphenhydrAMINE (BENADRYL) 25 MG tablet  --  --     Associated Diagnoses:  --     EPINEPHrine (EPIPEN) 0.3 MG/0.3ML SOAJ injection  07/10/24  --     Associated Diagnoses:  --     EQ ARTHRITIS PAIN RELIEVER 1 % GEL  11/05/24  --     APPLY 4GM  TOPICALLY TO AFFECTED AREA 4 TIMES DAILY AS NEEDED FOR PAIN     Associated Diagnoses:  --     gabapentin (NEURONTIN) 300 MG capsule  01/24/25 04/24/25     Take 2 capsules by mouth at bedtime for 90 days.     Associated Diagnoses:  --     Handicap Placard MISC  04/09/24  --     by Does not apply route Exp: 04/09/2029  Dx: lumbar spondylosis, lumbar radiculopathy     Associated Diagnoses:  --     hydrOXYzine HCl (ATARAX) 50 MG tablet  09/10/24  --     Take 0.5-1 tablets by mouth nightly as needed for Anxiety     Associated Diagnoses:  --     ketoconazole (NIZORAL) 2 % shampoo  01/03/22  --     Associated Diagnoses:  --     montelukast (SINGULAIR) 10 MG tablet  01/08/25  --     Take 1 tablet by mouth nightly     Associated Diagnoses:  Mild persistent asthma without complication, Non-seasonal allergic rhinitis due to pollen     omeprazole (PRILOSEC) 20 MG delayed release capsule  02/17/25  --     Take 1 capsule by mouth Daily     Associated Diagnoses:  Gastroesophageal reflux disease without esophagitis     Spacer/Aero-Holding Chambers SUJATHA  02/08/24  --     1 Device by Does not apply route daily as needed (inhaler use)     Associated Diagnoses:  Mild persistent asthma without complication

## 2025-03-04 NOTE — PROGRESS NOTES
PATIENT HAS THE FOLLOWING DIAGNOSIS SUPPORTING ADMINISTRATION OF ALLERGY INJECTIONS: J30.1Allergic rhinitis due to pollen  AND 49131 MULTIPLE ALLERGY INJECTIONS FOR ALLERGY INJECTION ADMINISTRATION      Patient here for allergy injection today.  Patient was presented with the opportunity to speak with provider and ask questions regarding allergy injections.  Patient was also instructed they need to wait 30 minutes after receiving allergy injections. All risks associated with potential adverse effects have been explained to patient and patient handout was provided following allergy testing.    After consent obtained/verified, allergy injection subcutaneously given in back of arm(s).  Please see below for specific details of site injections, concentration of serum, and volume injected.    VIAL COLOR OF ALL VIALS TODAY IS red 1:1. Vial allergy w/v concentration today is: 1:1     ALLERGY INJECTION FROM VIAL A GIVEN right  UPPER ARM IN THE AMOUNT OF 0.10 ML    ALLERGY INJECTION FROM VIAL B GIVEN left upper ARM IN THE AMOUNT OF 0.10  ML    ALLERGY INJECTION FROM VIAL C GIVEN leftlower ARM IN THE AMOUNT OF 0.10 ML      Documentation of vial injection specific to arm(s) noted on Allergy Immunotherapy Administration Form.       Patient waited 30 minutes for observation.No  Patient has received information and verbalizes understanding of the potential  health risks associated with allergy injections . Patient understands risks including anaphylaxis and chooses not to wait 30 minutes after administration of allergy injection(s).    Patient tolerated well without adverse reaction while the patient was in the office.    SHOT REACTION TREATMENT INSTRUCTIONS    During the 30 minute wait after an allergy injection the following symptoms should be reported:    Itching other than at the injection site  Hives or swelling other than at the injection site  Redness other than at the injection site  Difficulty breathing  Chest

## 2025-03-07 NOTE — DISCHARGE INSTRUCTIONS
Post procedure Instructions:    No driving or making significant decisions for the remainder of the day.   Be cautions with walking and activity for 24 hours, do not over exert yourself.  Ok to resume pre-procedure activity level today.  Apply ice to site of injection site if you have pain or discomfort.  Do not submerge sit of injection during bath or pool activities for 48 hours post-procedure.  Resume blood thinning medications in 24 hours.     Call office [phone] if you have:  Temperature greater than 100.4  Persistent nausea and vomiting  Severe uncontrolled pain  Redness, tenderness, or signs of infection (pain, swelling, redness, odor or green/yellow discharge around the site)  Difficulty breathing, headache or visual disturbances  Hives  Persistent dizziness or light-headedness  Extreme fatigue  Any other questions or concerns you may have after discharge    In an emergency, call 911 or go to an Emergency Department at a nearby hospital    “Surgical Site Infections”      How can we work together to prevent Surgical Site Infections?   We would like to thank you for choosing [facility] for your Surgical Care.  Below you will find helpful information on how we can work together to prevent Surgical Site Infections.    What is a Surgical Site Infection (SSI)?  A surgical site infection is an infection that occurs after surgery in the part of the body where the surgery took place. Most patients who have surgery do not develop an infection. However, infections develop in about 1 to 3 out of every 100 patients who have surgery.  Some of the common symptoms of a surgical site infection are:  Redness and pain around the area where you had surgery  Drainage of cloudy fluid from your surgical wound  Fever    Can SSIs be treated?  Yes. Most surgical site infections can be treated with antibiotics. The antibiotic given to you depends on the bacteria (germs) causing the infection. Sometimes patients with

## 2025-03-10 NOTE — H&P
Today, patient presents for planned lumbar epidural steroid injection approach at Lumbar 4-lumbar 5    This note is reflective of the patient's previous visit for evaluation. We will proceed with today's planned procedure. Since patient's last visit for evaluation, there have been no interval changes in medical history. Patient has no new numbness, weakness, or focal neurological deficit since evaluation. Patient has no contraindications to injection (no anticoagulation or recent antibiotic intake for active infections), and has a  present or is able to drive themselves (as discussed and cleared by physician).  Allergies to latex, contrast dye, and steroid medications have been confirmed with the patient prior to the procedure.  NPO necessity has been assessed and accepted based on procedure complexity. The risks and benefits of the procedure have been explained including but are not limited to infection, bleeding, paralysis, immediate post procedure weakness, and dizziness; the patient acknowledges understanding and desires to proceed with the procedure. Patient has signed consent for same procedure as discussed in previous clinic encounter. All other questions and concerns were addressed at bedside. See procedure note for full details.       Post procedure Instructions: The patient was advised not to drive during the day of the procedure and not to engage in any significant decision making (unless otherwise states by physician). The patient was also advised to be cautious with walking/activity for 24 hours following today's visit and asked not to engage in over-exertion (unless otherwise states by physician). After this time, it is ok to resume pre-procedure level of activity. Patient advised to apply ice to site of injection in situations of pain and discomfort. Patient advised to not submerge site of injection during bath or pool activities for approximately 24 hours post-procedure. Patient attested to

## 2025-03-11 ENCOUNTER — HOSPITAL ENCOUNTER (OUTPATIENT)
Age: 45
Setting detail: OUTPATIENT SURGERY
Discharge: HOME | End: 2025-03-11
Attending: ANESTHESIOLOGY | Admitting: ANESTHESIOLOGY
Payer: MEDICAID

## 2025-03-11 ENCOUNTER — APPOINTMENT (OUTPATIENT)
Dept: GENERAL RADIOLOGY | Age: 45
End: 2025-03-11
Attending: ANESTHESIOLOGY
Payer: MEDICAID

## 2025-03-11 ENCOUNTER — TELEPHONE (OUTPATIENT)
Dept: PSYCHIATRY | Age: 45
End: 2025-03-11

## 2025-03-11 VITALS
WEIGHT: 218 LBS | BODY MASS INDEX: 37.22 KG/M2 | OXYGEN SATURATION: 96 % | HEART RATE: 80 BPM | HEIGHT: 64 IN | DIASTOLIC BLOOD PRESSURE: 79 MMHG | SYSTOLIC BLOOD PRESSURE: 122 MMHG | RESPIRATION RATE: 16 BRPM | TEMPERATURE: 98.4 F

## 2025-03-11 PROCEDURE — 7100000010 HC PHASE II RECOVERY - FIRST 15 MIN: Performed by: ANESTHESIOLOGY

## 2025-03-11 PROCEDURE — 6360000004 HC RX CONTRAST MEDICATION: Performed by: ANESTHESIOLOGY

## 2025-03-11 PROCEDURE — 6360000002 HC RX W HCPCS: Performed by: ANESTHESIOLOGY

## 2025-03-11 PROCEDURE — 3600000054 HC PAIN LEVEL 3 BASE: Performed by: ANESTHESIOLOGY

## 2025-03-11 PROCEDURE — 7100000011 HC PHASE II RECOVERY - ADDTL 15 MIN: Performed by: ANESTHESIOLOGY

## 2025-03-11 PROCEDURE — 2500000003 HC RX 250 WO HCPCS: Performed by: ANESTHESIOLOGY

## 2025-03-11 PROCEDURE — 99152 MOD SED SAME PHYS/QHP 5/>YRS: CPT | Performed by: ANESTHESIOLOGY

## 2025-03-11 PROCEDURE — 62323 NJX INTERLAMINAR LMBR/SAC: CPT | Performed by: ANESTHESIOLOGY

## 2025-03-11 PROCEDURE — 2709999900 HC NON-CHARGEABLE SUPPLY: Performed by: ANESTHESIOLOGY

## 2025-03-11 RX ORDER — LIDOCAINE HYDROCHLORIDE 10 MG/ML
INJECTION, SOLUTION EPIDURAL; INFILTRATION; INTRACAUDAL; PERINEURAL PRN
Status: DISCONTINUED | OUTPATIENT
Start: 2025-03-11 | End: 2025-03-11 | Stop reason: ALTCHOICE

## 2025-03-11 RX ORDER — ACYCLOVIR 200 MG/1
CAPSULE ORAL PRN
Status: DISCONTINUED | OUTPATIENT
Start: 2025-03-11 | End: 2025-03-11 | Stop reason: ALTCHOICE

## 2025-03-11 RX ORDER — IOPAMIDOL 612 MG/ML
INJECTION, SOLUTION INTRAVASCULAR PRN
Status: DISCONTINUED | OUTPATIENT
Start: 2025-03-11 | End: 2025-03-11 | Stop reason: ALTCHOICE

## 2025-03-11 RX ORDER — MIDAZOLAM HYDROCHLORIDE 1 MG/ML
INJECTION, SOLUTION INTRAMUSCULAR; INTRAVENOUS PRN
Status: DISCONTINUED | OUTPATIENT
Start: 2025-03-11 | End: 2025-03-11 | Stop reason: ALTCHOICE

## 2025-03-11 RX ORDER — DEXAMETHASONE SODIUM PHOSPHATE 4 MG/ML
INJECTION, SOLUTION INTRA-ARTICULAR; INTRALESIONAL; INTRAMUSCULAR; INTRAVENOUS; SOFT TISSUE PRN
Status: DISCONTINUED | OUTPATIENT
Start: 2025-03-11 | End: 2025-03-11 | Stop reason: ALTCHOICE

## 2025-03-11 RX ORDER — FENTANYL CITRATE 50 UG/ML
INJECTION, SOLUTION INTRAMUSCULAR; INTRAVENOUS PRN
Status: DISCONTINUED | OUTPATIENT
Start: 2025-03-11 | End: 2025-03-11 | Stop reason: ALTCHOICE

## 2025-03-11 ASSESSMENT — PAIN - FUNCTIONAL ASSESSMENT: PAIN_FUNCTIONAL_ASSESSMENT: 0-10

## 2025-03-11 ASSESSMENT — PAIN SCALES - GENERAL: PAINLEVEL_OUTOF10: 6

## 2025-03-11 NOTE — POST SEDATION
[facility]  Sedation/Analgesia Post Sedation Record    Pt Name: Eufemia Koo  MRN: [id number]  YOB: 1980  Procedure Performed By: Liborio Blackmon DO  Primary Care Physician: Rebecca Elizalde, APRN - CNP    POST-PROCEDURE    Physicians/Assistants: Liborio Blackmon DO  Procedure Performed: See Procedure Note   Sedation/Anesthesia: Versed and Fentanyl (See procedure note for amount and duration)  Estimated Blood Loss:     0  ml  Specimens Removed: None        Complications: None           Liborio Blackmon DO  Electronically signed 3/11/2025 at 1:04 PM

## 2025-03-11 NOTE — TELEPHONE ENCOUNTER
Eufemia wrote into the office via [employer]:    I was wondering if you could prescribe me Zofran for my nausea? I am trying hard to quit and when I do smoke, I get really nauseated.     Please advise.

## 2025-03-11 NOTE — PRE SEDATION
[facility] [place] [place]  Pre-Sedation/Analgesia History & Physical    Pt Name: Eufemia Koo  MRN: [id number]  YOB: 1980  Provider Performing Procedure: Liborio Blackmon DO   Primary Care Physician: Rebecca Elizalde APRN - CNP      MEDICAL HISTORY       has a past medical history of Anxiety, Asthma, Bipolar disorder (HCC), Chronic back pain, DDD (degenerative disc disease), cervical, DDD (degenerative disc disease), thoracolumbar, Depression, GERD (gastroesophageal reflux disease), Headache, and Substance abuse (HCC).  SURGICAL HISTORY   has a past surgical history that includes Pain management procedure (Bilateral, 02/01/2022); Pain management procedure (Bilateral, 03/15/2022); Pain management procedure (Right, 04/12/2022); Pain management procedure (Left, 05/24/2022); hip surgery (Bilateral, 06/21/2022); Pain management procedure (N/A, 11/08/2022); Back Injection (Bilateral, 01/03/2023); Back Injection (Bilateral, 02/09/2023); Tubal ligation; Nerve Block (Bilateral, 9/5/2023); Pain management procedure (Bilateral, 3/26/2024); and Pain management procedure (Bilateral, 3/11/2025).    ALLERGIES   Allergies as of 01/23/2025 - Fully Reviewed 01/23/2025   Allergen Reaction Noted    Fluoxetine Other (See Comments) 02/15/2024    Hydrocodone  02/27/2020    Other  10/28/2024    Prozac [fluoxetine hcl] Other (See Comments) 12/05/2018    Shellfish-derived products  07/31/2024       MEDICATIONS   Prior to Admission medications    Medication Sig Start Date End Date Taking? Authorizing Provider   cetirizine (ZYRTEC) 10 MG tablet Take 1 tablet by mouth nightly 3/4/25  Yes Nani Hill APRN - CNP   montelukast (SINGULAIR) 10 MG tablet Take 1 tablet by mouth nightly 3/4/25  Yes Nani Hill APRN - CNP   tiotropium (SPIRIVA RESPIMAT) 1.25 MCG/ACT AERS inhaler Inhale 2 puffs into the lungs daily 3/4/25  Yes Nani Hill APRN - CNP   omeprazole (PRILOSEC) 20 MG delayed release capsule Take 1 capsule by mouth

## 2025-03-11 NOTE — PROCEDURES
Pre-operative Diagnosis: Radicular leg pain     Post-operative Diagnosis: Radicular leg pain     Procedure: Lumbar epidural steroid injection    Procedure Description:  After having obtained a signed informed consent, the patient was taken to the fluoroscopy suite and placed in the prone position. The patient's back was prepped with chloraprep and draped in a sterile fashion.  A total of 1.5 cc of 1 % lidocaine was used to anesthetize the skin and underlying tissues.  Under fluoroscopic guidance, a single 20G Tuohy needle was advanced using midline approach at the L4/L5 interspace until gaining the epidural space using the loss of resistance to saline syringe technique.  There were no paresthesias, heme, or CSF aspiration.  A total of 0.25 cc of Omnipaque 300 were injected having had adequate dye spread within the epidural space. Needle placement and contrast spread was confirmed using the AP and contralateral views.  10 mg of Dexamethasone with 1 cc of saline solution were injected in the epidural space. The needle was flushed and removed without any complication.  The patient tolerated the procedure well and was transported to the recovery room. The patient was observed for 15 minutes to then discharged in an ambulatory fashion.    Procedural Complications: None  Estimated Blood Loss: 0 mL      IV sedation was used during the procedure:  - Moderate intravenous conscious sedation was supervised by Dr. Blackmon  - The patient was independently monitored by a Registered Nurse assigned to the procedure room  - Monitoring included automated blood pressure, continuous EKG, and continuous pulse oximetry  - The detailed conscious record is permanently stored in the Hospital Information System  - The following is the conscious sedation record:  Start Time: 09:35  End Time : 09:50  Duration: 15 minutes   Medications Administered: 2 mg Versed, 100 mcg Fentanyl       Liborio Blackmon DO  Interventional Pain Management/PM&R

## 2025-03-11 NOTE — PROGRESS NOTES
0940: Pt arrived to Phase II recovery via cart. Awake and alert. Report received from WELL[facility] Avila.  0942: VSS. Patient tearful. HOB elevated. Snack and drink provided. Call light in reach.  0958: Pt sat edge of bed and dressed independently. IV removed.  1003: Patient escorted to vehicle and discharged home in stable condition with Med.

## 2025-03-12 ENCOUNTER — TELEPHONE (OUTPATIENT)
Dept: ALLERGY | Age: 45
End: 2025-03-12

## 2025-03-12 ENCOUNTER — PATIENT MESSAGE (OUTPATIENT)
Age: 45
End: 2025-03-12

## 2025-03-12 DIAGNOSIS — R11.0 NAUSEA: Primary | ICD-10-CM

## 2025-03-12 RX ORDER — ONDANSETRON 4 MG/1
4 TABLET, ORALLY DISINTEGRATING ORAL 3 TIMES DAILY PRN
Qty: 30 TABLET | Refills: 0 | Status: SHIPPED | OUTPATIENT
Start: 2025-03-12 | End: 2025-03-22

## 2025-03-12 NOTE — TELEPHONE ENCOUNTER
I would recommend she reach out to her PCP to see if they would prescribe this as it is not something I normally prescribe.

## 2025-03-13 ENCOUNTER — NURSE ONLY (OUTPATIENT)
Dept: ALLERGY | Age: 45
End: 2025-03-13

## 2025-03-13 VITALS
DIASTOLIC BLOOD PRESSURE: 62 MMHG | HEART RATE: 96 BPM | SYSTOLIC BLOOD PRESSURE: 107 MMHG | RESPIRATION RATE: 18 BRPM | OXYGEN SATURATION: 98 %

## 2025-03-13 DIAGNOSIS — J30.1 NON-SEASONAL ALLERGIC RHINITIS DUE TO POLLEN: Primary | ICD-10-CM

## 2025-03-13 DIAGNOSIS — J30.9 CHRONIC ALLERGIC RHINITIS: ICD-10-CM

## 2025-03-17 ENCOUNTER — NURSE ONLY (OUTPATIENT)
Dept: ALLERGY | Age: 45
End: 2025-03-17
Payer: MEDICAID

## 2025-03-17 VITALS
SYSTOLIC BLOOD PRESSURE: 126 MMHG | HEART RATE: 84 BPM | OXYGEN SATURATION: 98 % | DIASTOLIC BLOOD PRESSURE: 83 MMHG | RESPIRATION RATE: 18 BRPM

## 2025-03-17 DIAGNOSIS — J30.1 NON-SEASONAL ALLERGIC RHINITIS DUE TO POLLEN: Primary | ICD-10-CM

## 2025-03-17 DIAGNOSIS — J30.9 CHRONIC ALLERGIC RHINITIS: ICD-10-CM

## 2025-03-17 PROCEDURE — 95117 IMMUNOTHERAPY INJECTIONS: CPT | Performed by: NURSE PRACTITIONER

## 2025-03-18 ENCOUNTER — OFFICE VISIT (OUTPATIENT)
Age: 45
End: 2025-03-18
Payer: MEDICAID

## 2025-03-18 VITALS
BODY MASS INDEX: 36.18 KG/M2 | WEIGHT: 211.9 LBS | SYSTOLIC BLOOD PRESSURE: 134 MMHG | HEART RATE: 87 BPM | HEIGHT: 64 IN | OXYGEN SATURATION: 99 % | DIASTOLIC BLOOD PRESSURE: 82 MMHG

## 2025-03-18 DIAGNOSIS — L40.9 PSORIASIS: ICD-10-CM

## 2025-03-18 DIAGNOSIS — G89.29 CHRONIC MIDLINE LOW BACK PAIN WITH BILATERAL SCIATICA: ICD-10-CM

## 2025-03-18 DIAGNOSIS — M46.1 DEGENERATIVE JOINT DISEASE OF SACROILIAC JOINT: ICD-10-CM

## 2025-03-18 DIAGNOSIS — M54.42 CHRONIC MIDLINE LOW BACK PAIN WITH BILATERAL SCIATICA: ICD-10-CM

## 2025-03-18 DIAGNOSIS — M54.41 CHRONIC MIDLINE LOW BACK PAIN WITH BILATERAL SCIATICA: ICD-10-CM

## 2025-03-18 DIAGNOSIS — Z51.81 MEDICATION MONITORING ENCOUNTER: ICD-10-CM

## 2025-03-18 DIAGNOSIS — L40.50 PSORIATIC ARTHRITIS (HCC): Primary | ICD-10-CM

## 2025-03-18 PROCEDURE — 4004F PT TOBACCO SCREEN RCVD TLK: CPT | Performed by: NURSE PRACTITIONER

## 2025-03-18 PROCEDURE — G8417 CALC BMI ABV UP PARAM F/U: HCPCS | Performed by: NURSE PRACTITIONER

## 2025-03-18 PROCEDURE — 99214 OFFICE O/P EST MOD 30 MIN: CPT | Performed by: NURSE PRACTITIONER

## 2025-03-18 PROCEDURE — G8427 DOCREV CUR MEDS BY ELIG CLIN: HCPCS | Performed by: NURSE PRACTITIONER

## 2025-03-18 PROCEDURE — 99213 OFFICE O/P EST LOW 20 MIN: CPT | Performed by: NURSE PRACTITIONER

## 2025-03-18 ASSESSMENT — ENCOUNTER SYMPTOMS
SHORTNESS OF BREATH: 1
EYES NEGATIVE: 1
BACK PAIN: 1
WHEEZING: 1
GASTROINTESTINAL NEGATIVE: 1

## 2025-03-18 NOTE — PROGRESS NOTES
[facility] RHEUMATOLOGY FOLLOW UP NOTE     Date Of Service: 3/18/2025  Provider: BINH Hoang CNP  PCP: Rebecca Elizalde APRN - CNP   Name: Eufemia Koo   MRN: [id number]    Subjective   CHIEF COMPLAINT:    Chief Complaint   Patient presents with    Follow-up     3 month follow up polyarthralgia        Eufemia Koo  is a(n)44 y.o. female  here for the f/u evaluation of psoriatic arthritis.     Interval hx:    - no new symptoms   - had lumbar injection recently which did not help     pain affecting the left wrist, left shoulder, back, hips, knees, left ankle  Pain on a scale 0-10: 8.5/10  Type of pain: constant back   Timing: none  Aggravating factors: standing, walking, laying  Alleviating factors: celebrex, gabapentin      Associated symptoms:  denies swelling/  Redness/ warmth , + AM stiffness lasting ~ all day     REVIEW OF SYSTEMS: (ROS)    Review of Systems   Constitutional: Negative.    HENT: Negative.     Eyes: Negative.    Respiratory:  Positive for shortness of breath and wheezing.    Cardiovascular: Negative.    Gastrointestinal: Negative.    Endocrine: Negative.    Genitourinary: Negative.    Musculoskeletal:  Positive for arthralgias and back pain.   Skin:  Positive for rash.   Neurological: Negative.    Psychiatric/Behavioral: Negative.         Past Medical History:  has a past medical history of Anxiety, Asthma, Bipolar disorder ([facility]), Chronic back pain, DDD (degenerative disc disease), cervical, DDD (degenerative disc disease), thoracolumbar, Depression, GERD (gastroesophageal reflux disease), Headache, and Substance abuse ([facility]).    Current Medications:    Current Outpatient Medications   Medication Instructions    albuterol (PROVENTIL) 2.5 mg, Nebulization, EVERY 4 HOURS PRN    albuterol sulfate HFA (PROVENTIL;VENTOLIN;PROAIR) 108 (90 Base) MCG/ACT inhaler INHALE 2 PUFFS BY MOUTH EVERY 4 HOURS AS NEEDED FOR WHEEZING OR  SHORTNESS  OF  BREATH    ALLERGEN EXTRACT WEEKLY

## 2025-03-26 ENCOUNTER — TELEPHONE (OUTPATIENT)
Dept: FAMILY MEDICINE CLINIC | Age: 45
End: 2025-03-26

## 2025-03-26 ENCOUNTER — CLINICAL SUPPORT (OUTPATIENT)
Dept: ALLERGY | Age: 45
End: 2025-03-26
Payer: MEDICAID

## 2025-03-26 ENCOUNTER — HOSPITAL ENCOUNTER (OUTPATIENT)
Age: 45
Discharge: HOME | End: 2025-03-26
Payer: MEDICAID

## 2025-03-26 VITALS
DIASTOLIC BLOOD PRESSURE: 86 MMHG | HEART RATE: 83 BPM | OXYGEN SATURATION: 98 % | SYSTOLIC BLOOD PRESSURE: 124 MMHG | RESPIRATION RATE: 18 BRPM

## 2025-03-26 DIAGNOSIS — K21.9 GASTROESOPHAGEAL REFLUX DISEASE WITHOUT ESOPHAGITIS: ICD-10-CM

## 2025-03-26 DIAGNOSIS — Z13.220 SCREENING FOR HYPERLIPIDEMIA: ICD-10-CM

## 2025-03-26 DIAGNOSIS — J30.9 CHRONIC ALLERGIC RHINITIS: ICD-10-CM

## 2025-03-26 DIAGNOSIS — J30.1 NON-SEASONAL ALLERGIC RHINITIS DUE TO POLLEN: Primary | ICD-10-CM

## 2025-03-26 DIAGNOSIS — Z29.89 IMMUNOTHERAPY: ICD-10-CM

## 2025-03-26 DIAGNOSIS — Z51.81 MEDICATION MONITORING ENCOUNTER: ICD-10-CM

## 2025-03-26 DIAGNOSIS — J30.9 CHRONIC ALLERGIC RHINITIS: Primary | ICD-10-CM

## 2025-03-26 DIAGNOSIS — L40.50 PSORIATIC ARTHRITIS (HCC): ICD-10-CM

## 2025-03-26 DIAGNOSIS — Z13.1 SCREENING FOR DIABETES MELLITUS: ICD-10-CM

## 2025-03-26 LAB
ALBUMIN SERPL BCG-MCNC: 3.9 G/DL (ref 3.4–4.9)
ALP SERPL-CCNC: 100 U/L (ref 35–104)
ALT SERPL W/O P-5'-P-CCNC: 22 U/L (ref 10–35)
ANION GAP SERPL CALC-SCNC: 10 MEQ/L (ref 8–16)
AST SERPL-CCNC: 30 U/L (ref 10–35)
BILIRUB SERPL-MCNC: 0.5 MG/DL (ref 0.3–1.2)
BUN SERPL-MCNC: 10 MG/DL (ref 8–23)
CALCIUM SERPL-MCNC: 9.3 MG/DL (ref 8.6–10)
CHLORIDE SERPL-SCNC: 102 MEQ/L (ref 98–111)
CO2 SERPL-SCNC: 22 MEQ/L (ref 22–29)
CREAT SERPL-MCNC: 0.9 MG/DL (ref 0.5–0.9)
GFR SERPL CREATININE-BSD FRML MDRD: 80 ML/MIN/1.73M2
GLUCOSE SERPL-MCNC: 102 MG/DL (ref 74–109)
POTASSIUM SERPL-SCNC: 4.3 MEQ/L (ref 3.5–5.2)
PROT SERPL-MCNC: 6.9 G/DL (ref 6.4–8.3)
SODIUM SERPL-SCNC: 134 MEQ/L (ref 135–145)
TSH SERPL DL<=0.05 MIU/L-ACNC: 2.35 UIU/ML (ref 0.27–4.2)

## 2025-03-26 PROCEDURE — 95117 IMMUNOTHERAPY INJECTIONS: CPT | Performed by: NURSE PRACTITIONER

## 2025-03-26 PROCEDURE — 95165 ANTIGEN THERAPY SERVICES: CPT | Performed by: NURSE PRACTITIONER

## 2025-03-26 PROCEDURE — 80061 LIPID PANEL: CPT

## 2025-03-26 PROCEDURE — 36415 COLL VENOUS BLD VENIPUNCTURE: CPT

## 2025-03-26 PROCEDURE — 84443 ASSAY THYROID STIM HORMONE: CPT

## 2025-03-26 PROCEDURE — 80053 COMPREHEN METABOLIC PANEL: CPT

## 2025-03-26 PROCEDURE — 86480 TB TEST CELL IMMUN MEASURE: CPT

## 2025-03-26 NOTE — PROGRESS NOTES
PATIENT HAS THE FOLLOWING DIAGNOSIS SUPPORTING ADMINISTRATION OF ALLERGY INJECTIONS: J30.1Allergic rhinitis due to pollen  AND 22938 MULTIPLE ALLERGY INJECTIONS FOR ALLERGY INJECTION ADMINISTRATION      Patient here for allergy injection today.  Patient was presented with the opportunity to speak with provider and ask questions regarding allergy injections.  Patient was also instructed they need to wait 30 minutes after receiving allergy injections. All risks associated with potential adverse effects have been explained to patient and patient handout was provided following allergy testing.    After consent obtained/verified, allergy injection subcutaneously given in back of arm(s).  Please see below for specific details of site injections, concentration of serum, and volume injected.    VIAL COLOR OF ALL VIALS TODAY IS red 1:1. Vial allergy w/v concentration today is: 1:1     ALLERGY INJECTION FROM VIAL A GIVEN left  UPPER ARM IN THE AMOUNT OF 0.25 ML    ALLERGY INJECTION FROM VIAL B GIVEN right lower ARM IN THE AMOUNT OF 0.25  ML    ALLERGY INJECTION FROM VIAL C GIVEN rightupper ARM IN THE AMOUNT OF 0.25 ML      Documentation of vial injection specific to arm(s) noted on Allergy Immunotherapy Administration Form.       Patient waited 30 minutes for observation.No  Patient has received information and verbalizes understanding of the potential  health risks associated with allergy injections . Patient understands risks including anaphylaxis and chooses not to wait 30 minutes after administration of allergy injection(s).    Patient tolerated well without adverse reaction while the patient was in the office.    SHOT REACTION TREATMENT INSTRUCTIONS    During the 30 minute wait after an allergy injection the following symptoms should be reported:    Itching other than at the injection site  Hives or swelling other than at the injection site  Redness other than at the injection site  Difficulty breathing  Chest

## 2025-03-26 NOTE — PROGRESS NOTES
TYPE OF INSURANCE: MEDICAID  PT'S CURRENT VIALS EMPTY. NEW VIALS MIXED.  ALLERGY EXTRACT MIXING.    DATE OF MIXING= Today   TOTAL NUMBER OF SET OF VIALS= 3  TOTAL VIALS PREPARED = 3  TOTAL INJECTABLE DOSES =   10 INJECTIONS PER VIAL  TOTAL ANTICIPATED DOSES = 30 INJECTIONS   TOTAL NUMBER OF INJECTIONS BILLED TODAY = 30       An allergy extract was prepared according to written prescription by provider.  Provider onsite during allergy extract preparation. Patient vial(s) include the following:     RED VIAL - 1:1 CONCENTRATION - EXPIRES 12 MONTHS  YELLOW VIAL-1:10 CONCENTRATION - EXPIRES 12 MONTHS  BLUE VIAL-1:100 CONCENTRATION - EXPIRES 12 MONTHS  GREEN VIAL-1:1,000 CONCENTRATION - EXPIRES 6 MONTHS  SILVER VIAL-1:10,000 CONCENTRATION - EXPIRES 6 MONTHS    Allergy extract was prepared by the following method:   RED TO YELLOW:  Once the  one-to-one concentration was prepared in the red vial, 0.5 ML's was then extracted in place into the yellow vial to achieve a 1:10 concentration.    YELLOW TO BLUE:  Then 0.5 ML's was extracted from the yellow vial and placed into the blue file with dilutant to achieve a 1:100 concentration.    BLUE TO GREEN:  Then 0.5 ML's was extracted from the blue vial and placed into Green vial with dilute to achieve a 1:1,000 concentration.    GREEN TO SILVER:  Then 0.5 ML's was taken from the green vial and placed in the Silver vial with dilutant to achieve a 1:10,000 concentration.      Patient vials were labeled with name, date-of-birth, vial (A,B,or C), concentration, and expiration.    When injecting from a new  vial the first injections is 0.05 ml and are increased by 0.05 increments until 0.5ml from the vial is achieved or the patient reaches maximum tolerated dose.   Injections are given once ot three times weekly and at least 24 hours apart, according to provider orders.   If patient has complications or \"knots\" or maximum tolerance the dose building is reduced, stopped, or maintained

## 2025-03-27 LAB
CHOLEST SERPL-MCNC: 109 MG/DL (ref 100–199)
HDLC SERPL-MCNC: 27 MG/DL
LDLC SERPL CALC-MCNC: 47 MG/DL
TRIGL SERPL-MCNC: 173 MG/DL (ref 0–199)

## 2025-03-28 LAB
GAMMA INTERFERON BACKGROUND BLD IA-ACNC: 0.06 IU/ML
M TB IFN-G BLD-IMP: NEGATIVE
M TB IFN-G CD4+ BCKGRND COR BLD-ACNC: 0 IU/ML
M TB IFN-G CD4+CD8+ BCKGRND COR BLD-ACNC: 0 IU/ML
MITOGEN IGNF BCKGRD COR BLD-ACNC: 9.94 IU/ML

## 2025-03-31 ENCOUNTER — CLINICAL SUPPORT (OUTPATIENT)
Dept: ALLERGY | Age: 45
End: 2025-03-31
Payer: MEDICAID

## 2025-03-31 ENCOUNTER — RESULTS FOLLOW-UP (OUTPATIENT)
Age: 45
End: 2025-03-31

## 2025-03-31 VITALS
SYSTOLIC BLOOD PRESSURE: 119 MMHG | OXYGEN SATURATION: 98 % | DIASTOLIC BLOOD PRESSURE: 85 MMHG | HEART RATE: 79 BPM | RESPIRATION RATE: 18 BRPM

## 2025-03-31 DIAGNOSIS — J30.1 NON-SEASONAL ALLERGIC RHINITIS DUE TO POLLEN: ICD-10-CM

## 2025-03-31 DIAGNOSIS — J30.9 CHRONIC ALLERGIC RHINITIS: Primary | ICD-10-CM

## 2025-03-31 PROCEDURE — 95117 IMMUNOTHERAPY INJECTIONS: CPT | Performed by: NURSE PRACTITIONER

## 2025-03-31 RX ORDER — SECUKINUMAB 150 MG/ML
INJECTION SUBCUTANEOUS
Qty: 5 ADJUSTABLE DOSE PRE-FILLED PEN SYRINGE | Refills: 0 | Status: SHIPPED | OUTPATIENT
Start: 2025-03-31 | End: 2025-04-15

## 2025-03-31 RX ORDER — SECUKINUMAB 150 MG/ML
150 INJECTION SUBCUTANEOUS
Qty: 1 ADJUSTABLE DOSE PRE-FILLED PEN SYRINGE | Refills: 3 | Status: SHIPPED | OUTPATIENT
Start: 2025-03-31 | End: 2025-04-15

## 2025-04-08 ASSESSMENT — PATIENT HEALTH QUESTIONNAIRE - PHQ9
6. FEELING BAD ABOUT YOURSELF - OR THAT YOU ARE A FAILURE OR HAVE LET YOURSELF OR YOUR FAMILY DOWN: NOT AT ALL
7. TROUBLE CONCENTRATING ON THINGS, SUCH AS READING THE NEWSPAPER OR WATCHING TELEVISION: NOT AT ALL
SUM OF ALL RESPONSES TO PHQ QUESTIONS 1-9: 4
8. MOVING OR SPEAKING SO SLOWLY THAT OTHER PEOPLE COULD HAVE NOTICED. OR THE OPPOSITE, BEING SO FIGETY OR RESTLESS THAT YOU HAVE BEEN MOVING AROUND A LOT MORE THAN USUAL: NOT AT ALL
SUM OF ALL RESPONSES TO PHQ QUESTIONS 1-9: 4
1. LITTLE INTEREST OR PLEASURE IN DOING THINGS: NOT AT ALL
5. POOR APPETITE OR OVEREATING: NOT AT ALL
5. POOR APPETITE OR OVEREATING: NOT AT ALL
8. MOVING OR SPEAKING SO SLOWLY THAT OTHER PEOPLE COULD HAVE NOTICED. OR THE OPPOSITE - BEING SO FIDGETY OR RESTLESS THAT YOU HAVE BEEN MOVING AROUND A LOT MORE THAN USUAL: NOT AT ALL
SUM OF ALL RESPONSES TO PHQ QUESTIONS 1-9: 4
1. LITTLE INTEREST OR PLEASURE IN DOING THINGS: NOT AT ALL
2. FEELING DOWN, DEPRESSED OR HOPELESS: SEVERAL DAYS
SUM OF ALL RESPONSES TO PHQ QUESTIONS 1-9: 4
9. THOUGHTS THAT YOU WOULD BE BETTER OFF DEAD, OR OF HURTING YOURSELF: NOT AT ALL
4. FEELING TIRED OR HAVING LITTLE ENERGY: SEVERAL DAYS
7. TROUBLE CONCENTRATING ON THINGS, SUCH AS READING THE NEWSPAPER OR WATCHING TELEVISION: NOT AT ALL
9. THOUGHTS THAT YOU WOULD BE BETTER OFF DEAD, OR OF HURTING YOURSELF: NOT AT ALL
10. IF YOU CHECKED OFF ANY PROBLEMS, HOW DIFFICULT HAVE THESE PROBLEMS MADE IT FOR YOU TO DO YOUR WORK, TAKE CARE OF THINGS AT HOME, OR GET ALONG WITH OTHER PEOPLE: SOMEWHAT DIFFICULT
3. TROUBLE FALLING OR STAYING ASLEEP: MORE THAN HALF THE DAYS
3. TROUBLE FALLING OR STAYING ASLEEP: MORE THAN HALF THE DAYS
2. FEELING DOWN, DEPRESSED OR HOPELESS: SEVERAL DAYS
10. IF YOU CHECKED OFF ANY PROBLEMS, HOW DIFFICULT HAVE THESE PROBLEMS MADE IT FOR YOU TO DO YOUR WORK, TAKE CARE OF THINGS AT HOME, OR GET ALONG WITH OTHER PEOPLE: SOMEWHAT DIFFICULT
6. FEELING BAD ABOUT YOURSELF - OR THAT YOU ARE A FAILURE OR HAVE LET YOURSELF OR YOUR FAMILY DOWN: NOT AT ALL
4. FEELING TIRED OR HAVING LITTLE ENERGY: SEVERAL DAYS
SUM OF ALL RESPONSES TO PHQ QUESTIONS 1-9: 4

## 2025-04-08 ASSESSMENT — ANXIETY QUESTIONNAIRES
2. NOT BEING ABLE TO STOP OR CONTROL WORRYING: SEVERAL DAYS
IF YOU CHECKED OFF ANY PROBLEMS ON THIS QUESTIONNAIRE, HOW DIFFICULT HAVE THESE PROBLEMS MADE IT FOR YOU TO DO YOUR WORK, TAKE CARE OF THINGS AT HOME, OR GET ALONG WITH OTHER PEOPLE: VERY DIFFICULT
2. NOT BEING ABLE TO STOP OR CONTROL WORRYING: SEVERAL DAYS
5. BEING SO RESTLESS THAT IT IS HARD TO SIT STILL: NOT AT ALL
1. FEELING NERVOUS, ANXIOUS, OR ON EDGE: SEVERAL DAYS
IF YOU CHECKED OFF ANY PROBLEMS ON THIS QUESTIONNAIRE, HOW DIFFICULT HAVE THESE PROBLEMS MADE IT FOR YOU TO DO YOUR WORK, TAKE CARE OF THINGS AT HOME, OR GET ALONG WITH OTHER PEOPLE: VERY DIFFICULT
6. BECOMING EASILY ANNOYED OR IRRITABLE: SEVERAL DAYS
3. WORRYING TOO MUCH ABOUT DIFFERENT THINGS: SEVERAL DAYS
4. TROUBLE RELAXING: SEVERAL DAYS
5. BEING SO RESTLESS THAT IT IS HARD TO SIT STILL: NOT AT ALL
4. TROUBLE RELAXING: SEVERAL DAYS
1. FEELING NERVOUS, ANXIOUS, OR ON EDGE: SEVERAL DAYS
7. FEELING AFRAID AS IF SOMETHING AWFUL MIGHT HAPPEN: NOT AT ALL
GAD7 TOTAL SCORE: 5
6. BECOMING EASILY ANNOYED OR IRRITABLE: SEVERAL DAYS
7. FEELING AFRAID AS IF SOMETHING AWFUL MIGHT HAPPEN: NOT AT ALL
3. WORRYING TOO MUCH ABOUT DIFFERENT THINGS: SEVERAL DAYS

## 2025-04-09 ENCOUNTER — TELEMEDICINE (OUTPATIENT)
Dept: PSYCHIATRY | Age: 45
End: 2025-04-09

## 2025-04-09 ENCOUNTER — CLINICAL SUPPORT (OUTPATIENT)
Dept: ALLERGY | Age: 45
End: 2025-04-09
Payer: MEDICAID

## 2025-04-09 VITALS
DIASTOLIC BLOOD PRESSURE: 96 MMHG | SYSTOLIC BLOOD PRESSURE: 134 MMHG | RESPIRATION RATE: 18 BRPM | HEART RATE: 94 BPM | OXYGEN SATURATION: 98 %

## 2025-04-09 DIAGNOSIS — J30.1 NON-SEASONAL ALLERGIC RHINITIS DUE TO POLLEN: ICD-10-CM

## 2025-04-09 DIAGNOSIS — F51.01 PRIMARY INSOMNIA: ICD-10-CM

## 2025-04-09 DIAGNOSIS — F17.200 TOBACCO USE DISORDER: ICD-10-CM

## 2025-04-09 DIAGNOSIS — F43.10 PTSD (POST-TRAUMATIC STRESS DISORDER): ICD-10-CM

## 2025-04-09 DIAGNOSIS — F39 UNSPECIFIED MOOD (AFFECTIVE) DISORDER: Primary | ICD-10-CM

## 2025-04-09 DIAGNOSIS — J30.9 CHRONIC ALLERGIC RHINITIS: Primary | ICD-10-CM

## 2025-04-09 DIAGNOSIS — F43.22 ADJUSTMENT DISORDER WITH ANXIETY: ICD-10-CM

## 2025-04-09 PROCEDURE — 95117 IMMUNOTHERAPY INJECTIONS: CPT | Performed by: NURSE PRACTITIONER

## 2025-04-09 RX ORDER — BUPROPION HYDROCHLORIDE 150 MG/1
150 TABLET, EXTENDED RELEASE ORAL 2 TIMES DAILY
Qty: 60 TABLET | Refills: 2 | Status: SHIPPED | OUTPATIENT
Start: 2025-04-09

## 2025-04-09 RX ORDER — VARENICLINE TARTRATE 1 MG/1
1 TABLET, FILM COATED ORAL 2 TIMES DAILY
Qty: 60 TABLET | Refills: 2 | Status: SHIPPED | OUTPATIENT
Start: 2025-04-09

## 2025-04-09 RX ORDER — ONDANSETRON 4 MG/1
TABLET, ORALLY DISINTEGRATING ORAL
COMMUNITY
Start: 2025-03-12

## 2025-04-09 NOTE — PROGRESS NOTES
[facility] [place] [facility]  [facility]  [address] [id number] [phone]    Progress Note    Patient:  Eufemia Koo  YOB: 1980  PCP:  Rebecca Elizalde APRN - CNP  Visit Date:  4/9/2025      CC: Follow up for medication management for    Diagnosis Orders   1. Unspecified mood (affective) disorder  buPROPion (WELLBUTRIN SR) 150 MG extended release tablet      2. Tobacco use disorder  buPROPion (WELLBUTRIN SR) 150 MG extended release tablet    varenicline (CHANTIX) 1 MG tablet      3. PTSD (post-traumatic stress disorder)        4. Adjustment disorder with anxiety        5. Primary insomnia                SUBJECTIVE:      Eufemia Koo, a 44 y.o. female, presents for a follow up visit.  Reports from patient's with continued struggles of tobacco use.  Is using Chantix but only cutting down a little bit.  Still struggling during various parts of the day.  Highlights when she is playing video games she tends to smoke more.  We discussed behavioral techniques to approach smoking cessation.  She also reports some increased episodes of depression.  Denies any suicidal thoughts.  Still struggles with sleep, primarily sleep initiation.  Reports some irritability secondary to frustrations about quitting smoking but denies any major anger outburst or symptoms consistent with nain or hypomania.  Outside of mood instability denies symptoms of PTSD recently.     16 minutes spent in psychotherapy.  CBT utilized.  Focus was on mood and behavioral approaches to smoking cessation.    OBJECTIVE:      1/10/2025     9:13 AM   Patient-Reported Vitals   Patient-Reported Weight 225   Patient-Reported Height 5'4   Patient-Reported Systolic 132 mmHg   Patient-Reported Diastolic 74 mmHg   Patient-Reported Pulse 93   Patient-Reported Temperature 97.9          MENTAL STATUS EXAM:  Orientation: Alert, oriented, thought content appropriate   Mood:. \"frustrated\"

## 2025-04-09 NOTE — PATIENT INSTRUCTIONS
Support System:  Inform friends, family, and colleagues about your plan to quit. Their encouragement can provide motivation and accountability.  Use Distractions:  Engage in activities that keep your hands and mind busy, such as reading, exercising, or hobbies. This helps divert attention from cravings.  Practice Stress Management:  Incorporate relaxation techniques into your daily routine, such as deep breathing, meditation, or yoga, to manage stress without smoking.  Consider Behavioral Therapy:  Cognitive-behavioral therapy (CBT) can help you change smoking-related thoughts and behaviors. Discuss this option with your healthcare provider.  Reward Your Progress:  Set milestones and reward yourself for each smoke-free week or month. Use the money saved from not buying cigarettes for a special treat.  Stay Positive:  Focus on the positive aspects of quitting, such as improved health, better taste and smell, and financial savings. Remind yourself of these benefits regularly.  Plan for Relapses:  If you slip and have a cigarette, don't be discouraged. Identify what led to the lapse and use it as a learning experience to strengthen your resolve.    -Please take medications as prescribed  -Refrain from alcohol or drug use  -Seek emergency help via the emergency and/or calling 911 should symptoms become severe, worsen, or with other concerning symptoms.Go immediately to the emergency room and/or call 911 with any suicidal or homicidal ideations or if audio/visual hallucinations develop.   -Contact office with any questions or concerns.      Crisis phone numbers:  988-national suicide hotline, call or text  [facility] [phone].  [facility] [phone]  [facility] [phone].  [facility] [phone].  [facility] [phone].  [facility] [phone].

## 2025-04-10 ENCOUNTER — TELEPHONE (OUTPATIENT)
Dept: PSYCHIATRY | Age: 45
End: 2025-04-10

## 2025-04-10 NOTE — TELEPHONE ENCOUNTER
Eufemia wrote into the office via [employer]: she labeled the message: a different stop smoking med.    Wellbutrin or the generic because the one we decided to go with I heard it is has a high thought suicide     Please advise. She is scheduled to return on 07/08/25.

## 2025-04-10 NOTE — TELEPHONE ENCOUNTER
Please ask patient to clarify her request. Is she talking about wanting to Chantix or Wellbutrin?     Dr. Rousseau

## 2025-04-11 ENCOUNTER — TELEPHONE (OUTPATIENT)
Age: 45
End: 2025-04-11

## 2025-04-11 NOTE — TELEPHONE ENCOUNTER
Eufemia sent the following response via [employer]:    Wandering about wellbutrin. Would it week with my meds

## 2025-04-11 NOTE — TELEPHONE ENCOUNTER
Wellbutrin as an antidepressant that has a dual effect for both depression and smoking cessation.  It can be utilized in conjunction with Chantix.  There are some reports of neuropsychiatric side effects on Chantix to include suicidal ideation but you have been taking this now for a few months without issue.  If you would like to try just Wellbutrin and stop the Chantix we can do so but given the severity and persistence of year tobacco use disorder I think we are okay with doing both medications and monitoring for any side effects. Let me know your thoughts.    Dr. Rousseau

## 2025-04-11 NOTE — TELEPHONE ENCOUNTER
----- Message from Fuad JAVIER sent at 4/11/2025  8:57 AM EDT -----  Regarding: RE: Cosentyx  Hey there - did we decide on how we wanted to proceed for this particular patient? I havent received a new script yet. Would you like me to profile this one for the time being?  ----- Message -----  From: Yady Andre MA  Sent: 4/1/2025   2:47 PM EDT  To: Fuad [place]  Subject: FW: Cosentyx                                       ----- Message -----  From: Mona Elaine APRN - CNP  Sent: 4/1/2025   2:45 PM EDT  To: Yady Andre MA  Subject: RE: Cosentyx                                     Okay with taltz if patient is willing. Works similar, same side effect profile as cosentyx.  ----- Message -----  From: Yady Andre MA  Sent: 4/1/2025   2:38 PM EDT  To: BINH Hoang CNP  Subject: FW: Cosentyx                                       ----- Message -----  From: Fuad [place]  Sent: 4/1/2025   2:36 PM EDT  To: \[facility]""[facility]  Subject: Efren Patel there,    Initial PA has denied for the plan -  Must have had an inadequate clinical response (symptoms/conditions did not improve) of at least 90 days of TWO preferred medications in this UPDL category that are NOT biosimilars of the same reference product, if indicated for the diagnosis which include but are not limited to: Enbrel, Humira, Otezla, Skyrizi, Taltz.       How would you like to proceed?    Let me know - THANKS

## 2025-04-15 ENCOUNTER — PATIENT MESSAGE (OUTPATIENT)
Age: 45
End: 2025-04-15

## 2025-04-15 RX ORDER — IXEKIZUMAB 80 MG/ML
INJECTION, SOLUTION SUBCUTANEOUS
Qty: 2 EACH | Refills: 0 | Status: ACTIVE | OUTPATIENT
Start: 2025-04-15

## 2025-04-15 RX ORDER — IXEKIZUMAB 80 MG/ML
80 INJECTION, SOLUTION SUBCUTANEOUS
Qty: 1 ML | Refills: 3 | Status: ACTIVE | OUTPATIENT
Start: 2025-04-15

## 2025-04-17 ENCOUNTER — TELEPHONE (OUTPATIENT)
Age: 45
End: 2025-04-17

## 2025-04-17 RX ORDER — MEDROXYPROGESTERONE ACETATE 150 MG/ML
50 INJECTION, SUSPENSION INTRAMUSCULAR WEEKLY
Qty: 4 ML | Refills: 3 | Status: SHIPPED | OUTPATIENT
Start: 2025-04-17

## 2025-04-17 NOTE — TELEPHONE ENCOUNTER
Spoke with patient to inform of Taltz denial.  She states that she is okay with whatever medication insurance will cover.

## 2025-04-17 NOTE — TELEPHONE ENCOUNTER
----- Message from BINH Aguilera CNP sent at 4/17/2025  3:43 PM EDT -----  Regarding: RE: Taltz  Please contact the patient and see if she would be okay with pursuing enbrel or humira due to insurance denial. Side effects: Rare hepatitis, drug induced lupus, increased risk respiratory infections, increased risk of lymphoma, injection site reaction.  ----- Message -----  From: Yady Andre MA  Sent: 4/17/2025   8:10 AM EDT  To: BINH Hoang CNP  Subject: FW: Taltz                                          ----- Message -----  From: Fuad [place]  Sent: 4/17/2025   8:09 AM EDT  [facility]  Subject: Becca Patel there,    Looks like the second PA denied as well from the plan: here is the rejection reasoning:    \": Coverage is provided when the member meets ALL the following requirements: Must have met the step therapy requirement for this medication which requires the member to have a history of at least 90 days of therapy of ONE preferred TNF (Tumor necrosis factor) inhibitor as indicated for the diagnosis in this UPDL category, which include but are not limited to: Adalimumab-fkjp (generic of Hulio), Amjevita 10/0.1 ml (Biosimilar of Humira), Enbrel, Humira, Inflectra (Bio of Remicade) and Simlandi (Biosimilar of Humira).\"

## 2025-04-23 ENCOUNTER — CLINICAL SUPPORT (OUTPATIENT)
Dept: ALLERGY | Age: 45
End: 2025-04-23

## 2025-04-23 VITALS
DIASTOLIC BLOOD PRESSURE: 90 MMHG | SYSTOLIC BLOOD PRESSURE: 152 MMHG | HEART RATE: 91 BPM | OXYGEN SATURATION: 97 % | RESPIRATION RATE: 18 BRPM

## 2025-04-23 DIAGNOSIS — J30.1 NON-SEASONAL ALLERGIC RHINITIS DUE TO POLLEN: ICD-10-CM

## 2025-04-23 DIAGNOSIS — J30.9 CHRONIC ALLERGIC RHINITIS: Primary | ICD-10-CM

## 2025-04-23 NOTE — PROGRESS NOTES

## 2025-04-28 ENCOUNTER — CLINICAL SUPPORT (OUTPATIENT)
Dept: ALLERGY | Age: 45
End: 2025-04-28
Payer: MEDICAID

## 2025-04-28 VITALS
SYSTOLIC BLOOD PRESSURE: 134 MMHG | HEART RATE: 87 BPM | OXYGEN SATURATION: 97 % | RESPIRATION RATE: 18 BRPM | DIASTOLIC BLOOD PRESSURE: 82 MMHG

## 2025-04-28 DIAGNOSIS — J30.1 NON-SEASONAL ALLERGIC RHINITIS DUE TO POLLEN: ICD-10-CM

## 2025-04-28 DIAGNOSIS — J30.9 CHRONIC ALLERGIC RHINITIS: Primary | ICD-10-CM

## 2025-04-28 DIAGNOSIS — R11.0 NAUSEA: ICD-10-CM

## 2025-04-28 PROCEDURE — 95117 IMMUNOTHERAPY INJECTIONS: CPT | Performed by: NURSE PRACTITIONER

## 2025-04-28 RX ORDER — ONDANSETRON 4 MG/1
TABLET, ORALLY DISINTEGRATING ORAL
Qty: 30 TABLET | Refills: 0 | Status: SHIPPED | OUTPATIENT
Start: 2025-04-28

## 2025-04-28 RX ORDER — DICLOFENAC SODIUM 10 MG/G
GEL TOPICAL
Qty: 200 G | Refills: 0 | Status: SHIPPED | OUTPATIENT
Start: 2025-04-28 | End: 2025-06-09

## 2025-04-28 NOTE — PROGRESS NOTES
PATIENT HAS THE FOLLOWING DIAGNOSIS SUPPORTING ADMINISTRATION OF ALLERGY INJECTIONS: J30.1Allergic rhinitis due to pollen  AND 73338 MULTIPLE ALLERGY INJECTIONS FOR ALLERGY INJECTION ADMINISTRATION      Patient here for allergy injection today.  Patient was presented with the opportunity to speak with provider and ask questions regarding allergy injections.  Patient was also instructed they need to wait 30 minutes after receiving allergy injections. All risks associated with potential adverse effects have been explained to patient and patient handout was provided following allergy testing.    After consent obtained/verified, allergy injection subcutaneously given in back of arm(s).  Please see below for specific details of site injections, concentration of serum, and volume injected.    VIAL COLOR OF ALL VIALS TODAY IS red 1:1. Vial allergy w/v concentration today is: 1:1     ALLERGY INJECTION FROM VIAL A GIVEN left  UPPER ARM IN THE AMOUNT OF 0.40 ML    ALLERGY INJECTION FROM VIAL B GIVEN right lower ARM IN THE AMOUNT OF 0.40  ML    ALLERGY INJECTION FROM VIAL C GIVEN rightupper ARM IN THE AMOUNT OF 0.40 ML      Documentation of vial injection specific to arm(s) noted on Allergy Immunotherapy Administration Form.       Patient waited 30 minutes for observation.No  Patient has received information and verbalizes understanding of the potential  health risks associated with allergy injections . Patient understands risks including anaphylaxis and chooses not to wait 30 minutes after administration of allergy injection(s).    Patient tolerated well without adverse reaction while the patient was in the office.    SHOT REACTION TREATMENT INSTRUCTIONS    During the 30 minute wait after an allergy injection the following symptoms should be reported:    Itching other than at the injection site  Hives or swelling other than at the injection site  Redness other than at the injection site  Difficulty breathing  Chest

## 2025-04-28 NOTE — TELEPHONE ENCOUNTER
This medication refill is regarding a electronic request. Refill requested by  [facility] [address] .    Requested Prescriptions     Pending Prescriptions Disp Refills    ondansetron (ZOFRAN-ODT) 4 MG disintegrating tablet [Pharmacy Med Name: Ondansetron 4 MG Oral Tablet Disintegrating] 30 tablet 0     Sig: DISSOLVE 1 TABLET IN MOUTH THREE TIMES DAILY AS NEEDED FOR NAUSEA FOR VOMITING     Date of last visit: 2/17/2025   Date of next visit: 2/18/2026  Date of last refill: 3/12/25     Rx verified, ordered and set to EP.

## 2025-05-07 ENCOUNTER — CLINICAL SUPPORT (OUTPATIENT)
Dept: ALLERGY | Age: 45
End: 2025-05-07
Payer: MEDICAID

## 2025-05-07 VITALS
DIASTOLIC BLOOD PRESSURE: 96 MMHG | SYSTOLIC BLOOD PRESSURE: 142 MMHG | RESPIRATION RATE: 18 BRPM | OXYGEN SATURATION: 98 % | HEART RATE: 80 BPM

## 2025-05-07 DIAGNOSIS — J30.9 CHRONIC ALLERGIC RHINITIS: Primary | ICD-10-CM

## 2025-05-07 DIAGNOSIS — J30.1 NON-SEASONAL ALLERGIC RHINITIS DUE TO POLLEN: ICD-10-CM

## 2025-05-07 PROCEDURE — 95117 IMMUNOTHERAPY INJECTIONS: CPT | Performed by: NURSE PRACTITIONER

## 2025-05-07 NOTE — PROGRESS NOTES
PATIENT HAS THE FOLLOWING DIAGNOSIS SUPPORTING ADMINISTRATION OF ALLERGY INJECTIONS: J30.1Allergic rhinitis due to pollen  AND 55521 MULTIPLE ALLERGY INJECTIONS FOR ALLERGY INJECTION ADMINISTRATION      Patient here for allergy injection today.  Patient was presented with the opportunity to speak with provider and ask questions regarding allergy injections.  Patient was also instructed they need to wait 30 minutes after receiving allergy injections. All risks associated with potential adverse effects have been explained to patient and patient handout was provided following allergy testing.    After consent obtained/verified, allergy injection subcutaneously given in back of arm(s).  Please see below for specific details of site injections, concentration of serum, and volume injected.    VIAL COLOR OF ALL VIALS TODAY IS red 1:1. Vial allergy w/v concentration today is: 1:1     ALLERGY INJECTION FROM VIAL A GIVEN right  UPPER ARM IN THE AMOUNT OF 0.45 ML    ALLERGY INJECTION FROM VIAL B GIVEN left upper ARM IN THE AMOUNT OF 0.45  ML    ALLERGY INJECTION FROM VIAL C GIVEN leftlower ARM IN THE AMOUNT OF 0.45 ML      Documentation of vial injection specific to arm(s) noted on Allergy Immunotherapy Administration Form.       Patient waited 30 minutes for observation.No  Patient has received information and verbalizes understanding of the potential  health risks associated with allergy injections . Patient understands risks including anaphylaxis and chooses not to wait 30 minutes after administration of allergy injection(s).    Patient tolerated well without adverse reaction while the patient was in the office.    SHOT REACTION TREATMENT INSTRUCTIONS    During the 30 minute wait after an allergy injection the following symptoms should be reported:    Itching other than at the injection site  Hives or swelling other than at the injection site  Redness other than at the injection site  Difficulty breathing  Chest  CHIEF COMPLAINT:    Interval Events: Currently tolerating Bilevel NIV with nasal mask. States slight improvement in breathing. Continues to wear 3L NC. compliant with inhalers.     REVIEW OF SYSTEMS:  CONSTITUTIONAL: +weakness. No fevers or chills  EYES/ENT: No visual changes;  No vertigo or throat pain   NECK: No pain or stiffness  RESPIRATORY: No cough, wheezing, hemoptysis; +shortness of breath  CARDIOVASCULAR: No chest pain or palpitations  GASTROINTESTINAL: No abdominal or epigastric pain. No nausea, vomiting, or hematemesis; No diarrhea or constipation. No melena or hematochezia.  GENITOURINARY: No dysuria, frequency or hematuria  NEUROLOGICAL: No numbness or weakness  SKIN: No itching, burning, rashes, or lesions   All other review of systems is negative unless indicated above.    OBJECTIVE:  ICU Vital Signs Last 24 Hrs  T(C): 36.7 (16 Feb 2021 12:11), Max: 36.9 (16 Feb 2021 05:24)  T(F): 98 (16 Feb 2021 12:11), Max: 98.4 (16 Feb 2021 05:24)  HR: 97 (16 Feb 2021 14:49) (87 - 97)  BP: 148/85 (16 Feb 2021 12:11) (117/72 - 148/85)  BP(mean): 96 (16 Feb 2021 05:24) (96 - 96)  ABP: --  ABP(mean): --  RR: 22 (16 Feb 2021 12:11) (15 - 22)  SpO2: 99% (16 Feb 2021 14:49) (96% - 100%)        CAPILLARY BLOOD GLUCOSE          PHYSICAL EXAM:  General: +thin appearing.   Neurology: A&Ox3, nonfocal, ROLON x 4  Eyes: PERRLA/ EOMI, Gross vision intact  ENT/Neck: Neck supple, trachea midline, No JVD, Gross hearing intact  Respiratory: Distant breath sounds bilaterally. No wheezing.   CV: RRR, +S1/S2, -S3/S4, no murmurs, rubs or gallops  Abdominal: Soft, NT, ND +BS, No HSM  MSK: 5/5 strength UE/LE bilaterally  Extremities: No edema, 2+ peripheral pulses  Skin: No Rashes, Hematoma, Ecchymosis      HOSPITAL MEDICATIONS:  MEDICATIONS  (STANDING):  aspirin enteric coated 81 milliGRAM(s) Oral daily  budesonide 160 MICROgram(s)/formoterol 4.5 MICROgram(s) Inhaler 2 Puff(s) Inhalation two times a day  carvedilol 25 milliGRAM(s) Oral every 12 hours  enoxaparin Injectable 30 milliGRAM(s) SubCutaneous daily  hydrALAZINE 75 milliGRAM(s) Oral three times a day  pantoprazole    Tablet 40 milliGRAM(s) Oral before breakfast  predniSONE   Tablet 40 milliGRAM(s) Oral daily  tiotropium 18 MICROgram(s) Capsule 1 Capsule(s) Inhalation daily    MEDICATIONS  (PRN):  ALBUTerol    90 MICROgram(s) HFA Inhaler 1 Puff(s) Inhalation every 6 hours PRN Shortness of Breath and/or Wheezing      LABS:                        11.3   5.69  )-----------( 221      ( 16 Feb 2021 06:40 )             35.6     Hgb Trend: 11.3<--, 11.4<--, 11.4<--, 11.3<--  02-16    145  |  91<L>  |  31<H>  ----------------------------<  95  2.9<LL>   |  46<HH>  |  0.83    Ca    9.9      16 Feb 2021 06:40      Creatinine Trend: 0.83<--, 0.76<--, 0.90<--, 0.72<--      Arterial Blood Gas:  02-16 @ 13:56  7.42/72/156/42/99.0/19.5  ABG lactate: --        MICROBIOLOGY:       RADIOLOGY:  [x] Reviewed by me    CXR    IMPRESSION:  Hyperinflation of the lungs.    Continued pulmonary vascular cephalization consistent with mild pulmonary vascular redistribution/congestion.

## 2025-05-13 ENCOUNTER — CLINICAL SUPPORT (OUTPATIENT)
Dept: ALLERGY | Age: 45
End: 2025-05-13
Payer: MEDICAID

## 2025-05-13 VITALS
SYSTOLIC BLOOD PRESSURE: 157 MMHG | OXYGEN SATURATION: 98 % | HEART RATE: 81 BPM | RESPIRATION RATE: 18 BRPM | DIASTOLIC BLOOD PRESSURE: 97 MMHG

## 2025-05-13 DIAGNOSIS — J30.1 NON-SEASONAL ALLERGIC RHINITIS DUE TO POLLEN: ICD-10-CM

## 2025-05-13 DIAGNOSIS — J30.9 CHRONIC ALLERGIC RHINITIS: Primary | ICD-10-CM

## 2025-05-13 PROCEDURE — 95117 IMMUNOTHERAPY INJECTIONS: CPT | Performed by: NURSE PRACTITIONER

## 2025-05-13 NOTE — PROGRESS NOTES

## 2025-05-27 ENCOUNTER — CLINICAL SUPPORT (OUTPATIENT)
Dept: ALLERGY | Age: 45
End: 2025-05-27
Payer: MEDICAID

## 2025-05-27 VITALS
OXYGEN SATURATION: 97 % | DIASTOLIC BLOOD PRESSURE: 88 MMHG | SYSTOLIC BLOOD PRESSURE: 136 MMHG | HEART RATE: 65 BPM | RESPIRATION RATE: 18 BRPM

## 2025-05-27 DIAGNOSIS — J30.1 NON-SEASONAL ALLERGIC RHINITIS DUE TO POLLEN: ICD-10-CM

## 2025-05-27 DIAGNOSIS — J30.9 CHRONIC ALLERGIC RHINITIS: Primary | ICD-10-CM

## 2025-05-27 PROCEDURE — 95117 IMMUNOTHERAPY INJECTIONS: CPT | Performed by: NURSE PRACTITIONER

## 2025-05-27 NOTE — PROGRESS NOTES
PATIENT HAS THE FOLLOWING DIAGNOSIS SUPPORTING ADMINISTRATION OF ALLERGY INJECTIONS: J30.1Allergic rhinitis due to pollen  AND 85824 MULTIPLE ALLERGY INJECTIONS FOR ALLERGY INJECTION ADMINISTRATION      Patient here for allergy injection today.  Patient was presented with the opportunity to speak with provider and ask questions regarding allergy injections.  Patient was also instructed they need to wait 30 minutes after receiving allergy injections. All risks associated with potential adverse effects have been explained to patient and patient handout was provided following allergy testing.    After consent obtained/verified, allergy injection subcutaneously given in back of arm(s).  Please see below for specific details of site injections, concentration of serum, and volume injected.    VIAL COLOR OF ALL VIALS TODAY IS red 1:1. Vial allergy w/v concentration today is: 1:1     ALLERGY INJECTION FROM VIAL A GIVEN right  UPPER ARM IN THE AMOUNT OF 0.50 ML    ALLERGY INJECTION FROM VIAL B GIVEN left lower ARM IN THE AMOUNT OF 0.50  ML    ALLERGY INJECTION FROM VIAL C GIVEN leftupper ARM IN THE AMOUNT OF 0.50 ML      Documentation of vial injection specific to arm(s) noted on Allergy Immunotherapy Administration Form.       Patient waited 30 minutes for observation.No  Patient has received information and verbalizes understanding of the potential  health risks associated with allergy injections . Patient understands risks including anaphylaxis and chooses not to wait 30 minutes after administration of allergy injection(s).    Patient tolerated well without adverse reaction while the patient was in the office.    SHOT REACTION TREATMENT INSTRUCTIONS    During the 30 minute wait after an allergy injection the following symptoms should be reported:    Itching other than at the injection site  Hives or swelling other than at the injection site  Redness other than at the injection site  Difficulty breathing  Chest

## 2025-05-28 ENCOUNTER — PATIENT MESSAGE (OUTPATIENT)
Dept: ALLERGY | Age: 45
End: 2025-05-28

## 2025-05-28 ENCOUNTER — TELEPHONE (OUTPATIENT)
Dept: ALLERGY | Age: 45
End: 2025-05-28

## 2025-05-28 NOTE — TELEPHONE ENCOUNTER
Received pt my chart message. Informed provider and showed provider pictures pt sent over via [employer]. Provider stated to reduce pt dosage to 0.35 ml. Called and spoke with patient and asked allergy reaction questions and informed pt that I would send telephone encounter over to provider.  Pt verbalized understanding and thanked me.

## 2025-05-28 NOTE — TELEPHONE ENCOUNTER
Please  ask the following questions for patient who call in and complain of allergy injection reactions  It is a good idea to pull the patients allergy record prior to asking these questions so you have all the information      What were your symptoms?redness, swelling, hot, tender   How long after the injection did it take before you experienced symptoms? 9 hours    Did you take your antihistamine on the day you receive the allergy injection? Yes took 1 hour before allergy injections   Do you take your antihistamine in the morning or the evening? Afternoon   Which allergy injection site caused the reaction? Left and right arms   What color vial caused the injection reaction - red  What is the injection dosage that you were given? - 0.50  Have you had a previous or similar reaction prior to this event? Yes,  just not this bad   How often are you receiving allergy injections? Weekly   Did you have a KNOT that remained on your arm 24 hours AFTER the injection (or the next day)? No   How large was the allergy injection site KNOT the day you received the injection?  N/A  How large was the allergy injection site KNOT the day AFTER you received the injection?  N/A  How large was the allergy injection site REDNESS the day you received the injection? Size of a small saucer   How large  was the allergy injection site REDNESS the day AFTER you received the injection? Pt says its wrapping around arm   Did you take your antihistamine the following day? Yes   How long did it take before the allergy injection swelling went down? Still has not went down   Did you apply ice to the area of swelling? Yes   Do you apply any topical creams and if so what to the area of swelling? No just applied sarna lotion   Did you experience any anaphylactic symptoms including shortness of breath, throat swelling, chest tightness? No   Did you experience a rash or any other generalized body symptoms? No   Did you use your Epipen? No

## 2025-05-30 RX ORDER — GABAPENTIN 300 MG/1
600 CAPSULE ORAL NIGHTLY
Qty: 60 CAPSULE | Refills: 1 | Status: SHIPPED | OUTPATIENT
Start: 2025-05-30 | End: 2025-07-29

## 2025-06-05 ENCOUNTER — CLINICAL SUPPORT (OUTPATIENT)
Dept: ALLERGY | Age: 45
End: 2025-06-05
Payer: MEDICAID

## 2025-06-05 VITALS — HEART RATE: 80 BPM | SYSTOLIC BLOOD PRESSURE: 134 MMHG | OXYGEN SATURATION: 98 % | DIASTOLIC BLOOD PRESSURE: 93 MMHG

## 2025-06-05 DIAGNOSIS — J30.9 CHRONIC ALLERGIC RHINITIS: Primary | ICD-10-CM

## 2025-06-05 DIAGNOSIS — J30.1 NON-SEASONAL ALLERGIC RHINITIS DUE TO POLLEN: ICD-10-CM

## 2025-06-05 PROCEDURE — 95117 IMMUNOTHERAPY INJECTIONS: CPT | Performed by: NURSE PRACTITIONER

## 2025-06-05 NOTE — PROGRESS NOTES

## 2025-06-09 ENCOUNTER — CLINICAL SUPPORT (OUTPATIENT)
Dept: ALLERGY | Age: 45
End: 2025-06-09
Payer: MEDICAID

## 2025-06-09 VITALS
SYSTOLIC BLOOD PRESSURE: 146 MMHG | RESPIRATION RATE: 18 BRPM | HEART RATE: 89 BPM | DIASTOLIC BLOOD PRESSURE: 99 MMHG | OXYGEN SATURATION: 98 %

## 2025-06-09 DIAGNOSIS — J30.1 NON-SEASONAL ALLERGIC RHINITIS DUE TO POLLEN: ICD-10-CM

## 2025-06-09 DIAGNOSIS — J30.9 CHRONIC ALLERGIC RHINITIS: Primary | ICD-10-CM

## 2025-06-09 PROCEDURE — 95117 IMMUNOTHERAPY INJECTIONS: CPT | Performed by: NURSE PRACTITIONER

## 2025-06-09 RX ORDER — DICLOFENAC SODIUM 10 MG/G
GEL TOPICAL
Qty: 200 G | Refills: 0 | Status: SHIPPED | OUTPATIENT
Start: 2025-06-09

## 2025-06-10 ENCOUNTER — OFFICE VISIT (OUTPATIENT)
Dept: PHYSICAL MEDICINE AND REHAB | Age: 45
End: 2025-06-10
Payer: MEDICAID

## 2025-06-10 VITALS
BODY MASS INDEX: 36.02 KG/M2 | SYSTOLIC BLOOD PRESSURE: 104 MMHG | WEIGHT: 211 LBS | DIASTOLIC BLOOD PRESSURE: 72 MMHG | HEIGHT: 64 IN

## 2025-06-10 DIAGNOSIS — M54.17 RADICULOPATHY, LUMBOSACRAL REGION: ICD-10-CM

## 2025-06-10 DIAGNOSIS — M47.816 LUMBAR SPONDYLOSIS: ICD-10-CM

## 2025-06-10 DIAGNOSIS — M53.3 SACROILIAC JOINT DYSFUNCTION OF RIGHT SIDE: Primary | ICD-10-CM

## 2025-06-10 DIAGNOSIS — G89.4 CHRONIC PAIN SYNDROME: ICD-10-CM

## 2025-06-10 PROCEDURE — G8417 CALC BMI ABV UP PARAM F/U: HCPCS | Performed by: NURSE PRACTITIONER

## 2025-06-10 PROCEDURE — G8427 DOCREV CUR MEDS BY ELIG CLIN: HCPCS | Performed by: NURSE PRACTITIONER

## 2025-06-10 PROCEDURE — 99214 OFFICE O/P EST MOD 30 MIN: CPT | Performed by: NURSE PRACTITIONER

## 2025-06-10 PROCEDURE — 4004F PT TOBACCO SCREEN RCVD TLK: CPT | Performed by: NURSE PRACTITIONER

## 2025-06-10 NOTE — PROGRESS NOTES
Functionality Assessment/Goals Worksheet     On a scale of 0 (Does not Interfere) to 10 (Completely Interferes)     1.  Which number describes how during the past week pain has interfered with           the following:  A.  General Activity:  6  B.  Mood: 9  C.  Walking Ability:  5  D.  Normal Work (Includes both work outside the home and housework):  5  E.  Relations with Other People:   8  F.  Sleep:   8  G.  Enjoyment of Life:   6    2.  Patient Prefers to Take their Pain Medications:     []  On a regular basis   [x]  Only when necessary    []  Does not take pain medications    3.  What are the Patient's Goals/Expectations for Visiting Pain Management?     [x]  Learn about my pain    []  Receive Medication   []  Physical Therapy     []  Treat Depression   []  Receive Injections    []  Treat Sleep   []  Deal with Anxiety and Stress   []  Treat Opoid Dependence/Addiction   []  Other:                                
or compression upon underlying spinal cord.       At T12-L1, there is no disc herniation, canal or foraminal stenosis.       At L1-L2, there is no disc herniation, canal or foraminal stenosis.       At L2-L3, there is no disc herniation, canal or foraminal stenosis.       At L3-L4, there is a 2.7 mm bulging disc and facet hypertrophy. This causes mild canal and mild-to-moderate bilateral foraminal stenosis.       At L4-L5, there is a 3.7 mm bulging disc and facet hypertrophy. This results in mild-to-moderate canal and bilateral foraminal stenosis.       At L5-S1, there is no disc herniation, canal or foraminal stenosis. There are no suspicious findings in the visualized aspects of the retroperitoneum and paraspinal soft tissues.               Impression       1. There is mild-to-moderate canal and bilateral foraminal stenosis at L4-5.   2. There is mild canal and mild-to-moderate bilateral foraminal stenosis at L3-4.   3. There is a disc protrusion with no significant compression upon underlying spinal cord at T11-12.   4. Otherwise negative MRI scan of the lumbar spine.                   **This report has been created using voice recognition software. It may contain minor errors which are inherent in voice recognition technology.**       Final report electronically signed by DR JULIET SWANSON on 12/19/2022 10:56 AM           Past Medical History:   Diagnosis Date    Anxiety     Asthma     Bipolar disorder (HCC)     Chronic back pain     DDD (degenerative disc disease), cervical     DDD (degenerative disc disease), thoracolumbar     Depression     GERD (gastroesophageal reflux disease)     Headache     Substance abuse (HCC)        Past Surgical History:   Procedure Laterality Date    BACK INJECTION Bilateral 01/03/2023    bilateral sacroiliac joint injection performed by Liborio Blackmon DO at [email or web address] SURGERY [place]    BACK INJECTION Bilateral 02/09/2023    bilateral sacroiliac joint injection performed by Liborio KWON

## 2025-06-10 NOTE — PROGRESS NOTES
[facility]  Sleep Medicine Clinic initial consultation note    Eufemia Koo                                                Chief complaint: Eufemia Koo is a 45 y.o.oldfemale came for further evaluation regarding her ?sleep apnea  with referral from BINH Jeff *.      [place]:    Sleep/Wake schedule:  Usual time to go to bed during the work/regular day of week:   Usual time to wake up during the work//regular day of week:   Over the weekends her sleep schedule: [] Remain same.  []phase delayed.  She usually falls a sleep in less than:    She takes naps: {YES/NO:19726}.   Number of naps per week:   During each nap she spends a total of:   The naps were reported as refreshing: No      Sleep Hygiene:  Is the temperature and evironment in her bed room is acceptable to her: Yes.   She watches Television in her bed room: {YES/NO:19726}.   She read books, study, pay bills etc in the bed: {YES/NO:19726}.   Frequency She wake up during night/sleep:   Majority of nocturnal awakenings are for urination: {YES/NO:19726}.   Difficulty in falling back to sleep after nocturnal awakenings: No  .  Do you drink coffee: {YES/NO:19726}.       Do you drink caffeinated beverages i.e sodas: {YES/NO:19726}.   Do you drink tea: {YES/NO:19726}.     Do you drink alcoholic beverages: {YES/NO:19726}.    History of recreational drug use: No    Social History     Tobacco Use    Smoking status: Every Day     Current packs/day: 0.50     Average packs/day: 0.5 packs/day for 30.4 years (15.2 ttl pk-yrs)     Types: Cigarettes     Start date: 1/1/1995     Passive exposure: Current    Smokeless tobacco: Never    Tobacco comments:     currently a pack a day 3/28/22 pt down to 10 cigs a day 2-8-24   Vaping Use    Vaping status: Never Used   Substance Use Topics    Alcohol use: Not Currently     Comment: Special occasions    Drug use: Not Currently     Types: Marijuana (Weed), Methamphetamines

## 2025-06-11 ENCOUNTER — HOSPITAL ENCOUNTER (OUTPATIENT)
Dept: SLEEP CENTER | Age: 45
Discharge: HOME | End: 2025-06-13
Payer: MEDICAID

## 2025-06-11 ENCOUNTER — OFFICE VISIT (OUTPATIENT)
Age: 45
End: 2025-06-11
Payer: MEDICAID

## 2025-06-11 VITALS
DIASTOLIC BLOOD PRESSURE: 62 MMHG | WEIGHT: 222 LBS | SYSTOLIC BLOOD PRESSURE: 124 MMHG | OXYGEN SATURATION: 99 % | HEIGHT: 64 IN | HEART RATE: 78 BPM | BODY MASS INDEX: 37.9 KG/M2 | TEMPERATURE: 98.2 F

## 2025-06-11 DIAGNOSIS — G47.30 SLEEP APNEA, UNSPECIFIED TYPE: ICD-10-CM

## 2025-06-11 DIAGNOSIS — G47.00 INSOMNIA, UNSPECIFIED TYPE: ICD-10-CM

## 2025-06-11 DIAGNOSIS — R06.83 SNORING: ICD-10-CM

## 2025-06-11 DIAGNOSIS — G47.10 HYPERSOMNIA: ICD-10-CM

## 2025-06-11 DIAGNOSIS — G47.22 CIRCADIAN RHYTHM SLEEP DISORDER, ADVANCED SLEEP PHASE TYPE: ICD-10-CM

## 2025-06-11 DIAGNOSIS — G47.00 INSOMNIA, UNSPECIFIED TYPE: Primary | ICD-10-CM

## 2025-06-11 PROCEDURE — 95810 POLYSOM 6/> YRS 4/> PARAM: CPT

## 2025-06-11 PROCEDURE — G8417 CALC BMI ABV UP PARAM F/U: HCPCS | Performed by: INTERNAL MEDICINE

## 2025-06-11 PROCEDURE — 99204 OFFICE O/P NEW MOD 45 MIN: CPT | Performed by: INTERNAL MEDICINE

## 2025-06-11 PROCEDURE — G8427 DOCREV CUR MEDS BY ELIG CLIN: HCPCS | Performed by: INTERNAL MEDICINE

## 2025-06-11 PROCEDURE — 4004F PT TOBACCO SCREEN RCVD TLK: CPT | Performed by: INTERNAL MEDICINE

## 2025-06-11 NOTE — PROGRESS NOTES
[facility]  Sleep Medicine Clinic initial consultation note    Eufemia Koo                                                Chief complaint: Eufemia Koo is a 45 y.o.oldfemale came for further evaluation regarding her hypersomnia and ?sleep apnea  with referral from BIN[facility] Jeff *. Patient states hypersomnia and insomnia are what bother her the most.       [place]:    Sleep/Wake schedule:  Usual time to go to bed during the work/regular day of week: 3:30 AM  Usual time to wake up during the work//regular day of week: 2:00 PM earliest to 5:30 PM  Over the weekends her sleep schedule:  [x]phase delayed.  She usually falls a sleep in less than: 2-4 hours  She takes naps: Yes.   Number of naps per week: 0-1  During each nap she spends a total of: 2 hours  The naps were reported as refreshing: No      Sleep Hygiene:  Is the temperature and evironment in her bed room is acceptable to her: Yes.   She watches Television in her bed room: No.   She read books, study, pay bills etc in the bed: No.   Frequency She wake up during night/sleep: 0-4 times  Majority of nocturnal awakenings are for urination: No.   Difficulty in falling back to sleep after nocturnal awakenings: Yes  .  Do you drink coffee: Yes. 3-4 cups when she wakes.       Do you drink caffeinated beverages i.e sodas: No.   Do you drink tea: Yes.   Occasionally.   Do you drink alcoholic beverages: No.    History of recreational drug use: No    Social History     Tobacco Use    Smoking status: Every Day     Current packs/day: 0.50     Average packs/day: 0.5 packs/day for 30.4 years (15.2 ttl pk-yrs)     Types: Cigarettes     Start date: 1/1/1995     Passive exposure: Current    Smokeless tobacco: Never    Tobacco comments:     currently a pack a day 3/28/22 pt down to 10 cigs a day 2-8-24 1 ppd 6/11/25   Vaping Use    Vaping status: Never Used   Substance Use Topics    Alcohol use: Not Currently     Comment:

## 2025-06-11 NOTE — PATIENT INSTRUCTIONS
-Will schedule patient for polysomnogram in the sleep lab.   -I had a discussion with patient regarding avialable treatment options for her sleep disorder breathing including but not limited to CPAP titration in the sleep lab Vs.Dental appliance placement with referral to a local dentist Vs other available surgical options including Uvulopalatopharyngoplasty, maxillomandibular ostomy,Inspire device placement and tracheostomy as last option. At the end of discussion, she is not decided on her   treatment if she found to have obstructive sleep apnea at this time.  -We will see Eufemia Koo back in 1week after the sleep study to go over the sleep study results and further management options.  -She was educated to practice good sleep hygiene practices. She  was provided with a good sleep hygiene hand out.  -Eufemia was advised to make earlier appointment with my clinic if she develops any worsening of sleep symptoms. She verbalizes understanding.  -Eufemia was advised to not to drive any motor vehicles or operate heavy equipment until her sleep symptoms are under good control.Eufemia NAHUM Koo verbalizes understanding.  -She was advised to loose weight by controlling diet and doing exercise once cleared by her family physician.   - Eufemia Koo was educated about my impression and plan. She verbalizes understanding.  -Try to cut back on caffeine.   -Try to get back to a normal sleep schedule. Try to go to sleep at 11 PM and wake at 6 AM.

## 2025-06-11 NOTE — PROGRESS NOTES
Chief Complaint: New patient referred by [facility]en for daytime somnolence     M[place]amp[place] airway Class:I  Neck Circumference:. 15.5 Inches    [place] sleepiness score 6/11/25: 13  SAQLI: 58

## 2025-06-16 ENCOUNTER — TELEPHONE (OUTPATIENT)
Dept: SLEEP CENTER | Age: 45
End: 2025-06-16

## 2025-06-16 NOTE — TELEPHONE ENCOUNTER
Please re-schedule Eufemia for a F/U with Dr. Hamlin to go over the results of her sleep study.  Thanks.

## 2025-06-17 DIAGNOSIS — J45.30 MILD PERSISTENT ASTHMA WITHOUT COMPLICATION: ICD-10-CM

## 2025-06-18 ENCOUNTER — CLINICAL SUPPORT (OUTPATIENT)
Dept: ALLERGY | Age: 45
End: 2025-06-18
Payer: MEDICAID

## 2025-06-18 VITALS
OXYGEN SATURATION: 99 % | SYSTOLIC BLOOD PRESSURE: 128 MMHG | DIASTOLIC BLOOD PRESSURE: 95 MMHG | HEART RATE: 81 BPM | RESPIRATION RATE: 18 BRPM

## 2025-06-18 DIAGNOSIS — J30.9 CHRONIC ALLERGIC RHINITIS: Primary | ICD-10-CM

## 2025-06-18 DIAGNOSIS — J30.1 NON-SEASONAL ALLERGIC RHINITIS DUE TO POLLEN: ICD-10-CM

## 2025-06-18 PROCEDURE — 95117 IMMUNOTHERAPY INJECTIONS: CPT | Performed by: NURSE PRACTITIONER

## 2025-06-18 NOTE — PROGRESS NOTES
PATIENT HAS THE FOLLOWING DIAGNOSIS SUPPORTING ADMINISTRATION OF ALLERGY INJECTIONS: J30.1Allergic rhinitis due to pollen  AND 71660 MULTIPLE ALLERGY INJECTIONS FOR ALLERGY INJECTION ADMINISTRATION      Patient here for allergy injection today.  Patient was presented with the opportunity to speak with provider and ask questions regarding allergy injections.  Patient was also instructed they need to wait 30 minutes after receiving allergy injections. All risks associated with potential adverse effects have been explained to patient and patient handout was provided following allergy testing.    After consent obtained/verified, allergy injection subcutaneously given in back of arm(s).  Please see below for specific details of site injections, concentration of serum, and volume injected.    VIAL COLOR OF ALL VIALS TODAY IS red 1:1 concentration    ALLERGY INJECTION FROM VIAL A GIVEN left  UPPER ARM IN THE AMOUNT OF 0.45 ML    ALLERGY INJECTION FROM VIAL B GIVEN right upper ARM IN THE AMOUNT OF 0.45  ML    ALLERGY INJECTION FROM VIAL C GIVEN rightlower ARM IN THE AMOUNT OF 0.45 ML    Documentation of vial injection specific to arm(s) noted on Allergy Immunotherapy Administration Form.       Patient waited 30 minutes for observation.No  Patient has received information and verbalizes understanding of the potential  health risks associated with allergy injections . Patient understands risks including anaphylaxis and chooses not to wait 30 minutes after administration of allergy injection(s).    Patient tolerated well without adverse reaction while the patient was in the office.    SHOT REACTION TREATMENT INSTRUCTIONS    During the 30 minute wait after an allergy injection the following symptoms should be reported:    Itching other than at the injection site  Hives or swelling other than at the injection site  Redness other than at the injection site  Difficulty breathing  Chest tightness  Difficulty swallowing  Throat

## 2025-06-19 ENCOUNTER — PATIENT MESSAGE (OUTPATIENT)
Dept: ALLERGY | Age: 45
End: 2025-06-19

## 2025-06-19 DIAGNOSIS — J45.30 MILD PERSISTENT ASTHMA WITHOUT COMPLICATION: ICD-10-CM

## 2025-06-19 RX ORDER — ALBUTEROL SULFATE 90 UG/1
2 INHALANT RESPIRATORY (INHALATION) EVERY 4 HOURS PRN
Qty: 18 G | Refills: 1 | Status: SHIPPED | OUTPATIENT
Start: 2025-06-19

## 2025-06-19 RX ORDER — ALBUTEROL SULFATE 90 UG/1
INHALANT RESPIRATORY (INHALATION)
Qty: 18 G | Refills: 0 | OUTPATIENT
Start: 2025-06-19

## 2025-06-19 RX ORDER — ALBUTEROL SULFATE 90 UG/1
2 INHALANT RESPIRATORY (INHALATION) EVERY 4 HOURS PRN
Qty: 18 G | Refills: 3 | Status: SHIPPED | OUTPATIENT
Start: 2025-06-19 | End: 2025-06-19 | Stop reason: SDUPTHER

## 2025-06-19 NOTE — TELEPHONE ENCOUNTER
IF THE PATIENT HAS NOT BEEN SEEN DURING THE LAST YEAR, THE MUST HAVE AN APPT TO BE SEEN AND I WILL ONLD SEND IN 90 DAYS ONE TIME.    DID THE PATIENT MISS THE LAST APPT AS A NO SHOW?  no.  IF THE PATIENT WAS A NO SHOW I WILL NOT FILL THE MEDICATION. PATIENTS WILL NEED AN APPT.    __________________________________________________________________________________________________________________________________    Pharmacy requests the following medication to be refilled: And patient. Asthma is being taken over by you. See [facility] [employer] message from today    NAME OF MEDICATION:  albuterol   DOSE OF MEDICATION REQUESTED:  108 mcg  FREQUENCY OF MEDICATION: prn  NUMBER OF MEDICATION FILL DAYS:  30     NAME AND LOCATION OF PHARMACY:  [facility] on [address]     MEDICATION LAST FILLED: 11/23/24    NUMBER OF REFILLS LAST PROVIDED: 11  (If refills are current the patient does not need another refill)    Last appointment: 3/4/25      Next appt : 4/6/26

## 2025-06-19 NOTE — TELEPHONE ENCOUNTER
Called [facility] pharmacy and spoke with Shante stating about pt's refill for albuterol. She states pt is refilling every 17 days and is out of refills. Called pt and asked if she is using it this often and she states yes. Informed pt I would place refills but if that keeps happening, visit with provider needs to happen to discuss asthma and inhalers.  Pt verbalized understanding and thanked me.

## 2025-06-23 ENCOUNTER — CLINICAL SUPPORT (OUTPATIENT)
Dept: ALLERGY | Age: 45
End: 2025-06-23
Payer: MEDICAID

## 2025-06-23 VITALS
RESPIRATION RATE: 18 BRPM | OXYGEN SATURATION: 98 % | HEART RATE: 97 BPM | SYSTOLIC BLOOD PRESSURE: 137 MMHG | DIASTOLIC BLOOD PRESSURE: 88 MMHG

## 2025-06-23 DIAGNOSIS — J30.1 NON-SEASONAL ALLERGIC RHINITIS DUE TO POLLEN: ICD-10-CM

## 2025-06-23 DIAGNOSIS — J30.9 CHRONIC ALLERGIC RHINITIS: Primary | ICD-10-CM

## 2025-06-23 PROCEDURE — 95117 IMMUNOTHERAPY INJECTIONS: CPT | Performed by: NURSE PRACTITIONER

## 2025-06-23 NOTE — PROGRESS NOTES
PATIENT HAS THE FOLLOWING DIAGNOSIS SUPPORTING ADMINISTRATION OF ALLERGY INJECTIONS: J30.1Allergic rhinitis due to pollen  AND 20330 MULTIPLE ALLERGY INJECTIONS FOR ALLERGY INJECTION ADMINISTRATION      Patient here for allergy injection today.  Patient was presented with the opportunity to speak with provider and ask questions regarding allergy injections.  Patient was also instructed they need to wait 30 minutes after receiving allergy injections. All risks associated with potential adverse effects have been explained to patient and patient handout was provided following allergy testing.    After consent obtained/verified, allergy injection subcutaneously given in back of arm(s).  Please see below for specific details of site injections, concentration of serum, and volume injected.    VIAL COLOR OF ALL VIALS TODAY IS red 1:1 concentration    ALLERGY INJECTION FROM VIAL A GIVEN right  UPPER ARM IN THE AMOUNT OF 0.50 ML    ALLERGY INJECTION FROM VIAL B GIVEN left lower ARM IN THE AMOUNT OF 0.50  ML    ALLERGY INJECTION FROM VIAL C GIVEN leftupper ARM IN THE AMOUNT OF 0.50 ML    Documentation of vial injection specific to arm(s) noted on Allergy Immunotherapy Administration Form.       Patient waited 30 minutes for observation.No  Patient has received information and verbalizes understanding of the potential  health risks associated with allergy injections . Patient understands risks including anaphylaxis and chooses not to wait 30 minutes after administration of allergy injection(s).    Patient tolerated well without adverse reaction while the patient was in the office.    SHOT REACTION TREATMENT INSTRUCTIONS    During the 30 minute wait after an allergy injection the following symptoms should be reported:    Itching other than at the injection site  Hives or swelling other than at the injection site  Redness other than at the injection site  Difficulty breathing  Chest tightness  Difficulty swallowing  Throat

## 2025-06-27 NOTE — DISCHARGE INSTRUCTIONS
Post procedure Instructions:    No driving or making significant decisions for the remainder of the day.   Be cautions with walking and activity for 24 hours, do not over exert yourself.  Ok to resume pre-procedure activity level today.  Apply ice to site of injection site if you have pain or discomfort.  Do not submerge sit of injection during bath or pool activities for 48 hours post-procedure.  Resume blood thinning medications in 24 hours.     Call office [phone] if you have:  Temperature greater than 100.4  Persistent nausea and vomiting  Severe uncontrolled pain  Redness, tenderness, or signs of infection (pain, swelling, redness, odor or green/yellow discharge around the site)  Difficulty breathing, headache or visual disturbances  Hives  Persistent dizziness or light-headedness  Extreme fatigue  Any other questions or concerns you may have after discharge    In an emergency, call 911 or go to an Emergency Department at a nearby hospital    “Surgical Site Infections”      How can we work together to prevent Surgical Site Infections?   We would like to thank you for choosing [facility] for your Surgical Care.  Below you will find helpful information on how we can work together to prevent Surgical Site Infections.    What is a Surgical Site Infection (SSI)?  A surgical site infection is an infection that occurs after surgery in the part of the body where the surgery took place. Most patients who have surgery do not develop an infection. However, infections develop in about 1 to 3 out of every 100 patients who have surgery.  Some of the common symptoms of a surgical site infection are:  Redness and pain around the area where you had surgery  Drainage of cloudy fluid from your surgical wound  Fever    Can SSIs be treated?  Yes. Most surgical site infections can be treated with antibiotics. The antibiotic given to you depends on the bacteria (germs) causing the infection. Sometimes patients with

## 2025-06-30 NOTE — H&P
Most of her pain has been right low back/buttocks.  She states this pain is worse with walking, bending, sitting.  She has a sharp stabbing pain in this region without significant leg radiation.  She denies any other new symptoms at this time.    History of Interventions:   Surgery: No previous thoracic or lumbar surgeries  Injections: Thoracic epidural?  Several years ago  Lumbar MBB L4-5 and L5-S1 (02/01/2022) - >85% relief x 2 days  Lumbar MBB L4-5 and L5-S1 (03/15/2022) - >85% relief x 3 days  Right lumbar RFA (04/12/2022) - significant relief  Left lumbar RFA (05/24/2022) - significant relief  B/L GTB injection (06/21/2022) - effective  LESI L5-S1 (11/08/2022) - no relief   B/L SI injection (01/03/2023/02/09/2023) - >85% relief x 1.5 weeks/ >85% relief x 5 days then gradual return  Lumbar RFA (3/26/24) - minimal relief  LESI L4-5 03/2025 -ineffective    Current Treatment Medications:   Gabapentin 600 mg nightly    Historical Treatment Medications:   Cymbalta - fatigue    Ibuprofen - ineffective  Naproxen - upset stomach, increase blood pressure   Tylenol - ineffective   OTC muscle rub -ineffective  Mobic - rare, ineffective  Celebrex   Toradol as needed- mild effectiveness    Baclofen - ineffective     Imaging:  EMG      (Lumbar MRI 12/19/2022)  Narrative   PROCEDURE: MRI LUMBAR SPINE WO CONTRAST       CLINICAL INFORMATION: Radiculopathy, lumbosacral region.       COMPARISON: Plain radiographs dated 2/2/2021.       TECHNIQUE: Sagittal and axial T1 and T2-weighted images were obtained through the lumbar spine.       FINDINGS:           The lumbar vertebral bodies are normally aligned.  There is normal marrow signal throughout.  There is no bone marrow edema.  There are no compression fractures.  No pars defects are noted.  .       The distal spinal cord, conus medullaris and cauda equina are normal.        There are no gross abnormalities in the visualized aspects of the distal thoracic spine.       On the axial

## 2025-07-01 ENCOUNTER — HOSPITAL ENCOUNTER (OUTPATIENT)
Age: 45
Setting detail: OUTPATIENT SURGERY
Discharge: HOME | End: 2025-07-01
Attending: ANESTHESIOLOGY | Admitting: ANESTHESIOLOGY
Payer: MEDICAID

## 2025-07-01 ENCOUNTER — APPOINTMENT (OUTPATIENT)
Dept: GENERAL RADIOLOGY | Age: 45
End: 2025-07-01
Attending: ANESTHESIOLOGY
Payer: MEDICAID

## 2025-07-01 VITALS
RESPIRATION RATE: 16 BRPM | WEIGHT: 224 LBS | BODY MASS INDEX: 38.24 KG/M2 | HEIGHT: 64 IN | SYSTOLIC BLOOD PRESSURE: 120 MMHG | HEART RATE: 72 BPM | DIASTOLIC BLOOD PRESSURE: 77 MMHG | OXYGEN SATURATION: 96 % | TEMPERATURE: 97.5 F

## 2025-07-01 PROCEDURE — 2709999900 HC NON-CHARGEABLE SUPPLY: Performed by: ANESTHESIOLOGY

## 2025-07-01 PROCEDURE — 7100000010 HC PHASE II RECOVERY - FIRST 15 MIN: Performed by: ANESTHESIOLOGY

## 2025-07-01 PROCEDURE — 99152 MOD SED SAME PHYS/QHP 5/>YRS: CPT | Performed by: ANESTHESIOLOGY

## 2025-07-01 PROCEDURE — 64451 NJX AA&/STRD NRV NRVTG SI JT: CPT | Performed by: ANESTHESIOLOGY

## 2025-07-01 PROCEDURE — 7100000011 HC PHASE II RECOVERY - ADDTL 15 MIN: Performed by: ANESTHESIOLOGY

## 2025-07-01 PROCEDURE — 6360000002 HC RX W HCPCS: Performed by: ANESTHESIOLOGY

## 2025-07-01 PROCEDURE — 3600000054 HC PAIN LEVEL 3 BASE: Performed by: ANESTHESIOLOGY

## 2025-07-01 RX ORDER — LIDOCAINE HYDROCHLORIDE 10 MG/ML
INJECTION, SOLUTION EPIDURAL; INFILTRATION; INTRACAUDAL; PERINEURAL PRN
Status: DISCONTINUED | OUTPATIENT
Start: 2025-07-01 | End: 2025-07-01 | Stop reason: ALTCHOICE

## 2025-07-01 RX ORDER — METHYLPREDNISOLONE ACETATE 80 MG/ML
INJECTION, SUSPENSION INTRA-ARTICULAR; INTRALESIONAL; INTRAMUSCULAR; SOFT TISSUE PRN
Status: DISCONTINUED | OUTPATIENT
Start: 2025-07-01 | End: 2025-07-01 | Stop reason: ALTCHOICE

## 2025-07-01 RX ORDER — FENTANYL CITRATE 50 UG/ML
INJECTION, SOLUTION INTRAMUSCULAR; INTRAVENOUS PRN
Status: DISCONTINUED | OUTPATIENT
Start: 2025-07-01 | End: 2025-07-01 | Stop reason: ALTCHOICE

## 2025-07-01 RX ORDER — MIDAZOLAM HYDROCHLORIDE 1 MG/ML
INJECTION, SOLUTION INTRAMUSCULAR; INTRAVENOUS PRN
Status: DISCONTINUED | OUTPATIENT
Start: 2025-07-01 | End: 2025-07-01 | Stop reason: ALTCHOICE

## 2025-07-01 RX ORDER — BUPIVACAINE HYDROCHLORIDE 5 MG/ML
INJECTION, SOLUTION PERINEURAL PRN
Status: DISCONTINUED | OUTPATIENT
Start: 2025-07-01 | End: 2025-07-01 | Stop reason: ALTCHOICE

## 2025-07-01 ASSESSMENT — PAIN - FUNCTIONAL ASSESSMENT: PAIN_FUNCTIONAL_ASSESSMENT: 0-10

## 2025-07-01 ASSESSMENT — PAIN DESCRIPTION - DESCRIPTORS: DESCRIPTORS: ACHING

## 2025-07-01 ASSESSMENT — PAIN SCALES - GENERAL: PAINLEVEL_OUTOF10: 4

## 2025-07-01 NOTE — PROGRESS NOTES
0911-Patient to Phase II via cart. Report received from Rhoda PARIS. Patient drowsy but responsive.Vitals obtained and stable. Respirations even and unlabored on room air. Patient denies pain, nausea, numbness and tingling. Patient able to move all extremities. No drainage noted at injection sites. Patient instructed to stay in bed. Instructed on call light use.  0915-Patient provided with snack and drink. Denies needs. Call light remains in reach.  0930-VSS. IV removed with no complications. Patient getting dressed at bedside. Ride notified of pickup.  0935-Patient meets discharge criteria.  Discharged in stable condition with responsible . All belongings given to patient. Patient ambulated to car with assistance from RN. Patient tolerated well.

## 2025-07-01 NOTE — PROCEDURES
Pre-operative Diagnosis:  SI joint pain     Post-operative Diagnosis:  SI joint pain     Procedure: RIGHT SI joint block     Procedure Description:  After having signed the informed consent, the patient was placed in the prone position.  The patient's low back and buttocks was prepped with chloraprep solution, and draped in a sterile fashion. A total of 1 ml of 1% lidocaine was used to anesthetize the skin and underlying tissues at each level. Next, 3.5 inch 22 g spinal needles were advanced to the anatomic location of the L5 primary dorsal ramus at the junction of the superior articular process and sacral ala utilizing intermittent fluoroscopy, as well as the S1, S2, and S3 lateral branch nerves at the lateral border of the S1, S2, and S3 posterior/dorsal foramen. Then, a 1 cc mixture of 80 mg depo-medrol and 0.25% bupivacaine was injected at each site. The needles were removed without any complications. The patient tolerated the procedure well, was transported to the recovery room and observed for 15 minutes and discharged in an ambulatory fashion. No immediate reported complications.     Procedural Complications: None  Estimated Blood Loss: 0 mL           IV sedation was used during the procedure:  - Moderate intravenous conscious sedation was supervised by Dr. Blackmon  - The patient was independently monitored by a Registered Nurse assigned to the procedure room  - Monitoring included automated blood pressure, continuous EKG, and continuous pulse oximetry  - The detailed conscious record is permanently stored in the Hospital Information System  - The following is the conscious sedation record:  Start Time: 9:05  End Time : 9:20  Duration: 15 minutes   Medications Administered: 2 mg Versed, 100 mcg Fentanyl        Liborio Blackmon DO  Interventional Pain Management/PM&R   [facility]

## 2025-07-01 NOTE — POST SEDATION
[facility]  Sedation/Analgesia Post Sedation Record    Pt Name: Eufemia Koo  MRN: [id number]  YOB: 1980  Procedure Performed By: Liborio Blackmon DO  Primary Care Physician: No primary care provider on file.    POST-PROCEDURE    Physicians/Assistants: Liborio Blackmon DO  Procedure Performed: See Procedure Note   Sedation/Anesthesia: Versed and Fentanyl (See procedure note for amount and duration)  Estimated Blood Loss:     0  ml  Specimens Removed: None        Complications: None           Liborio Blackmon DO  Electronically signed 7/1/2025 at 1:18 PM

## 2025-07-01 NOTE — PRE SEDATION
[facility]  Pre-Sedation/Analgesia History & Physical    Pt Name: Eufemia Koo  MRN: [id number]  YOB: 1980  Provider Performing Procedure: Liborio Blackmon DO   Primary Care Physician: No primary care provider on file.      MEDICAL HISTORY       has a past medical history of Anxiety, Asthma, Bipolar disorder (HCC), Chronic back pain, DDD (degenerative disc disease), cervical, DDD (degenerative disc disease), thoracolumbar, Depression, GERD (gastroesophageal reflux disease), Headache, and Substance abuse (HCC).  SURGICAL HISTORY   has a past surgical history that includes Pain management procedure (Bilateral, 02/01/2022); Pain management procedure (Bilateral, 03/15/2022); Pain management procedure (Right, 04/12/2022); Pain management procedure (Left, 05/24/2022); hip surgery (Bilateral, 06/21/2022); Pain management procedure (N/A, 11/08/2022); Back Injection (Bilateral, 01/03/2023); Back Injection (Bilateral, 02/09/2023); Tubal ligation; Nerve Block (Bilateral, 9/5/2023); Pain management procedure (Bilateral, 3/26/2024); Pain management procedure (Bilateral, 3/11/2025); and Pain management procedure (Right, 7/1/2025).    ALLERGIES   Allergies as of 06/10/2025 - Fully Reviewed 06/10/2025   Allergen Reaction Noted    Fluoxetine Other (See Comments) 02/15/2024    Hydrocodone  02/27/2020    Other  10/28/2024    Prozac [fluoxetine hcl] Other (See Comments) 12/05/2018    Shellfish-derived products  07/31/2024       MEDICATIONS   Prior to Admission medications    Medication Sig Start Date End Date Taking? Authorizing Provider   albuterol sulfate HFA (PROVENTIL;VENTOLIN;PROAIR) 108 (90 Base) MCG/ACT inhaler Inhale 2 puffs into the lungs every 4 hours as needed for Wheezing 6/19/25  Yes Nani Hill APRN - CNP   melatonin (RA MELATONIN) 3 MG TABS tablet Take 1 tablet by mouth nightly 6/11/25 6/11/26 Yes Newman-Waterhouse, Aundrea N, DO   gabapentin (NEURONTIN) 300 MG capsule Take 2 capsules by

## 2025-07-02 ENCOUNTER — CLINICAL SUPPORT (OUTPATIENT)
Dept: ALLERGY | Age: 45
End: 2025-07-02
Payer: MEDICAID

## 2025-07-02 VITALS
HEART RATE: 57 BPM | OXYGEN SATURATION: 99 % | RESPIRATION RATE: 16 BRPM | DIASTOLIC BLOOD PRESSURE: 90 MMHG | SYSTOLIC BLOOD PRESSURE: 150 MMHG

## 2025-07-02 DIAGNOSIS — J30.1 NON-SEASONAL ALLERGIC RHINITIS DUE TO POLLEN: Primary | ICD-10-CM

## 2025-07-02 DIAGNOSIS — J30.9 CHRONIC ALLERGIC RHINITIS: ICD-10-CM

## 2025-07-02 PROCEDURE — 95117 IMMUNOTHERAPY INJECTIONS: CPT | Performed by: NURSE PRACTITIONER

## 2025-07-02 NOTE — PROGRESS NOTES
PATIENT HAS THE FOLLOWING DIAGNOSIS SUPPORTING ADMINISTRATION OF ALLERGY INJECTIONS: J30.1Allergic rhinitis due to pollen  AND 11586 MULTIPLE ALLERGY INJECTIONS FOR ALLERGY INJECTION ADMINISTRATION      Patient here for allergy injection today.  Patient was presented with the opportunity to speak with provider and ask questions regarding allergy injections.  Patient was also instructed they need to wait 30 minutes after receiving allergy injections. All risks associated with potential adverse effects have been explained to patient and patient handout was provided following allergy testing.    After consent obtained/verified, allergy injection subcutaneously given in back of arm(s).  Please see below for specific details of site injections, concentration of serum, and volume injected.    VIAL COLOR OF ALL VIALS TODAY IS red 1:1 concentration    ALLERGY INJECTION FROM VIAL A GIVEN left  UPPER ARM IN THE AMOUNT OF 0.50 ML    ALLERGY INJECTION FROM VIAL B GIVEN right lower ARM IN THE AMOUNT OF 0.50  ML    ALLERGY INJECTION FROM VIAL C GIVEN rightupper ARM IN THE AMOUNT OF 0.50 ML    Documentation of vial injection specific to arm(s) noted on Allergy Immunotherapy Administration Form.       Patient waited 30 minutes for observation.No  Patient has received information and verbalizes understanding of the potential  health risks associated with allergy injections . Patient understands risks including anaphylaxis and chooses not to wait 30 minutes after administration of allergy injection(s).    Patient tolerated well without adverse reaction while the patient was in the office.    SHOT REACTION TREATMENT INSTRUCTIONS    During the 30 minute wait after an allergy injection the following symptoms should be reported:    Itching other than at the injection site  Hives or swelling other than at the injection site  Redness other than at the injection site  Difficulty breathing  Chest tightness  Difficulty swallowing  Throat

## 2025-07-07 ASSESSMENT — PATIENT HEALTH QUESTIONNAIRE - PHQ9
SUM OF ALL RESPONSES TO PHQ QUESTIONS 1-9: 5
2. FEELING DOWN, DEPRESSED OR HOPELESS: SEVERAL DAYS
7. TROUBLE CONCENTRATING ON THINGS, SUCH AS READING THE NEWSPAPER OR WATCHING TELEVISION: NOT AT ALL
5. POOR APPETITE OR OVEREATING: NOT AT ALL
SUM OF ALL RESPONSES TO PHQ QUESTIONS 1-9: 5
1. LITTLE INTEREST OR PLEASURE IN DOING THINGS: MORE THAN HALF THE DAYS
2. FEELING DOWN, DEPRESSED OR HOPELESS: SEVERAL DAYS
9. THOUGHTS THAT YOU WOULD BE BETTER OFF DEAD, OR OF HURTING YOURSELF: NOT AT ALL
10. IF YOU CHECKED OFF ANY PROBLEMS, HOW DIFFICULT HAVE THESE PROBLEMS MADE IT FOR YOU TO DO YOUR WORK, TAKE CARE OF THINGS AT HOME, OR GET ALONG WITH OTHER PEOPLE: NOT DIFFICULT AT ALL
4. FEELING TIRED OR HAVING LITTLE ENERGY: SEVERAL DAYS
8. MOVING OR SPEAKING SO SLOWLY THAT OTHER PEOPLE COULD HAVE NOTICED. OR THE OPPOSITE - BEING SO FIDGETY OR RESTLESS THAT YOU HAVE BEEN MOVING AROUND A LOT MORE THAN USUAL: NOT AT ALL
9. THOUGHTS THAT YOU WOULD BE BETTER OFF DEAD, OR OF HURTING YOURSELF: NOT AT ALL
10. IF YOU CHECKED OFF ANY PROBLEMS, HOW DIFFICULT HAVE THESE PROBLEMS MADE IT FOR YOU TO DO YOUR WORK, TAKE CARE OF THINGS AT HOME, OR GET ALONG WITH OTHER PEOPLE: NOT DIFFICULT AT ALL
SUM OF ALL RESPONSES TO PHQ QUESTIONS 1-9: 5
3. TROUBLE FALLING OR STAYING ASLEEP: NOT AT ALL
5. POOR APPETITE OR OVEREATING: NOT AT ALL
SUM OF ALL RESPONSES TO PHQ QUESTIONS 1-9: 5
6. FEELING BAD ABOUT YOURSELF - OR THAT YOU ARE A FAILURE OR HAVE LET YOURSELF OR YOUR FAMILY DOWN: SEVERAL DAYS
7. TROUBLE CONCENTRATING ON THINGS, SUCH AS READING THE NEWSPAPER OR WATCHING TELEVISION: NOT AT ALL
SUM OF ALL RESPONSES TO PHQ QUESTIONS 1-9: 5
6. FEELING BAD ABOUT YOURSELF - OR THAT YOU ARE A FAILURE OR HAVE LET YOURSELF OR YOUR FAMILY DOWN: SEVERAL DAYS
8. MOVING OR SPEAKING SO SLOWLY THAT OTHER PEOPLE COULD HAVE NOTICED. OR THE OPPOSITE, BEING SO FIGETY OR RESTLESS THAT YOU HAVE BEEN MOVING AROUND A LOT MORE THAN USUAL: NOT AT ALL
3. TROUBLE FALLING OR STAYING ASLEEP: NOT AT ALL
1. LITTLE INTEREST OR PLEASURE IN DOING THINGS: MORE THAN HALF THE DAYS
4. FEELING TIRED OR HAVING LITTLE ENERGY: SEVERAL DAYS

## 2025-07-07 ASSESSMENT — ANXIETY QUESTIONNAIRES
5. BEING SO RESTLESS THAT IT IS HARD TO SIT STILL: SEVERAL DAYS
5. BEING SO RESTLESS THAT IT IS HARD TO SIT STILL: SEVERAL DAYS
2. NOT BEING ABLE TO STOP OR CONTROL WORRYING: NOT AT ALL
1. FEELING NERVOUS, ANXIOUS, OR ON EDGE: SEVERAL DAYS
3. WORRYING TOO MUCH ABOUT DIFFERENT THINGS: NOT AT ALL
IF YOU CHECKED OFF ANY PROBLEMS ON THIS QUESTIONNAIRE, HOW DIFFICULT HAVE THESE PROBLEMS MADE IT FOR YOU TO DO YOUR WORK, TAKE CARE OF THINGS AT HOME, OR GET ALONG WITH OTHER PEOPLE: NOT DIFFICULT AT ALL
7. FEELING AFRAID AS IF SOMETHING AWFUL MIGHT HAPPEN: NOT AT ALL
3. WORRYING TOO MUCH ABOUT DIFFERENT THINGS: NOT AT ALL
6. BECOMING EASILY ANNOYED OR IRRITABLE: SEVERAL DAYS
IF YOU CHECKED OFF ANY PROBLEMS ON THIS QUESTIONNAIRE, HOW DIFFICULT HAVE THESE PROBLEMS MADE IT FOR YOU TO DO YOUR WORK, TAKE CARE OF THINGS AT HOME, OR GET ALONG WITH OTHER PEOPLE: NOT DIFFICULT AT ALL
2. NOT BEING ABLE TO STOP OR CONTROL WORRYING: NOT AT ALL
6. BECOMING EASILY ANNOYED OR IRRITABLE: SEVERAL DAYS
GAD7 TOTAL SCORE: 4
7. FEELING AFRAID AS IF SOMETHING AWFUL MIGHT HAPPEN: NOT AT ALL
4. TROUBLE RELAXING: SEVERAL DAYS
4. TROUBLE RELAXING: SEVERAL DAYS
1. FEELING NERVOUS, ANXIOUS, OR ON EDGE: SEVERAL DAYS

## 2025-07-08 ENCOUNTER — OFFICE VISIT (OUTPATIENT)
Dept: PSYCHIATRY | Age: 45
End: 2025-07-08

## 2025-07-08 VITALS — HEART RATE: 75 BPM | SYSTOLIC BLOOD PRESSURE: 134 MMHG | DIASTOLIC BLOOD PRESSURE: 93 MMHG

## 2025-07-08 DIAGNOSIS — F39 UNSPECIFIED MOOD (AFFECTIVE) DISORDER: Primary | ICD-10-CM

## 2025-07-08 DIAGNOSIS — F43.10 PTSD (POST-TRAUMATIC STRESS DISORDER): ICD-10-CM

## 2025-07-08 DIAGNOSIS — F17.200 TOBACCO USE DISORDER: ICD-10-CM

## 2025-07-08 RX ORDER — BUPROPION HYDROCHLORIDE 150 MG/1
150 TABLET, EXTENDED RELEASE ORAL 2 TIMES DAILY
Qty: 60 TABLET | Refills: 2 | Status: SHIPPED | OUTPATIENT
Start: 2025-07-08

## 2025-07-08 NOTE — PROGRESS NOTES
[facility] [place] [facility]  [facility]  [address] [id number] [phone]    Progress Note    Patient:  Eufemia Koo  YOB: 1980  PCP:  No primary care provider on file.  Visit Date:  7/8/2025      CC: Follow up for medication management for    Diagnosis Orders   1. Unspecified mood (affective) disorder  buPROPion (WELLBUTRIN SR) 150 MG extended release tablet      2. PTSD (post-traumatic stress disorder)        3. Tobacco use disorder  buPROPion (WELLBUTRIN SR) 150 MG extended release tablet                SUBJECTIVE:      Eufemia Koo, a 45 y.o. female, presents for a follow up visit.  Reports she is doing okay.  She never started the bupropion.  She stopped Chantix as it is no longer working.  Continues to struggle with tobacco use disorder.  Reports some depressive symptoms but reports these fluctuate.  Denies suicidal or homicidal thoughts.  Continues to have some intermittent relationship conflicts with family but overall doing better.  Anxiety is much improved since resuming hydroxyzine.  Sleeping much better.  Appetite stable. Still some hypervigilance but denies other PTSD symptoms.    16 minutes spent in psychotherapy.  Focus was on stressors and tobacco use.    OBJECTIVE:  Vitals:    07/08/25 1013   BP: (!) 134/93   Pulse: 75           MENTAL STATUS EXAM:  Orientation: Alert, oriented, thought content appropriate   Mood:. \"Pretty good\"      Affect:  Mildly constricted   Appearance:  Age Appropriate, Clothing Appropriate for Age, and Clothing Appropriate for Weather   Thought Process:  Within Normal Limits   Thought Content:  Within Normal Limits   Insight:  Age Appropriate   Judgment: Age Appropriate   Suicidal Ideations: Denies               7/7/2025    11:24 AM   PHQ-9    Little interest or pleasure in doing things 2   Feeling down, depressed, or hopeless 1   Trouble falling or staying asleep, or sleeping too much 0

## 2025-07-08 NOTE — PATIENT INSTRUCTIONS
Lm for call back for appt. Plan:  Eufemia Koo, it was nice seeing you today  Start bupropion SR 150mg daily for 3 days and if tolerated increase to 150mg twice daily with second dose around 2pm. Monitor for side effects  Continue hydroxyzine and melatonin nightly  Follow up in 6 weeks    Here are some effective smoking cessation tips and resources that you can share with your patients:  Tips for Smoking Cessation  Set a Quit Date: Encourage patients to choose a specific date to quit smoking. This helps them mentally prepare and commit to the process.  Identify Triggers: Help patients identify situations or emotions that trigger the urge to smoke and develop strategies to avoid or cope with these triggers.  Use Nicotine Replacement Therapy (NRT): Recommend options like nicotine patches, gum, lozenges, inhalers, or nasal sprays to help manage withdrawal symptoms.  Consider Prescription Medications: Discuss medications such as bupropion (Zyban) or varenicline (Chantix) that can aid in reducing cravings and withdrawal symptoms.  Behavioral Support: Encourage participation in counseling or support groups, which can provide motivation and coping strategies.  Develop a Support System: Suggest that patients inform family and friends about their quit attempt to gain support and encouragement.  Stay Active: Recommend regular physical activity to help reduce cravings and improve mood.  Practice Stress Management: Teach relaxation techniques such as deep breathing, meditation, or yoga to help manage stress without smoking.  Track Progress: Encourage keeping a journal to track progress, challenges, and successes.  Reward Milestones: Suggest setting up a reward system for reaching certain milestones, such as being smoke-free for a week or a month.  Resources for Smoking Cessation  Quitlines:  1-800-QUIT-NOW: A free, confidential resource offering support and advice from trained counselors.  Online Support:  Smokefree.gov: Provides tools, tips, and

## 2025-07-23 ENCOUNTER — CLINICAL SUPPORT (OUTPATIENT)
Dept: ALLERGY | Age: 45
End: 2025-07-23
Payer: MEDICAID

## 2025-07-23 VITALS
SYSTOLIC BLOOD PRESSURE: 119 MMHG | RESPIRATION RATE: 18 BRPM | OXYGEN SATURATION: 98 % | HEART RATE: 87 BPM | DIASTOLIC BLOOD PRESSURE: 99 MMHG

## 2025-07-23 DIAGNOSIS — J30.9 CHRONIC ALLERGIC RHINITIS: ICD-10-CM

## 2025-07-23 DIAGNOSIS — J30.1 NON-SEASONAL ALLERGIC RHINITIS DUE TO POLLEN: Primary | ICD-10-CM

## 2025-07-23 PROCEDURE — 95117 IMMUNOTHERAPY INJECTIONS: CPT | Performed by: NURSE PRACTITIONER

## 2025-07-23 NOTE — PROGRESS NOTES
PATIENT HAS THE FOLLOWING DIAGNOSIS SUPPORTING ADMINISTRATION OF ALLERGY INJECTIONS: J30.1Allergic rhinitis due to pollen  AND 68792 MULTIPLE ALLERGY INJECTIONS FOR ALLERGY INJECTION ADMINISTRATION      Patient here for allergy injection today.  Patient was presented with the opportunity to speak with provider and ask questions regarding allergy injections.  Patient was also instructed they need to wait 30 minutes after receiving allergy injections. All risks associated with potential adverse effects have been explained to patient and patient handout was provided following allergy testing.    After consent obtained/verified, allergy injection subcutaneously given in back of arm(s).  Please see below for specific details of site injections, concentration of serum, and volume injected.    VIAL COLOR OF ALL VIALS TODAY IS red 1:1 concentration    ALLERGY INJECTION FROM VIAL A GIVEN right  UPPER ARM IN THE AMOUNT OF 0.50 ML    ALLERGY INJECTION FROM VIAL B GIVEN left upper ARM IN THE AMOUNT OF 0.50  ML    ALLERGY INJECTION FROM VIAL C GIVEN leftlower ARM IN THE AMOUNT OF 0.50 ML    Documentation of vial injection specific to arm(s) noted on Allergy Immunotherapy Administration Form.       Patient waited 30 minutes for observation.No  Patient has received information and verbalizes understanding of the potential  health risks associated with allergy injections . Patient understands risks including anaphylaxis and chooses not to wait 30 minutes after administration of allergy injection(s).    Patient tolerated well without adverse reaction while the patient was in the office.    SHOT REACTION TREATMENT INSTRUCTIONS    During the 30 minute wait after an allergy injection the following symptoms should be reported:    Itching other than at the injection site  Hives or swelling other than at the injection site  Redness other than at the injection site  Difficulty breathing  Chest tightness  Difficulty swallowing  Throat

## 2025-07-28 ENCOUNTER — CLINICAL SUPPORT (OUTPATIENT)
Dept: ALLERGY | Age: 45
End: 2025-07-28
Payer: MEDICAID

## 2025-07-28 VITALS
OXYGEN SATURATION: 98 % | HEART RATE: 80 BPM | RESPIRATION RATE: 18 BRPM | DIASTOLIC BLOOD PRESSURE: 99 MMHG | SYSTOLIC BLOOD PRESSURE: 145 MMHG

## 2025-07-28 DIAGNOSIS — J30.9 CHRONIC ALLERGIC RHINITIS: ICD-10-CM

## 2025-07-28 DIAGNOSIS — J30.1 NON-SEASONAL ALLERGIC RHINITIS DUE TO POLLEN: Primary | ICD-10-CM

## 2025-07-28 PROCEDURE — 95117 IMMUNOTHERAPY INJECTIONS: CPT | Performed by: NURSE PRACTITIONER

## 2025-07-28 NOTE — PROGRESS NOTES
PATIENT HAS THE FOLLOWING DIAGNOSIS SUPPORTING ADMINISTRATION OF ALLERGY INJECTIONS: J30.1Allergic rhinitis due to pollen  AND 79518 MULTIPLE ALLERGY INJECTIONS FOR ALLERGY INJECTION ADMINISTRATION      Patient here for allergy injection today.  Patient was presented with the opportunity to speak with provider and ask questions regarding allergy injections.  Patient was also instructed they need to wait 30 minutes after receiving allergy injections. All risks associated with potential adverse effects have been explained to patient and patient handout was provided following allergy testing.    After consent obtained/verified, allergy injection subcutaneously given in back of arm(s).  Please see below for specific details of site injections, concentration of serum, and volume injected.    VIAL COLOR OF ALL VIALS TODAY IS red 1:1 concentration    ALLERGY INJECTION FROM VIAL A GIVEN left  UPPER ARM IN THE AMOUNT OF 0.50 ML    ALLERGY INJECTION FROM VIAL B GIVEN right upper ARM IN THE AMOUNT OF 0.50  ML    ALLERGY INJECTION FROM VIAL C GIVEN rightlower ARM IN THE AMOUNT OF 0.50 ML    Documentation of vial injection specific to arm(s) noted on Allergy Immunotherapy Administration Form.       Patient waited 30 minutes for observation.No  Patient has received information and verbalizes understanding of the potential  health risks associated with allergy injections . Patient understands risks including anaphylaxis and chooses not to wait 30 minutes after administration of allergy injection(s).    Patient tolerated well without adverse reaction while the patient was in the office.    SHOT REACTION TREATMENT INSTRUCTIONS    During the 30 minute wait after an allergy injection the following symptoms should be reported:    Itching other than at the injection site  Hives or swelling other than at the injection site  Redness other than at the injection site  Difficulty breathing  Chest tightness  Difficulty swallowing  Throat

## 2025-08-05 RX ORDER — MEDROXYPROGESTERONE ACETATE 150 MG/ML
INJECTION, SUSPENSION INTRAMUSCULAR
Qty: 4 ML | Refills: 0 | Status: ACTIVE | OUTPATIENT
Start: 2025-08-05

## 2025-08-12 ENCOUNTER — OFFICE VISIT (OUTPATIENT)
Dept: PHYSICAL MEDICINE AND REHAB | Age: 45
End: 2025-08-12
Payer: MEDICAID

## 2025-08-12 VITALS
DIASTOLIC BLOOD PRESSURE: 68 MMHG | SYSTOLIC BLOOD PRESSURE: 102 MMHG | BODY MASS INDEX: 38.24 KG/M2 | HEIGHT: 64 IN | WEIGHT: 224 LBS

## 2025-08-12 DIAGNOSIS — G89.4 CHRONIC PAIN SYNDROME: ICD-10-CM

## 2025-08-12 DIAGNOSIS — M47.816 LUMBAR SPONDYLOSIS: ICD-10-CM

## 2025-08-12 DIAGNOSIS — M54.17 RADICULOPATHY, LUMBOSACRAL REGION: ICD-10-CM

## 2025-08-12 DIAGNOSIS — M53.3 SACROILIAC JOINT DYSFUNCTION OF RIGHT SIDE: Primary | ICD-10-CM

## 2025-08-12 PROCEDURE — G8417 CALC BMI ABV UP PARAM F/U: HCPCS | Performed by: NURSE PRACTITIONER

## 2025-08-12 PROCEDURE — 4004F PT TOBACCO SCREEN RCVD TLK: CPT | Performed by: NURSE PRACTITIONER

## 2025-08-12 PROCEDURE — 99214 OFFICE O/P EST MOD 30 MIN: CPT | Performed by: NURSE PRACTITIONER

## 2025-08-12 PROCEDURE — G8427 DOCREV CUR MEDS BY ELIG CLIN: HCPCS | Performed by: NURSE PRACTITIONER

## 2025-08-13 ENCOUNTER — CLINICAL SUPPORT (OUTPATIENT)
Dept: ALLERGY | Age: 45
End: 2025-08-13

## 2025-08-13 VITALS
HEART RATE: 85 BPM | SYSTOLIC BLOOD PRESSURE: 129 MMHG | RESPIRATION RATE: 18 BRPM | OXYGEN SATURATION: 98 % | DIASTOLIC BLOOD PRESSURE: 93 MMHG

## 2025-08-13 DIAGNOSIS — J30.1 NON-SEASONAL ALLERGIC RHINITIS DUE TO POLLEN: Primary | ICD-10-CM

## 2025-08-13 DIAGNOSIS — J30.9 CHRONIC ALLERGIC RHINITIS: ICD-10-CM

## 2025-08-14 DIAGNOSIS — Z29.89 IMMUNOTHERAPY: ICD-10-CM

## 2025-08-14 DIAGNOSIS — J30.9 CHRONIC ALLERGIC RHINITIS: Primary | ICD-10-CM

## 2025-08-16 ASSESSMENT — ANXIETY QUESTIONNAIRES
1. FEELING NERVOUS, ANXIOUS, OR ON EDGE: SEVERAL DAYS
7. FEELING AFRAID AS IF SOMETHING AWFUL MIGHT HAPPEN: NOT AT ALL
3. WORRYING TOO MUCH ABOUT DIFFERENT THINGS: NOT AT ALL
IF YOU CHECKED OFF ANY PROBLEMS ON THIS QUESTIONNAIRE, HOW DIFFICULT HAVE THESE PROBLEMS MADE IT FOR YOU TO DO YOUR WORK, TAKE CARE OF THINGS AT HOME, OR GET ALONG WITH OTHER PEOPLE: SOMEWHAT DIFFICULT
2. NOT BEING ABLE TO STOP OR CONTROL WORRYING: NOT AT ALL
3. WORRYING TOO MUCH ABOUT DIFFERENT THINGS: NOT AT ALL
5. BEING SO RESTLESS THAT IT IS HARD TO SIT STILL: SEVERAL DAYS
5. BEING SO RESTLESS THAT IT IS HARD TO SIT STILL: SEVERAL DAYS
1. FEELING NERVOUS, ANXIOUS, OR ON EDGE: SEVERAL DAYS
2. NOT BEING ABLE TO STOP OR CONTROL WORRYING: NOT AT ALL
6. BECOMING EASILY ANNOYED OR IRRITABLE: SEVERAL DAYS
6. BECOMING EASILY ANNOYED OR IRRITABLE: SEVERAL DAYS
7. FEELING AFRAID AS IF SOMETHING AWFUL MIGHT HAPPEN: NOT AT ALL
IF YOU CHECKED OFF ANY PROBLEMS ON THIS QUESTIONNAIRE, HOW DIFFICULT HAVE THESE PROBLEMS MADE IT FOR YOU TO DO YOUR WORK, TAKE CARE OF THINGS AT HOME, OR GET ALONG WITH OTHER PEOPLE: SOMEWHAT DIFFICULT

## 2025-08-16 ASSESSMENT — PATIENT HEALTH QUESTIONNAIRE - PHQ9
7. TROUBLE CONCENTRATING ON THINGS, SUCH AS READING THE NEWSPAPER OR WATCHING TELEVISION: NOT AT ALL
SUM OF ALL RESPONSES TO PHQ QUESTIONS 1-9: 5
SUM OF ALL RESPONSES TO PHQ QUESTIONS 1-9: 5
6. FEELING BAD ABOUT YOURSELF - OR THAT YOU ARE A FAILURE OR HAVE LET YOURSELF OR YOUR FAMILY DOWN: NOT AT ALL
4. FEELING TIRED OR HAVING LITTLE ENERGY: NEARLY EVERY DAY
SUM OF ALL RESPONSES TO PHQ QUESTIONS 1-9: 5
8. MOVING OR SPEAKING SO SLOWLY THAT OTHER PEOPLE COULD HAVE NOTICED. OR THE OPPOSITE - BEING SO FIDGETY OR RESTLESS THAT YOU HAVE BEEN MOVING AROUND A LOT MORE THAN USUAL: NOT AT ALL
6. FEELING BAD ABOUT YOURSELF - OR THAT YOU ARE A FAILURE OR HAVE LET YOURSELF OR YOUR FAMILY DOWN: NOT AT ALL
7. TROUBLE CONCENTRATING ON THINGS, SUCH AS READING THE NEWSPAPER OR WATCHING TELEVISION: NOT AT ALL
10. IF YOU CHECKED OFF ANY PROBLEMS, HOW DIFFICULT HAVE THESE PROBLEMS MADE IT FOR YOU TO DO YOUR WORK, TAKE CARE OF THINGS AT HOME, OR GET ALONG WITH OTHER PEOPLE: SOMEWHAT DIFFICULT
1. LITTLE INTEREST OR PLEASURE IN DOING THINGS: NOT AT ALL
9. THOUGHTS THAT YOU WOULD BE BETTER OFF DEAD, OR OF HURTING YOURSELF: NOT AT ALL
3. TROUBLE FALLING OR STAYING ASLEEP: MORE THAN HALF THE DAYS
8. MOVING OR SPEAKING SO SLOWLY THAT OTHER PEOPLE COULD HAVE NOTICED. OR THE OPPOSITE, BEING SO FIGETY OR RESTLESS THAT YOU HAVE BEEN MOVING AROUND A LOT MORE THAN USUAL: NOT AT ALL
3. TROUBLE FALLING OR STAYING ASLEEP: MORE THAN HALF THE DAYS
10. IF YOU CHECKED OFF ANY PROBLEMS, HOW DIFFICULT HAVE THESE PROBLEMS MADE IT FOR YOU TO DO YOUR WORK, TAKE CARE OF THINGS AT HOME, OR GET ALONG WITH OTHER PEOPLE: SOMEWHAT DIFFICULT
5. POOR APPETITE OR OVEREATING: NOT AT ALL
5. POOR APPETITE OR OVEREATING: NOT AT ALL
2. FEELING DOWN, DEPRESSED OR HOPELESS: NOT AT ALL
4. FEELING TIRED OR HAVING LITTLE ENERGY: NEARLY EVERY DAY
9. THOUGHTS THAT YOU WOULD BE BETTER OFF DEAD, OR OF HURTING YOURSELF: NOT AT ALL
SUM OF ALL RESPONSES TO PHQ QUESTIONS 1-9: 5
2. FEELING DOWN, DEPRESSED OR HOPELESS: NOT AT ALL
1. LITTLE INTEREST OR PLEASURE IN DOING THINGS: NOT AT ALL
SUM OF ALL RESPONSES TO PHQ QUESTIONS 1-9: 5

## 2025-08-19 ENCOUNTER — OFFICE VISIT (OUTPATIENT)
Dept: PSYCHIATRY | Age: 45
End: 2025-08-19
Payer: MEDICAID

## 2025-08-19 DIAGNOSIS — F41.1 GAD (GENERALIZED ANXIETY DISORDER): ICD-10-CM

## 2025-08-19 DIAGNOSIS — F39 UNSPECIFIED MOOD (AFFECTIVE) DISORDER: ICD-10-CM

## 2025-08-19 DIAGNOSIS — F17.200 TOBACCO USE DISORDER: Primary | ICD-10-CM

## 2025-08-19 DIAGNOSIS — F43.10 PTSD (POST-TRAUMATIC STRESS DISORDER): ICD-10-CM

## 2025-08-19 PROCEDURE — G8427 DOCREV CUR MEDS BY ELIG CLIN: HCPCS | Performed by: PSYCHIATRY & NEUROLOGY

## 2025-08-19 PROCEDURE — 99406 BEHAV CHNG SMOKING 3-10 MIN: CPT | Performed by: PSYCHIATRY & NEUROLOGY

## 2025-08-19 PROCEDURE — 4004F PT TOBACCO SCREEN RCVD TLK: CPT | Performed by: PSYCHIATRY & NEUROLOGY

## 2025-08-19 PROCEDURE — 99214 OFFICE O/P EST MOD 30 MIN: CPT | Performed by: PSYCHIATRY & NEUROLOGY

## 2025-08-19 PROCEDURE — G8417 CALC BMI ABV UP PARAM F/U: HCPCS | Performed by: PSYCHIATRY & NEUROLOGY

## 2025-08-19 RX ORDER — NICOTINE 21 MG/24HR
1 PATCH, TRANSDERMAL 24 HOURS TRANSDERMAL DAILY
Qty: 14 PATCH | Refills: 0 | Status: SHIPPED | OUTPATIENT
Start: 2025-08-19 | End: 2025-09-02

## 2025-08-19 RX ORDER — BUPROPION HYDROCHLORIDE 150 MG/1
150 TABLET, EXTENDED RELEASE ORAL 2 TIMES DAILY
Qty: 60 TABLET | Refills: 5 | Status: SHIPPED | OUTPATIENT
Start: 2025-08-19

## 2025-08-19 RX ORDER — NICOTINE 21 MG/24HR
1 PATCH, TRANSDERMAL 24 HOURS TRANSDERMAL DAILY
Qty: 42 PATCH | Refills: 0 | Status: SHIPPED | OUTPATIENT
Start: 2025-08-19 | End: 2025-09-30

## 2025-08-19 ASSESSMENT — PATIENT HEALTH QUESTIONNAIRE - PHQ9
6. FEELING BAD ABOUT YOURSELF - OR THAT YOU ARE A FAILURE OR HAVE LET YOURSELF OR YOUR FAMILY DOWN: NOT AT ALL
8. MOVING OR SPEAKING SO SLOWLY THAT OTHER PEOPLE COULD HAVE NOTICED. OR THE OPPOSITE, BEING SO FIGETY OR RESTLESS THAT YOU HAVE BEEN MOVING AROUND A LOT MORE THAN USUAL: NOT AT ALL
SUM OF ALL RESPONSES TO PHQ QUESTIONS 1-9: 3
10. IF YOU CHECKED OFF ANY PROBLEMS, HOW DIFFICULT HAVE THESE PROBLEMS MADE IT FOR YOU TO DO YOUR WORK, TAKE CARE OF THINGS AT HOME, OR GET ALONG WITH OTHER PEOPLE: NOT DIFFICULT AT ALL
7. TROUBLE CONCENTRATING ON THINGS, SUCH AS READING THE NEWSPAPER OR WATCHING TELEVISION: NOT AT ALL
3. TROUBLE FALLING OR STAYING ASLEEP: SEVERAL DAYS
9. THOUGHTS THAT YOU WOULD BE BETTER OFF DEAD, OR OF HURTING YOURSELF: NOT AT ALL
SUM OF ALL RESPONSES TO PHQ QUESTIONS 1-9: 3
4. FEELING TIRED OR HAVING LITTLE ENERGY: MORE THAN HALF THE DAYS
SUM OF ALL RESPONSES TO PHQ QUESTIONS 1-9: 3
1. LITTLE INTEREST OR PLEASURE IN DOING THINGS: NOT AT ALL
SUM OF ALL RESPONSES TO PHQ QUESTIONS 1-9: 3
5. POOR APPETITE OR OVEREATING: NOT AT ALL
2. FEELING DOWN, DEPRESSED OR HOPELESS: NOT AT ALL

## 2025-08-22 ENCOUNTER — OFFICE VISIT (OUTPATIENT)
Age: 45
End: 2025-08-22
Payer: MEDICAID

## 2025-08-22 VITALS
HEART RATE: 92 BPM | SYSTOLIC BLOOD PRESSURE: 124 MMHG | HEIGHT: 64 IN | BODY MASS INDEX: 38.41 KG/M2 | TEMPERATURE: 97.8 F | OXYGEN SATURATION: 97 % | DIASTOLIC BLOOD PRESSURE: 62 MMHG | WEIGHT: 225 LBS

## 2025-08-22 DIAGNOSIS — G47.10 HYPERSOMNIA: Primary | ICD-10-CM

## 2025-08-22 PROCEDURE — 4004F PT TOBACCO SCREEN RCVD TLK: CPT | Performed by: INTERNAL MEDICINE

## 2025-08-22 PROCEDURE — G8427 DOCREV CUR MEDS BY ELIG CLIN: HCPCS | Performed by: INTERNAL MEDICINE

## 2025-08-22 PROCEDURE — 99213 OFFICE O/P EST LOW 20 MIN: CPT | Performed by: INTERNAL MEDICINE

## 2025-08-22 PROCEDURE — G8417 CALC BMI ABV UP PARAM F/U: HCPCS | Performed by: INTERNAL MEDICINE

## 2025-08-27 ENCOUNTER — CLINICAL SUPPORT (OUTPATIENT)
Dept: ALLERGY | Age: 45
End: 2025-08-27
Payer: MEDICAID

## 2025-08-27 VITALS
DIASTOLIC BLOOD PRESSURE: 83 MMHG | HEART RATE: 95 BPM | SYSTOLIC BLOOD PRESSURE: 126 MMHG | OXYGEN SATURATION: 98 % | RESPIRATION RATE: 18 BRPM

## 2025-08-27 DIAGNOSIS — J30.1 NON-SEASONAL ALLERGIC RHINITIS DUE TO POLLEN: Primary | ICD-10-CM

## 2025-08-27 DIAGNOSIS — J30.9 CHRONIC ALLERGIC RHINITIS: ICD-10-CM

## 2025-08-27 PROCEDURE — 95117 IMMUNOTHERAPY INJECTIONS: CPT | Performed by: NURSE PRACTITIONER

## 2025-08-29 RX ORDER — HYDROXYZINE HYDROCHLORIDE 50 MG/1
TABLET, FILM COATED ORAL
Qty: 30 TABLET | Refills: 5 | Status: SHIPPED | OUTPATIENT
Start: 2025-08-29

## 2025-09-03 ENCOUNTER — CLINICAL SUPPORT (OUTPATIENT)
Dept: ALLERGY | Age: 45
End: 2025-09-03
Payer: MEDICAID

## 2025-09-03 VITALS
SYSTOLIC BLOOD PRESSURE: 109 MMHG | HEART RATE: 94 BPM | DIASTOLIC BLOOD PRESSURE: 94 MMHG | RESPIRATION RATE: 18 BRPM | OXYGEN SATURATION: 98 %

## 2025-09-03 DIAGNOSIS — J30.9 CHRONIC ALLERGIC RHINITIS: ICD-10-CM

## 2025-09-03 DIAGNOSIS — J30.1 NON-SEASONAL ALLERGIC RHINITIS DUE TO POLLEN: Primary | ICD-10-CM

## 2025-09-03 PROCEDURE — 95117 IMMUNOTHERAPY INJECTIONS: CPT | Performed by: NURSE PRACTITIONER

## (undated) DEVICE — GLOVE ORANGE PI 8 1/2   MSG9085

## (undated) DEVICE — HYPODERMIC SAFETY NEEDLE: Brand: MAGELLAN

## (undated) DEVICE — SYRINGE MED 3ML CLR PLAS STD N CTRL LUERLOCK TIP DISP

## (undated) DEVICE — TOWEL,OR,DSP,ST,BLUE,STD,4/PK,20PK/CS: Brand: MEDLINE

## (undated) DEVICE — MARKER,SKIN,WI/RULER AND LABELS: Brand: MEDLINE

## (undated) DEVICE — SYRINGE MEDICAL 3ML CLEAR PLASTIC STANDARD NON CONTROL LUERLOCK TIP DISPOSABLE

## (undated) DEVICE — GAUZE SPONGES,USP TYPE VII GAUZE, 12 PLY: Brand: CURITY

## (undated) DEVICE — GLOVE BIOGEL POWDER FREE SZ 8

## (undated) DEVICE — 1840 FOAM BLOCK NEEDLE COUNTER: Brand: DEVON

## (undated) DEVICE — APPLICATOR MEDICATED 10.5 CC SOLUTION CLR STRL CHLORAPREP

## (undated) DEVICE — NEEDLE SPNL 22GA L3.5IN BLK HUB S STL REG WALL FIT STYL

## (undated) DEVICE — 6 ML SYRINGE LUER-LOCK TIP: Brand: MONOJECT

## (undated) DEVICE — NEEDLE HYPO 18GA L1.5IN THN WALL PIVOTING SHLD BVL ORIENTED

## (undated) DEVICE — SYRINGE MED 10ML LUERLOCK TIP W/O SFTY DISP

## (undated) DEVICE — NEEDLE SPNL 22GA L3.5IN BLK HUB S STL REG WALL FIT STYL W/

## (undated) DEVICE — APPLICATOR MEDICATED 3 CC SOLUTION CLR STRL CHLORAPREP

## (undated) DEVICE — NEEDLE SPINAL 22GA L3.5IN SPINOCAN

## (undated) DEVICE — 3 ML SYRINGE LUER-LOCK TIP: Brand: MONOJECT

## (undated) DEVICE — SC PAIN PACK: Brand: MEDLINE INDUSTRIES, INC.

## (undated) DEVICE — APPLICATOR MEDICATED 26 CC SOLUTION HI LT ORNG CHLORAPREP

## (undated) DEVICE — NEEDLE EPI L3.5IN OD20GA CLR POLYCARB HUB WNG N DEHP TUOHY

## (undated) DEVICE — Device

## (undated) DEVICE — SYRINGE MED 7ML PLAS LUERSLIP LOSS OF RESISTANCE EPILOR

## (undated) DEVICE — COUNTER NDL 10 COUNT HLD 20 FOAM BLK SGL MAG